# Patient Record
Sex: FEMALE | Race: WHITE | NOT HISPANIC OR LATINO | Employment: FULL TIME | URBAN - METROPOLITAN AREA
[De-identification: names, ages, dates, MRNs, and addresses within clinical notes are randomized per-mention and may not be internally consistent; named-entity substitution may affect disease eponyms.]

---

## 2017-01-27 ENCOUNTER — OFFICE VISIT (OUTPATIENT)
Dept: FAMILY MEDICINE CLINIC | Facility: CLINIC | Age: 57
End: 2017-01-27

## 2017-01-27 VITALS
HEART RATE: 87 BPM | OXYGEN SATURATION: 97 % | BODY MASS INDEX: 27.49 KG/M2 | TEMPERATURE: 98.7 F | HEIGHT: 64 IN | SYSTOLIC BLOOD PRESSURE: 122 MMHG | WEIGHT: 161 LBS | DIASTOLIC BLOOD PRESSURE: 84 MMHG

## 2017-01-27 DIAGNOSIS — R05.3 CHRONIC COUGH: Primary | ICD-10-CM

## 2017-01-27 PROCEDURE — 71020 XR CHEST PA AND LATERAL: CPT | Performed by: NURSE PRACTITIONER

## 2017-01-27 PROCEDURE — 99213 OFFICE O/P EST LOW 20 MIN: CPT | Performed by: NURSE PRACTITIONER

## 2017-01-27 RX ORDER — AZITHROMYCIN 250 MG/1
TABLET, FILM COATED ORAL
Qty: 5 TABLET | Refills: 0 | Status: SHIPPED | OUTPATIENT
Start: 2017-01-27 | End: 2017-05-04 | Stop reason: SDUPTHER

## 2017-01-27 RX ORDER — BENZONATATE 100 MG/1
100 CAPSULE ORAL 3 TIMES DAILY PRN
Qty: 30 CAPSULE | Refills: 0 | Status: SHIPPED | OUTPATIENT
Start: 2017-01-27 | End: 2017-05-09

## 2017-01-27 NOTE — PROGRESS NOTES
Subjective   Wilma Longo is a 56 y.o. female.     History of Present Illness   Wilma Longo 56 y.o. female who presents for evaluation of cough. Symptoms include dry cough and chest congestion and shortness of breath.  Onset of symptoms was 1 month ago, unchanged since that time. Patient denies fever.   Evaluation to date: none Treatment to date:  OTC cough suppressant. Reports she had upper respiratory infection before baljeet. Most symptoms have improved but the cough has lingered. Patient states it is not constant but that she will get into coughing fits.  She does reports dyspnea on exertion and feeling pressure in her chest.  She is very concerned about cardiovascular disease as both her parents had it and her mother  at 55.  Patient has seen cardiologist a few years ago for chest pain but workup was negative. Patient denies chest pain, shoulder pain or nausea currently or with coughing spells. She denies history of GERD and denies hoarseness or cough that is worse at night or in the morning. She states she is very careful with her diet due to colitis.  She is not a smoker .She reports being exposed to a lot of second hand smoke and vehicle emissions throughout her life.     The following portions of the patient's history were reviewed and updated as appropriate: allergies, current medications, past family history, past medical history, past social history, past surgical history and problem list.    Review of Systems   Constitutional: Negative for chills and fever.   HENT: Negative for congestion.    Respiratory: Positive for cough, chest tightness and shortness of breath.        Objective   Physical Exam   Constitutional: She is oriented to person, place, and time. She appears well-developed and well-nourished.   HENT:   Head: Normocephalic and atraumatic.   Cardiovascular: Normal rate and regular rhythm.    Pulmonary/Chest: Effort normal and breath sounds normal.   Neurological: She is alert  and oriented to person, place, and time.   Psychiatric: She has a normal mood and affect. Judgment normal.   Vitals reviewed.      Assessment/Plan   Wilma was seen today for cough and fatigue.    Diagnoses and all orders for this visit:    Chronic cough  -     azithromycin (ZITHROMAX Z-DIMAS) 250 MG tablet; Take 2 tablets the first day, then 1 tablet daily for 4 days.  -     benzonatate (TESSALON PERLES) 100 MG capsule; Take 1 capsule by mouth 3 (Three) Times a Day As Needed for cough.  -     XR Chest PA & Lateral (In Office)      2 view chest xray ordered.  Shortening of diaphragm noted compared to previous xray from 4/2014.  May be the angle of xray vs hypoinflation at time of imaging.    Will treat as bronchitis as patient had URI symptoms initially.  If no improvement, will hold lisinopril x 1 week to see if cough improves.  If still no improvement, will refer for allergy/asthma testing.

## 2017-03-10 DIAGNOSIS — E78.5 HYPERLIPEMIA: ICD-10-CM

## 2017-03-10 RX ORDER — PITAVASTATIN CALCIUM 1.04 MG/1
TABLET, FILM COATED ORAL
Qty: 30 TABLET | Refills: 4 | OUTPATIENT
Start: 2017-03-10

## 2017-03-16 DIAGNOSIS — E78.5 HYPERLIPEMIA: ICD-10-CM

## 2017-03-16 RX ORDER — PITAVASTATIN CALCIUM 1.04 MG/1
TABLET, FILM COATED ORAL
Qty: 30 TABLET | Refills: 0 | Status: SHIPPED | OUTPATIENT
Start: 2017-03-16 | End: 2017-05-09 | Stop reason: SDUPTHER

## 2017-05-04 ENCOUNTER — OFFICE VISIT (OUTPATIENT)
Dept: FAMILY MEDICINE CLINIC | Facility: CLINIC | Age: 57
End: 2017-05-04

## 2017-05-04 VITALS
DIASTOLIC BLOOD PRESSURE: 88 MMHG | WEIGHT: 171 LBS | HEART RATE: 97 BPM | RESPIRATION RATE: 16 BRPM | SYSTOLIC BLOOD PRESSURE: 134 MMHG | HEIGHT: 64 IN | BODY MASS INDEX: 29.19 KG/M2 | OXYGEN SATURATION: 95 % | TEMPERATURE: 99.9 F

## 2017-05-04 DIAGNOSIS — J40 BRONCHITIS: Primary | ICD-10-CM

## 2017-05-04 PROCEDURE — 99213 OFFICE O/P EST LOW 20 MIN: CPT | Performed by: NURSE PRACTITIONER

## 2017-05-04 RX ORDER — PREDNISONE 20 MG/1
20 TABLET ORAL 2 TIMES DAILY
Qty: 10 TABLET | Refills: 0 | Status: SHIPPED | OUTPATIENT
Start: 2017-05-04 | End: 2017-05-09

## 2017-05-04 RX ORDER — AZITHROMYCIN 250 MG/1
TABLET, FILM COATED ORAL
Qty: 5 TABLET | Refills: 0 | Status: SHIPPED | OUTPATIENT
Start: 2017-05-04 | End: 2017-05-09

## 2017-05-09 ENCOUNTER — OFFICE VISIT (OUTPATIENT)
Dept: FAMILY MEDICINE CLINIC | Facility: CLINIC | Age: 57
End: 2017-05-09

## 2017-05-09 VITALS
HEIGHT: 64 IN | HEART RATE: 74 BPM | TEMPERATURE: 98.5 F | BODY MASS INDEX: 29.02 KG/M2 | SYSTOLIC BLOOD PRESSURE: 120 MMHG | WEIGHT: 170 LBS | DIASTOLIC BLOOD PRESSURE: 68 MMHG | RESPIRATION RATE: 16 BRPM

## 2017-05-09 DIAGNOSIS — E03.9 ACQUIRED HYPOTHYROIDISM: ICD-10-CM

## 2017-05-09 DIAGNOSIS — E78.2 MIXED HYPERLIPIDEMIA: ICD-10-CM

## 2017-05-09 DIAGNOSIS — F39 MOOD DISORDER (HCC): ICD-10-CM

## 2017-05-09 DIAGNOSIS — R73.01 IMPAIRED FASTING GLUCOSE: Primary | ICD-10-CM

## 2017-05-09 DIAGNOSIS — E55.9 VITAMIN D DEFICIENCY: ICD-10-CM

## 2017-05-09 DIAGNOSIS — I10 BENIGN ESSENTIAL HYPERTENSION: ICD-10-CM

## 2017-05-09 PROCEDURE — 99214 OFFICE O/P EST MOD 30 MIN: CPT | Performed by: FAMILY MEDICINE

## 2017-05-09 RX ORDER — ALPRAZOLAM 0.5 MG/1
0.5 TABLET ORAL DAILY
Qty: 30 TABLET | Refills: 2 | Status: SHIPPED | OUTPATIENT
Start: 2017-05-09 | End: 2017-12-21 | Stop reason: SDUPTHER

## 2017-05-09 RX ORDER — LEVOTHYROXINE SODIUM 0.07 MG/1
75 TABLET ORAL DAILY
Qty: 30 TABLET | Refills: 5 | Status: SHIPPED | OUTPATIENT
Start: 2017-05-09 | End: 2017-12-21 | Stop reason: SDUPTHER

## 2017-05-09 RX ORDER — LISINOPRIL 10 MG/1
10 TABLET ORAL DAILY
Qty: 30 TABLET | Refills: 5 | Status: SHIPPED | OUTPATIENT
Start: 2017-05-09 | End: 2017-12-21 | Stop reason: SDUPTHER

## 2017-05-09 RX ORDER — ERGOCALCIFEROL 1.25 MG/1
50000 CAPSULE ORAL WEEKLY
Qty: 5 CAPSULE | Refills: 5 | Status: SHIPPED | OUTPATIENT
Start: 2017-05-09 | End: 2017-12-21 | Stop reason: SDUPTHER

## 2017-09-11 ENCOUNTER — APPOINTMENT (OUTPATIENT)
Dept: WOMENS IMAGING | Facility: HOSPITAL | Age: 57
End: 2017-09-11

## 2017-09-11 PROCEDURE — 77067 SCR MAMMO BI INCL CAD: CPT | Performed by: RADIOLOGY

## 2017-09-11 PROCEDURE — 77063 BREAST TOMOSYNTHESIS BI: CPT | Performed by: RADIOLOGY

## 2017-09-29 ENCOUNTER — HOSPITAL ENCOUNTER (EMERGENCY)
Facility: HOSPITAL | Age: 57
Discharge: LEFT BEFORE DISCHARGE | End: 2017-09-29

## 2017-09-29 VITALS
BODY MASS INDEX: 30.36 KG/M2 | RESPIRATION RATE: 18 BRPM | TEMPERATURE: 98.5 F | HEIGHT: 62 IN | WEIGHT: 165 LBS | OXYGEN SATURATION: 96 % | HEART RATE: 105 BPM

## 2017-09-29 PROCEDURE — 99211 OFF/OP EST MAY X REQ PHY/QHP: CPT

## 2017-09-29 PROCEDURE — 93005 ELECTROCARDIOGRAM TRACING: CPT

## 2017-09-29 PROCEDURE — 93010 ELECTROCARDIOGRAM REPORT: CPT | Performed by: INTERNAL MEDICINE

## 2017-10-04 ENCOUNTER — OFFICE VISIT (OUTPATIENT)
Dept: FAMILY MEDICINE CLINIC | Facility: CLINIC | Age: 57
End: 2017-10-04

## 2017-10-04 VITALS
TEMPERATURE: 98.7 F | BODY MASS INDEX: 31.28 KG/M2 | HEART RATE: 97 BPM | SYSTOLIC BLOOD PRESSURE: 137 MMHG | OXYGEN SATURATION: 99 % | DIASTOLIC BLOOD PRESSURE: 86 MMHG | RESPIRATION RATE: 18 BRPM | HEIGHT: 62 IN | WEIGHT: 170 LBS

## 2017-10-04 DIAGNOSIS — R42 DIZZINESS: Primary | ICD-10-CM

## 2017-10-04 DIAGNOSIS — R68.84 JAW PAIN: ICD-10-CM

## 2017-10-04 PROCEDURE — 99214 OFFICE O/P EST MOD 30 MIN: CPT | Performed by: NURSE PRACTITIONER

## 2017-10-04 RX ORDER — MECLIZINE HYDROCHLORIDE 25 MG/1
25 TABLET ORAL 3 TIMES DAILY PRN
Qty: 30 TABLET | Refills: 0 | Status: SHIPPED | OUTPATIENT
Start: 2017-10-04 | End: 2018-07-04

## 2017-10-04 RX ORDER — FLUTICASONE PROPIONATE 50 MCG
2 SPRAY, SUSPENSION (ML) NASAL DAILY
Qty: 1 BOTTLE | Refills: 5 | Status: SHIPPED | OUTPATIENT
Start: 2017-10-04 | End: 2019-01-15

## 2017-10-04 NOTE — PROGRESS NOTES
Subjective   Wilma Longo is a 57 y.o. female.     History of Present Illness   Wilma Longo 57 y.o. female who presents for evaluation of dizziness. The onset of symptoms were several days ago and are intermittent. The attacks occur daily and last a few minutes. Positions that worsen symptoms: none. Previous workup/treatments: EKG. Associated ear symptoms: aural pressure. Associated CNS symptoms: none. She denies synscopy, tinnitus, asymmetrical movements, ataxia, blackouts, blindness, parathesis, hearing loss, memory loss, fainting, involuntary movements, sensory loss, motor loss, eye pain, ear pain and ear discharge.   Recent infections: none. Head trauma: denied.  Patient states she had some pain radiating from her left ear to her jaw on Friday.  She went to ED but left as her EKG was unchanged and it was 6 hour wait. She reports continued intermittent dizziness.  States it is similar to the vertigo she had years ago. She states she felt like she might faint once on Friday. She denies jaw pain or chest pain.  She saw cardiology 3 years ago for workup which she states was negative.   The following portions of the patient's history were reviewed and updated as appropriate: allergies, current medications, past family history, past medical history, past social history, past surgical history and problem list.    Review of Systems   Constitutional: Negative for chills, fever and unexpected weight change.   HENT: Negative for congestion and ear pain.    Respiratory: Negative for shortness of breath.    Cardiovascular: Negative for chest pain and palpitations.   Neurological: Positive for dizziness. Negative for tremors, seizures, syncope, facial asymmetry, speech difficulty, weakness, light-headedness, numbness and headaches.   Psychiatric/Behavioral: Negative for behavioral problems.       Objective   Physical Exam   Constitutional: She is oriented to person, place, and time. She appears well-developed and  well-nourished.   HENT:   Right Ear: External ear and ear canal normal. Tympanic membrane is bulging. Tympanic membrane is not erythematous.   Left Ear: External ear and ear canal normal. Tympanic membrane is bulging. Tympanic membrane is not erythematous.   Cardiovascular: Normal rate and regular rhythm.    Pulmonary/Chest: Effort normal and breath sounds normal.   Neurological: She is alert and oriented to person, place, and time.   Psychiatric: She has a normal mood and affect. Judgment normal.   Nursing note and vitals reviewed.      Assessment/Plan   Wilma was seen today for dizziness.    Diagnoses and all orders for this visit:    Dizziness  -     Ambulatory Referral to Cardiology    Jaw pain  -     Ambulatory Referral to Cardiology    Other orders  -     meclizine (ANTIVERT) 25 MG tablet; Take 1 tablet by mouth 3 (Three) Times a Day As Needed for dizziness.  -     fluticasone (FLONASE) 50 MCG/ACT nasal spray; 2 sprays into each nostril Daily.          Patient will use flonase and meclizine.    She will go to ER if symptoms recur.

## 2017-10-22 LAB
ALBUMIN SERPL-MCNC: 3.9 G/DL (ref 3.5–5.5)
ALBUMIN/GLOB SERPL: 1.2 {RATIO} (ref 1.2–2.2)
ALP SERPL-CCNC: 61 IU/L (ref 39–117)
ALT SERPL-CCNC: 16 IU/L (ref 0–32)
AST SERPL-CCNC: 18 IU/L (ref 0–40)
BASOPHILS # BLD AUTO: 0.1 X10E3/UL (ref 0–0.2)
BASOPHILS NFR BLD AUTO: 1 %
BILIRUB SERPL-MCNC: 0.4 MG/DL (ref 0–1.2)
BUN SERPL-MCNC: 9 MG/DL (ref 6–24)
BUN/CREAT SERPL: 12 (ref 9–23)
CALCIUM SERPL-MCNC: 9.4 MG/DL (ref 8.7–10.2)
CHLORIDE SERPL-SCNC: 103 MMOL/L (ref 96–106)
CHOLEST SERPL-MCNC: 142 MG/DL (ref 100–199)
CO2 SERPL-SCNC: 25 MMOL/L (ref 18–29)
CREAT SERPL-MCNC: 0.76 MG/DL (ref 0.57–1)
EOSINOPHIL # BLD AUTO: 0.2 X10E3/UL (ref 0–0.4)
EOSINOPHIL NFR BLD AUTO: 2 %
ERYTHROCYTE [DISTWIDTH] IN BLOOD BY AUTOMATED COUNT: 14.3 % (ref 12.3–15.4)
GFR SERPLBLD CREATININE-BSD FMLA CKD-EPI: 101 ML/MIN/1.73
GFR SERPLBLD CREATININE-BSD FMLA CKD-EPI: 87 ML/MIN/1.73
GLOBULIN SER CALC-MCNC: 3.3 G/DL (ref 1.5–4.5)
GLUCOSE SERPL-MCNC: 105 MG/DL (ref 65–99)
HBA1C MFR BLD: 5.8 % (ref 4.8–5.6)
HCT VFR BLD AUTO: 40.4 % (ref 34–46.6)
HDLC SERPL-MCNC: 44 MG/DL
HGB BLD-MCNC: 13 G/DL (ref 11.1–15.9)
IMM GRANULOCYTES # BLD: 0 X10E3/UL (ref 0–0.1)
IMM GRANULOCYTES NFR BLD: 0 %
LDLC SERPL CALC-MCNC: 70 MG/DL (ref 0–99)
LYMPHOCYTES # BLD AUTO: 3 X10E3/UL (ref 0.7–3.1)
LYMPHOCYTES NFR BLD AUTO: 35 %
MCH RBC QN AUTO: 28.7 PG (ref 26.6–33)
MCHC RBC AUTO-ENTMCNC: 32.2 G/DL (ref 31.5–35.7)
MCV RBC AUTO: 89 FL (ref 79–97)
MONOCYTES # BLD AUTO: 0.8 X10E3/UL (ref 0.1–0.9)
MONOCYTES NFR BLD AUTO: 9 %
NEUTROPHILS # BLD AUTO: 4.4 X10E3/UL (ref 1.4–7)
NEUTROPHILS NFR BLD AUTO: 53 %
PLATELET # BLD AUTO: 293 X10E3/UL (ref 150–379)
POTASSIUM SERPL-SCNC: 4.4 MMOL/L (ref 3.5–5.2)
PROT SERPL-MCNC: 7.2 G/DL (ref 6–8.5)
RBC # BLD AUTO: 4.53 X10E6/UL (ref 3.77–5.28)
SODIUM SERPL-SCNC: 143 MMOL/L (ref 134–144)
T4 FREE SERPL-MCNC: 1.3 NG/DL (ref 0.82–1.77)
TRIGL SERPL-MCNC: 139 MG/DL (ref 0–149)
TSH SERPL DL<=0.005 MIU/L-ACNC: 1.7 UIU/ML (ref 0.45–4.5)
VLDLC SERPL CALC-MCNC: 28 MG/DL (ref 5–40)
WBC # BLD AUTO: 8.3 X10E3/UL (ref 3.4–10.8)

## 2017-12-05 DIAGNOSIS — E78.2 MIXED HYPERLIPIDEMIA: ICD-10-CM

## 2017-12-05 RX ORDER — PITAVASTATIN CALCIUM 1.04 MG/1
TABLET, FILM COATED ORAL
Qty: 30 TABLET | Refills: 0 | Status: SHIPPED | OUTPATIENT
Start: 2017-12-05 | End: 2017-12-21 | Stop reason: SDUPTHER

## 2017-12-21 ENCOUNTER — OFFICE VISIT (OUTPATIENT)
Dept: FAMILY MEDICINE CLINIC | Facility: CLINIC | Age: 57
End: 2017-12-21

## 2017-12-21 VITALS
SYSTOLIC BLOOD PRESSURE: 124 MMHG | HEIGHT: 64 IN | WEIGHT: 169 LBS | BODY MASS INDEX: 28.85 KG/M2 | DIASTOLIC BLOOD PRESSURE: 75 MMHG | RESPIRATION RATE: 16 BRPM | TEMPERATURE: 98.8 F | HEART RATE: 78 BPM

## 2017-12-21 DIAGNOSIS — I10 BENIGN ESSENTIAL HYPERTENSION: Primary | ICD-10-CM

## 2017-12-21 DIAGNOSIS — M79.7 FIBROMYALGIA: ICD-10-CM

## 2017-12-21 DIAGNOSIS — E55.9 VITAMIN D DEFICIENCY: ICD-10-CM

## 2017-12-21 DIAGNOSIS — F39 MOOD DISORDER (HCC): ICD-10-CM

## 2017-12-21 DIAGNOSIS — E03.9 ACQUIRED HYPOTHYROIDISM: ICD-10-CM

## 2017-12-21 DIAGNOSIS — E78.2 MIXED HYPERLIPIDEMIA: ICD-10-CM

## 2017-12-21 PROCEDURE — 99214 OFFICE O/P EST MOD 30 MIN: CPT | Performed by: FAMILY MEDICINE

## 2017-12-21 RX ORDER — ERGOCALCIFEROL 1.25 MG/1
50000 CAPSULE ORAL WEEKLY
Qty: 5 CAPSULE | Refills: 5 | Status: SHIPPED | OUTPATIENT
Start: 2017-12-21 | End: 2018-07-04 | Stop reason: SDUPTHER

## 2017-12-21 RX ORDER — DULOXETIN HYDROCHLORIDE 30 MG/1
30 CAPSULE, DELAYED RELEASE ORAL DAILY
Qty: 30 CAPSULE | Refills: 5 | Status: SHIPPED | OUTPATIENT
Start: 2017-12-21 | End: 2018-07-04 | Stop reason: SDUPTHER

## 2017-12-21 RX ORDER — LEVOTHYROXINE SODIUM 0.07 MG/1
75 TABLET ORAL DAILY
Qty: 30 TABLET | Refills: 5 | Status: SHIPPED | OUTPATIENT
Start: 2017-12-21 | End: 2018-07-04 | Stop reason: SDUPTHER

## 2017-12-21 RX ORDER — ALPRAZOLAM 0.5 MG/1
0.5 TABLET ORAL DAILY
Qty: 30 TABLET | Refills: 2 | Status: SHIPPED | OUTPATIENT
Start: 2017-12-21 | End: 2018-07-04 | Stop reason: SDUPTHER

## 2017-12-21 RX ORDER — LISINOPRIL 10 MG/1
10 TABLET ORAL DAILY
Qty: 30 TABLET | Refills: 5 | Status: SHIPPED | OUTPATIENT
Start: 2017-12-21 | End: 2018-07-04 | Stop reason: SDUPTHER

## 2017-12-21 NOTE — PROGRESS NOTES
Subjective   Wilma Longo is a 57 y.o. female.     History of Present Illness     Chief Complaint:   Chief Complaint   Patient presents with   • Hypertension     MED REFILL - SURESH    • Hyperlipidemia   • Hypothyroidism   • Anxiety   • LAB RESULTS       Wilma Longo 57 y.o. female who presents today for Medical Management of the below listed issues and medication refills.  she has a problem list of   Patient Active Problem List   Diagnosis   • Colitis   • Depression   • Benign essential hypertension   • Hypothyroidism   • Impaired fasting glucose   • Insomnia   • Irritable bowel syndrome   • Hyperlipidemia   • Mood disorder   • Vitamin D deficiency   • Fibromyalgia   .  Since the last visit, she has overall felt well medically, although has a several year h/o waxing/waning achiness and fatigue issues. Worse in cold weather. Has a FH of several people with Fibromyalgia. Also has the sleep disturbance with this.  she has been compliant with   Current Outpatient Prescriptions:   •  ALPRAZolam (XANAX) 0.5 MG tablet, Take 1 tablet by mouth Daily., Disp: 30 tablet, Rfl: 2  •  levothyroxine (SYNTHROID, LEVOTHROID) 75 MCG tablet, Take 1 tablet by mouth Daily., Disp: 30 tablet, Rfl: 5  •  lisinopril (PRINIVIL,ZESTRIL) 10 MG tablet, Take 1 tablet by mouth Daily., Disp: 30 tablet, Rfl: 5  •  pitavastatin calcium (LIVALO) 1 MG tablet tablet, Take 1 tablet by mouth Daily., Disp: 30 tablet, Rfl: 5  •  vitamin D (ERGOCALCIFEROL) 76738 units capsule capsule, Take 1 capsule by mouth 1 (One) Time Per Week., Disp: 5 capsule, Rfl: 5  •  DULoxetine (CYMBALTA) 30 MG capsule, Take 1 capsule by mouth Daily., Disp: 30 capsule, Rfl: 5  •  fluticasone (FLONASE) 50 MCG/ACT nasal spray, 2 sprays into each nostril Daily., Disp: 1 bottle, Rfl: 5  •  meclizine (ANTIVERT) 25 MG tablet, Take 1 tablet by mouth 3 (Three) Times a Day As Needed for dizziness., Disp: 30 tablet, Rfl: 0  •  mesalamine (LIALDA) 1.2 G EC tablet, Take 2 tablets by  "mouth daily with breakfast., Disp: 60 tablet, Rfl: 11.  she denies medication side effects.    All of the chronic condition(s) listed above are stable w/o issues.    /75  Pulse 78  Temp 98.8 °F (37.1 °C) (Oral)   Resp 16  Ht 162.6 cm (64\")  Wt 76.7 kg (169 lb)  BMI 29.01 kg/m2    Results for orders placed or performed in visit on 05/09/17   Comprehensive metabolic panel   Result Value Ref Range    Glucose 105 (H) 65 - 99 mg/dL    BUN 9 6 - 24 mg/dL    Creatinine 0.76 0.57 - 1.00 mg/dL    eGFR Non African Am 87 >59 mL/min/1.73    eGFR African Am 101 >59 mL/min/1.73    BUN/Creatinine Ratio 12 9 - 23    Sodium 143 134 - 144 mmol/L    Potassium 4.4 3.5 - 5.2 mmol/L    Chloride 103 96 - 106 mmol/L    Total CO2 25 18 - 29 mmol/L    Calcium 9.4 8.7 - 10.2 mg/dL    Total Protein 7.2 6.0 - 8.5 g/dL    Albumin 3.9 3.5 - 5.5 g/dL    Globulin 3.3 1.5 - 4.5 g/dL    A/G Ratio 1.2 1.2 - 2.2    Total Bilirubin 0.4 0.0 - 1.2 mg/dL    Alkaline Phosphatase 61 39 - 117 IU/L    AST (SGOT) 18 0 - 40 IU/L    ALT (SGPT) 16 0 - 32 IU/L   Lipid panel   Result Value Ref Range    Total Cholesterol 142 100 - 199 mg/dL    Triglycerides 139 0 - 149 mg/dL    HDL Cholesterol 44 >39 mg/dL    VLDL Cholesterol 28 5 - 40 mg/dL    LDL Cholesterol  70 0 - 99 mg/dL   TSH   Result Value Ref Range    TSH 1.700 0.450 - 4.500 uIU/mL   Hemoglobin A1c   Result Value Ref Range    Hemoglobin A1C 5.8 (H) 4.8 - 5.6 %   T4, Free   Result Value Ref Range    Free T4 1.30 0.82 - 1.77 ng/dL   CBC and Differential   Result Value Ref Range    WBC 8.3 3.4 - 10.8 x10E3/uL    RBC 4.53 3.77 - 5.28 x10E6/uL    Hemoglobin 13.0 11.1 - 15.9 g/dL    Hematocrit 40.4 34.0 - 46.6 %    MCV 89 79 - 97 fL    MCH 28.7 26.6 - 33.0 pg    MCHC 32.2 31.5 - 35.7 g/dL    RDW 14.3 12.3 - 15.4 %    Platelets 293 150 - 379 x10E3/uL    Neutrophil Rel % 53 Not Estab. %    Lymphocyte Rel % 35 Not Estab. %    Monocyte Rel % 9 Not Estab. %    Eosinophil Rel % 2 Not Estab. %    Basophil Rel " % 1 Not Estab. %    Neutrophils Absolute 4.4 1.4 - 7.0 x10E3/uL    Lymphocytes Absolute 3.0 0.7 - 3.1 x10E3/uL    Monocytes Absolute 0.8 0.1 - 0.9 x10E3/uL    Eosinophils Absolute 0.2 0.0 - 0.4 x10E3/uL    Basophils Absolute 0.1 0.0 - 0.2 x10E3/uL    Immature Granulocyte Rel % 0 Not Estab. %    Immature Grans Absolute 0.0 0.0 - 0.1 x10E3/uL           The following portions of the patient's history were reviewed and updated as appropriate: allergies, current medications, past family history, past medical history, past social history, past surgical history and problem list.    Review of Systems   Constitutional: Negative for activity change, chills, fatigue and fever.   Respiratory: Negative for cough and chest tightness.    Cardiovascular: Negative for chest pain and palpitations.   Gastrointestinal: Negative for abdominal pain and nausea.   Endocrine: Negative for cold intolerance.   Musculoskeletal: Positive for myalgias.   Psychiatric/Behavioral: Negative for behavioral problems and dysphoric mood.       Objective   Physical Exam   Constitutional: She appears well-developed and well-nourished.   Neck: Neck supple. No thyromegaly present.   Cardiovascular: Normal rate and regular rhythm.    No murmur heard.  Pulmonary/Chest: Effort normal and breath sounds normal.   Abdominal: Bowel sounds are normal.   Musculoskeletal: She exhibits tenderness (multiple upper/lower trigger points).   Psychiatric: She has a normal mood and affect. Her behavior is normal.   Nursing note and vitals reviewed.  Labs reviewed with pt today during visit. All questions answered.      Assessment/Plan   Wilma was seen today for hypertension, hyperlipidemia, hypothyroidism, anxiety and lab results.    Diagnoses and all orders for this visit:    Benign essential hypertension  -     lisinopril (PRINIVIL,ZESTRIL) 10 MG tablet; Take 1 tablet by mouth Daily.    Mood disorder  -     ALPRAZolam (XANAX) 0.5 MG tablet; Take 1 tablet by mouth  Daily.    Acquired hypothyroidism  -     levothyroxine (SYNTHROID, LEVOTHROID) 75 MCG tablet; Take 1 tablet by mouth Daily.    Mixed hyperlipidemia  -     pitavastatin calcium (LIVALO) 1 MG tablet tablet; Take 1 tablet by mouth Daily.    Vitamin D deficiency  -     vitamin D (ERGOCALCIFEROL) 20513 units capsule capsule; Take 1 capsule by mouth 1 (One) Time Per Week.    Fibromyalgia  -     DULoxetine (CYMBALTA) 30 MG capsule; Take 1 capsule by mouth Daily.    Pt to contact in 1 month if she wishes to increase the Cymbalta dose to 60mg.

## 2018-01-02 ENCOUNTER — OFFICE VISIT (OUTPATIENT)
Dept: FAMILY MEDICINE CLINIC | Facility: CLINIC | Age: 58
End: 2018-01-02

## 2018-01-02 VITALS
OXYGEN SATURATION: 98 % | WEIGHT: 165 LBS | SYSTOLIC BLOOD PRESSURE: 133 MMHG | RESPIRATION RATE: 18 BRPM | DIASTOLIC BLOOD PRESSURE: 81 MMHG | TEMPERATURE: 98.7 F | HEIGHT: 64 IN | HEART RATE: 90 BPM | BODY MASS INDEX: 28.17 KG/M2

## 2018-01-02 DIAGNOSIS — R68.89 FLU-LIKE SYMPTOMS: Primary | ICD-10-CM

## 2018-01-02 DIAGNOSIS — J40 BRONCHITIS: ICD-10-CM

## 2018-01-02 LAB
EXPIRATION DATE: NORMAL
FLUAV AG NPH QL: NEGATIVE
FLUBV AG NPH QL: NEGATIVE
INTERNAL CONTROL: NORMAL
Lab: NORMAL

## 2018-01-02 PROCEDURE — 99213 OFFICE O/P EST LOW 20 MIN: CPT | Performed by: NURSE PRACTITIONER

## 2018-01-02 PROCEDURE — 87804 INFLUENZA ASSAY W/OPTIC: CPT | Performed by: NURSE PRACTITIONER

## 2018-01-02 RX ORDER — DEXTROMETHORPHAN HYDROBROMIDE AND PROMETHAZINE HYDROCHLORIDE 15; 6.25 MG/5ML; MG/5ML
5 SYRUP ORAL NIGHTLY PRN
Qty: 120 ML | Refills: 0 | Status: SHIPPED | OUTPATIENT
Start: 2018-01-02 | End: 2018-01-12

## 2018-01-02 RX ORDER — PREDNISONE 20 MG/1
20 TABLET ORAL 2 TIMES DAILY
Qty: 14 TABLET | Refills: 0 | Status: SHIPPED | OUTPATIENT
Start: 2018-01-02 | End: 2018-07-04

## 2018-01-02 NOTE — PROGRESS NOTES
Subjective   Wilma Longo is a 57 y.o. female.     History of Present Illness   Wilma Longo 57 y.o. female who presents for evaluation of upper respiratory congestion, fever, cough. Symptoms include congestion, nasal blockage, productive cough, fever, chills and wheezing.  Onset of symptoms was 4 days ago, unchanged since that time. Patient denies pleuritic chest pain, dyspnea on exertion, history of asthma.   Evaluation to date: none Treatment to date:  OTC oral decongestants, OTC cough suppressant, Tylenol and Advil. Reports temp of 101 a couple of days ago.     The following portions of the patient's history were reviewed and updated as appropriate: allergies, current medications, past family history, past medical history, past social history, past surgical history and problem list.    Review of Systems   Constitutional: Positive for chills and fever.   HENT: Positive for congestion. Negative for ear pain, sinus pain and sinus pressure.    Respiratory: Positive for cough, chest tightness and wheezing.        Objective   Physical Exam   Constitutional: She is oriented to person, place, and time. She appears well-developed and well-nourished.   HENT:   Right Ear: Tympanic membrane, external ear and ear canal normal.   Left Ear: Tympanic membrane, external ear and ear canal normal.   Nose: Right sinus exhibits no maxillary sinus tenderness and no frontal sinus tenderness. Left sinus exhibits no maxillary sinus tenderness and no frontal sinus tenderness.   Mouth/Throat: Uvula is midline and oropharynx is clear and moist.   Cardiovascular: Normal rate and regular rhythm.    Pulmonary/Chest: Effort normal. She has wheezes in the right lower field and the left lower field.   Neurological: She is alert and oriented to person, place, and time.   Skin: Skin is warm.   Psychiatric: She has a normal mood and affect. Judgment normal.   Nursing note and vitals reviewed.      Assessment/Plan   Wilma was seen today  for uri, fever and nausea.    Diagnoses and all orders for this visit:    Flu-like symptoms  -     POCT Influenza A/B    Bronchitis  -     predniSONE (DELTASONE) 20 MG tablet; Take 1 tablet by mouth 2 (Two) Times a Day.  -     promethazine-dextromethorphan (PROMETHAZINE-DM) 6.25-15 MG/5ML syrup; Take 5 mL by mouth At Night As Needed for Cough for up to 10 days.

## 2018-07-04 NOTE — PROGRESS NOTES
"Subjective   Wilma Longo is a 58 y.o. female.     History of Present Illness     Chief Complaint:   Chief Complaint   Patient presents with   • Hypertension     med refill  - dai - no labs    • Hyperlipidemia   • Hypothyroidism   • Anxiety       Wilma Longo 58 y.o. female who presents today for Medical Management of the below listed issues and medication refills.  she has a problem list of   Patient Active Problem List   Diagnosis   • Colitis   • Depression   • Benign essential hypertension   • Hypothyroidism   • Impaired fasting glucose   • Insomnia   • Irritable bowel syndrome   • Hyperlipidemia   • Mood disorder (CMS/HCC)   • Vitamin D deficiency   • Fibromyalgia   .  Since the last visit, she has overall felt well.  she has been compliant with   Current Outpatient Prescriptions:   •  ALPRAZolam (XANAX) 0.5 MG tablet, Take 1 tablet by mouth Daily., Disp: 30 tablet, Rfl: 2  •  DULoxetine (CYMBALTA) 30 MG capsule, Take 1 capsule by mouth Daily., Disp: 30 capsule, Rfl: 5  •  fluticasone (FLONASE) 50 MCG/ACT nasal spray, 2 sprays into each nostril Daily., Disp: 1 bottle, Rfl: 5  •  levothyroxine (SYNTHROID, LEVOTHROID) 75 MCG tablet, Take 1 tablet by mouth Daily., Disp: 30 tablet, Rfl: 5  •  lisinopril (PRINIVIL,ZESTRIL) 10 MG tablet, Take 1 tablet by mouth Daily., Disp: 30 tablet, Rfl: 5  •  mesalamine (LIALDA) 1.2 G EC tablet, Take 2 tablets by mouth daily with breakfast., Disp: 60 tablet, Rfl: 11  •  pitavastatin calcium (LIVALO) 1 MG tablet tablet, Take 1 tablet by mouth Daily., Disp: 30 tablet, Rfl: 5  •  vitamin D (ERGOCALCIFEROL) 89224 units capsule capsule, Take 1 capsule by mouth 1 (One) Time Per Week., Disp: 5 capsule, Rfl: 5.  she denies medication side effects.    All of the chronic condition(s) listed above are stable w/o issues.    /76   Pulse 92   Temp 98.2 °F (36.8 °C) (Oral)   Resp 18   Ht 162.6 cm (64\")   Wt 75.3 kg (166 lb)   BMI 28.49 kg/m²     Results for orders placed or " performed in visit on 01/02/18   POCT Influenza A/B   Result Value Ref Range    Rapid Influenza A Ag negative     Rapid Influenza B Ag negative     Internal Control Passed Passed    Lot Number [id number]     Expiration Date 6/2,019            The following portions of the patient's history were reviewed and updated as appropriate: allergies, current medications, past family history, past medical history, past social history, past surgical history and problem list.    Review of Systems   Constitutional: Negative for activity change, chills, fatigue and fever.   Respiratory: Negative for cough and chest tightness.    Cardiovascular: Negative for chest pain and palpitations.   Gastrointestinal: Negative for abdominal pain and nausea.   Endocrine: Negative for cold intolerance.   Psychiatric/Behavioral: Negative for behavioral problems and dysphoric mood.       Objective   Physical Exam   Constitutional: She appears well-developed and well-nourished.   Neck: Neck supple. No thyromegaly present.   Cardiovascular: Normal rate and regular rhythm.    No murmur heard.  Pulmonary/Chest: Effort normal and breath sounds normal.   Abdominal: Bowel sounds are normal. There is no tenderness.   Neurological: She is alert.   Psychiatric: She has a normal mood and affect. Her behavior is normal.   Nursing note and vitals reviewed.      Assessment/Plan   Wilma was seen today for hypertension, hyperlipidemia, hypothyroidism and anxiety.    Diagnoses and all orders for this visit:    Benign essential hypertension  -     lisinopril (PRINIVIL,ZESTRIL) 10 MG tablet; Take 1 tablet by mouth Daily.  -     Basic Metabolic Panel    Mixed hyperlipidemia  -     pitavastatin calcium (LIVALO) 1 MG tablet tablet; Take 1 tablet by mouth Daily.    Vitamin D deficiency  -     vitamin D (ERGOCALCIFEROL) 17620 units capsule capsule; Take 1 capsule by mouth 1 (One) Time Per Week.    Acquired hypothyroidism  -     levothyroxine (SYNTHROID, LEVOTHROID) 75 MCG  tablet; Take 1 tablet by mouth Daily.    Fibromyalgia  -     DULoxetine (CYMBALTA) 30 MG capsule; Take 1 capsule by mouth Daily.    Mood disorder (CMS/HCC)  -     ALPRAZolam (XANAX) 0.5 MG tablet; Take 1 tablet by mouth Daily.    Impaired fasting glucose  -     Basic Metabolic Panel  -     Hemoglobin A1c

## 2018-07-05 ENCOUNTER — OFFICE VISIT (OUTPATIENT)
Dept: FAMILY MEDICINE CLINIC | Facility: CLINIC | Age: 58
End: 2018-07-05

## 2018-07-05 VITALS
SYSTOLIC BLOOD PRESSURE: 123 MMHG | DIASTOLIC BLOOD PRESSURE: 76 MMHG | RESPIRATION RATE: 18 BRPM | HEIGHT: 64 IN | TEMPERATURE: 98.2 F | BODY MASS INDEX: 28.34 KG/M2 | WEIGHT: 166 LBS | HEART RATE: 92 BPM

## 2018-07-05 DIAGNOSIS — E55.9 VITAMIN D DEFICIENCY: ICD-10-CM

## 2018-07-05 DIAGNOSIS — I10 BENIGN ESSENTIAL HYPERTENSION: Primary | ICD-10-CM

## 2018-07-05 DIAGNOSIS — E03.9 ACQUIRED HYPOTHYROIDISM: ICD-10-CM

## 2018-07-05 DIAGNOSIS — R73.01 IMPAIRED FASTING GLUCOSE: ICD-10-CM

## 2018-07-05 DIAGNOSIS — M79.7 FIBROMYALGIA: ICD-10-CM

## 2018-07-05 DIAGNOSIS — E78.2 MIXED HYPERLIPIDEMIA: ICD-10-CM

## 2018-07-05 DIAGNOSIS — F39 MOOD DISORDER (HCC): ICD-10-CM

## 2018-07-05 PROCEDURE — 99214 OFFICE O/P EST MOD 30 MIN: CPT | Performed by: FAMILY MEDICINE

## 2018-07-05 RX ORDER — ALPRAZOLAM 0.5 MG/1
0.5 TABLET ORAL DAILY
Qty: 30 TABLET | Refills: 2 | Status: SHIPPED | OUTPATIENT
Start: 2018-07-05 | End: 2019-01-15 | Stop reason: SDUPTHER

## 2018-07-05 RX ORDER — DULOXETIN HYDROCHLORIDE 30 MG/1
30 CAPSULE, DELAYED RELEASE ORAL DAILY
Qty: 30 CAPSULE | Refills: 5 | Status: SHIPPED | OUTPATIENT
Start: 2018-07-05 | End: 2019-01-15

## 2018-07-05 RX ORDER — LISINOPRIL 10 MG/1
10 TABLET ORAL DAILY
Qty: 30 TABLET | Refills: 5 | Status: SHIPPED | OUTPATIENT
Start: 2018-07-05 | End: 2019-01-15 | Stop reason: SDUPTHER

## 2018-07-05 RX ORDER — LEVOTHYROXINE SODIUM 0.07 MG/1
75 TABLET ORAL DAILY
Qty: 30 TABLET | Refills: 5 | Status: SHIPPED | OUTPATIENT
Start: 2018-07-05 | End: 2019-01-15 | Stop reason: SDUPTHER

## 2018-07-05 RX ORDER — ERGOCALCIFEROL 1.25 MG/1
50000 CAPSULE ORAL WEEKLY
Qty: 5 CAPSULE | Refills: 5 | Status: SHIPPED | OUTPATIENT
Start: 2018-07-05 | End: 2019-01-15 | Stop reason: SDUPTHER

## 2018-09-14 ENCOUNTER — APPOINTMENT (OUTPATIENT)
Dept: WOMENS IMAGING | Facility: HOSPITAL | Age: 58
End: 2018-09-14

## 2018-09-14 PROCEDURE — 77063 BREAST TOMOSYNTHESIS BI: CPT | Performed by: RADIOLOGY

## 2018-09-14 PROCEDURE — 77067 SCR MAMMO BI INCL CAD: CPT | Performed by: RADIOLOGY

## 2018-11-28 ENCOUNTER — OFFICE VISIT (OUTPATIENT)
Dept: FAMILY MEDICINE CLINIC | Facility: CLINIC | Age: 58
End: 2018-11-28

## 2018-11-28 VITALS
HEART RATE: 96 BPM | RESPIRATION RATE: 18 BRPM | BODY MASS INDEX: 28.51 KG/M2 | TEMPERATURE: 99 F | DIASTOLIC BLOOD PRESSURE: 83 MMHG | WEIGHT: 167 LBS | OXYGEN SATURATION: 97 % | SYSTOLIC BLOOD PRESSURE: 146 MMHG | HEIGHT: 64 IN

## 2018-11-28 DIAGNOSIS — J40 BRONCHITIS: Primary | ICD-10-CM

## 2018-11-28 PROCEDURE — 99213 OFFICE O/P EST LOW 20 MIN: CPT | Performed by: FAMILY MEDICINE

## 2018-11-28 PROCEDURE — 71046 X-RAY EXAM CHEST 2 VIEWS: CPT | Performed by: FAMILY MEDICINE

## 2018-11-28 RX ORDER — ALBUTEROL SULFATE 90 UG/1
2 AEROSOL, METERED RESPIRATORY (INHALATION) EVERY 4 HOURS PRN
Qty: 1 INHALER | Refills: 1 | Status: SHIPPED | OUTPATIENT
Start: 2018-11-28 | End: 2019-08-06

## 2018-11-28 RX ORDER — METHYLPREDNISOLONE 4 MG/1
TABLET ORAL
Qty: 21 TABLET | Refills: 0 | Status: SHIPPED | OUTPATIENT
Start: 2018-11-28 | End: 2019-01-15

## 2018-11-28 RX ORDER — AZITHROMYCIN 250 MG/1
TABLET, FILM COATED ORAL
Qty: 6 TABLET | Refills: 0 | Status: SHIPPED | OUTPATIENT
Start: 2018-11-28 | End: 2019-01-15

## 2018-11-28 NOTE — PROGRESS NOTES
"Subjective   Wilma Longo is a 58 y.o. female.     CC: Cough/Congestion    History of Present Illness     Pt comes in today c/o a cough x 3 weeks as well as congestion. Has some f/c, mild dyspnea, mild wheezing, colored d/c, and RN. Cough is keeping up at night,      The following portions of the patient's history were reviewed and updated as appropriate: allergies, current medications, past family history, past medical history, past social history, past surgical history and problem list.    Review of Systems   Constitutional: Negative for activity change, chills, fatigue and fever.   HENT: Positive for congestion.    Respiratory: Positive for cough. Negative for chest tightness.    Cardiovascular: Negative for chest pain and palpitations.   Gastrointestinal: Negative for abdominal pain and nausea.   Endocrine: Negative for cold intolerance.   Psychiatric/Behavioral: Negative for behavioral problems and dysphoric mood.     /83   Pulse 96   Temp 99 °F (37.2 °C) (Oral)   Resp 18   Ht 162.6 cm (64\")   Wt 75.8 kg (167 lb)   SpO2 97%   BMI 28.67 kg/m²     Objective   Physical Exam   Constitutional: She appears well-developed and well-nourished.   Neck: Neck supple. No thyromegaly present.   Cardiovascular: Normal rate and regular rhythm.   No murmur heard.  Pulmonary/Chest: Effort normal. She has wheezes. She has no rales.   Abdominal: Bowel sounds are normal. There is no tenderness.   Neurological: She is alert.   Psychiatric: She has a normal mood and affect. Her behavior is normal.   Nursing note and vitals reviewed.  CXR: ordered due to chronic cough, no comparison, read by me: WNL    Assessment/Plan   Wilma was seen today for cough and nasal congestion.    Diagnoses and all orders for this visit:    Bronchitis  -     XR Chest PA & Lateral (In Office)  -     MethylPREDNISolone (MEDROL) 4 MG tablet; follow package directions  -     HYDROcod Polst-CPM Polst ER (TUSSIONEX PENNKINETIC ER) 10-8 MG/5ML " ER suspension; Take 5 mL by mouth Every 12 (Twelve) Hours As Needed for Cough.  -     azithromycin (ZITHROMAX Z-DIMAS) 250 MG tablet; Take 2 tablets the first day, then 1 tablet daily for 4 days.  -     albuterol (PROVENTIL HFA;VENTOLIN HFA;PROAIR HFA) 108 (90 Base) MCG/ACT inhaler; Inhale 2 puffs Every 4 (Four) Hours As Needed for Wheezing.

## 2019-01-15 ENCOUNTER — OFFICE VISIT (OUTPATIENT)
Dept: FAMILY MEDICINE CLINIC | Facility: CLINIC | Age: 59
End: 2019-01-15

## 2019-01-15 VITALS
HEART RATE: 98 BPM | SYSTOLIC BLOOD PRESSURE: 110 MMHG | BODY MASS INDEX: 29.02 KG/M2 | DIASTOLIC BLOOD PRESSURE: 70 MMHG | HEIGHT: 64 IN | TEMPERATURE: 98.3 F | WEIGHT: 170 LBS | RESPIRATION RATE: 18 BRPM

## 2019-01-15 DIAGNOSIS — E03.9 ACQUIRED HYPOTHYROIDISM: ICD-10-CM

## 2019-01-15 DIAGNOSIS — F39 MOOD DISORDER (HCC): ICD-10-CM

## 2019-01-15 DIAGNOSIS — R73.01 IMPAIRED FASTING GLUCOSE: ICD-10-CM

## 2019-01-15 DIAGNOSIS — M79.7 FIBROMYALGIA: ICD-10-CM

## 2019-01-15 DIAGNOSIS — E55.9 VITAMIN D DEFICIENCY: ICD-10-CM

## 2019-01-15 DIAGNOSIS — E78.2 MIXED HYPERLIPIDEMIA: ICD-10-CM

## 2019-01-15 DIAGNOSIS — I10 BENIGN ESSENTIAL HYPERTENSION: Primary | ICD-10-CM

## 2019-01-15 PROCEDURE — 99214 OFFICE O/P EST MOD 30 MIN: CPT | Performed by: FAMILY MEDICINE

## 2019-01-15 RX ORDER — LEVOTHYROXINE SODIUM 0.07 MG/1
75 TABLET ORAL DAILY
Qty: 30 TABLET | Refills: 5 | Status: SHIPPED | OUTPATIENT
Start: 2019-01-15 | End: 2019-07-24 | Stop reason: SDUPTHER

## 2019-01-15 RX ORDER — ERGOCALCIFEROL 1.25 MG/1
50000 CAPSULE ORAL WEEKLY
Qty: 5 CAPSULE | Refills: 5 | Status: SHIPPED | OUTPATIENT
Start: 2019-01-15 | End: 2019-07-24 | Stop reason: SDUPTHER

## 2019-01-15 RX ORDER — DULOXETIN HYDROCHLORIDE 60 MG/1
60 CAPSULE, DELAYED RELEASE ORAL DAILY
Qty: 30 CAPSULE | Refills: 5 | Status: SHIPPED | OUTPATIENT
Start: 2019-01-15 | End: 2019-07-24 | Stop reason: SDUPTHER

## 2019-01-15 RX ORDER — LISINOPRIL 10 MG/1
10 TABLET ORAL DAILY
Qty: 30 TABLET | Refills: 5 | Status: SHIPPED | OUTPATIENT
Start: 2019-01-15 | End: 2019-07-24 | Stop reason: SDUPTHER

## 2019-01-15 RX ORDER — ALPRAZOLAM 0.5 MG/1
0.5 TABLET ORAL DAILY
Qty: 30 TABLET | Refills: 2 | Status: SHIPPED | OUTPATIENT
Start: 2019-01-15 | End: 2019-07-24 | Stop reason: SDUPTHER

## 2019-01-15 RX ORDER — DULOXETIN HYDROCHLORIDE 30 MG/1
30 CAPSULE, DELAYED RELEASE ORAL DAILY
Qty: 30 CAPSULE | Refills: 5 | Status: CANCELLED | OUTPATIENT
Start: 2019-01-15

## 2019-01-15 NOTE — PROGRESS NOTES
"Subjective   Wilma Longo is a 58 y.o. female.     History of Present Illness     Chief Complaint:   Chief Complaint   Patient presents with   • Hypertension   • Hyperlipidemia   • Hypothyroidism   • Anxiety   • Depression       Wilma Longo 58 y.o. female who presents today for Medical Management of the below listed issues and medication refills.  she has a problem list of   Patient Active Problem List   Diagnosis   • Colitis   • Depression   • Benign essential hypertension   • Hypothyroidism   • Impaired fasting glucose   • Insomnia   • Irritable bowel syndrome   • Hyperlipidemia   • Mood disorder (CMS/HCC)   • Vitamin D deficiency   • Fibromyalgia   .  Since the last visit, she has overall felt well.  she has been compliant with   Current Outpatient Medications:   •  ALPRAZolam (XANAX) 0.5 MG tablet, Take 1 tablet by mouth Daily., Disp: 30 tablet, Rfl: 2  •  DULoxetine (CYMBALTA) 60 MG capsule, Take 1 capsule by mouth Daily., Disp: 30 capsule, Rfl: 5  •  levothyroxine (SYNTHROID, LEVOTHROID) 75 MCG tablet, Take 1 tablet by mouth Daily., Disp: 30 tablet, Rfl: 5  •  lisinopril (PRINIVIL,ZESTRIL) 10 MG tablet, Take 1 tablet by mouth Daily., Disp: 30 tablet, Rfl: 5  •  pitavastatin calcium (LIVALO) 1 MG tablet tablet, Take 1 tablet by mouth Daily., Disp: 30 tablet, Rfl: 5  •  vitamin D (ERGOCALCIFEROL) 01518 units capsule capsule, Take 1 capsule by mouth 1 (One) Time Per Week., Disp: 5 capsule, Rfl: 5  •  albuterol (PROVENTIL HFA;VENTOLIN HFA;PROAIR HFA) 108 (90 Base) MCG/ACT inhaler, Inhale 2 puffs Every 4 (Four) Hours As Needed for Wheezing., Disp: 1 inhaler, Rfl: 1  •  mesalamine (LIALDA) 1.2 G EC tablet, Take 2 tablets by mouth daily with breakfast., Disp: 60 tablet, Rfl: 11.  she denies medication side effects.    All of the chronic condition(s) listed above are stable w/o issues.    /70   Pulse 98   Temp 98.3 °F (36.8 °C) (Oral)   Resp 18   Ht 162.6 cm (64\")   Wt 77.1 kg (170 lb)   BMI 29.18 " kg/m²     Results for orders placed or performed in visit on 01/02/18   POCT Influenza A/B   Result Value Ref Range    Rapid Influenza A Ag negative     Rapid Influenza B Ag negative     Internal Control Passed Passed    Lot Number [id number]     Expiration Date 6/2,019            The following portions of the patient's history were reviewed and updated as appropriate: allergies, current medications, past family history, past medical history, past social history, past surgical history and problem list.    Review of Systems   Constitutional: Negative for activity change, chills, fatigue and fever.   Respiratory: Negative for cough and chest tightness.    Cardiovascular: Negative for chest pain and palpitations.   Gastrointestinal: Negative for abdominal pain and nausea.   Endocrine: Negative for cold intolerance.   Psychiatric/Behavioral: Negative for behavioral problems and dysphoric mood.       Objective   Physical Exam   Constitutional: She appears well-developed and well-nourished.   Neck: Neck supple. No thyromegaly present.   Cardiovascular: Normal rate and regular rhythm.   No murmur heard.  Pulmonary/Chest: Effort normal and breath sounds normal.   Abdominal: Bowel sounds are normal. There is no tenderness.   Neurological: She is alert.   Psychiatric: She has a normal mood and affect. Her behavior is normal.   Nursing note and vitals reviewed.    The patient has read and signed the [facility] Controlled Substance Contract.  I will continue to see patient for regular follow up appointments.  They are well controlled on their medication.  SURESH has been reviewed by me and is updated every 3 months. The patient is aware of the potential for addiction and dependence.    Assessment/Plan   Wilma was seen today for hypertension, hyperlipidemia, hypothyroidism, anxiety and depression.    Diagnoses and all orders for this visit:    Benign essential hypertension  -     lisinopril (PRINIVIL,ZESTRIL) 10 MG tablet;  Take 1 tablet by mouth Daily.  -     Comprehensive metabolic panel  -     Lipid panel  -     CBC and Differential    Mood disorder (CMS/HCC)  -     ALPRAZolam (XANAX) 0.5 MG tablet; Take 1 tablet by mouth Daily.    Mixed hyperlipidemia  -     pitavastatin calcium (LIVALO) 1 MG tablet tablet; Take 1 tablet by mouth Daily.  -     Lipid panel    Vitamin D deficiency  -     vitamin D (ERGOCALCIFEROL) 22497 units capsule capsule; Take 1 capsule by mouth 1 (One) Time Per Week.    Fibromyalgia  -     DULoxetine (CYMBALTA) 60 MG capsule; Take 1 capsule by mouth Daily.    Acquired hypothyroidism  -     levothyroxine (SYNTHROID, LEVOTHROID) 75 MCG tablet; Take 1 tablet by mouth Daily.  -     TSH  -     T4, Free    Impaired fasting glucose  -     Hemoglobin A1c    Other orders  -     Cancel: DULoxetine (CYMBALTA) 30 MG capsule; Take 1 capsule by mouth Daily.  -     Cancel: Mammo Screening Bilateral With CAD; Future

## 2019-01-18 ENCOUNTER — PRIOR AUTHORIZATION (OUTPATIENT)
Dept: FAMILY MEDICINE CLINIC | Facility: CLINIC | Age: 59
End: 2019-01-18

## 2019-02-05 ENCOUNTER — TELEPHONE (OUTPATIENT)
Dept: FAMILY MEDICINE CLINIC | Facility: CLINIC | Age: 59
End: 2019-02-05

## 2019-02-05 DIAGNOSIS — E78.2 MIXED HYPERLIPIDEMIA: Primary | ICD-10-CM

## 2019-02-25 DIAGNOSIS — E78.2 MIXED HYPERLIPIDEMIA: ICD-10-CM

## 2019-03-05 RX ORDER — ROSUVASTATIN CALCIUM 5 MG/1
5 TABLET, COATED ORAL DAILY
Qty: 90 TABLET | Refills: 1 | Status: SHIPPED | OUTPATIENT
Start: 2019-03-05 | End: 2019-07-24 | Stop reason: SDUPTHER

## 2019-03-22 LAB
ALBUMIN SERPL-MCNC: 4.5 G/DL (ref 3.5–5.2)
ALBUMIN/GLOB SERPL: 1.5 G/DL
ALP SERPL-CCNC: 67 U/L (ref 39–117)
ALT SERPL-CCNC: 18 U/L (ref 1–33)
AST SERPL-CCNC: 20 U/L (ref 1–32)
BASOPHILS # BLD AUTO: 0.05 10*3/MM3 (ref 0–0.2)
BASOPHILS NFR BLD AUTO: 0.8 % (ref 0–1.5)
BILIRUB SERPL-MCNC: 0.4 MG/DL (ref 0.2–1.2)
BUN SERPL-MCNC: 13 MG/DL (ref 6–20)
BUN/CREAT SERPL: 15.9 (ref 7–25)
CALCIUM SERPL-MCNC: 9.6 MG/DL (ref 8.6–10.5)
CHLORIDE SERPL-SCNC: 106 MMOL/L (ref 98–107)
CHOLEST SERPL-MCNC: 160 MG/DL (ref 0–200)
CO2 SERPL-SCNC: 23.8 MMOL/L (ref 22–29)
CREAT SERPL-MCNC: 0.82 MG/DL (ref 0.57–1)
EOSINOPHIL # BLD AUTO: 0.08 10*3/MM3 (ref 0–0.4)
EOSINOPHIL NFR BLD AUTO: 1.2 % (ref 0.3–6.2)
ERYTHROCYTE [DISTWIDTH] IN BLOOD BY AUTOMATED COUNT: 12.8 % (ref 12.3–15.4)
GLOBULIN SER CALC-MCNC: 3 GM/DL
GLUCOSE SERPL-MCNC: 107 MG/DL (ref 65–99)
HBA1C MFR BLD: 5.96 % (ref 4.8–5.6)
HCT VFR BLD AUTO: 45.3 % (ref 34–46.6)
HDLC SERPL-MCNC: 53 MG/DL (ref 40–60)
HGB BLD-MCNC: 13.6 G/DL (ref 12–15.9)
IMM GRANULOCYTES # BLD AUTO: 0.03 10*3/MM3 (ref 0–0.05)
IMM GRANULOCYTES NFR BLD AUTO: 0.5 % (ref 0–0.5)
LDLC SERPL CALC-MCNC: 81 MG/DL (ref 0–100)
LYMPHOCYTES # BLD AUTO: 2.31 10*3/MM3 (ref 0.7–3.1)
LYMPHOCYTES NFR BLD AUTO: 35.2 % (ref 19.6–45.3)
MCH RBC QN AUTO: 28.9 PG (ref 26.6–33)
MCHC RBC AUTO-ENTMCNC: 30 G/DL (ref 31.5–35.7)
MCV RBC AUTO: 96.2 FL (ref 79–97)
MONOCYTES # BLD AUTO: 0.37 10*3/MM3 (ref 0.1–0.9)
MONOCYTES NFR BLD AUTO: 5.6 % (ref 5–12)
NEUTROPHILS # BLD AUTO: 3.72 10*3/MM3 (ref 1.4–7)
NEUTROPHILS NFR BLD AUTO: 56.7 % (ref 42.7–76)
NRBC BLD AUTO-RTO: 0.2 /100 WBC (ref 0–0)
PLATELET # BLD AUTO: 336 10*3/MM3 (ref 140–450)
POTASSIUM SERPL-SCNC: 5.1 MMOL/L (ref 3.5–5.2)
PROT SERPL-MCNC: 7.5 G/DL (ref 6–8.5)
RBC # BLD AUTO: 4.71 10*6/MM3 (ref 3.77–5.28)
SODIUM SERPL-SCNC: 146 MMOL/L (ref 136–145)
T4 FREE SERPL-MCNC: 1.15 NG/DL (ref 0.93–1.7)
TRIGL SERPL-MCNC: 129 MG/DL (ref 0–150)
TSH SERPL DL<=0.005 MIU/L-ACNC: 1.11 MIU/ML (ref 0.27–4.2)
VLDLC SERPL CALC-MCNC: 25.8 MG/DL (ref 5–40)
WBC # BLD AUTO: 6.56 10*3/MM3 (ref 3.4–10.8)

## 2019-07-24 ENCOUNTER — OFFICE VISIT (OUTPATIENT)
Dept: FAMILY MEDICINE CLINIC | Facility: CLINIC | Age: 59
End: 2019-07-24

## 2019-07-24 VITALS
BODY MASS INDEX: 29.02 KG/M2 | RESPIRATION RATE: 18 BRPM | TEMPERATURE: 98.7 F | HEIGHT: 64 IN | HEART RATE: 98 BPM | SYSTOLIC BLOOD PRESSURE: 119 MMHG | WEIGHT: 170 LBS | DIASTOLIC BLOOD PRESSURE: 74 MMHG

## 2019-07-24 DIAGNOSIS — E55.9 VITAMIN D DEFICIENCY: ICD-10-CM

## 2019-07-24 DIAGNOSIS — F39 MOOD DISORDER (HCC): ICD-10-CM

## 2019-07-24 DIAGNOSIS — E78.2 MIXED HYPERLIPIDEMIA: ICD-10-CM

## 2019-07-24 DIAGNOSIS — E03.9 ACQUIRED HYPOTHYROIDISM: ICD-10-CM

## 2019-07-24 DIAGNOSIS — I10 BENIGN ESSENTIAL HYPERTENSION: Primary | ICD-10-CM

## 2019-07-24 DIAGNOSIS — R13.11 ORAL PHASE DYSPHAGIA: ICD-10-CM

## 2019-07-24 DIAGNOSIS — M25.552 LEFT HIP PAIN: ICD-10-CM

## 2019-07-24 DIAGNOSIS — M79.7 FIBROMYALGIA: ICD-10-CM

## 2019-07-24 PROCEDURE — 99214 OFFICE O/P EST MOD 30 MIN: CPT | Performed by: FAMILY MEDICINE

## 2019-07-24 RX ORDER — ERGOCALCIFEROL 1.25 MG/1
50000 CAPSULE ORAL WEEKLY
Qty: 5 CAPSULE | Refills: 5 | Status: SHIPPED | OUTPATIENT
Start: 2019-07-24 | End: 2020-02-03 | Stop reason: SDUPTHER

## 2019-07-24 RX ORDER — ALPRAZOLAM 0.5 MG/1
0.5 TABLET ORAL DAILY
Qty: 30 TABLET | Refills: 2 | Status: SHIPPED | OUTPATIENT
Start: 2019-07-24 | End: 2020-02-03 | Stop reason: SDUPTHER

## 2019-07-24 RX ORDER — DULOXETIN HYDROCHLORIDE 60 MG/1
60 CAPSULE, DELAYED RELEASE ORAL DAILY
Qty: 30 CAPSULE | Refills: 5 | Status: SHIPPED | OUTPATIENT
Start: 2019-07-24 | End: 2020-02-03 | Stop reason: SDUPTHER

## 2019-07-24 RX ORDER — LISINOPRIL 10 MG/1
10 TABLET ORAL DAILY
Qty: 30 TABLET | Refills: 5 | Status: SHIPPED | OUTPATIENT
Start: 2019-07-24 | End: 2020-02-03 | Stop reason: SDUPTHER

## 2019-07-24 RX ORDER — LEVOTHYROXINE SODIUM 0.07 MG/1
75 TABLET ORAL DAILY
Qty: 30 TABLET | Refills: 5 | Status: SHIPPED | OUTPATIENT
Start: 2019-07-24 | End: 2020-02-03 | Stop reason: SDUPTHER

## 2019-07-24 RX ORDER — ROSUVASTATIN CALCIUM 5 MG/1
5 TABLET, COATED ORAL DAILY
Qty: 30 TABLET | Refills: 5 | Status: SHIPPED | OUTPATIENT
Start: 2019-07-24 | End: 2020-02-03 | Stop reason: SDUPTHER

## 2019-07-24 NOTE — PROGRESS NOTES
Subjective   Wilma Longo is a 59 y.o. female.     History of Present Illness     Chief Complaint:   Chief Complaint   Patient presents with   • Hypertension     med refill  - dai    • Hyperlipidemia   • Hypothyroidism   • Anxiety       Wilma Longo 59 y.o. female who presents today for Medical Management of the below listed issues and medication refills.  she has a problem list of   Patient Active Problem List   Diagnosis   • Colitis   • Depression   • Benign essential hypertension   • Hypothyroidism   • Impaired fasting glucose   • Insomnia   • Irritable bowel syndrome   • Hyperlipidemia   • Mood disorder (CMS/HCC)   • Vitamin D deficiency   • Fibromyalgia   .  Since the last visit, she has overall felt well regarding her regular medical issues. But, the left hip has been hurting x 1+ year and, no matter what she does, it continues to hurt and disturb sleep.  she has been compliant with   Current Outpatient Medications:   •  ALPRAZolam (XANAX) 0.5 MG tablet, Take 1 tablet by mouth Daily., Disp: 30 tablet, Rfl: 2  •  DULoxetine (CYMBALTA) 60 MG capsule, Take 1 capsule by mouth Daily., Disp: 30 capsule, Rfl: 5  •  levothyroxine (SYNTHROID, LEVOTHROID) 75 MCG tablet, Take 1 tablet by mouth Daily., Disp: 30 tablet, Rfl: 5  •  lisinopril (PRINIVIL,ZESTRIL) 10 MG tablet, Take 1 tablet by mouth Daily., Disp: 30 tablet, Rfl: 5  •  rosuvastatin (CRESTOR) 5 MG tablet, Take 1 tablet by mouth Daily., Disp: 30 tablet, Rfl: 5  •  vitamin D (ERGOCALCIFEROL) 02051 units capsule capsule, Take 1 capsule by mouth 1 (One) Time Per Week., Disp: 5 capsule, Rfl: 5  •  albuterol (PROVENTIL HFA;VENTOLIN HFA;PROAIR HFA) 108 (90 Base) MCG/ACT inhaler, Inhale 2 puffs Every 4 (Four) Hours As Needed for Wheezing., Disp: 1 inhaler, Rfl: 1  •  mesalamine (LIALDA) 1.2 G EC tablet, Take 2 tablets by mouth daily with breakfast., Disp: 60 tablet, Rfl: 11.  she denies medication side effects.    She also has had a several month h/o solid  "foods \"sticking\" with swallowing, at times, but more progressive. Does have some issues with GERD-sx in the middle of the PM, too.    All of the chronic condition(s) listed above are stable w/o issues.    /74   Pulse 98   Temp 98.7 °F (37.1 °C) (Oral)   Resp 18   Ht 162.6 cm (64\")   Wt 77.1 kg (170 lb)   BMI 29.18 kg/m²     Results for orders placed or performed in visit on 01/15/19   Comprehensive metabolic panel   Result Value Ref Range    Glucose 107 (H) 65 - 99 mg/dL    BUN 13 6 - 20 mg/dL    Creatinine 0.82 0.57 - 1.00 mg/dL    eGFR Non African Am 72 >60 mL/min/1.73    eGFR African Am 87 >60 mL/min/1.73    BUN/Creatinine Ratio 15.9 7.0 - 25.0    Sodium 146 (H) 136 - 145 mmol/L    Potassium 5.1 3.5 - 5.2 mmol/L    Chloride 106 98 - 107 mmol/L    Total CO2 23.8 22.0 - 29.0 mmol/L    Calcium 9.6 8.6 - 10.5 mg/dL    Total Protein 7.5 6.0 - 8.5 g/dL    Albumin 4.50 3.50 - 5.20 g/dL    Globulin 3.0 gm/dL    A/G Ratio 1.5 g/dL    Total Bilirubin 0.4 0.2 - 1.2 mg/dL    Alkaline Phosphatase 67 39 - 117 U/L    AST (SGOT) 20 1 - 32 U/L    ALT (SGPT) 18 1 - 33 U/L   Lipid panel   Result Value Ref Range    Total Cholesterol 160 0 - 200 mg/dL    Triglycerides 129 0 - 150 mg/dL    HDL Cholesterol 53 40 - 60 mg/dL    VLDL Cholesterol 25.8 5 - 40 mg/dL    LDL Cholesterol  81 0 - 100 mg/dL   TSH   Result Value Ref Range    TSH 1.110 0.270 - 4.200 mIU/mL   Hemoglobin A1c   Result Value Ref Range    Hemoglobin A1C 5.96 (H) 4.80 - 5.60 %   T4, Free   Result Value Ref Range    Free T4 1.15 0.93 - 1.70 ng/dL   CBC and Differential   Result Value Ref Range    WBC 6.56 3.40 - 10.80 10*3/mm3    RBC 4.71 3.77 - 5.28 10*6/mm3    Hemoglobin 13.6 12.0 - 15.9 g/dL    Hematocrit 45.3 34.0 - 46.6 %    MCV 96.2 79.0 - 97.0 fL    MCH 28.9 26.6 - 33.0 pg    MCHC 30.0 (L) 31.5 - 35.7 g/dL    RDW 12.8 12.3 - 15.4 %    Platelets 336 140 - 450 10*3/mm3    Neutrophil Rel % 56.7 42.7 - 76.0 %    Lymphocyte Rel % 35.2 19.6 - 45.3 %    " Monocyte Rel % 5.6 5.0 - 12.0 %    Eosinophil Rel % 1.2 0.3 - 6.2 %    Basophil Rel % 0.8 0.0 - 1.5 %    Neutrophils Absolute 3.72 1.40 - 7.00 10*3/mm3    Lymphocytes Absolute 2.31 0.70 - 3.10 10*3/mm3    Monocytes Absolute 0.37 0.10 - 0.90 10*3/mm3    Eosinophils Absolute 0.08 0.00 - 0.40 10*3/mm3    Basophils Absolute 0.05 0.00 - 0.20 10*3/mm3    Immature Granulocyte Rel % 0.5 0.0 - 0.5 %    Immature Grans Absolute 0.03 0.00 - 0.05 10*3/mm3    nRBC 0.2 (H) 0.0 - 0.0 /100 WBC           The following portions of the patient's history were reviewed and updated as appropriate: allergies, current medications, past family history, past medical history, past social history, past surgical history and problem list.    Review of Systems   Constitutional: Negative for activity change, chills, fatigue and fever.   Respiratory: Negative for cough and chest tightness.    Cardiovascular: Negative for chest pain and palpitations.   Gastrointestinal: Negative for abdominal pain and nausea.   Endocrine: Negative for cold intolerance.   Psychiatric/Behavioral: Negative for behavioral problems and dysphoric mood.       Objective   Physical Exam   Constitutional: She appears well-developed and well-nourished.   Neck: Neck supple. No thyromegaly present.   Cardiovascular: Normal rate and regular rhythm.   No murmur heard.  Pulmonary/Chest: Effort normal and breath sounds normal.   Abdominal: Bowel sounds are normal. There is no tenderness.   Neurological: She is alert.   Psychiatric: She has a normal mood and affect. Her behavior is normal.   Nursing note and vitals reviewed.    The patient has read and signed the [facility] Controlled Substance Contract.  I will continue to see patient for regular follow up appointments.  They are well controlled on their medication.  SURESH has been reviewed by me and is updated every 3 months. The patient is aware of the potential for addiction and dependence.    Assessment/Plan   Wilma was  seen today for hypertension, hyperlipidemia, hypothyroidism and anxiety.    Diagnoses and all orders for this visit:    Benign essential hypertension  -     lisinopril (PRINIVIL,ZESTRIL) 10 MG tablet; Take 1 tablet by mouth Daily.    Mood disorder (CMS/HCC)  -     ALPRAZolam (XANAX) 0.5 MG tablet; Take 1 tablet by mouth Daily.    Mixed hyperlipidemia  -     rosuvastatin (CRESTOR) 5 MG tablet; Take 1 tablet by mouth Daily.    Vitamin D deficiency  -     vitamin D (ERGOCALCIFEROL) 31061 units capsule capsule; Take 1 capsule by mouth 1 (One) Time Per Week.    Fibromyalgia  -     DULoxetine (CYMBALTA) 60 MG capsule; Take 1 capsule by mouth Daily.    Acquired hypothyroidism  -     levothyroxine (SYNTHROID, LEVOTHROID) 75 MCG tablet; Take 1 tablet by mouth Daily.    Left hip pain  -     Ambulatory Referral to Orthopedic Surgery    Oral phase dysphagia  -     Ambulatory Referral to Gastroenterology    Pt to elevate HOB and start PPI.

## 2019-08-06 ENCOUNTER — OFFICE VISIT (OUTPATIENT)
Dept: GASTROENTEROLOGY | Facility: CLINIC | Age: 59
End: 2019-08-06

## 2019-08-06 VITALS
WEIGHT: 165.5 LBS | HEART RATE: 97 BPM | SYSTOLIC BLOOD PRESSURE: 126 MMHG | BODY MASS INDEX: 28.25 KG/M2 | HEIGHT: 64 IN | DIASTOLIC BLOOD PRESSURE: 90 MMHG

## 2019-08-06 DIAGNOSIS — K52.9 COLITIS: ICD-10-CM

## 2019-08-06 DIAGNOSIS — R13.10 DYSPHAGIA, UNSPECIFIED TYPE: Primary | ICD-10-CM

## 2019-08-06 PROCEDURE — 99214 OFFICE O/P EST MOD 30 MIN: CPT | Performed by: INTERNAL MEDICINE

## 2019-08-06 RX ORDER — SODIUM CHLORIDE, SODIUM LACTATE, POTASSIUM CHLORIDE, CALCIUM CHLORIDE 600; 310; 30; 20 MG/100ML; MG/100ML; MG/100ML; MG/100ML
30 INJECTION, SOLUTION INTRAVENOUS CONTINUOUS
Status: CANCELLED | OUTPATIENT
Start: 2019-09-06

## 2019-08-06 NOTE — PROGRESS NOTES
Subjective   Wilma Longo is a 59 y.o.. female is here today for follow-up.    Chief Complaint   Patient presents with   • Difficulty Swallowing   • Diarrhea     Hx Colitis     History of Present Illness  Patient presents with a couple of issues.  First, she is having problems with dysphasia.  Oftentimes she feels like things are getting stuck at the level of the sternal notch.  Sometimes it sounds like simple transfer dysphasia, other times it sounds like she is able to initiate a swallow but cannot get it down.  In any case this is a new process and only been going on fairly recently.  She is not aware of anything that may provoke it.  It is associated with some heartburn.  Is not severe.  Her second issue is chronic colitis.  Her last colonoscopy was 2016.  It looked normal but background biopsies demonstrated pancolitis.  She is really not having any trouble with it now as long as she adheres to her dietary regimen.  She is not on any colitis specific medications.    The following portions of the patient's history were reviewed and updated as appropriate: allergies, current medications, past family history, past medical history, past social history, past surgical history and problem list.      Current Outpatient Medications:   •  ALPRAZolam (XANAX) 0.5 MG tablet, Take 1 tablet by mouth Daily., Disp: 30 tablet, Rfl: 2  •  DULoxetine (CYMBALTA) 60 MG capsule, Take 1 capsule by mouth Daily., Disp: 30 capsule, Rfl: 5  •  levothyroxine (SYNTHROID, LEVOTHROID) 75 MCG tablet, Take 1 tablet by mouth Daily., Disp: 30 tablet, Rfl: 5  •  lisinopril (PRINIVIL,ZESTRIL) 10 MG tablet, Take 1 tablet by mouth Daily., Disp: 30 tablet, Rfl: 5  •  rosuvastatin (CRESTOR) 5 MG tablet, Take 1 tablet by mouth Daily., Disp: 30 tablet, Rfl: 5  •  vitamin D (ERGOCALCIFEROL) 01878 units capsule capsule, Take 1 capsule by mouth 1 (One) Time Per Week., Disp: 5 capsule, Rfl: 5    Family History   Problem Relation Age of Onset   •  Depression Mother    • Heart disease Mother    • Stroke Mother    • Arthritis Father    • Heart disease Father    • Skin cancer Father    • Heart disease Maternal Grandmother    • Stroke Maternal Grandfather    • Stroke Paternal Grandfather    • Colon cancer Neg Hx    • Colon polyps Neg Hx    • Liver disease Neg Hx    • Rectal cancer Neg Hx    • Stomach cancer Neg Hx    • Liver cancer Neg Hx        Review of Systems   Respiratory: Negative for shortness of breath.    Cardiovascular: Negative for chest pain.   All other systems reviewed and are negative.      Objective   Physical Exam   Constitutional: She is oriented to person, place, and time. She appears well-developed and well-nourished.   HENT:   Head: Normocephalic and atraumatic.   Right Ear: External ear normal.   Left Ear: External ear normal.   Eyes: Conjunctivae and EOM are normal. Pupils are equal, round, and reactive to light.   Pulmonary/Chest: Effort normal.   Neurological: She is alert and oriented to person, place, and time.   Psychiatric: She has a normal mood and affect. Her behavior is normal. Judgment and thought content normal.   Nursing note and vitals reviewed.      Pertinent laboratory results were reviewed. , Pertinent old records were reviewed.  and Pertinent medical tests were reviewed.     Assessment/Plan   Problems Addressed this Visit        Digestive    Colitis    Relevant Orders    Case Request (Completed)    Dysphagia - Primary    Relevant Orders    Case Request (Completed)        We scheduled EGD and colonoscopy.

## 2019-08-30 ENCOUNTER — TELEPHONE (OUTPATIENT)
Dept: GASTROENTEROLOGY | Facility: CLINIC | Age: 59
End: 2019-08-30

## 2019-09-05 RX ORDER — VALACYCLOVIR HYDROCHLORIDE 500 MG/1
500 TABLET, FILM COATED ORAL AS NEEDED
COMMUNITY

## 2019-09-06 ENCOUNTER — HOSPITAL ENCOUNTER (OUTPATIENT)
Facility: HOSPITAL | Age: 59
Setting detail: HOSPITAL OUTPATIENT SURGERY
Discharge: HOME | End: 2019-09-06
Attending: INTERNAL MEDICINE | Admitting: INTERNAL MEDICINE

## 2019-09-06 ENCOUNTER — ANESTHESIA EVENT (OUTPATIENT)
Dept: GASTROENTEROLOGY | Facility: HOSPITAL | Age: 59
End: 2019-09-06

## 2019-09-06 ENCOUNTER — ANESTHESIA (OUTPATIENT)
Dept: GASTROENTEROLOGY | Facility: HOSPITAL | Age: 59
End: 2019-09-06

## 2019-09-06 VITALS
OXYGEN SATURATION: 99 % | DIASTOLIC BLOOD PRESSURE: 69 MMHG | HEART RATE: 85 BPM | HEIGHT: 64 IN | RESPIRATION RATE: 16 BRPM | SYSTOLIC BLOOD PRESSURE: 119 MMHG | BODY MASS INDEX: 29.02 KG/M2 | WEIGHT: 170 LBS

## 2019-09-06 DIAGNOSIS — R13.10 DYSPHAGIA, UNSPECIFIED TYPE: ICD-10-CM

## 2019-09-06 DIAGNOSIS — K52.9 COLITIS: ICD-10-CM

## 2019-09-06 PROCEDURE — 43239 EGD BIOPSY SINGLE/MULTIPLE: CPT | Performed by: INTERNAL MEDICINE

## 2019-09-06 PROCEDURE — 45378 DIAGNOSTIC COLONOSCOPY: CPT | Performed by: INTERNAL MEDICINE

## 2019-09-06 PROCEDURE — 43450 DILATE ESOPHAGUS 1/MULT PASS: CPT | Performed by: INTERNAL MEDICINE

## 2019-09-06 PROCEDURE — 25010000002 PROPOFOL 10 MG/ML EMULSION: Performed by: ANESTHESIOLOGY

## 2019-09-06 PROCEDURE — 88313 SPECIAL STAINS GROUP 2: CPT | Performed by: INTERNAL MEDICINE

## 2019-09-06 PROCEDURE — 88305 TISSUE EXAM BY PATHOLOGIST: CPT | Performed by: INTERNAL MEDICINE

## 2019-09-06 PROCEDURE — 25010000002 PHENYLEPHRINE PER 1 ML: Performed by: ANESTHESIOLOGY

## 2019-09-06 RX ORDER — LIDOCAINE HYDROCHLORIDE 20 MG/ML
INJECTION, SOLUTION INFILTRATION; PERINEURAL AS NEEDED
Status: DISCONTINUED | OUTPATIENT
Start: 2019-09-06 | End: 2019-09-06 | Stop reason: SURG

## 2019-09-06 RX ORDER — PROPOFOL 10 MG/ML
VIAL (ML) INTRAVENOUS CONTINUOUS PRN
Status: DISCONTINUED | OUTPATIENT
Start: 2019-09-06 | End: 2019-09-06 | Stop reason: SURG

## 2019-09-06 RX ORDER — SODIUM CHLORIDE, SODIUM LACTATE, POTASSIUM CHLORIDE, CALCIUM CHLORIDE 600; 310; 30; 20 MG/100ML; MG/100ML; MG/100ML; MG/100ML
30 INJECTION, SOLUTION INTRAVENOUS CONTINUOUS
Status: DISCONTINUED | OUTPATIENT
Start: 2019-09-06 | End: 2019-09-06 | Stop reason: HOSPADM

## 2019-09-06 RX ORDER — PROPOFOL 10 MG/ML
VIAL (ML) INTRAVENOUS AS NEEDED
Status: DISCONTINUED | OUTPATIENT
Start: 2019-09-06 | End: 2019-09-06 | Stop reason: SURG

## 2019-09-06 RX ADMIN — SODIUM CHLORIDE, POTASSIUM CHLORIDE, SODIUM LACTATE AND CALCIUM CHLORIDE 30 ML/HR: 600; 310; 30; 20 INJECTION, SOLUTION INTRAVENOUS at 08:35

## 2019-09-06 RX ADMIN — PHENYLEPHRINE HYDROCHLORIDE 100 MCG: 10 INJECTION INTRAVENOUS at 10:09

## 2019-09-06 RX ADMIN — PROPOFOL 140 MCG/KG/MIN: 10 INJECTION, EMULSION INTRAVENOUS at 09:43

## 2019-09-06 RX ADMIN — EPHEDRINE SULFATE 10 MG: 50 INJECTION INTRAMUSCULAR; INTRAVENOUS; SUBCUTANEOUS at 10:05

## 2019-09-06 RX ADMIN — EPHEDRINE SULFATE 10 MG: 50 INJECTION INTRAMUSCULAR; INTRAVENOUS; SUBCUTANEOUS at 10:01

## 2019-09-06 RX ADMIN — PROPOFOL 125 MG: 10 INJECTION, EMULSION INTRAVENOUS at 09:43

## 2019-09-06 RX ADMIN — LIDOCAINE HYDROCHLORIDE 50 MG: 20 INJECTION, SOLUTION INFILTRATION; PERINEURAL at 09:43

## 2019-09-06 NOTE — H&P
CC: Here for endoscopic evaluation.     HPI: Patient presents with a couple of issues.  First, she is having problems with dysphasia.  Oftentimes she feels like things are getting stuck at the level of the sternal notch.  Sometimes it sounds like simple transfer dysphasia, other times it sounds like she is able to initiate a swallow but cannot get it down.  In any case this is a new process and only been going on fairly recently.  She is not aware of anything that may provoke it.  It is associated with some heartburn.  Is not severe.  Her second issue is chronic colitis.  Her last colonoscopy was 2016.  It looked normal but background biopsies demonstrated pancolitis.  She is really not having any trouble with it now as long as she adheres to her dietary regimen.  She is not on any colitis specific medications.    Past Medical History:   Diagnosis Date   • Anxiety    • Benign essential hypertension 02/01/2015   • Colitis 06/12/2009   • Depression    • Fibromyalgia    • Hyperlipidemia 04/13/2011 2/1/2015   • Hypothyroidism 02/01/2015   • Insomnia 02/03/2015   • Irritable bowel syndrome 11/30/2009   • Panic attacks    • Skin cancer of arm    • Vitamin D deficiency 07/19/2012       Past Surgical History:   Procedure Laterality Date   • BLADDER REPAIR  09/2015    X2   • COLONOSCOPY  2010    [facility]   • COLONOSCOPY N/A 9/9/2016    Procedure: COLONOSCOPY into cecum and terminal ileum with biopsies;  Surgeon: Orlin Mann MD;  Location: [facility] ENDOSCOPY;  Service:    • HYSTERECTOMY  12/2013   • SKIN CANCER EXCISION         Prior to Admission medications    Medication Sig Start Date End Date Taking? Authorizing Provider   DULoxetine (CYMBALTA) 60 MG capsule Take 1 capsule by mouth Daily. 7/24/19  Yes Jerman Virgen MD   levothyroxine (SYNTHROID, LEVOTHROID) 75 MCG tablet Take 1 tablet by mouth Daily. 7/24/19  Yes Jerman Virgen MD   lisinopril (PRINIVIL,ZESTRIL) 10 MG tablet Take 1 tablet by mouth Daily. 7/24/19  Yes  Jerman Virgen MD   rosuvastatin (CRESTOR) 5 MG tablet Take 1 tablet by mouth Daily. 7/24/19  Yes Jerman Virgen MD   valACYclovir (VALTREX) 500 MG tablet Take 500 mg by mouth As Needed.   Yes Provider, MD Leoncio   vitamin D (ERGOCALCIFEROL) 43411 units capsule capsule Take 1 capsule by mouth 1 (One) Time Per Week.  Patient taking differently: Take 1.2 Units by mouth 1 (One) Time Per Week. 7/24/19  Yes Jerman Virgen MD   ALPRAZolam (XANAX) 0.5 MG tablet Take 1 tablet by mouth Daily.  Patient taking differently: Take 0.5 mg by mouth As Needed. 7/24/19   Jerman Virgen MD       No Known Allergies    Family History   Problem Relation Age of Onset   • Depression Mother    • Heart disease Mother    • Stroke Mother    • Arthritis Father    • Heart disease Father    • Skin cancer Father    • Heart disease Maternal Grandmother    • Stroke Maternal Grandfather    • Stroke Paternal Grandfather    • Colon cancer Neg Hx    • Colon polyps Neg Hx    • Liver disease Neg Hx    • Rectal cancer Neg Hx    • Stomach cancer Neg Hx    • Liver cancer Neg Hx    • Malig Hyperthermia Neg Hx        OBJECTIVE:    Patient's vital signs reviewed. No acute distress.     Chest is clear, no wheezing or rales. Normal symmetric air entry throughout both lung fields. No chest wall deformities or tenderness.    S1 and S2 normal, no murmurs, clicks, gallops or rubs. Regular rate and rhythm. Chest is clear; no wheezes or rales. No edema or JVD.    The abdomen is soft without tenderness, guarding, mass, rebound or organomegaly. Bowel sounds are normal. No CVA tenderness or inguinal adenopathy noted.    Patient is alert, oriented and with an intact memory.    Assessment: Dysphagia, chronic colitis.     Plan: EGD. Colonoscopy.

## 2019-09-06 NOTE — ANESTHESIA PREPROCEDURE EVALUATION
Anesthesia Evaluation     Patient summary reviewed   NPO Solid Status: > 8 hours  NPO Liquid Status: > 8 hours           Airway   Mallampati: III  TM distance: >3 FB  Neck ROM: full  No difficulty expected  Dental    (+) upper dentures and lower dentures    Pulmonary     breath sounds clear to auscultation  Cardiovascular   Exercise tolerance: good (4-7 METS)    Rate: normal        Neuro/Psych  GI/Hepatic/Renal/Endo      Musculoskeletal     Abdominal    Substance History      OB/GYN          Other                        Anesthesia Plan    ASA 2     MAC     intravenous induction   Anesthetic plan, all risks, benefits, and alternatives have been provided, discussed and informed consent has been obtained with: patient.

## 2019-09-10 LAB
CYTO UR: NORMAL
LAB AP CASE REPORT: NORMAL
LAB AP DIAGNOSIS COMMENT: NORMAL
PATH REPORT.ADDENDUM SPEC: NORMAL
PATH REPORT.FINAL DX SPEC: NORMAL
PATH REPORT.GROSS SPEC: NORMAL

## 2019-09-17 ENCOUNTER — APPOINTMENT (OUTPATIENT)
Dept: WOMENS IMAGING | Facility: HOSPITAL | Age: 59
End: 2019-09-17

## 2019-09-17 PROCEDURE — 77063 BREAST TOMOSYNTHESIS BI: CPT | Performed by: RADIOLOGY

## 2019-09-17 PROCEDURE — 77067 SCR MAMMO BI INCL CAD: CPT | Performed by: RADIOLOGY

## 2020-02-03 ENCOUNTER — OFFICE VISIT (OUTPATIENT)
Dept: FAMILY MEDICINE CLINIC | Facility: CLINIC | Age: 60
End: 2020-02-03

## 2020-02-03 VITALS
OXYGEN SATURATION: 98 % | WEIGHT: 150 LBS | SYSTOLIC BLOOD PRESSURE: 110 MMHG | TEMPERATURE: 98 F | DIASTOLIC BLOOD PRESSURE: 60 MMHG | HEIGHT: 64 IN | HEART RATE: 71 BPM | BODY MASS INDEX: 25.61 KG/M2 | RESPIRATION RATE: 16 BRPM

## 2020-02-03 DIAGNOSIS — E55.9 VITAMIN D DEFICIENCY: ICD-10-CM

## 2020-02-03 DIAGNOSIS — R73.01 IMPAIRED FASTING GLUCOSE: ICD-10-CM

## 2020-02-03 DIAGNOSIS — E03.9 ACQUIRED HYPOTHYROIDISM: ICD-10-CM

## 2020-02-03 DIAGNOSIS — M79.7 FIBROMYALGIA: ICD-10-CM

## 2020-02-03 DIAGNOSIS — F39 MOOD DISORDER (HCC): ICD-10-CM

## 2020-02-03 DIAGNOSIS — E78.2 MIXED HYPERLIPIDEMIA: ICD-10-CM

## 2020-02-03 DIAGNOSIS — I10 BENIGN ESSENTIAL HYPERTENSION: Primary | ICD-10-CM

## 2020-02-03 PROCEDURE — 99214 OFFICE O/P EST MOD 30 MIN: CPT | Performed by: FAMILY MEDICINE

## 2020-02-03 RX ORDER — ROSUVASTATIN CALCIUM 5 MG/1
5 TABLET, COATED ORAL DAILY
Qty: 30 TABLET | Refills: 5 | Status: SHIPPED | OUTPATIENT
Start: 2020-02-03 | End: 2020-08-24 | Stop reason: SDUPTHER

## 2020-02-03 RX ORDER — ALPRAZOLAM 0.5 MG/1
0.5 TABLET ORAL AS NEEDED
Qty: 30 TABLET | Refills: 2 | Status: SHIPPED | OUTPATIENT
Start: 2020-02-03 | End: 2020-02-03 | Stop reason: SDUPTHER

## 2020-02-03 RX ORDER — ALPRAZOLAM 0.5 MG/1
0.5 TABLET ORAL DAILY PRN
Qty: 30 TABLET | Refills: 2 | Status: SHIPPED | OUTPATIENT
Start: 2020-02-03 | End: 2021-04-19 | Stop reason: SDUPTHER

## 2020-02-03 RX ORDER — ERGOCALCIFEROL 1.25 MG/1
50000 CAPSULE ORAL WEEKLY
Qty: 5 CAPSULE | Refills: 5 | Status: SHIPPED | OUTPATIENT
Start: 2020-02-03 | End: 2020-08-24 | Stop reason: SDUPTHER

## 2020-02-03 RX ORDER — LEVOTHYROXINE SODIUM 0.07 MG/1
75 TABLET ORAL DAILY
Qty: 30 TABLET | Refills: 5 | Status: SHIPPED | OUTPATIENT
Start: 2020-02-03 | End: 2020-08-24 | Stop reason: SDUPTHER

## 2020-02-03 RX ORDER — DULOXETIN HYDROCHLORIDE 60 MG/1
60 CAPSULE, DELAYED RELEASE ORAL DAILY
Qty: 30 CAPSULE | Refills: 5 | Status: SHIPPED | OUTPATIENT
Start: 2020-02-03 | End: 2020-08-24 | Stop reason: SDUPTHER

## 2020-02-03 RX ORDER — LISINOPRIL 10 MG/1
10 TABLET ORAL DAILY
Qty: 30 TABLET | Refills: 5 | Status: SHIPPED | OUTPATIENT
Start: 2020-02-03 | End: 2020-08-24 | Stop reason: SDUPTHER

## 2020-02-03 NOTE — PROGRESS NOTES
"Subjective   Wilma Longo is a 59 y.o. female.     History of Present Illness     Chief Complaint:   Chief Complaint   Patient presents with   • Hypertension   • Anxiety   • Depression       Wilma Longo 59 y.o. female who presents today for Medical Management of the below listed issues and medication refills.  she has a problem list of   Patient Active Problem List   Diagnosis   • Colitis   • Depression   • Benign essential hypertension   • Hypothyroidism   • Impaired fasting glucose   • Insomnia   • Irritable bowel syndrome   • Hyperlipidemia   • Mood disorder (CMS/HCC)   • Vitamin D deficiency   • Fibromyalgia   • Dysphagia   .  Since the last visit, she has overall felt well. Has lose weight with Weight Wat[employer] and is feeling good.  she has been compliant with   Current Outpatient Medications:   •  ALPRAZolam (XANAX) 0.5 MG tablet, Take 1 tablet by mouth As Needed for Anxiety., Disp: 30 tablet, Rfl: 2  •  DULoxetine (CYMBALTA) 60 MG capsule, Take 1 capsule by mouth Daily., Disp: 30 capsule, Rfl: 5  •  levothyroxine (SYNTHROID, LEVOTHROID) 75 MCG tablet, Take 1 tablet by mouth Daily., Disp: 30 tablet, Rfl: 5  •  lisinopril (PRINIVIL,ZESTRIL) 10 MG tablet, Take 1 tablet by mouth Daily., Disp: 30 tablet, Rfl: 5  •  rosuvastatin (CRESTOR) 5 MG tablet, Take 1 tablet by mouth Daily., Disp: 30 tablet, Rfl: 5  •  valACYclovir (VALTREX) 500 MG tablet, Take 500 mg by mouth As Needed., Disp: , Rfl:   •  vitamin D (ERGOCALCIFEROL) 1.25 MG (68897 UT) capsule capsule, Take 1 capsule by mouth 1 (One) Time Per Week., Disp: 5 capsule, Rfl: 5.  she denies medication side effects.    All of the other chronic condition(s) listed above are stable w/o issues.    /60   Pulse 71   Temp 98 °F (36.7 °C)   Resp 16   Ht 162.6 cm (64\")   Wt 68 kg (150 lb)   SpO2 98%   BMI 25.75 kg/m²     Results for orders placed or performed during the hospital encounter of 09/06/19   Tissue Pathology Exam   Result Value Ref Range    " Addendum       An Alcian blue stain was performed on part 1 utilizing appropriate controls.  Focal strong acid mucin positivity is seen with rare goblet cell metaplasia.  This finding is consistent with focal intestinal metaplasia and a diagnosis of developing  Osman's esophagus. No dysplasia nor malignancy is identified.    Ana Paula./heidi      Case Report       Surgical Pathology Report                         Case: [id number]                                  Authorizing Provider:  Orlin Mann MD       Collected:           09/06/2019 09:53 AM          Ordering Location:     [facility]  Received:            09/06/2019 11:53 AM                                 ENDO SUITES                                                                  Pathologist:           Ananth Bolaños MD                                                         Specimen:    Esophagus, Distal, DISTAL ESOPAHGUS BIOPSIES                                               Final Diagnosis       1. Distal Esophagus Biopsy: Benign squamous and glandular mucosa with    A. Mild mixed acute and chronic inflammation with rare eosinophils and changes suggesting chronic reflux.   B. Focally suspicious for rare intestinal metaplasia by routine staining (see comment).   C. No dysplasia nor malignancy identified.    francist/ozzie       Comment       An Alcian blue stain will be performed on part 1 and reported separately.    ana paula/ozzie       Gross Description       1.  The specimen is received in formalin labeled with the patient's name and further designated 'distal esophagus biopsy' are multiple small fragments of gray-tan tissue. The specimen is submitted for embedding as received.        Microscopic Description       Performed, incorporated in diagnosis.              The following portions of the patient's history were reviewed and updated as appropriate: allergies, current medications, past family history, past medical history, past social history, past  surgical history and problem list.    Review of Systems   Constitutional: Negative for activity change, chills, fatigue and fever.   Respiratory: Negative for cough and chest tightness.    Cardiovascular: Negative for chest pain and palpitations.   Gastrointestinal: Negative for abdominal pain and nausea.   Endocrine: Negative for cold intolerance.   Psychiatric/Behavioral: Negative for behavioral problems and dysphoric mood.       Objective   Physical Exam   Constitutional: She appears well-developed and well-nourished.   Neck: Neck supple. No thyromegaly present.   Cardiovascular: Normal rate and regular rhythm.   No murmur heard.  Pulmonary/Chest: Effort normal and breath sounds normal.   Abdominal: Bowel sounds are normal. There is no tenderness.   Neurological: She is alert.   Psychiatric: She has a normal mood and affect. Her behavior is normal.   Nursing note and vitals reviewed.      Assessment/Plan   Wilma was seen today for hypertension, anxiety and depression.    Diagnoses and all orders for this visit:    Benign essential hypertension  -     lisinopril (PRINIVIL,ZESTRIL) 10 MG tablet; Take 1 tablet by mouth Daily.  -     Comprehensive metabolic panel  -     Lipid panel  -     CBC and Differential  -     TSH    Acquired hypothyroidism  -     levothyroxine (SYNTHROID, LEVOTHROID) 75 MCG tablet; Take 1 tablet by mouth Daily.  -     T4, Free    Mixed hyperlipidemia  -     rosuvastatin (CRESTOR) 5 MG tablet; Take 1 tablet by mouth Daily.  -     Lipid panel    Vitamin D deficiency  -     vitamin D (ERGOCALCIFEROL) 1.25 MG (38504 UT) capsule capsule; Take 1 capsule by mouth 1 (One) Time Per Week.  -     Vitamin D 25 Hydroxy    Impaired fasting glucose  -     Hemoglobin A1c    Fibromyalgia  -     DULoxetine (CYMBALTA) 60 MG capsule; Take 1 capsule by mouth Daily.    Mood disorder (CMS/HCC)  -     ALPRAZolam (XANAX) 0.5 MG tablet; Take 1 tablet by mouth As Needed for Anxiety.

## 2020-02-24 LAB
25(OH)D3+25(OH)D2 SERPL-MCNC: 73.2 NG/ML (ref 30–100)
ALBUMIN SERPL-MCNC: 4.2 G/DL (ref 3.8–4.9)
ALBUMIN/GLOB SERPL: 1.4 {RATIO} (ref 1.2–2.2)
ALP SERPL-CCNC: 59 IU/L (ref 39–117)
ALT SERPL-CCNC: 16 IU/L (ref 0–32)
AST SERPL-CCNC: 17 IU/L (ref 0–40)
BASOPHILS # BLD AUTO: 0.1 X10E3/UL (ref 0–0.2)
BASOPHILS NFR BLD AUTO: 1 %
BILIRUB SERPL-MCNC: 0.4 MG/DL (ref 0–1.2)
BUN SERPL-MCNC: 11 MG/DL (ref 6–24)
BUN/CREAT SERPL: 13 (ref 9–23)
CALCIUM SERPL-MCNC: 9.4 MG/DL (ref 8.7–10.2)
CHLORIDE SERPL-SCNC: 102 MMOL/L (ref 96–106)
CHOLEST SERPL-MCNC: 155 MG/DL (ref 100–199)
CO2 SERPL-SCNC: 23 MMOL/L (ref 20–29)
CREAT SERPL-MCNC: 0.86 MG/DL (ref 0.57–1)
EOSINOPHIL # BLD AUTO: 0.1 X10E3/UL (ref 0–0.4)
EOSINOPHIL NFR BLD AUTO: 2 %
ERYTHROCYTE [DISTWIDTH] IN BLOOD BY AUTOMATED COUNT: 13.1 % (ref 11.7–15.4)
GLOBULIN SER CALC-MCNC: 3 G/DL (ref 1.5–4.5)
GLUCOSE SERPL-MCNC: 96 MG/DL (ref 65–99)
HBA1C MFR BLD: 5.5 % (ref 4.8–5.6)
HCT VFR BLD AUTO: 41 % (ref 34–46.6)
HDLC SERPL-MCNC: 50 MG/DL
HGB BLD-MCNC: 12.9 G/DL (ref 11.1–15.9)
IMM GRANULOCYTES # BLD AUTO: 0 X10E3/UL (ref 0–0.1)
IMM GRANULOCYTES NFR BLD AUTO: 0 %
LDLC SERPL CALC-MCNC: 80 MG/DL (ref 0–99)
LYMPHOCYTES # BLD AUTO: 2.4 X10E3/UL (ref 0.7–3.1)
LYMPHOCYTES NFR BLD AUTO: 40 %
MCH RBC QN AUTO: 28.1 PG (ref 26.6–33)
MCHC RBC AUTO-ENTMCNC: 31.5 G/DL (ref 31.5–35.7)
MCV RBC AUTO: 89 FL (ref 79–97)
MONOCYTES # BLD AUTO: 0.5 X10E3/UL (ref 0.1–0.9)
MONOCYTES NFR BLD AUTO: 8 %
NEUTROPHILS # BLD AUTO: 3 X10E3/UL (ref 1.4–7)
NEUTROPHILS NFR BLD AUTO: 49 %
PLATELET # BLD AUTO: 354 X10E3/UL (ref 150–450)
POTASSIUM SERPL-SCNC: 4.3 MMOL/L (ref 3.5–5.2)
PROT SERPL-MCNC: 7.2 G/DL (ref 6–8.5)
RBC # BLD AUTO: 4.59 X10E6/UL (ref 3.77–5.28)
SODIUM SERPL-SCNC: 142 MMOL/L (ref 134–144)
T4 FREE SERPL-MCNC: 1.33 NG/DL (ref 0.82–1.77)
TRIGL SERPL-MCNC: 125 MG/DL (ref 0–149)
TSH SERPL DL<=0.005 MIU/L-ACNC: 0.56 UIU/ML (ref 0.45–4.5)
VLDLC SERPL CALC-MCNC: 25 MG/DL (ref 5–40)
WBC # BLD AUTO: 6.2 X10E3/UL (ref 3.4–10.8)

## 2020-08-24 ENCOUNTER — OFFICE VISIT (OUTPATIENT)
Dept: FAMILY MEDICINE CLINIC | Facility: CLINIC | Age: 60
End: 2020-08-24

## 2020-08-24 VITALS
DIASTOLIC BLOOD PRESSURE: 82 MMHG | BODY MASS INDEX: 25.78 KG/M2 | TEMPERATURE: 97.1 F | HEIGHT: 64 IN | HEART RATE: 89 BPM | RESPIRATION RATE: 16 BRPM | SYSTOLIC BLOOD PRESSURE: 133 MMHG | WEIGHT: 151 LBS

## 2020-08-24 DIAGNOSIS — M75.02 ADHESIVE CAPSULITIS OF LEFT SHOULDER: ICD-10-CM

## 2020-08-24 DIAGNOSIS — F39 MOOD DISORDER (HCC): ICD-10-CM

## 2020-08-24 DIAGNOSIS — E03.9 ACQUIRED HYPOTHYROIDISM: ICD-10-CM

## 2020-08-24 DIAGNOSIS — E78.2 MIXED HYPERLIPIDEMIA: ICD-10-CM

## 2020-08-24 DIAGNOSIS — E55.9 VITAMIN D DEFICIENCY: ICD-10-CM

## 2020-08-24 DIAGNOSIS — I10 BENIGN ESSENTIAL HYPERTENSION: Primary | ICD-10-CM

## 2020-08-24 DIAGNOSIS — M79.7 FIBROMYALGIA: ICD-10-CM

## 2020-08-24 PROCEDURE — 99214 OFFICE O/P EST MOD 30 MIN: CPT | Performed by: FAMILY MEDICINE

## 2020-08-24 RX ORDER — ALPRAZOLAM 0.5 MG/1
0.5 TABLET ORAL DAILY PRN
Qty: 30 TABLET | Refills: 2 | Status: CANCELLED | OUTPATIENT
Start: 2020-08-24

## 2020-08-24 RX ORDER — DULOXETIN HYDROCHLORIDE 60 MG/1
60 CAPSULE, DELAYED RELEASE ORAL DAILY
Qty: 30 CAPSULE | Refills: 5 | Status: SHIPPED | OUTPATIENT
Start: 2020-08-24 | End: 2021-04-17 | Stop reason: SDUPTHER

## 2020-08-24 RX ORDER — LEVOTHYROXINE SODIUM 0.07 MG/1
75 TABLET ORAL DAILY
Qty: 30 TABLET | Refills: 5 | Status: SHIPPED | OUTPATIENT
Start: 2020-08-24 | End: 2021-04-17 | Stop reason: SDUPTHER

## 2020-08-24 RX ORDER — LISINOPRIL 10 MG/1
10 TABLET ORAL DAILY
Qty: 30 TABLET | Refills: 5 | Status: SHIPPED | OUTPATIENT
Start: 2020-08-24 | End: 2021-04-17 | Stop reason: SDUPTHER

## 2020-08-24 RX ORDER — NAPROXEN 500 MG/1
500 TABLET ORAL DAILY
Qty: 30 TABLET | Refills: 5 | Status: SHIPPED | OUTPATIENT
Start: 2020-08-24 | End: 2021-04-17 | Stop reason: SDUPTHER

## 2020-08-24 RX ORDER — ROSUVASTATIN CALCIUM 5 MG/1
5 TABLET, COATED ORAL DAILY
Qty: 30 TABLET | Refills: 5 | Status: SHIPPED | OUTPATIENT
Start: 2020-08-24 | End: 2021-04-17 | Stop reason: SDUPTHER

## 2020-08-24 RX ORDER — ERGOCALCIFEROL 1.25 MG/1
50000 CAPSULE ORAL WEEKLY
Qty: 5 CAPSULE | Refills: 5 | Status: SHIPPED | OUTPATIENT
Start: 2020-08-24 | End: 2021-04-17 | Stop reason: SDUPTHER

## 2020-08-24 NOTE — PROGRESS NOTES
"Subjective   Wilma Longo is a 60 y.o. female.     History of Present Illness     Chief Complaint:   Chief Complaint   Patient presents with   • Hypertension   • Hypothyroidism   • Hyperlipidemia   • Mood Disorder       Wilma Longo 60 y.o. female who presents today for Medical Management of the below listed issues and medication refills.  she has a problem list of   Patient Active Problem List   Diagnosis   • Colitis   • Depression   • Benign essential hypertension   • Hypothyroidism   • Impaired fasting glucose   • Insomnia   • Irritable bowel syndrome   • Hyperlipidemia   • Mood disorder (CMS/HCC)   • Vitamin D deficiency   • Fibromyalgia   • Dysphagia   • Adhesive capsulitis of left shoulder   .  Since the last visit, she has overall felt well.  she has been compliant with   Current Outpatient Medications:   •  ALPRAZolam (XANAX) 0.5 MG tablet, Take 1 tablet by mouth Daily As Needed for Anxiety. D/c previous order, Disp: 30 tablet, Rfl: 2  •  DULoxetine (CYMBALTA) 60 MG capsule, Take 1 capsule by mouth Daily., Disp: 30 capsule, Rfl: 5  •  levothyroxine (SYNTHROID, LEVOTHROID) 75 MCG tablet, Take 1 tablet by mouth Daily., Disp: 30 tablet, Rfl: 5  •  lisinopril (PRINIVIL,ZESTRIL) 10 MG tablet, Take 1 tablet by mouth Daily., Disp: 30 tablet, Rfl: 5  •  naproxen (NAPROSYN) 500 MG tablet, Take 1 tablet by mouth Daily., Disp: 30 tablet, Rfl: 5  •  rosuvastatin (Crestor) 5 MG tablet, Take 1 tablet by mouth Daily., Disp: 30 tablet, Rfl: 5  •  valACYclovir (VALTREX) 500 MG tablet, Take 500 mg by mouth As Needed., Disp: , Rfl:   •  vitamin D (ERGOCALCIFEROL) 1.25 MG (27362 UT) capsule capsule, Take 1 capsule by mouth 1 (One) Time Per Week., Disp: 5 capsule, Rfl: 5.  she denies medication side effects.    All of the other chronic condition(s) listed above are stable w/o issues.    /82 (BP Location: Left arm, Patient Position: Standing)   Pulse 89   Temp 97.1 °F (36.2 °C)   Resp 16   Ht 162.6 cm (64\")   " Wt 68.5 kg (151 lb)   BMI 25.92 kg/m²     Results for orders placed or performed in visit on 02/03/20   Comprehensive metabolic panel   Result Value Ref Range    Glucose 96 65 - 99 mg/dL    BUN 11 6 - 24 mg/dL    Creatinine 0.86 0.57 - 1.00 mg/dL    eGFR Non African Am 74 >59 mL/min/1.73    eGFR African Am 86 >59 mL/min/1.73    BUN/Creatinine Ratio 13 9 - 23    Sodium 142 134 - 144 mmol/L    Potassium 4.3 3.5 - 5.2 mmol/L    Chloride 102 96 - 106 mmol/L    Total CO2 23 20 - 29 mmol/L    Calcium 9.4 8.7 - 10.2 mg/dL    Total Protein 7.2 6.0 - 8.5 g/dL    Albumin 4.2 3.8 - 4.9 g/dL    Globulin 3.0 1.5 - 4.5 g/dL    A/G Ratio 1.4 1.2 - 2.2    Total Bilirubin 0.4 0.0 - 1.2 mg/dL    Alkaline Phosphatase 59 39 - 117 IU/L    AST (SGOT) 17 0 - 40 IU/L    ALT (SGPT) 16 0 - 32 IU/L   Lipid panel   Result Value Ref Range    Total Cholesterol 155 100 - 199 mg/dL    Triglycerides 125 0 - 149 mg/dL    HDL Cholesterol 50 >39 mg/dL    VLDL Cholesterol 25 5 - 40 mg/dL    LDL Cholesterol  80 0 - 99 mg/dL   TSH   Result Value Ref Range    TSH 0.560 0.450 - 4.500 uIU/mL   Hemoglobin A1c   Result Value Ref Range    Hemoglobin A1C 5.5 4.8 - 5.6 %   T4, Free   Result Value Ref Range    Free T4 1.33 0.82 - 1.77 ng/dL   Vitamin D 25 Hydroxy   Result Value Ref Range    25 Hydroxy, Vitamin D 73.2 30.0 - 100.0 ng/mL   CBC and Differential   Result Value Ref Range    WBC 6.2 3.4 - 10.8 x10E3/uL    RBC 4.59 3.77 - 5.28 x10E6/uL    Hemoglobin 12.9 11.1 - 15.9 g/dL    Hematocrit 41.0 34.0 - 46.6 %    MCV 89 79 - 97 fL    MCH 28.1 26.6 - 33.0 pg    MCHC 31.5 31.5 - 35.7 g/dL    RDW 13.1 11.7 - 15.4 %    Platelets 354 150 - 450 x10E3/uL    Neutrophil Rel % 49 Not Estab. %    Lymphocyte Rel % 40 Not Estab. %    Monocyte Rel % 8 Not Estab. %    Eosinophil Rel % 2 Not Estab. %    Basophil Rel % 1 Not Estab. %    Neutrophils Absolute 3.0 1.4 - 7.0 x10E3/uL    Lymphocytes Absolute 2.4 0.7 - 3.1 x10E3/uL    Monocytes Absolute 0.5 0.1 - 0.9 x10E3/uL     Eosinophils Absolute 0.1 0.0 - 0.4 x10E3/uL    Basophils Absolute 0.1 0.0 - 0.2 x10E3/uL    Immature Granulocyte Rel % 0 Not Estab. %    Immature Grans Absolute 0.0 0.0 - 0.1 x10E3/uL     Pt get's her mammograms at [facility] and is UTD per pt.      The following portions of the patient's history were reviewed and updated as appropriate: allergies, current medications, past family history, past medical history, past social history, past surgical history and problem list.    Review of Systems   Constitutional: Negative for activity change, chills, fatigue and fever.   Respiratory: Negative for cough and chest tightness.    Cardiovascular: Negative for chest pain and palpitations.   Gastrointestinal: Negative for abdominal pain and nausea.   Endocrine: Negative for cold intolerance.   Psychiatric/Behavioral: Negative for behavioral problems and dysphoric mood.       Objective   Physical Exam   Constitutional: She appears well-developed and well-nourished.   Neck: Neck supple. No thyromegaly present.   Cardiovascular: Normal rate and regular rhythm.   No murmur heard.  Pulmonary/Chest: Effort normal and breath sounds normal.   Abdominal: Bowel sounds are normal. There is no tenderness.   Neurological: She is alert.   Psychiatric: She has a normal mood and affect. Her behavior is normal.   Nursing note and vitals reviewed.      Assessment/Plan   Wilma was seen today for hypertension, hypothyroidism, hyperlipidemia and mood disorder.    Diagnoses and all orders for this visit:    Benign essential hypertension  -     lisinopril (PRINIVIL,ZESTRIL) 10 MG tablet; Take 1 tablet by mouth Daily.    Acquired hypothyroidism  -     levothyroxine (SYNTHROID, LEVOTHROID) 75 MCG tablet; Take 1 tablet by mouth Daily.    Fibromyalgia  -     DULoxetine (CYMBALTA) 60 MG capsule; Take 1 capsule by mouth Daily.    Mood disorder (CMS/HCC)    Mixed hyperlipidemia  -     rosuvastatin (Crestor) 5 MG tablet; Take 1 tablet by mouth  Daily.    Vitamin D deficiency  -     vitamin D (ERGOCALCIFEROL) 1.25 MG (61753 UT) capsule capsule; Take 1 capsule by mouth 1 (One) Time Per Week.    Adhesive capsulitis of left shoulder  -     naproxen (NAPROSYN) 500 MG tablet; Take 1 tablet by mouth Daily.

## 2020-11-22 NOTE — PROGRESS NOTES
Subjective   Wilma Longo is a 60 y.o. female.     CC: Video Visit for URI-Sx    History of Present Illness     Pt seen today on a VV for cough/dyspnea, congestion, Ha, and fatigue for 14 days. Lots of aches and malaise. Was tested on 11/11 for the COVID/Flu/Strep and all negative. The [facility] asked her to rest/hyrate, etc. Pt got some better until the 18th, when she started back down a similar sx pathway.       Last EKG Qt interval 396.    The following portions of the patient's history were reviewed and updated as appropriate: allergies, current medications, past family history, past medical history, past social history, past surgical history and problem list.    Review of Systems   Constitutional: Negative for activity change, chills and fever.   HENT: Positive for congestion and sore throat.    Respiratory: Positive for cough and shortness of breath (mild, but stable).    Cardiovascular: Negative for chest pain.   Neurological: Positive for headaches.   Psychiatric/Behavioral: Negative for dysphoric mood.       Objective   Physical Exam  Constitutional:       General: She is not in acute distress.     Appearance: She is well-developed.   Pulmonary:      Effort: Pulmonary effort is normal.   Neurological:      Mental Status: She is alert and oriented to person, place, and time.   Psychiatric:         Behavior: Behavior normal.         Thought Content: Thought content normal.     Labs from [facility] reviewed by me today.    Assessment/Plan   Diagnoses and all orders for this visit:    1. Acute URI (Primary)  -     COVID-19,[facility] ROUTINE, NP/OP SWAB IN TRANSPORT MEDIA OR ESWAB 72 HR TAT - Swab, Nasopharynx; Future  -     azithromycin (Zithromax) 500 MG tablet; Take 1 tablet by mouth Daily for 5 days.  Dispense: 5 tablet; Refill: 0  -     hydroxychloroquine (PLAQUENIL) 200 MG tablet; Take 1 tablet by mouth 2 (Two) Times a Day for 7 days. Indications: Symptoms of COVID, suspect infection  Dispense: 14 tablet;  Refill: 0    Pt to start Zinc 50mg QD.  Spent  15   minutes with chart and interview and consent for this encounter given by the patient.  You have chosen to receive care through a telehealth visit.  Do you consent to use a video/audio connection for your medical care today? Yes

## 2020-11-23 ENCOUNTER — TELEMEDICINE (OUTPATIENT)
Dept: FAMILY MEDICINE CLINIC | Facility: CLINIC | Age: 60
End: 2020-11-23

## 2020-11-23 DIAGNOSIS — J06.9 ACUTE URI: Primary | ICD-10-CM

## 2020-11-23 PROCEDURE — 99213 OFFICE O/P EST LOW 20 MIN: CPT | Performed by: FAMILY MEDICINE

## 2020-11-23 RX ORDER — HYDROXYCHLOROQUINE SULFATE 200 MG/1
200 TABLET, FILM COATED ORAL 2 TIMES DAILY
Qty: 14 TABLET | Refills: 0 | Status: SHIPPED | OUTPATIENT
Start: 2020-11-23 | End: 2020-11-30

## 2020-11-23 RX ORDER — AZITHROMYCIN 500 MG/1
500 TABLET, FILM COATED ORAL DAILY
Qty: 5 TABLET | Refills: 0 | Status: SHIPPED | OUTPATIENT
Start: 2020-11-23 | End: 2020-11-28

## 2020-11-30 DIAGNOSIS — E03.9 ACQUIRED HYPOTHYROIDISM: Primary | ICD-10-CM

## 2020-11-30 DIAGNOSIS — I10 BENIGN ESSENTIAL HYPERTENSION: ICD-10-CM

## 2020-12-03 LAB
ALBUMIN SERPL-MCNC: 3.3 G/DL (ref 3.8–4.9)
ALBUMIN/GLOB SERPL: 0.8 {RATIO} (ref 1.2–2.2)
ALP SERPL-CCNC: 297 IU/L (ref 39–117)
ALT SERPL-CCNC: 113 IU/L (ref 0–32)
AST SERPL-CCNC: 133 IU/L (ref 0–40)
BASOPHILS # BLD AUTO: 0.1 X10E3/UL (ref 0–0.2)
BASOPHILS NFR BLD AUTO: 0 %
BILIRUB SERPL-MCNC: 0.4 MG/DL (ref 0–1.2)
BUN SERPL-MCNC: 16 MG/DL (ref 8–27)
BUN/CREAT SERPL: 17 (ref 12–28)
CALCIUM SERPL-MCNC: 9.2 MG/DL (ref 8.7–10.3)
CHLORIDE SERPL-SCNC: 97 MMOL/L (ref 96–106)
CO2 SERPL-SCNC: 21 MMOL/L (ref 20–29)
CREAT SERPL-MCNC: 0.94 MG/DL (ref 0.57–1)
EOSINOPHIL # BLD AUTO: 0 X10E3/UL (ref 0–0.4)
EOSINOPHIL NFR BLD AUTO: 0 %
ERYTHROCYTE [DISTWIDTH] IN BLOOD BY AUTOMATED COUNT: 12.8 % (ref 11.7–15.4)
GLOBULIN SER CALC-MCNC: 4.2 G/DL (ref 1.5–4.5)
GLUCOSE SERPL-MCNC: 103 MG/DL (ref 65–99)
HCT VFR BLD AUTO: 30.6 % (ref 34–46.6)
HGB BLD-MCNC: 10 G/DL (ref 11.1–15.9)
IMM GRANULOCYTES # BLD AUTO: 0.1 X10E3/UL (ref 0–0.1)
IMM GRANULOCYTES NFR BLD AUTO: 1 %
LYMPHOCYTES # BLD AUTO: 2.2 X10E3/UL (ref 0.7–3.1)
LYMPHOCYTES NFR BLD AUTO: 13 %
MCH RBC QN AUTO: 27.5 PG (ref 26.6–33)
MCHC RBC AUTO-ENTMCNC: 32.7 G/DL (ref 31.5–35.7)
MCV RBC AUTO: 84 FL (ref 79–97)
MONOCYTES # BLD AUTO: 1.7 X10E3/UL (ref 0.1–0.9)
MONOCYTES NFR BLD AUTO: 10 %
NEUTROPHILS # BLD AUTO: 12.7 X10E3/UL (ref 1.4–7)
NEUTROPHILS NFR BLD AUTO: 76 %
PLATELET # BLD AUTO: 591 X10E3/UL (ref 150–450)
POTASSIUM SERPL-SCNC: 5 MMOL/L (ref 3.5–5.2)
PROT SERPL-MCNC: 7.5 G/DL (ref 6–8.5)
RBC # BLD AUTO: 3.64 X10E6/UL (ref 3.77–5.28)
SODIUM SERPL-SCNC: 135 MMOL/L (ref 134–144)
T4 FREE SERPL-MCNC: 1.38 NG/DL (ref 0.82–1.77)
TSH SERPL DL<=0.005 MIU/L-ACNC: 2.41 UIU/ML (ref 0.45–4.5)
WBC # BLD AUTO: 16.8 X10E3/UL (ref 3.4–10.8)

## 2020-12-05 DIAGNOSIS — R79.89 ELEVATED LFTS: Primary | ICD-10-CM

## 2020-12-08 ENCOUNTER — TELEMEDICINE (OUTPATIENT)
Dept: FAMILY MEDICINE CLINIC | Facility: CLINIC | Age: 60
End: 2020-12-08

## 2020-12-08 DIAGNOSIS — D72.829 LEUKOCYTOSIS, UNSPECIFIED TYPE: Primary | ICD-10-CM

## 2020-12-08 DIAGNOSIS — N39.0 URINARY TRACT INFECTION WITHOUT HEMATURIA, SITE UNSPECIFIED: ICD-10-CM

## 2020-12-08 DIAGNOSIS — R79.89 ELEVATED LFTS: ICD-10-CM

## 2020-12-08 DIAGNOSIS — D64.9 ANEMIA, UNSPECIFIED TYPE: ICD-10-CM

## 2020-12-08 PROCEDURE — 99214 OFFICE O/P EST MOD 30 MIN: CPT | Performed by: FAMILY MEDICINE

## 2020-12-08 RX ORDER — CIPROFLOXACIN 250 MG/1
250 TABLET, FILM COATED ORAL
Qty: 14 TABLET | Refills: 0 | Status: SHIPPED | OUTPATIENT
Start: 2020-12-08 | End: 2020-12-15

## 2020-12-08 NOTE — PROGRESS NOTES
Subjective   Wilma Longo is a 60 y.o. female.     CC: Video Visit for Abnormal Labs    History of Present Illness     Pt seen today on a VV after recent labs (12/2/20) showed rather elevated LFTs, elevated WBCs, along with anemia. My message back to her was as follows:     Well, I think we are on the path to figuring out why you feel so bad! Your liver enzymes are quite elevated (go ahead and HOLD the Crestor at the current time), you are rather anemic, and your WBC count is elevated. I just ordered a bunch more labs for you to run by and do (not fasting), an ultrasound of your liver, and a consult with the gastro doctors. I'll let you know when I get them all back. That said, if I had to guess, I would think this is all caused by a viral infection (? COVID?). Either way, we need to work it up.    The orders and u/s are in yet not completed yet. Pt also recent saw her GYN, where she was found to have a UTI and was placed on Macrobid.    Her 11/23/20 COVID testing was NEGATIVE.       The following portions of the patient's history were reviewed and updated as appropriate: allergies, current medications, past family history, past medical history, past social history, past surgical history and problem list.    Review of Systems   Constitutional: Positive for fever. Negative for activity change and chills.   Respiratory: Negative for cough.    Cardiovascular: Negative for chest pain.   Neurological: Positive for headaches.   Psychiatric/Behavioral: Negative for dysphoric mood.       Objective   Physical Exam  Constitutional:       General: She is not in acute distress.     Appearance: She is well-developed.   Pulmonary:      Effort: Pulmonary effort is normal.   Neurological:      Mental Status: She is alert and oriented to person, place, and time.   Psychiatric:         Behavior: Behavior normal.         Thought Content: Thought content normal.     Labs reviewed with pt today during visit. All questions  answered.  Review of urine culture shows Macrobid to be nonaffective with the current infection.      Assessment/Plan   Diagnoses and all orders for this visit:    1. Leukocytosis, unspecified type (Primary)    2. Anemia, unspecified type    3. Elevated LFTs    4. Urinary tract infection without hematuria, site unspecified  -     ciprofloxacin (Cipro) 250 MG tablet; Take 1 tablet by mouth 2 (Two) Times a Day for 7 days.  Dispense: 14 tablet; Refill: 0    Pt to complete the labs, get the u/s, and keep the GI appt. No Crestor or Tylenol at this time.  Spent 23    minutes with chart and interview and consent for this encounter given by the patient.  You have chosen to receive care through a telehealth visit.  Do you consent to use a video/audio connection for your medical care today? Yes

## 2020-12-10 LAB
ALBUMIN SERPL-MCNC: 3.4 G/DL (ref 3.5–5.2)
ALBUMIN/GLOB SERPL: 0.9 G/DL
ALP BONE CFR SERPL: 22 % (ref 14–68)
ALP INTEST CFR SERPL: 4 % (ref 0–18)
ALP LIVER CFR SERPL: 74 % (ref 18–85)
ALP SERPL-CCNC: 168 U/L (ref 39–117)
ALT SERPL-CCNC: 38 U/L (ref 1–33)
AST SERPL-CCNC: 17 U/L (ref 1–32)
BASOPHILS # BLD AUTO: 0.09 10*3/MM3 (ref 0–0.2)
BASOPHILS NFR BLD AUTO: 0.7 % (ref 0–1.5)
BILIRUB SERPL-MCNC: 0.2 MG/DL (ref 0–1.2)
BUN SERPL-MCNC: 19 MG/DL (ref 8–23)
BUN/CREAT SERPL: 22.6 (ref 7–25)
CALCIUM SERPL-MCNC: 9.2 MG/DL (ref 8.6–10.5)
CHLORIDE SERPL-SCNC: 97 MMOL/L (ref 98–107)
CMV IGG SERPL IA-ACNC: >10 U/ML (ref 0–0.59)
CMV IGM SERPL IA-ACNC: <30 AU/ML (ref 0–29.9)
CO2 SERPL-SCNC: 29.5 MMOL/L (ref 22–29)
CREAT SERPL-MCNC: 0.84 MG/DL (ref 0.57–1)
EBV EA IGG SER-ACNC: >150 U/ML (ref 0–8.9)
EBV VCA IGG SER IA-ACNC: 354 U/ML (ref 0–17.9)
EOSINOPHIL # BLD AUTO: 0.24 10*3/MM3 (ref 0–0.4)
EOSINOPHIL NFR BLD AUTO: 1.8 % (ref 0.3–6.2)
ERYTHROCYTE [DISTWIDTH] IN BLOOD BY AUTOMATED COUNT: 12.8 % (ref 12.3–15.4)
GGT SERPL-CCNC: 113 U/L (ref 5–36)
GLOBULIN SER CALC-MCNC: 4 GM/DL
GLUCOSE SERPL-MCNC: 98 MG/DL (ref 65–99)
HAV IGM SERPL QL IA: NEGATIVE
HBV CORE IGM SERPL QL IA: NEGATIVE
HBV SURFACE AG SERPL QL IA: NEGATIVE
HCT VFR BLD AUTO: 32.1 % (ref 34–46.6)
HCV AB S/CO SERPL IA: <0.1 S/CO RATIO (ref 0–0.9)
HGB BLD-MCNC: 10.5 G/DL (ref 12–15.9)
IMM GRANULOCYTES # BLD AUTO: 0.12 10*3/MM3 (ref 0–0.05)
IMM GRANULOCYTES NFR BLD AUTO: 0.9 % (ref 0–0.5)
LYMPHOCYTES # BLD AUTO: 3.67 10*3/MM3 (ref 0.7–3.1)
LYMPHOCYTES NFR BLD AUTO: 27.7 % (ref 19.6–45.3)
MCH RBC QN AUTO: 27.4 PG (ref 26.6–33)
MCHC RBC AUTO-ENTMCNC: 32.7 G/DL (ref 31.5–35.7)
MCV RBC AUTO: 83.8 FL (ref 79–97)
MONOCYTES # BLD AUTO: 1.46 10*3/MM3 (ref 0.1–0.9)
MONOCYTES NFR BLD AUTO: 11 % (ref 5–12)
NEUTROPHILS # BLD AUTO: 7.65 10*3/MM3 (ref 1.7–7)
NEUTROPHILS NFR BLD AUTO: 57.9 % (ref 42.7–76)
NRBC BLD AUTO-RTO: 0 /100 WBC (ref 0–0.2)
PLATELET # BLD AUTO: 603 10*3/MM3 (ref 140–450)
POTASSIUM SERPL-SCNC: 4.7 MMOL/L (ref 3.5–5.2)
PROT SERPL-MCNC: 7.4 G/DL (ref 6–8.5)
RBC # BLD AUTO: 3.83 10*6/MM3 (ref 3.77–5.28)
SODIUM SERPL-SCNC: 136 MMOL/L (ref 136–145)
WBC # BLD AUTO: 13.23 10*3/MM3 (ref 3.4–10.8)

## 2020-12-11 DIAGNOSIS — D72.829 LEUKOCYTOSIS, UNSPECIFIED TYPE: Primary | ICD-10-CM

## 2020-12-14 ENCOUNTER — TELEPHONE (OUTPATIENT)
Dept: GASTROENTEROLOGY | Facility: CLINIC | Age: 60
End: 2020-12-14

## 2020-12-14 NOTE — TELEPHONE ENCOUNTER
Received referral for pt, tried calling pt but had to lm for office visit. Please disregard last note.

## 2020-12-19 ENCOUNTER — HOSPITAL ENCOUNTER (OUTPATIENT)
Dept: ULTRASOUND IMAGING | Facility: HOSPITAL | Age: 60
Discharge: HOME | End: 2020-12-19
Admitting: FAMILY MEDICINE

## 2020-12-19 DIAGNOSIS — R79.89 ELEVATED LFTS: ICD-10-CM

## 2020-12-19 DIAGNOSIS — N13.30 HYDRONEPHROSIS, UNSPECIFIED HYDRONEPHROSIS TYPE: Primary | ICD-10-CM

## 2020-12-19 PROCEDURE — 76705 ECHO EXAM OF ABDOMEN: CPT

## 2021-01-05 DIAGNOSIS — D72.829 LEUKOCYTOSIS, UNSPECIFIED TYPE: Primary | ICD-10-CM

## 2021-01-14 ENCOUNTER — LAB (OUTPATIENT)
Dept: OTHER | Facility: HOSPITAL | Age: 61
End: 2021-01-14

## 2021-01-14 ENCOUNTER — CONSULT (OUTPATIENT)
Dept: ONCOLOGY | Facility: CLINIC | Age: 61
End: 2021-01-14

## 2021-01-14 VITALS
BODY MASS INDEX: 26.4 KG/M2 | RESPIRATION RATE: 16 BRPM | HEART RATE: 100 BPM | SYSTOLIC BLOOD PRESSURE: 131 MMHG | WEIGHT: 154.6 LBS | DIASTOLIC BLOOD PRESSURE: 84 MMHG | HEIGHT: 64 IN | TEMPERATURE: 98 F | OXYGEN SATURATION: 100 %

## 2021-01-14 DIAGNOSIS — D72.829 LEUKOCYTOSIS, UNSPECIFIED TYPE: Primary | ICD-10-CM

## 2021-01-14 DIAGNOSIS — D64.9 ANEMIA, UNSPECIFIED TYPE: Primary | ICD-10-CM

## 2021-01-14 LAB
BASOPHILS # BLD AUTO: 0.06 10*3/MM3 (ref 0–0.2)
BASOPHILS NFR BLD AUTO: 0.5 % (ref 0–1.5)
DEPRECATED RDW RBC AUTO: 47.2 FL (ref 37–54)
EOSINOPHIL # BLD AUTO: 0.09 10*3/MM3 (ref 0–0.4)
EOSINOPHIL NFR BLD AUTO: 0.8 % (ref 0.3–6.2)
ERYTHROCYTE [DISTWIDTH] IN BLOOD BY AUTOMATED COUNT: 14.8 % (ref 12.3–15.4)
FERRITIN SERPL-MCNC: 111.8 NG/ML (ref 13–150)
HCT VFR BLD AUTO: 40.8 % (ref 34–46.6)
HGB BLD-MCNC: 12.4 G/DL (ref 12–15.9)
HGB RETIC QN AUTO: 30.4 PG (ref 29.8–36.1)
HOLD SPECIMEN: NORMAL
IMM GRANULOCYTES # BLD AUTO: 0.05 10*3/MM3 (ref 0–0.05)
IMM GRANULOCYTES NFR BLD AUTO: 0.4 % (ref 0–0.5)
IMM RETICS NFR: 12.9 % (ref 3–15.8)
IRON 24H UR-MRATE: 13 MCG/DL (ref 37–145)
IRON SATN MFR SERPL: 3 % (ref 20–50)
LYMPHOCYTES # BLD AUTO: 1.68 10*3/MM3 (ref 0.7–3.1)
LYMPHOCYTES NFR BLD AUTO: 14.5 % (ref 19.6–45.3)
MCH RBC QN AUTO: 26.8 PG (ref 26.6–33)
MCHC RBC AUTO-ENTMCNC: 30.4 G/DL (ref 31.5–35.7)
MCV RBC AUTO: 88.3 FL (ref 79–97)
MONOCYTES # BLD AUTO: 0.91 10*3/MM3 (ref 0.1–0.9)
MONOCYTES NFR BLD AUTO: 7.8 % (ref 5–12)
NEUTROPHILS NFR BLD AUTO: 76 % (ref 42.7–76)
NEUTROPHILS NFR BLD AUTO: 8.83 10*3/MM3 (ref 1.7–7)
NRBC BLD AUTO-RTO: 0 /100 WBC (ref 0–0.2)
PLATELET # BLD AUTO: 404 10*3/MM3 (ref 140–450)
PMV BLD AUTO: 9.2 FL (ref 6–12)
RBC # BLD AUTO: 4.62 10*6/MM3 (ref 3.77–5.28)
RETICS # AUTO: 0.1 10*6/MM3 (ref 0.02–0.13)
RETICS/RBC NFR AUTO: 2.16 % (ref 0.7–1.9)
TIBC SERPL-MCNC: 377 MCG/DL (ref 298–536)
TRANSFERRIN SERPL-MCNC: 253 MG/DL (ref 200–360)
WBC # BLD AUTO: 11.62 10*3/MM3 (ref 3.4–10.8)
WHOLE BLOOD HOLD SPECIMEN: NORMAL

## 2021-01-14 PROCEDURE — 36415 COLL VENOUS BLD VENIPUNCTURE: CPT

## 2021-01-14 PROCEDURE — 85025 COMPLETE CBC W/AUTO DIFF WBC: CPT | Performed by: INTERNAL MEDICINE

## 2021-01-14 PROCEDURE — 82728 ASSAY OF FERRITIN: CPT | Performed by: INTERNAL MEDICINE

## 2021-01-14 PROCEDURE — 83540 ASSAY OF IRON: CPT | Performed by: INTERNAL MEDICINE

## 2021-01-14 PROCEDURE — 99205 OFFICE O/P NEW HI 60 MIN: CPT | Performed by: INTERNAL MEDICINE

## 2021-01-14 PROCEDURE — 85046 RETICYTE/HGB CONCENTRATE: CPT | Performed by: INTERNAL MEDICINE

## 2021-01-14 PROCEDURE — 84466 ASSAY OF TRANSFERRIN: CPT | Performed by: INTERNAL MEDICINE

## 2021-01-18 ENCOUNTER — OFFICE VISIT (OUTPATIENT)
Dept: GASTROENTEROLOGY | Facility: CLINIC | Age: 61
End: 2021-01-18

## 2021-01-18 VITALS — WEIGHT: 156 LBS | BODY MASS INDEX: 26.63 KG/M2 | HEIGHT: 64 IN

## 2021-01-18 DIAGNOSIS — K52.9 COLITIS: ICD-10-CM

## 2021-01-18 DIAGNOSIS — R74.8 ELEVATED LIVER ENZYMES: Primary | ICD-10-CM

## 2021-01-18 DIAGNOSIS — D64.9 ANEMIA, UNSPECIFIED TYPE: ICD-10-CM

## 2021-01-18 DIAGNOSIS — R74.8 ELEVATED ALKALINE PHOSPHATASE LEVEL: ICD-10-CM

## 2021-01-18 PROCEDURE — 99214 OFFICE O/P EST MOD 30 MIN: CPT | Performed by: INTERNAL MEDICINE

## 2021-01-18 RX ORDER — HYDROCORTISONE ACETATE 25 MG/1
25 SUPPOSITORY RECTAL NIGHTLY PRN
Qty: 30 SUPPOSITORY | Refills: 1 | Status: ON HOLD | OUTPATIENT
Start: 2021-01-18 | End: 2021-04-06

## 2021-01-18 NOTE — PROGRESS NOTES
"Chief Complaint   Patient presents with   • Abnormal Lab-Anemia   • ABN LFT's     Subjective   HPI  Wilma Longo is a 60 y.o. female who presents today for transistion of care.  She is former patient of Dr Mann.     She has been referred for evaluation of elevated ALP.  She had labs in early December showing ALP of 297.  Follow up CMP 1 week alter showed improvement to 168.  She had mild elevation of ALT as well.  She reports she had a week or two episode of viral like syndrome with body aches and fatigue in late November preceding this blood work.  She also had a mild anemia, which has since normalized.  U/S liver from 12/20 reviewed, she had normal appearing liver and biliary tree. She did have R sided hydronephrosis which is being worked up.      Hx of \"colitis\" per Dr Mann, last colonoscopy in 2016 and 2019 normal macroscopically, she did have mild active colitis on biopsies in 2016.  She has internal hemorrhoids.    EGD in 2019 was normal.  She is not on any medical therapy for her colitis. She reports occasional red blood on tissue with wiping.  Bowel function is a little erratic.  She is currently being evaluated by Urogynecology for pelvic floor prolapse.        Objective   There were no vitals filed for this visit.  Physical Exam  Vitals signs reviewed.   Constitutional:       Appearance: She is well-developed.   HENT:      Head: Normocephalic and atraumatic.   Abdominal:      General: Bowel sounds are normal. There is no distension.      Palpations: Abdomen is soft. There is no mass.      Tenderness: There is no abdominal tenderness.      Hernia: No hernia is present.   Skin:     General: Skin is warm and dry.   Neurological:      Mental Status: She is alert and oriented to person, place, and time.   Psychiatric:         Behavior: Behavior normal.         Thought Content: Thought content normal.         Judgment: Judgment normal.       The following data was reviewed by: Basilio Martin MD on " 01/18/2021:  CMP    CMP 12/2/20 12/9/20 1/18/21   Glucose 103 (A) 98 98   BUN 16 19 11   Creatinine 0.94 0.84 1.01 (A)   eGFR Non  Am 66 69 61   eGFR  Am 76 84 70   Sodium 135 136 143   Potassium 5.0 4.7 4.7   Chloride 97 97 (A) 106   Calcium 9.2 9.2 9.3   Total Protein 7.5 7.4 7.5   Albumin 3.3 (A) 3.40 (A) 3.9   Globulin 4.2 4.0 3.6   Total Bilirubin 0.4 0.2 0.2   Alkaline Phosphatase 297 (A) 168 (A) 67   AST (SGOT) 133 (A) 17 17   ALT (SGPT) 113 (A) 38 (A) 12   (A) Abnormal value            CBC    CBC 12/2/20 12/9/20 1/14/21   WBC 16.8 (A) 13.23 (A) 11.62 (A)   RBC 3.64 (A) 3.83 4.62   Hemoglobin 10.0 (A) 10.5 (A) 12.4   Hematocrit 30.6 (A) 32.1 (A) 40.8   MCV 84 83.8 88.3   MCH 27.5 27.4 26.8   MCHC 32.7 32.7 30.4 (A)   RDW 12.8 12.8 14.8   Platelets 591 (A) 603 (A) 404   (A) Abnormal value            Data reviewed: Consultant notes (Dr Mann's past office notes) and GI studies Prior endoscopy studies from 2016 and 2019 as well as accompanying pathology.                             Reviewed us liver from 12/2020    Assessment/Plan   Assessment:     1. Elevated liver enzymes    2. Colitis    3. Anemia, unspecified type    4. Elevated alkaline phosphatase level    5.      Pelvic floor prolapse    All problems new to me today    Plan:   Etiology of elevation of ALP/ALT unclear, given some preceding viral symptoms this could have been viral prodrome with transient elevation.  U/S was unremarkable.  Will update LFTs today to see where they are, and if still high, will require further serologic workup.      Transient anemia also resolved.  She has some intermittent scant rectal bleeding which is likely anorectal in origin.  We discussed updating her colonoscopy but she would like to wait until she completes evaluation for her pelvic floor dysfunction.  I think this is reasonable given the essentially normal colonoscopy in 2019.      She will f/u in 6 weeks for reassessment.          Basilio Martin,  M.D.  [facility]  [address] [place] [address]  Office: [phone]

## 2021-01-19 LAB
ALBUMIN SERPL-MCNC: 3.9 G/DL (ref 3.8–4.9)
ALBUMIN/GLOB SERPL: 1.1 {RATIO} (ref 1.2–2.2)
ALP SERPL-CCNC: 67 IU/L (ref 39–117)
ALT SERPL-CCNC: 12 IU/L (ref 0–32)
AST SERPL-CCNC: 17 IU/L (ref 0–40)
BILIRUB SERPL-MCNC: 0.2 MG/DL (ref 0–1.2)
BUN SERPL-MCNC: 11 MG/DL (ref 8–27)
BUN/CREAT SERPL: 11 (ref 12–28)
CALCIUM SERPL-MCNC: 9.3 MG/DL (ref 8.7–10.3)
CHLORIDE SERPL-SCNC: 106 MMOL/L (ref 96–106)
CO2 SERPL-SCNC: 23 MMOL/L (ref 20–29)
CREAT SERPL-MCNC: 1.01 MG/DL (ref 0.57–1)
GLOBULIN SER CALC-MCNC: 3.6 G/DL (ref 1.5–4.5)
GLUCOSE SERPL-MCNC: 98 MG/DL (ref 65–99)
MITOCHONDRIA M2 IGG SER-ACNC: <20 UNITS (ref 0–20)
POTASSIUM SERPL-SCNC: 4.7 MMOL/L (ref 3.5–5.2)
PROT SERPL-MCNC: 7.5 G/DL (ref 6–8.5)
SODIUM SERPL-SCNC: 143 MMOL/L (ref 134–144)

## 2021-02-01 ENCOUNTER — TELEPHONE (OUTPATIENT)
Dept: GASTROENTEROLOGY | Facility: CLINIC | Age: 61
End: 2021-02-01

## 2021-02-01 NOTE — TELEPHONE ENCOUNTER
Called pt and advised of Dr Martin's note.  Pt verbalized understanding.     Pt will wait to schedule f/u visit. She is in the process of scheduling a GYN surgery.

## 2021-02-01 NOTE — TELEPHONE ENCOUNTER
----- Message from Basilio Martin MD sent at 2/1/2021 11:19 AM EST -----  Liver enzymes are normal now.  Would just recommend we monitor for the time being.  Office f/u in 6-8 weeks

## 2021-02-08 ENCOUNTER — HOSPITAL ENCOUNTER (OUTPATIENT)
Dept: CT IMAGING | Facility: HOSPITAL | Age: 61
Discharge: HOME | End: 2021-02-08
Admitting: FAMILY MEDICINE

## 2021-02-08 DIAGNOSIS — N13.30 HYDRONEPHROSIS, UNSPECIFIED HYDRONEPHROSIS TYPE: ICD-10-CM

## 2021-02-08 PROCEDURE — 74176 CT ABD & PELVIS W/O CONTRAST: CPT

## 2021-02-11 DIAGNOSIS — N20.0 RENAL STONE: Primary | ICD-10-CM

## 2021-02-11 DIAGNOSIS — R93.7 ABNORMAL CT OF SPINE: ICD-10-CM

## 2021-02-14 NOTE — PROGRESS NOTES
Subjective   Wilma Longo is a 60 y.o. female.     CC: Video Visit for Management of Abnormal CT Scan    History of Present Illness     Pt seen today on a VV after recent CT Scan showed some abnormalities. Originally, pt received an U/S of the liver due to elevated LFTs (supsequently saw GI and was cleared). This U/S showed some moderate-to-severe hydronephrosis on the right side, thus the CT was ordered. This showed:    IMPRESSION:   1. 6 mm right proximal ureteric calculus with hydroureteronephrosis. Dr. Virgen has been paged to discuss these findings.   2. Pelvic floor weakness.   3. Small ill-defined sclerotic abnormality within the L2 vertebral body.   This is indeterminate. Correlation with any history of malignancy with   propensity for osseous metastases is recommended. In the absence of the   same correlation with prior imaging or followup with bone scan is   recommended.      The following portions of the patient's history were reviewed and updated as appropriate: allergies, current medications, past family history, past medical history, past social history, past surgical history and problem list.    Review of Systems   Constitutional: Negative for activity change, chills and fever.   Respiratory: Negative for cough.    Cardiovascular: Negative for chest pain.   Psychiatric/Behavioral: Negative for dysphoric mood.       Objective   Physical Exam  Constitutional:       General: She is not in acute distress.     Appearance: She is well-developed.   Pulmonary:      Effort: Pulmonary effort is normal.   Neurological:      Mental Status: She is alert and oriented to person, place, and time.   Psychiatric:         Behavior: Behavior normal.         Thought Content: Thought content normal.     CT scan report reviewed with pt and her questions addressed.    Assessment/Plan   Diagnoses and all orders for this visit:    1. Abnormal CT of spine (Primary)    2. Renal stone    Upon receiving this CT report, I  placed a Hematology and Urology referral and encourage pt to keep those appts for further evaluation.  Spent  15   minutes with chart and interview and consent for this encounter given by the patient.  You have chosen to receive care through a telehealth visit.  Do you consent to use a video/audio connection for your medical care today? Yes

## 2021-02-15 ENCOUNTER — DOCUMENTATION (OUTPATIENT)
Dept: ONCOLOGY | Facility: CLINIC | Age: 61
End: 2021-02-15

## 2021-02-15 ENCOUNTER — TELEPHONE (OUTPATIENT)
Dept: ONCOLOGY | Facility: CLINIC | Age: 61
End: 2021-02-15

## 2021-02-15 ENCOUNTER — TELEMEDICINE (OUTPATIENT)
Dept: FAMILY MEDICINE CLINIC | Facility: CLINIC | Age: 61
End: 2021-02-15

## 2021-02-15 DIAGNOSIS — R93.7 ABNORMAL CT OF SPINE: Primary | ICD-10-CM

## 2021-02-15 DIAGNOSIS — N20.0 RENAL STONE: ICD-10-CM

## 2021-02-15 PROCEDURE — 99213 OFFICE O/P EST LOW 20 MIN: CPT | Performed by: FAMILY MEDICINE

## 2021-02-15 NOTE — TELEPHONE ENCOUNTER
Called the patient per Dr. Dee to let her know that her PCP contacted Dr. Dee about her most recent CT scan that showed a questionable spot on her spine and the radiologist recommended a bone scan. I told the patient I would put the order in and we would try to get her in soon and she would be scheduled to see Dr. Dee a week later. Patient agreeable and v/u. Scheduling was notified.

## 2021-02-16 ENCOUNTER — TELEPHONE (OUTPATIENT)
Dept: ONCOLOGY | Facility: CLINIC | Age: 61
End: 2021-02-16

## 2021-02-16 NOTE — TELEPHONE ENCOUNTER
Caller: PATIENT    Relationship to patient:     Best call back number: [phone]    Chief complaint: PATIENT HAS BEEN WAITING ON A CALL TO SCHEDULE HER BONE SCAN AND HAS NOT HEARD ANYTHING YET, PLEASE ADVISE?    Type of visit:BONE SCAN    Requested date: ASAP    If rescheduling, when is the original appointment: NA    Additional notes:

## 2021-02-16 NOTE — TELEPHONE ENCOUNTER
----- Message from Rupali Landers RN sent at 2/15/2021 11:22 AM EST -----  Regarding: Code  Can we try to schedule the Bone scan for this patient later this week or next week. She will get her labs drawn at the scan. She will see Dr. Dee a week after the bone scan.     Thank you   ----- Message -----  From: Jerman Dee II, MD  Sent: 2/15/2021  11:12 AM EST  To: Wilma Leon, [facility] Rep, #    Hi,    Isabel, please call her and tell her that her PCP contacted me about her recent CT scan.  It showed a questionable place on her spine, which I suspect is nothing of concern.  However, the radiologist did recommend a bone scan to further evaluate.Please see if she wants to move the appointment with me up some, I think it is currently scheduled for around March 11 or so.  Do the same labs that I was previously planning about a week before the appointment with me but also do a bone scan.  Please explain to the patient why we are doing the bone scan.      Thanks!

## 2021-02-22 ENCOUNTER — HOSPITAL ENCOUNTER (OUTPATIENT)
Dept: NUCLEAR MEDICINE | Facility: HOSPITAL | Age: 61
Discharge: HOME | End: 2021-02-22

## 2021-02-22 DIAGNOSIS — D64.9 ANEMIA, UNSPECIFIED TYPE: ICD-10-CM

## 2021-02-22 DIAGNOSIS — R93.7 ABNORMAL CT OF SPINE: ICD-10-CM

## 2021-02-22 LAB
BASOPHILS # BLD AUTO: 0.07 10*3/MM3 (ref 0–0.2)
BASOPHILS NFR BLD AUTO: 0.7 % (ref 0–1.5)
DEPRECATED RDW RBC AUTO: 47 FL (ref 37–54)
EOSINOPHIL # BLD AUTO: 0.17 10*3/MM3 (ref 0–0.4)
EOSINOPHIL NFR BLD AUTO: 1.7 % (ref 0.3–6.2)
ERYTHROCYTE [DISTWIDTH] IN BLOOD BY AUTOMATED COUNT: 15.6 % (ref 12.3–15.4)
FERRITIN SERPL-MCNC: 132 NG/ML (ref 13–150)
HCT VFR BLD AUTO: 36.7 % (ref 34–46.6)
HGB BLD-MCNC: 11.6 G/DL (ref 12–15.9)
HGB RETIC QN AUTO: 25.8 PG (ref 29.8–36.1)
IMM GRANULOCYTES # BLD AUTO: 0.05 10*3/MM3 (ref 0–0.05)
IMM GRANULOCYTES NFR BLD AUTO: 0.5 % (ref 0–0.5)
IMM RETICS NFR: 25.3 % (ref 3–15.8)
IRON 24H UR-MRATE: 23 MCG/DL (ref 37–145)
IRON SATN MFR SERPL: 8 % (ref 20–50)
LYMPHOCYTES # BLD AUTO: 1.82 10*3/MM3 (ref 0.7–3.1)
LYMPHOCYTES NFR BLD AUTO: 18.6 % (ref 19.6–45.3)
MCH RBC QN AUTO: 26.3 PG (ref 26.6–33)
MCHC RBC AUTO-ENTMCNC: 31.6 G/DL (ref 31.5–35.7)
MCV RBC AUTO: 83.2 FL (ref 79–97)
MONOCYTES # BLD AUTO: 0.9 10*3/MM3 (ref 0.1–0.9)
MONOCYTES NFR BLD AUTO: 9.2 % (ref 5–12)
NEUTROPHILS NFR BLD AUTO: 6.79 10*3/MM3 (ref 1.7–7)
NEUTROPHILS NFR BLD AUTO: 69.3 % (ref 42.7–76)
NRBC BLD AUTO-RTO: 0 /100 WBC (ref 0–0.2)
PLATELET # BLD AUTO: 445 10*3/MM3 (ref 140–450)
PMV BLD AUTO: 9.9 FL (ref 6–12)
RBC # BLD AUTO: 4.41 10*6/MM3 (ref 3.77–5.28)
RETICS # AUTO: 0.06 10*6/MM3 (ref 0.02–0.13)
RETICS/RBC NFR AUTO: 1.26 % (ref 0.7–1.9)
TIBC SERPL-MCNC: 302 MCG/DL (ref 298–536)
TRANSFERRIN SERPL-MCNC: 203 MG/DL (ref 200–360)
WBC # BLD AUTO: 9.8 10*3/MM3 (ref 3.4–10.8)

## 2021-02-22 PROCEDURE — 0 TECHNETIUM MEDRONATE KIT: Performed by: INTERNAL MEDICINE

## 2021-02-22 PROCEDURE — 83540 ASSAY OF IRON: CPT | Performed by: INTERNAL MEDICINE

## 2021-02-22 PROCEDURE — 85046 RETICYTE/HGB CONCENTRATE: CPT | Performed by: INTERNAL MEDICINE

## 2021-02-22 PROCEDURE — 78306 BONE IMAGING WHOLE BODY: CPT

## 2021-02-22 PROCEDURE — A9503 TC99M MEDRONATE: HCPCS | Performed by: INTERNAL MEDICINE

## 2021-02-22 PROCEDURE — 85025 COMPLETE CBC W/AUTO DIFF WBC: CPT | Performed by: INTERNAL MEDICINE

## 2021-02-22 PROCEDURE — 36415 COLL VENOUS BLD VENIPUNCTURE: CPT

## 2021-02-22 PROCEDURE — 84466 ASSAY OF TRANSFERRIN: CPT | Performed by: INTERNAL MEDICINE

## 2021-02-22 PROCEDURE — 82728 ASSAY OF FERRITIN: CPT | Performed by: INTERNAL MEDICINE

## 2021-02-22 RX ORDER — TC 99M MEDRONATE 20 MG/10ML
19.4 INJECTION, POWDER, LYOPHILIZED, FOR SOLUTION INTRAVENOUS
Status: COMPLETED | OUTPATIENT
Start: 2021-02-22 | End: 2021-02-22

## 2021-02-22 RX ADMIN — Medication 19.4 MILLICURIE: at 09:30

## 2021-02-25 ENCOUNTER — TELEPHONE (OUTPATIENT)
Dept: ONCOLOGY | Facility: CLINIC | Age: 61
End: 2021-02-25

## 2021-02-25 NOTE — TELEPHONE ENCOUNTER
Caller: FAUZIA DOW    Relationship to patient: SELF    Best call back number: [phone]    Chief complaint: PT CALLED ABOUT CANCELED 3-2-2021 APPT. SHE WAS CONCERNED IT WAS CANCELED BECAUSE SHE WAS TO F/U ABOUT BIOPSY AND SPOT ON HER SPINE.    PT WOULD BE OPEN TO VIDEO PHONE CALL, IF NEEDED.    Type of visit: F/U APPT 3-2-2021

## 2021-03-02 ENCOUNTER — DOCUMENTATION (OUTPATIENT)
Dept: ONCOLOGY | Facility: CLINIC | Age: 61
End: 2021-03-02

## 2021-03-02 ENCOUNTER — APPOINTMENT (OUTPATIENT)
Dept: OTHER | Facility: HOSPITAL | Age: 61
End: 2021-03-02

## 2021-03-02 ENCOUNTER — OFFICE VISIT (OUTPATIENT)
Dept: ONCOLOGY | Facility: CLINIC | Age: 61
End: 2021-03-02

## 2021-03-02 ENCOUNTER — TELEPHONE (OUTPATIENT)
Dept: ONCOLOGY | Facility: CLINIC | Age: 61
End: 2021-03-02

## 2021-03-02 VITALS
TEMPERATURE: 97.3 F | HEIGHT: 64 IN | RESPIRATION RATE: 20 BRPM | SYSTOLIC BLOOD PRESSURE: 138 MMHG | BODY MASS INDEX: 27.25 KG/M2 | OXYGEN SATURATION: 98 % | DIASTOLIC BLOOD PRESSURE: 87 MMHG | HEART RATE: 99 BPM | WEIGHT: 159.6 LBS

## 2021-03-02 DIAGNOSIS — D64.9 ANEMIA, UNSPECIFIED TYPE: Primary | ICD-10-CM

## 2021-03-02 PROBLEM — K90.9 MALABSORPTION OF IRON: Status: ACTIVE | Noted: 2021-03-02

## 2021-03-02 PROBLEM — D50.9 IRON DEFICIENCY ANEMIA: Status: ACTIVE | Noted: 2021-03-02

## 2021-03-02 PROCEDURE — 99214 OFFICE O/P EST MOD 30 MIN: CPT | Performed by: INTERNAL MEDICINE

## 2021-03-02 RX ORDER — ESTRADIOL 10 UG/1
1 INSERT VAGINAL 2 TIMES WEEKLY
COMMUNITY
Start: 2021-01-29 | End: 2022-11-30

## 2021-03-02 NOTE — TELEPHONE ENCOUNTER
----- Message from Samira Rick sent at 3/2/2021  1:17 PM EST -----  Regarding: FW: antonio Nunn, please see message below and change. You just scheduled this today and it has been changed to venofer and let pt know. Thanks  ----- Message -----  From: Rupali Landers, DEANNE  Sent: 3/2/2021   1:15 PM EST  To: [facility]  Pool  Subject: code                                             Patient is now getting 3 doses of Venofer instead of injectafer because of insurance.     Thank you   ----- Message -----  From: Jerman Dee II, MD  Sent: 3/2/2021  12:58 PM EST  To: Wilma Wallace RN, Rupali Landers RN    Please change the plan to 3 doses of Venofer, 200 mg each.  ----- Message -----  From: Wilma Wallace, RN  Sent: 3/2/2021  11:19 AM EST  To: Jerman Dee II, MD, Rupali Landers RN    She is scheduled for Injectafer, however her insurance will not approve it until she has tried and failed Venofer, Infed or Ferrlecit.     Please let me know which you would want to change her to.    [facility] commercial members are not able to get Injectafer, however [facility] Medicare members can.    Thank you.

## 2021-03-02 NOTE — PROGRESS NOTES
.     REASON FOR FOLLOWUP :   Leukocytosis and anemia and thrombocytosis    HISTORY OF PRESENT ILLNESS:  The patient is a 60 y.o. year old female  who is here for follow-up with the above-mentioned history.    States she has some mild rectal bleeding but states this is known to her other doctors as it is due to her colitis and rectal prolapse.  She has plans for rectal prolapse repair early April 2021.    Difficulty tolerating the iron.  Cannot take more than twice per day.  This makes her colitis symptoms worse.  Also, oral iron does not seem to have helped.    Past Medical History:   Diagnosis Date   • Anemia    • Anxiety    • Benign essential hypertension 02/01/2015   • Cancer (CMS/HCC)    • Cardiomyopathy (CMS/HCC)    • Colitis 06/12/2009   • Depression    • Fibromyalgia    • History of gestational diabetes    • Hyperlipidemia 04/13/2011 2/1/2015   • Hypothyroidism 02/01/2015   • Insomnia 02/03/2015   • Irritable bowel syndrome 11/30/2009   • Panic attacks    • Skin cancer of arm    • Vitamin D deficiency 07/19/2012     Past Surgical History:   Procedure Laterality Date   • BLADDER REPAIR  09/2015    X2   • COLONOSCOPY  2010    wnl   • COLONOSCOPY N/A 9/9/2016    Procedure: COLONOSCOPY into cecum and terminal ileum with biopsies;  Surgeon: Orlin Mann MD;  Location: [facility] ENDOSCOPY;  Service:    • COLONOSCOPY N/A 9/6/2019    Procedure: COLONOSCOPY TO CECUM AND INTO TERMINAL ILEUM;  Surgeon: Orlin Mann MD;  Location: [facility] ENDOSCOPY;  Service: Gastroenterology   • ENDOSCOPY N/A 9/6/2019    Procedure: ESOPHAGOGASTRODUODENOSCOPY WITH COLD BIOPSIES AND 48F HERNÁNDEZ DILATION;  Surgeon: Orlin Mann MD;  Location: [facility] ENDOSCOPY;  Service: Gastroenterology   • HYSTERECTOMY  12/2013   • SKIN CANCER EXCISION         MEDICATIONS    Current Outpatient Medications:   •  ALPRAZolam (XANAX) 0.5 MG tablet, Take 1 tablet by mouth Daily As Needed for Anxiety. D/c previous order, Disp: 30 tablet, Rfl:  2  •  Aspirin Buf,CaCarb-MgCarb-MgO, 81 MG tablet, Take 81 mg by mouth Daily., Disp: , Rfl:   •  DULoxetine (CYMBALTA) 60 MG capsule, Take 1 capsule by mouth Daily., Disp: 30 capsule, Rfl: 5  •  estradiol (VAGIFEM) 10 MCG tablet vaginal tablet, , Disp: , Rfl:   •  hydrocortisone (ANUSOL-HC) 25 MG suppository, Insert 1 suppository into the rectum At Night As Needed for Hemorrhoids (rectal discomfort/bleeding)., Disp: 30 suppository, Rfl: 1  •  levothyroxine (SYNTHROID, LEVOTHROID) 75 MCG tablet, Take 1 tablet by mouth Daily., Disp: 30 tablet, Rfl: 5  •  lisinopril (PRINIVIL,ZESTRIL) 10 MG tablet, Take 1 tablet by mouth Daily., Disp: 30 tablet, Rfl: 5  •  naproxen (NAPROSYN) 500 MG tablet, Take 1 tablet by mouth Daily., Disp: 30 tablet, Rfl: 5  •  rosuvastatin (Crestor) 5 MG tablet, Take 1 tablet by mouth Daily., Disp: 30 tablet, Rfl: 5  •  valACYclovir (VALTREX) 500 MG tablet, Take 500 mg by mouth As Needed., Disp: , Rfl:   •  vitamin D (ERGOCALCIFEROL) 1.25 MG (18760 UT) capsule capsule, Take 1 capsule by mouth 1 (One) Time Per Week., Disp: 5 capsule, Rfl: 5    ALLERGIES:   No Known Allergies    SOCIAL HISTORY:       Social History     Socioeconomic History   • Marital status:      Spouse name: Ruben   • Number of children: Not on file   • Years of education: Not on file   • Highest education level: Not on file   Occupational History     Employer: [employer]   Tobacco Use   • Smoking status: Never Smoker   • Smokeless tobacco: Never Used   Substance and Sexual Activity   • Alcohol use: Yes     Comment: Rarely wine once per month   • Drug use: No   • Sexual activity: Defer         FAMILY HISTORY:  Family History   Problem Relation Age of Onset   • Depression Mother    • Heart disease Mother    • Stroke Mother    • Heart attack Mother    • Heart failure Mother    • Hyperlipidemia Mother    • Hypertension Mother    • Arthritis Father    • Heart disease Father    • Skin cancer Father    • Heart attack Father   "  • Heart failure Father    • Heart disease Maternal Grandmother    • Stroke Maternal Grandfather    • Stroke Paternal Grandfather    • Hypertension Sister    • Hypothyroidism Sister    • Hypertension Brother    • Hypothyroidism Daughter    • Colon cancer Neg Hx    • Colon polyps Neg Hx    • Liver disease Neg Hx    • Rectal cancer Neg Hx    • Stomach cancer Neg Hx    • Liver cancer Neg Hx    • Malig Hyperthermia Neg Hx        REVIEW OF SYSTEMS:  Review of Systems   Constitutional: Negative for activity change.   HENT: Negative for nosebleeds and trouble swallowing.    Respiratory: Negative for shortness of breath and wheezing.    Cardiovascular: Negative for chest pain and palpitations.   Gastrointestinal: Negative for constipation, diarrhea and nausea.   Genitourinary: Negative for dysuria and hematuria.   Musculoskeletal: Negative for arthralgias and myalgias.   Skin: Negative for rash and wound.   Neurological: Negative for seizures and syncope.   Hematological: Negative for adenopathy. Does not bruise/bleed easily.   Psychiatric/Behavioral: Negative for confusion.              Vitals:    03/02/21 1001   BP: 138/87   Pulse: 99   Resp: 20   Temp: 97.3 °F (36.3 °C)   TempSrc: Temporal   SpO2: 98%   Weight: 72.4 kg (159 lb 9.6 oz)   Height: 162 cm (63.78\")   PainSc: 0-No pain     Current Status 3/2/2021   ECOG score 0      PHYSICAL EXAM:          CONSTITUTIONAL:  Vital signs reviewed.  No distress, looks comfortable.  EYES:  Conjunctiva and lids unremarkable.  PERRLA  EARS,NOSE,MOUTH,THROAT:  Ears and nose appear unremarkable.  Lips, teeth, gums appear unremarkable.  RESPIRATORY:  Normal respiratory effort.  Lungs clear to auscultation bilaterally.  CARDIOVASCULAR:  Normal S1, S2.  No murmurs rubs or gallops.  No significant lower extremity edema.  GASTROINTESTINAL: Abdomen appears unremarkable.  Nontender.  No hepatomegaly.  No splenomegaly.  LYMPHATIC:  No cervical, supraclavicular, axillary " lymphadenopathy.  SKIN:  Warm.  No rashes.  PSYCHIATRIC:  Normal judgment and insight.  Normal mood and affect.         RECENT LABS:        WBC   Date Value Ref Range Status   02/22/2021 9.80 3.40 - 10.80 10*3/mm3 Final   01/14/2021 11.62 (H) 3.40 - 10.80 10*3/mm3 Final   12/09/2020 13.23 (H) 3.40 - 10.80 10*3/mm3 Final   12/02/2020 16.8 (H) 3.4 - 10.8 x10E3/uL Final   02/22/2020 6.2 3.4 - 10.8 x10E3/uL Final   03/21/2019 6.56 3.40 - 10.80 10*3/mm3 Final   10/21/2017 8.3 3.4 - 10.8 x10E3/uL Final   06/28/2016 7.94 4.50 - 10.70 10*3/mm3 Final   09/03/2015 7.56 4.50 - 10.70 K/Cumm Final   04/07/2014 6.03 4.50 - 10.70 K/Cumm Final     Hemoglobin   Date Value Ref Range Status   02/22/2021 11.6 (L) 12.0 - 15.9 g/dL Final   01/14/2021 12.4 12.0 - 15.9 g/dL Final   12/09/2020 10.5 (L) 12.0 - 15.9 g/dL Final   12/02/2020 10.0 (L) 11.1 - 15.9 g/dL Final   02/22/2020 12.9 11.1 - 15.9 g/dL Final   03/21/2019 13.6 12.0 - 15.9 g/dL Final   10/21/2017 13.0 11.1 - 15.9 g/dL Final   06/28/2016 13.9 11.9 - 15.5 g/dL Final   09/03/2015 12.4 11.9 - 15.5 g/dL Final   04/07/2014 12.5 11.9 - 15.5 g/dL Final     Platelets   Date Value Ref Range Status   02/22/2021 445 140 - 450 10*3/mm3 Final   01/14/2021 404 140 - 450 10*3/mm3 Final   12/09/2020 603 (H) 140 - 450 10*3/mm3 Final   12/02/2020 591 (H) 150 - 450 x10E3/uL Final   02/22/2020 354 150 - 450 x10E3/uL Final   03/21/2019 336 140 - 450 10*3/mm3 Final   10/21/2017 293 150 - 379 x10E3/uL Final   06/28/2016 289 140 - 500 10*3/mm3 Final   09/03/2015 300 140 - 500 K/Cumm Final   04/07/2014 277 140 - 500 K/Cumm Final       Assessment/Plan   Anemia, unspecified type  - Ferritin  - Iron Profile  - Retic With IRF & RET-He  - CBC & Differential        Wilma Longo   *Leukocytosis  · WBC normal through February 2020  · WBC 16.8 on 12/2/2020.  WBC 13.2 on 12/9/2020.  Differentials unremarkable.  · On 1/14/2021, WBC 11.6.  Differential unremarkable.  · WBC 9.8    *Iron deficiency  anemia  · Hb normal through February 2020.  MCV around 90.  · Hb 10 on 12/2/2020, 10.5 on 12/9/2020.  MCV 83.8.  · On 1/14/2021, ferritin 112, 3% saturation, reticulated hemoglobin low end of normal at 30.4.  Hb up to normal, 12.4 (without iron replacement).  · Hb normalized without iron replacement.  Perhaps this is because rectal bleeding which started early December 2020, decreased, although persists.  · 2/22/2021, ferritin 132, 8% saturation, Hb 11.6.    · Difficulty tolerating oral iron due to colitis/diarrhea.  No significant improvement from oral iron, thought to be due to poor absorption.  Arrange 1 dose Injectafer.    *Colitis.  · Because of this, patient doubts she will be able to tolerate oral iron.    *Etiology of iron deficiency  EGD and colonoscopy by Dr. Chadd Mann (who has since retired), on 9/6/2019.  · Follows with Dr. Gibbons.  · Dr. Martin is considering GI evaluation but waiting on pelvic floor dysfunction treatment first.  He reported essentially normal colonoscopy 2019    *Thrombocytosis  · PLT normal through February 2020  ·  on 12/2/2020.   on 12/9/2020.  · On 1/14/2021,   ·     *Patient states she was diagnosed with bladder prol[facility]  · Early April 2021 rectal/bladder/vaginal prolapse repair planned    *Moderate to severe right hydronephrosis, incidentally found on liver ultrasound, from 12/19/2020 (U/S was to evaluate high LFTs), ordered by PCP, Dr. Virgen  · Patient states PCP ordered a subsequent CT abdomen.  Patient chose to delay having this done as she thought fixing the bladder prolapse might take care of the unilateral hydronephrosis.  · Dr. Peter Vivar plans to treat the kidney stone.    *Small ill-defined sclerotic abnormality L2, indeterminate  ·  incidentally found on CT AP, 2/8/2021 to work-up hydroureteronephrosis which was felt to be due to a 6 mm right proximal ureteric calculus.  · Bone scan 2/22/2021  unremarkable.  Therefore, plan no further work-up or follow-up of this    *Hematochezia  · Specifically, pain with wiping (no blood mixed in stools), since early December 2020.  This has since improved.  · Following with GI, Dr. Martin, on Monday, 1/18/2021    Plan  · Stop oral iron as it is not working well and difficult to tolerate  · Arrange 1 dose Injectafer  · MD 3 months.  1 week prior: Iron labs    Dr. Martin's note reviewed and summarized.    32 minutes.  Total time.  Same day.

## 2021-03-02 NOTE — PROGRESS NOTES
Please change the plan to 3 doses of Venofer, 200 mg each.  ===View-only below this line===  ----- Message -----  From: Wilma Wallace RN  Sent: 3/2/2021  11:19 AM EST  To: Jerman Dee II, MD, Rupali Landers RN    She is scheduled for Injectafer, however her insurance will not approve it until she has tried and failed Venofer, Infed or Ferrlecit.     Please let me know which you would want to change her to.    [facility] [employer] commercial members are not able to get Injectafer, however [facility] Medicare members can.    Thank you.      
(0) independent

## 2021-03-02 NOTE — TELEPHONE ENCOUNTER
Called pt and advised of the change of treatment, milka lopez, we set up new josé miguel'ts, milka lopez, df

## 2021-03-10 ENCOUNTER — APPOINTMENT (OUTPATIENT)
Dept: ONCOLOGY | Facility: HOSPITAL | Age: 61
End: 2021-03-10

## 2021-03-10 ENCOUNTER — INFUSION (OUTPATIENT)
Dept: ONCOLOGY | Facility: HOSPITAL | Age: 61
End: 2021-03-10

## 2021-03-10 VITALS
OXYGEN SATURATION: 100 % | HEIGHT: 65 IN | WEIGHT: 159.4 LBS | BODY MASS INDEX: 26.56 KG/M2 | DIASTOLIC BLOOD PRESSURE: 84 MMHG | RESPIRATION RATE: 18 BRPM | TEMPERATURE: 97.5 F | SYSTOLIC BLOOD PRESSURE: 143 MMHG | HEART RATE: 90 BPM

## 2021-03-10 DIAGNOSIS — D50.9 IRON DEFICIENCY ANEMIA, UNSPECIFIED IRON DEFICIENCY ANEMIA TYPE: Primary | ICD-10-CM

## 2021-03-10 DIAGNOSIS — K90.9 MALABSORPTION OF IRON: ICD-10-CM

## 2021-03-10 PROCEDURE — 25010000002 IRON SUCROSE PER 1 MG: Performed by: INTERNAL MEDICINE

## 2021-03-10 PROCEDURE — 96365 THER/PROPH/DIAG IV INF INIT: CPT

## 2021-03-10 PROCEDURE — 96375 TX/PRO/DX INJ NEW DRUG ADDON: CPT

## 2021-03-10 PROCEDURE — 25010000002 DIPHENHYDRAMINE PER 50 MG: Performed by: INTERNAL MEDICINE

## 2021-03-10 RX ORDER — SODIUM CHLORIDE 9 MG/ML
250 INJECTION, SOLUTION INTRAVENOUS ONCE
Status: COMPLETED | OUTPATIENT
Start: 2021-03-10 | End: 2021-03-10

## 2021-03-10 RX ORDER — ACETAMINOPHEN 325 MG/1
650 TABLET ORAL ONCE
Status: COMPLETED | OUTPATIENT
Start: 2021-03-10 | End: 2021-03-10

## 2021-03-10 RX ADMIN — SODIUM CHLORIDE 250 ML: 9 INJECTION, SOLUTION INTRAVENOUS at 14:09

## 2021-03-10 RX ADMIN — IRON SUCROSE 200 MG: 20 INJECTION, SOLUTION INTRAVENOUS at 14:56

## 2021-03-10 RX ADMIN — DIPHENHYDRAMINE HYDROCHLORIDE 25 MG: 50 INJECTION, SOLUTION INTRAMUSCULAR; INTRAVENOUS at 14:10

## 2021-03-10 RX ADMIN — ACETAMINOPHEN 650 MG: 325 TABLET, FILM COATED ORAL at 14:09

## 2021-03-17 ENCOUNTER — INFUSION (OUTPATIENT)
Dept: ONCOLOGY | Facility: HOSPITAL | Age: 61
End: 2021-03-17

## 2021-03-17 VITALS
BODY MASS INDEX: 27.49 KG/M2 | TEMPERATURE: 97.3 F | OXYGEN SATURATION: 99 % | SYSTOLIC BLOOD PRESSURE: 124 MMHG | HEIGHT: 64 IN | WEIGHT: 161 LBS | DIASTOLIC BLOOD PRESSURE: 79 MMHG | RESPIRATION RATE: 18 BRPM | HEART RATE: 93 BPM

## 2021-03-17 DIAGNOSIS — K90.9 MALABSORPTION OF IRON: ICD-10-CM

## 2021-03-17 DIAGNOSIS — D50.9 IRON DEFICIENCY ANEMIA, UNSPECIFIED IRON DEFICIENCY ANEMIA TYPE: Primary | ICD-10-CM

## 2021-03-17 PROCEDURE — 96375 TX/PRO/DX INJ NEW DRUG ADDON: CPT

## 2021-03-17 PROCEDURE — 25010000002 IRON SUCROSE PER 1 MG: Performed by: INTERNAL MEDICINE

## 2021-03-17 PROCEDURE — 25010000002 DIPHENHYDRAMINE PER 50 MG: Performed by: INTERNAL MEDICINE

## 2021-03-17 PROCEDURE — 96365 THER/PROPH/DIAG IV INF INIT: CPT

## 2021-03-17 PROCEDURE — 96366 THER/PROPH/DIAG IV INF ADDON: CPT

## 2021-03-17 RX ORDER — SODIUM CHLORIDE 9 MG/ML
250 INJECTION, SOLUTION INTRAVENOUS ONCE
Status: COMPLETED | OUTPATIENT
Start: 2021-03-17 | End: 2021-03-17

## 2021-03-17 RX ORDER — ACETAMINOPHEN 325 MG/1
650 TABLET ORAL ONCE
Status: COMPLETED | OUTPATIENT
Start: 2021-03-17 | End: 2021-03-17

## 2021-03-17 RX ADMIN — DIPHENHYDRAMINE HYDROCHLORIDE 25 MG: 50 INJECTION INTRAMUSCULAR; INTRAVENOUS at 14:12

## 2021-03-17 RX ADMIN — SODIUM CHLORIDE 250 ML: 9 INJECTION, SOLUTION INTRAVENOUS at 14:11

## 2021-03-17 RX ADMIN — IRON SUCROSE 200 MG: 20 INJECTION, SOLUTION INTRAVENOUS at 14:59

## 2021-03-17 RX ADMIN — ACETAMINOPHEN 650 MG: 325 TABLET, FILM COATED ORAL at 14:12

## 2021-03-19 ENCOUNTER — BULK ORDERING (OUTPATIENT)
Dept: CASE MANAGEMENT | Facility: OTHER | Age: 61
End: 2021-03-19

## 2021-03-19 DIAGNOSIS — Z23 IMMUNIZATION DUE: ICD-10-CM

## 2021-03-24 ENCOUNTER — INFUSION (OUTPATIENT)
Dept: ONCOLOGY | Facility: HOSPITAL | Age: 61
End: 2021-03-24

## 2021-03-24 VITALS
HEIGHT: 64 IN | BODY MASS INDEX: 27.28 KG/M2 | WEIGHT: 159.8 LBS | HEART RATE: 93 BPM | SYSTOLIC BLOOD PRESSURE: 136 MMHG | RESPIRATION RATE: 18 BRPM | DIASTOLIC BLOOD PRESSURE: 84 MMHG | TEMPERATURE: 97.6 F | OXYGEN SATURATION: 98 %

## 2021-03-24 DIAGNOSIS — K90.9 MALABSORPTION OF IRON: ICD-10-CM

## 2021-03-24 DIAGNOSIS — D50.9 IRON DEFICIENCY ANEMIA, UNSPECIFIED IRON DEFICIENCY ANEMIA TYPE: Primary | ICD-10-CM

## 2021-03-24 PROCEDURE — 96376 TX/PRO/DX INJ SAME DRUG ADON: CPT

## 2021-03-24 PROCEDURE — 96372 THER/PROPH/DIAG INJ SC/IM: CPT | Performed by: INTERNAL MEDICINE

## 2021-03-24 PROCEDURE — 25010000002 IRON SUCROSE PER 1 MG: Performed by: INTERNAL MEDICINE

## 2021-03-24 PROCEDURE — 25010000002 DIPHENHYDRAMINE PER 50 MG: Performed by: INTERNAL MEDICINE

## 2021-03-24 PROCEDURE — 96365 THER/PROPH/DIAG IV INF INIT: CPT

## 2021-03-24 RX ORDER — SODIUM CHLORIDE 9 MG/ML
250 INJECTION, SOLUTION INTRAVENOUS ONCE
Status: COMPLETED | OUTPATIENT
Start: 2021-03-24 | End: 2021-03-24

## 2021-03-24 RX ORDER — ACETAMINOPHEN 325 MG/1
650 TABLET ORAL ONCE
Status: COMPLETED | OUTPATIENT
Start: 2021-03-24 | End: 2021-03-24

## 2021-03-24 RX ADMIN — IRON SUCROSE 200 MG: 20 INJECTION, SOLUTION INTRAVENOUS at 14:51

## 2021-03-24 RX ADMIN — SODIUM CHLORIDE 250 ML: 9 INJECTION, SOLUTION INTRAVENOUS at 14:11

## 2021-03-24 RX ADMIN — DIPHENHYDRAMINE HYDROCHLORIDE 25 MG: 50 INJECTION INTRAMUSCULAR; INTRAVENOUS at 14:12

## 2021-03-24 RX ADMIN — ACETAMINOPHEN 650 MG: 325 TABLET, FILM COATED ORAL at 14:11

## 2021-03-26 ENCOUNTER — TRANSCRIBE ORDERS (OUTPATIENT)
Dept: PREADMISSION TESTING | Facility: HOSPITAL | Age: 61
End: 2021-03-26

## 2021-03-31 ENCOUNTER — APPOINTMENT (OUTPATIENT)
Dept: PREADMISSION TESTING | Facility: HOSPITAL | Age: 61
End: 2021-03-31

## 2021-03-31 VITALS
RESPIRATION RATE: 16 BRPM | WEIGHT: 161 LBS | HEIGHT: 64 IN | OXYGEN SATURATION: 98 % | DIASTOLIC BLOOD PRESSURE: 87 MMHG | TEMPERATURE: 98 F | BODY MASS INDEX: 27.49 KG/M2 | HEART RATE: 98 BPM | SYSTOLIC BLOOD PRESSURE: 141 MMHG

## 2021-03-31 LAB
ANION GAP SERPL CALCULATED.3IONS-SCNC: 8.9 MMOL/L (ref 5–15)
BASOPHILS # BLD AUTO: 0.07 10*3/MM3 (ref 0–0.2)
BASOPHILS NFR BLD AUTO: 1.3 % (ref 0–1.5)
BUN SERPL-MCNC: 15 MG/DL (ref 8–23)
BUN/CREAT SERPL: 14.2 (ref 7–25)
CALCIUM SPEC-SCNC: 8.8 MG/DL (ref 8.6–10.5)
CHLORIDE SERPL-SCNC: 107 MMOL/L (ref 98–107)
CO2 SERPL-SCNC: 25.1 MMOL/L (ref 22–29)
CREAT SERPL-MCNC: 1.06 MG/DL (ref 0.57–1)
DEPRECATED RDW RBC AUTO: 53 FL (ref 37–54)
EOSINOPHIL # BLD AUTO: 0.22 10*3/MM3 (ref 0–0.4)
EOSINOPHIL NFR BLD AUTO: 4 % (ref 0.3–6.2)
ERYTHROCYTE [DISTWIDTH] IN BLOOD BY AUTOMATED COUNT: 16.3 % (ref 12.3–15.4)
GFR SERPL CREATININE-BSD FRML MDRD: 53 ML/MIN/1.73
GLUCOSE SERPL-MCNC: 99 MG/DL (ref 65–99)
HCT VFR BLD AUTO: 41.1 % (ref 34–46.6)
HGB BLD-MCNC: 12.5 G/DL (ref 12–15.9)
IMM GRANULOCYTES # BLD AUTO: 0.02 10*3/MM3 (ref 0–0.05)
IMM GRANULOCYTES NFR BLD AUTO: 0.4 % (ref 0–0.5)
LYMPHOCYTES # BLD AUTO: 1.94 10*3/MM3 (ref 0.7–3.1)
LYMPHOCYTES NFR BLD AUTO: 35.7 % (ref 19.6–45.3)
MCH RBC QN AUTO: 26.7 PG (ref 26.6–33)
MCHC RBC AUTO-ENTMCNC: 30.4 G/DL (ref 31.5–35.7)
MCV RBC AUTO: 87.8 FL (ref 79–97)
MONOCYTES # BLD AUTO: 0.51 10*3/MM3 (ref 0.1–0.9)
MONOCYTES NFR BLD AUTO: 9.4 % (ref 5–12)
NEUTROPHILS NFR BLD AUTO: 2.68 10*3/MM3 (ref 1.7–7)
NEUTROPHILS NFR BLD AUTO: 49.2 % (ref 42.7–76)
NRBC BLD AUTO-RTO: 0 /100 WBC (ref 0–0.2)
PLATELET # BLD AUTO: 284 10*3/MM3 (ref 140–450)
PMV BLD AUTO: 9.1 FL (ref 6–12)
POTASSIUM SERPL-SCNC: 4.5 MMOL/L (ref 3.5–5.2)
RBC # BLD AUTO: 4.68 10*6/MM3 (ref 3.77–5.28)
SODIUM SERPL-SCNC: 141 MMOL/L (ref 136–145)
WBC # BLD AUTO: 5.44 10*3/MM3 (ref 3.4–10.8)

## 2021-03-31 PROCEDURE — 80048 BASIC METABOLIC PNL TOTAL CA: CPT

## 2021-03-31 PROCEDURE — 36415 COLL VENOUS BLD VENIPUNCTURE: CPT

## 2021-03-31 PROCEDURE — 85025 COMPLETE CBC W/AUTO DIFF WBC: CPT

## 2021-03-31 RX ORDER — MULTIVIT WITH MINERALS/LUTEIN
500 TABLET ORAL NIGHTLY
COMMUNITY

## 2021-04-03 ENCOUNTER — LAB (OUTPATIENT)
Dept: LAB | Facility: HOSPITAL | Age: 61
End: 2021-04-03

## 2021-04-03 PROCEDURE — U0004 COV-19 TEST NON-CDC HGH THRU: HCPCS

## 2021-04-03 PROCEDURE — C9803 HOPD COVID-19 SPEC COLLECT: HCPCS

## 2021-04-05 PROBLEM — N81.9 PROLAPSE OF FEMALE PELVIC ORGANS: Status: ACTIVE | Noted: 2021-04-05

## 2021-04-05 LAB — SARS-COV-2 RNA RESP QL NAA+PROBE: NOT DETECTED

## 2021-04-05 NOTE — H&P
Interval H&P    I have reviewed the H&P from 3/8/21 and there are no interval changes noted.  Pt. scheduled for laparoscopic colpopexy, possible anterior and posterior repair, bilateral salpingectomy.  Place SCDs, IV, and administer Ancef 30-60 min prior to scheduled procedure.  Consent form reviewed, signed, and placed in chart.  Proceed to OR.     Alee MITTAL Fellow  [phone]

## 2021-04-06 ENCOUNTER — ANESTHESIA EVENT (OUTPATIENT)
Dept: PERIOP | Facility: HOSPITAL | Age: 61
End: 2021-04-06

## 2021-04-06 ENCOUNTER — HOSPITAL ENCOUNTER (OUTPATIENT)
Facility: HOSPITAL | Age: 61
Discharge: HOME | End: 2021-04-07
Attending: OBSTETRICS & GYNECOLOGY | Admitting: OBSTETRICS & GYNECOLOGY

## 2021-04-06 ENCOUNTER — ANESTHESIA (OUTPATIENT)
Dept: PERIOP | Facility: HOSPITAL | Age: 61
End: 2021-04-06

## 2021-04-06 DIAGNOSIS — N81.6 RECTOCELE: ICD-10-CM

## 2021-04-06 DIAGNOSIS — N81.9 PELVIC PROLAPSE: ICD-10-CM

## 2021-04-06 DIAGNOSIS — Z98.890 S/P LAPAROSCOPY: Primary | ICD-10-CM

## 2021-04-06 PROCEDURE — C1889 IMPLANT/INSERT DEVICE, NOC: HCPCS | Performed by: OBSTETRICS & GYNECOLOGY

## 2021-04-06 PROCEDURE — 25010000002 EPINEPHRINE PER 0.1 MG: Performed by: OBSTETRICS & GYNECOLOGY

## 2021-04-06 PROCEDURE — 25010000002 FENTANYL CITRATE (PF) 100 MCG/2ML SOLUTION: Performed by: NURSE ANESTHETIST, CERTIFIED REGISTERED

## 2021-04-06 PROCEDURE — 0: Performed by: OBSTETRICS & GYNECOLOGY

## 2021-04-06 PROCEDURE — 25010000002 NEOSTIGMINE 5 MG/10ML SOLUTION: Performed by: NURSE ANESTHETIST, CERTIFIED REGISTERED

## 2021-04-06 PROCEDURE — 25010000002 ONDANSETRON PER 1 MG: Performed by: NURSE ANESTHETIST, CERTIFIED REGISTERED

## 2021-04-06 PROCEDURE — 25010000002 PHENYLEPHRINE PER 1 ML: Performed by: NURSE ANESTHETIST, CERTIFIED REGISTERED

## 2021-04-06 PROCEDURE — C1763 CONN TISS, NON-HUMAN: HCPCS | Performed by: OBSTETRICS & GYNECOLOGY

## 2021-04-06 PROCEDURE — 25010000002 PROPOFOL 10 MG/ML EMULSION: Performed by: NURSE ANESTHETIST, CERTIFIED REGISTERED

## 2021-04-06 PROCEDURE — 25010000003 CEFAZOLIN IN DEXTROSE 2-4 GM/100ML-% SOLUTION: Performed by: OBSTETRICS & GYNECOLOGY

## 2021-04-06 PROCEDURE — 25010000003 CEFAZOLIN IN DEXTROSE 2-4 GM/100ML-% SOLUTION: Performed by: NURSE ANESTHETIST, CERTIFIED REGISTERED

## 2021-04-06 PROCEDURE — 25010000002 MIDAZOLAM PER 1 MG: Performed by: ANESTHESIOLOGY

## 2021-04-06 PROCEDURE — 88302 TISSUE EXAM BY PATHOLOGIST: CPT | Performed by: OBSTETRICS & GYNECOLOGY

## 2021-04-06 PROCEDURE — 25010000002 DEXAMETHASONE PER 1 MG: Performed by: NURSE ANESTHETIST, CERTIFIED REGISTERED

## 2021-04-06 PROCEDURE — G0378 HOSPITAL OBSERVATION PER HR: HCPCS

## 2021-04-06 PROCEDURE — 25010000002 METOCLOPRAMIDE PER 10 MG: Performed by: OBSTETRICS & GYNECOLOGY

## 2021-04-06 DEVICE — POLYPROPYLENE MESH FOR SACROCOLPOSUSPENSION/SACROCOLPOPEXY - Y
Type: IMPLANTABLE DEVICE | Site: ABDOMEN | Status: FUNCTIONAL
Brand: RESTORELLE

## 2021-04-06 DEVICE — DEV CONTRL TISS STRATAFIX SPIRAL PDS PLS CT1 2/0 15CM: Type: IMPLANTABLE DEVICE | Site: ABDOMEN | Status: FUNCTIONAL

## 2021-04-06 DEVICE — DEV CONTRL TISS STRATAFIX SPIRAL MNCRYL CT1 2/0 30CM: Type: IMPLANTABLE DEVICE | Site: ABDOMEN | Status: FUNCTIONAL

## 2021-04-06 DEVICE — FIXATION DEVICE;15 VIOLET ABSORBABLE TACKS
Type: IMPLANTABLE DEVICE | Site: ABDOMEN | Status: FUNCTIONAL
Brand: ABSORBATACK

## 2021-04-06 DEVICE — HEMOST ABS SURGICEL SNOW 1X2IN: Type: IMPLANTABLE DEVICE | Site: ABDOMEN | Status: FUNCTIONAL

## 2021-04-06 RX ORDER — HYDROMORPHONE HYDROCHLORIDE 1 MG/ML
0.5 INJECTION, SOLUTION INTRAMUSCULAR; INTRAVENOUS; SUBCUTANEOUS
Status: DISCONTINUED | OUTPATIENT
Start: 2021-04-06 | End: 2021-04-06 | Stop reason: HOSPADM

## 2021-04-06 RX ORDER — SODIUM CHLORIDE, SODIUM LACTATE, POTASSIUM CHLORIDE, CALCIUM CHLORIDE 600; 310; 30; 20 MG/100ML; MG/100ML; MG/100ML; MG/100ML
100 INJECTION, SOLUTION INTRAVENOUS CONTINUOUS
Status: DISCONTINUED | OUTPATIENT
Start: 2021-04-06 | End: 2021-04-07 | Stop reason: HOSPADM

## 2021-04-06 RX ORDER — SODIUM CHLORIDE, SODIUM LACTATE, POTASSIUM CHLORIDE, CALCIUM CHLORIDE 600; 310; 30; 20 MG/100ML; MG/100ML; MG/100ML; MG/100ML
50 INJECTION, SOLUTION INTRAVENOUS CONTINUOUS
Status: DISCONTINUED | OUTPATIENT
Start: 2021-04-06 | End: 2021-04-07 | Stop reason: HOSPADM

## 2021-04-06 RX ORDER — HYDROCODONE BITARTRATE AND ACETAMINOPHEN 7.5; 325 MG/1; MG/1
1 TABLET ORAL ONCE AS NEEDED
Status: DISCONTINUED | OUTPATIENT
Start: 2021-04-06 | End: 2021-04-06 | Stop reason: HOSPADM

## 2021-04-06 RX ORDER — PHENAZOPYRIDINE HYDROCHLORIDE 200 MG/1
200 TABLET, FILM COATED ORAL ONCE
Status: COMPLETED | OUTPATIENT
Start: 2021-04-06 | End: 2021-04-06

## 2021-04-06 RX ORDER — FLUMAZENIL 0.1 MG/ML
0.2 INJECTION INTRAVENOUS AS NEEDED
Status: DISCONTINUED | OUTPATIENT
Start: 2021-04-06 | End: 2021-04-06 | Stop reason: HOSPADM

## 2021-04-06 RX ORDER — SODIUM CHLORIDE 0.9 % (FLUSH) 0.9 %
3 SYRINGE (ML) INJECTION EVERY 12 HOURS SCHEDULED
Status: DISCONTINUED | OUTPATIENT
Start: 2021-04-06 | End: 2021-04-06 | Stop reason: HOSPADM

## 2021-04-06 RX ORDER — CEFAZOLIN SODIUM 2 G/100ML
2 INJECTION, SOLUTION INTRAVENOUS EVERY 8 HOURS
Status: DISCONTINUED | OUTPATIENT
Start: 2021-04-06 | End: 2021-04-06

## 2021-04-06 RX ORDER — DIPHENHYDRAMINE HYDROCHLORIDE 50 MG/ML
25 INJECTION INTRAMUSCULAR; INTRAVENOUS EVERY 6 HOURS PRN
Status: DISCONTINUED | OUTPATIENT
Start: 2021-04-06 | End: 2021-04-07 | Stop reason: HOSPADM

## 2021-04-06 RX ORDER — LABETALOL HYDROCHLORIDE 5 MG/ML
5 INJECTION, SOLUTION INTRAVENOUS
Status: DISCONTINUED | OUTPATIENT
Start: 2021-04-06 | End: 2021-04-06 | Stop reason: HOSPADM

## 2021-04-06 RX ORDER — LIDOCAINE HYDROCHLORIDE 20 MG/ML
INJECTION, SOLUTION INFILTRATION; PERINEURAL AS NEEDED
Status: DISCONTINUED | OUTPATIENT
Start: 2021-04-06 | End: 2021-04-06 | Stop reason: SURG

## 2021-04-06 RX ORDER — CEFAZOLIN SODIUM 2 G/100ML
INJECTION, SOLUTION INTRAVENOUS AS NEEDED
Status: DISCONTINUED | OUTPATIENT
Start: 2021-04-06 | End: 2021-04-06 | Stop reason: SURG

## 2021-04-06 RX ORDER — MAGNESIUM HYDROXIDE 1200 MG/15ML
LIQUID ORAL AS NEEDED
Status: DISCONTINUED | OUTPATIENT
Start: 2021-04-06 | End: 2021-04-06 | Stop reason: HOSPADM

## 2021-04-06 RX ORDER — ROCURONIUM BROMIDE 10 MG/ML
INJECTION, SOLUTION INTRAVENOUS AS NEEDED
Status: DISCONTINUED | OUTPATIENT
Start: 2021-04-06 | End: 2021-04-06 | Stop reason: SURG

## 2021-04-06 RX ORDER — ALPRAZOLAM 0.5 MG/1
0.5 TABLET ORAL NIGHTLY PRN
Status: DISCONTINUED | OUTPATIENT
Start: 2021-04-06 | End: 2021-04-07 | Stop reason: HOSPADM

## 2021-04-06 RX ORDER — HYDROCODONE BITARTRATE AND ACETAMINOPHEN 5; 325 MG/1; MG/1
1 TABLET ORAL EVERY 4 HOURS PRN
Status: DISCONTINUED | OUTPATIENT
Start: 2021-04-06 | End: 2021-04-07 | Stop reason: HOSPADM

## 2021-04-06 RX ORDER — FENTANYL CITRATE 50 UG/ML
INJECTION, SOLUTION INTRAMUSCULAR; INTRAVENOUS AS NEEDED
Status: DISCONTINUED | OUTPATIENT
Start: 2021-04-06 | End: 2021-04-06 | Stop reason: SURG

## 2021-04-06 RX ORDER — PROMETHAZINE HYDROCHLORIDE 25 MG/1
25 SUPPOSITORY RECTAL ONCE AS NEEDED
Status: DISCONTINUED | OUTPATIENT
Start: 2021-04-06 | End: 2021-04-06 | Stop reason: HOSPADM

## 2021-04-06 RX ORDER — LEVOTHYROXINE SODIUM 0.07 MG/1
75 TABLET ORAL
Status: DISCONTINUED | OUTPATIENT
Start: 2021-04-07 | End: 2021-04-07 | Stop reason: HOSPADM

## 2021-04-06 RX ORDER — CEFAZOLIN SODIUM 2 G/100ML
2 INJECTION, SOLUTION INTRAVENOUS ONCE
Status: COMPLETED | OUTPATIENT
Start: 2021-04-06 | End: 2021-04-06

## 2021-04-06 RX ORDER — SODIUM CHLORIDE 0.9 % (FLUSH) 0.9 %
10 SYRINGE (ML) INJECTION EVERY 12 HOURS SCHEDULED
Status: DISCONTINUED | OUTPATIENT
Start: 2021-04-06 | End: 2021-04-06 | Stop reason: HOSPADM

## 2021-04-06 RX ORDER — POLYETHYLENE GLYCOL 3350 17 G/17G
17 POWDER, FOR SOLUTION ORAL DAILY PRN
Status: DISCONTINUED | OUTPATIENT
Start: 2021-04-06 | End: 2021-04-07 | Stop reason: HOSPADM

## 2021-04-06 RX ORDER — CHOLECALCIFEROL (VITAMIN D3) 125 MCG
5 CAPSULE ORAL NIGHTLY PRN
Status: DISCONTINUED | OUTPATIENT
Start: 2021-04-06 | End: 2021-04-07 | Stop reason: HOSPADM

## 2021-04-06 RX ORDER — DIPHENHYDRAMINE HCL 25 MG
25 CAPSULE ORAL NIGHTLY PRN
Status: DISCONTINUED | OUTPATIENT
Start: 2021-04-06 | End: 2021-04-07 | Stop reason: HOSPADM

## 2021-04-06 RX ORDER — BUPIVACAINE HYDROCHLORIDE AND EPINEPHRINE 5; 5 MG/ML; UG/ML
INJECTION, SOLUTION PERINEURAL AS NEEDED
Status: DISCONTINUED | OUTPATIENT
Start: 2021-04-06 | End: 2021-04-06 | Stop reason: HOSPADM

## 2021-04-06 RX ORDER — MIDAZOLAM HYDROCHLORIDE 1 MG/ML
2 INJECTION INTRAMUSCULAR; INTRAVENOUS
Status: DISCONTINUED | OUTPATIENT
Start: 2021-04-06 | End: 2021-04-06 | Stop reason: HOSPADM

## 2021-04-06 RX ORDER — DEXAMETHASONE SODIUM PHOSPHATE 10 MG/ML
INJECTION INTRAMUSCULAR; INTRAVENOUS AS NEEDED
Status: DISCONTINUED | OUTPATIENT
Start: 2021-04-06 | End: 2021-04-06 | Stop reason: SURG

## 2021-04-06 RX ORDER — METOCLOPRAMIDE HYDROCHLORIDE 5 MG/ML
10 INJECTION INTRAMUSCULAR; INTRAVENOUS EVERY 6 HOURS PRN
Status: DISCONTINUED | OUTPATIENT
Start: 2021-04-06 | End: 2021-04-07 | Stop reason: HOSPADM

## 2021-04-06 RX ORDER — METHENAMINE, SODIUM PHOSPHATE, MONOBASIC, ANHYDROUS, PHENYL SALICYLATE, METHYLENE BLUE AND HYOSCYAMINE SULFATE 118; 40.8; 36; 10; .12 MG/1; MG/1; MG/1; MG/1; MG/1
CAPSULE ORAL
COMMUNITY
Start: 2021-03-03 | End: 2022-11-30

## 2021-04-06 RX ORDER — CALCIUM CARBONATE 200(500)MG
2 TABLET,CHEWABLE ORAL 3 TIMES DAILY PRN
Status: DISCONTINUED | OUTPATIENT
Start: 2021-04-06 | End: 2021-04-07 | Stop reason: HOSPADM

## 2021-04-06 RX ORDER — PROMETHAZINE HYDROCHLORIDE 25 MG/1
25 TABLET ORAL ONCE AS NEEDED
Status: DISCONTINUED | OUTPATIENT
Start: 2021-04-06 | End: 2021-04-06 | Stop reason: HOSPADM

## 2021-04-06 RX ORDER — CEFAZOLIN SODIUM 2 G/100ML
2 INJECTION, SOLUTION INTRAVENOUS EVERY 8 HOURS
Status: DISCONTINUED | OUTPATIENT
Start: 2021-04-06 | End: 2021-04-07 | Stop reason: HOSPADM

## 2021-04-06 RX ORDER — FAMOTIDINE 10 MG/ML
20 INJECTION, SOLUTION INTRAVENOUS ONCE
Status: COMPLETED | OUTPATIENT
Start: 2021-04-06 | End: 2021-04-06

## 2021-04-06 RX ORDER — NEOSTIGMINE METHYLSULFATE 0.5 MG/ML
INJECTION, SOLUTION INTRAVENOUS AS NEEDED
Status: DISCONTINUED | OUTPATIENT
Start: 2021-04-06 | End: 2021-04-06 | Stop reason: SURG

## 2021-04-06 RX ORDER — NALOXONE HCL 0.4 MG/ML
0.2 VIAL (ML) INJECTION AS NEEDED
Status: DISCONTINUED | OUTPATIENT
Start: 2021-04-06 | End: 2021-04-06 | Stop reason: HOSPADM

## 2021-04-06 RX ORDER — ONDANSETRON 2 MG/ML
4 INJECTION INTRAMUSCULAR; INTRAVENOUS EVERY 6 HOURS PRN
Status: DISCONTINUED | OUTPATIENT
Start: 2021-04-06 | End: 2021-04-07 | Stop reason: HOSPADM

## 2021-04-06 RX ORDER — SODIUM CHLORIDE 0.9 % (FLUSH) 0.9 %
10 SYRINGE (ML) INJECTION AS NEEDED
Status: DISCONTINUED | OUTPATIENT
Start: 2021-04-06 | End: 2021-04-06 | Stop reason: HOSPADM

## 2021-04-06 RX ORDER — PROPOFOL 10 MG/ML
VIAL (ML) INTRAVENOUS AS NEEDED
Status: DISCONTINUED | OUTPATIENT
Start: 2021-04-06 | End: 2021-04-06 | Stop reason: SURG

## 2021-04-06 RX ORDER — ALBUTEROL SULFATE 2.5 MG/3ML
2.5 SOLUTION RESPIRATORY (INHALATION) ONCE AS NEEDED
Status: DISCONTINUED | OUTPATIENT
Start: 2021-04-06 | End: 2021-04-06 | Stop reason: HOSPADM

## 2021-04-06 RX ORDER — SODIUM CHLORIDE, SODIUM LACTATE, POTASSIUM CHLORIDE, CALCIUM CHLORIDE 600; 310; 30; 20 MG/100ML; MG/100ML; MG/100ML; MG/100ML
9 INJECTION, SOLUTION INTRAVENOUS CONTINUOUS
Status: DISCONTINUED | OUTPATIENT
Start: 2021-04-06 | End: 2021-04-06

## 2021-04-06 RX ORDER — FENTANYL CITRATE 50 UG/ML
50 INJECTION, SOLUTION INTRAMUSCULAR; INTRAVENOUS
Status: DISCONTINUED | OUTPATIENT
Start: 2021-04-06 | End: 2021-04-06 | Stop reason: HOSPADM

## 2021-04-06 RX ORDER — ONDANSETRON 2 MG/ML
4 INJECTION INTRAMUSCULAR; INTRAVENOUS ONCE AS NEEDED
Status: DISCONTINUED | OUTPATIENT
Start: 2021-04-06 | End: 2021-04-06 | Stop reason: HOSPADM

## 2021-04-06 RX ORDER — GLYCOPYRROLATE 0.2 MG/ML
INJECTION INTRAMUSCULAR; INTRAVENOUS AS NEEDED
Status: DISCONTINUED | OUTPATIENT
Start: 2021-04-06 | End: 2021-04-06 | Stop reason: SURG

## 2021-04-06 RX ORDER — BISACODYL 5 MG/1
10 TABLET, DELAYED RELEASE ORAL DAILY
Status: DISCONTINUED | OUTPATIENT
Start: 2021-04-06 | End: 2021-04-07 | Stop reason: HOSPADM

## 2021-04-06 RX ORDER — EPHEDRINE SULFATE 50 MG/ML
5 INJECTION, SOLUTION INTRAVENOUS ONCE AS NEEDED
Status: DISCONTINUED | OUTPATIENT
Start: 2021-04-06 | End: 2021-04-06 | Stop reason: HOSPADM

## 2021-04-06 RX ORDER — DIPHENHYDRAMINE HCL 25 MG
25 CAPSULE ORAL
Status: DISCONTINUED | OUTPATIENT
Start: 2021-04-06 | End: 2021-04-06 | Stop reason: HOSPADM

## 2021-04-06 RX ORDER — EPHEDRINE SULFATE 50 MG/ML
INJECTION, SOLUTION INTRAVENOUS AS NEEDED
Status: DISCONTINUED | OUTPATIENT
Start: 2021-04-06 | End: 2021-04-06 | Stop reason: SURG

## 2021-04-06 RX ORDER — FAMOTIDINE 20 MG/1
20 TABLET, FILM COATED ORAL
Status: DISCONTINUED | OUTPATIENT
Start: 2021-04-06 | End: 2021-04-07 | Stop reason: HOSPADM

## 2021-04-06 RX ORDER — DIPHENHYDRAMINE HYDROCHLORIDE 50 MG/ML
12.5 INJECTION INTRAMUSCULAR; INTRAVENOUS
Status: DISCONTINUED | OUTPATIENT
Start: 2021-04-06 | End: 2021-04-06 | Stop reason: HOSPADM

## 2021-04-06 RX ORDER — OXYCODONE AND ACETAMINOPHEN 7.5; 325 MG/1; MG/1
1 TABLET ORAL ONCE AS NEEDED
Status: DISCONTINUED | OUTPATIENT
Start: 2021-04-06 | End: 2021-04-06 | Stop reason: HOSPADM

## 2021-04-06 RX ORDER — ONDANSETRON 2 MG/ML
INJECTION INTRAMUSCULAR; INTRAVENOUS AS NEEDED
Status: DISCONTINUED | OUTPATIENT
Start: 2021-04-06 | End: 2021-04-06 | Stop reason: SURG

## 2021-04-06 RX ORDER — METHENAMINE, SODIUM PHOSPHATE, MONOBASIC, MONOHYDRATE, PHENYL SALICYLATE, METHYLENE BLUE, AND HYOSCYAMINE SULFATE 120; 40.8; 36; 10; .12 MG/1; MG/1; MG/1; MG/1; MG/1
1 CAPSULE ORAL
COMMUNITY
Start: 2021-03-03

## 2021-04-06 RX ORDER — ALUMINA, MAGNESIA, AND SIMETHICONE 2400; 2400; 240 MG/30ML; MG/30ML; MG/30ML
15 SUSPENSION ORAL EVERY 6 HOURS PRN
Status: DISCONTINUED | OUTPATIENT
Start: 2021-04-06 | End: 2021-04-07 | Stop reason: HOSPADM

## 2021-04-06 RX ORDER — MIDAZOLAM HYDROCHLORIDE 1 MG/ML
1 INJECTION INTRAMUSCULAR; INTRAVENOUS
Status: DISCONTINUED | OUTPATIENT
Start: 2021-04-06 | End: 2021-04-06 | Stop reason: HOSPADM

## 2021-04-06 RX ORDER — SODIUM CHLORIDE 9 MG/ML
INJECTION, SOLUTION INTRAVENOUS AS NEEDED
Status: DISCONTINUED | OUTPATIENT
Start: 2021-04-06 | End: 2021-04-06 | Stop reason: HOSPADM

## 2021-04-06 RX ORDER — HYDROCODONE BITARTRATE AND ACETAMINOPHEN 10; 325 MG/1; MG/1
1 TABLET ORAL EVERY 4 HOURS PRN
Status: DISCONTINUED | OUTPATIENT
Start: 2021-04-06 | End: 2021-04-07 | Stop reason: HOSPADM

## 2021-04-06 RX ORDER — OXYCODONE HYDROCHLORIDE AND ACETAMINOPHEN 5; 325 MG/1; MG/1
TABLET ORAL
COMMUNITY
Start: 2021-03-03 | End: 2021-04-07 | Stop reason: HOSPADM

## 2021-04-06 RX ADMIN — CEFAZOLIN SODIUM 2 G: 2 INJECTION, SOLUTION INTRAVENOUS at 21:44

## 2021-04-06 RX ADMIN — PHENYLEPHRINE HYDROCHLORIDE 100 MCG: 10 INJECTION INTRAVENOUS at 10:49

## 2021-04-06 RX ADMIN — ONDANSETRON 4 MG: 2 INJECTION INTRAMUSCULAR; INTRAVENOUS at 11:26

## 2021-04-06 RX ADMIN — PHENYLEPHRINE HYDROCHLORIDE 100 MCG: 10 INJECTION INTRAVENOUS at 07:52

## 2021-04-06 RX ADMIN — FENTANYL CITRATE 100 MCG: 50 INJECTION INTRAMUSCULAR; INTRAVENOUS at 07:40

## 2021-04-06 RX ADMIN — SODIUM CHLORIDE, POTASSIUM CHLORIDE, SODIUM LACTATE AND CALCIUM CHLORIDE 9 ML/HR: 600; 310; 30; 20 INJECTION, SOLUTION INTRAVENOUS at 06:10

## 2021-04-06 RX ADMIN — MIDAZOLAM 1 MG: 1 INJECTION INTRAMUSCULAR; INTRAVENOUS at 06:31

## 2021-04-06 RX ADMIN — GLYCOPYRROLATE 0.4 MG: 0.2 INJECTION INTRAMUSCULAR; INTRAVENOUS at 11:31

## 2021-04-06 RX ADMIN — ROCURONIUM BROMIDE 50 MG: 50 INJECTION INTRAVENOUS at 07:40

## 2021-04-06 RX ADMIN — EPHEDRINE SULFATE 5 MG: 50 INJECTION INTRAVENOUS at 07:51

## 2021-04-06 RX ADMIN — FENTANYL CITRATE 50 MCG: 50 INJECTION INTRAMUSCULAR; INTRAVENOUS at 10:36

## 2021-04-06 RX ADMIN — FENTANYL CITRATE 50 MCG: 50 INJECTION, SOLUTION INTRAMUSCULAR; INTRAVENOUS at 12:35

## 2021-04-06 RX ADMIN — NEOSTIGMINE METHYLSULFATE 3 MG: 0.5 INJECTION INTRAVENOUS at 11:31

## 2021-04-06 RX ADMIN — LIDOCAINE HYDROCHLORIDE 80 MG: 20 INJECTION, SOLUTION INFILTRATION; PERINEURAL at 07:40

## 2021-04-06 RX ADMIN — PHENYLEPHRINE HYDROCHLORIDE 100 MCG: 10 INJECTION INTRAVENOUS at 10:53

## 2021-04-06 RX ADMIN — FENTANYL CITRATE 50 MCG: 50 INJECTION, SOLUTION INTRAMUSCULAR; INTRAVENOUS at 12:50

## 2021-04-06 RX ADMIN — PHENYLEPHRINE HYDROCHLORIDE 100 MCG: 10 INJECTION INTRAVENOUS at 07:50

## 2021-04-06 RX ADMIN — DEXAMETHASONE SODIUM PHOSPHATE 8 MG: 10 INJECTION INTRAMUSCULAR; INTRAVENOUS at 09:30

## 2021-04-06 RX ADMIN — FENTANYL CITRATE 50 MCG: 50 INJECTION INTRAMUSCULAR; INTRAVENOUS at 08:56

## 2021-04-06 RX ADMIN — FAMOTIDINE 20 MG: 20 TABLET, FILM COATED ORAL at 16:21

## 2021-04-06 RX ADMIN — PROPOFOL 25 MCG/KG/MIN: 10 INJECTION, EMULSION INTRAVENOUS at 07:45

## 2021-04-06 RX ADMIN — CEFAZOLIN SODIUM 2 G: 2 INJECTION, SOLUTION INTRAVENOUS at 11:14

## 2021-04-06 RX ADMIN — SODIUM CHLORIDE, POTASSIUM CHLORIDE, SODIUM LACTATE AND CALCIUM CHLORIDE 50 ML/HR: 600; 310; 30; 20 INJECTION, SOLUTION INTRAVENOUS at 15:52

## 2021-04-06 RX ADMIN — PHENAZOPYRIDINE 200 MG: 200 TABLET ORAL at 06:31

## 2021-04-06 RX ADMIN — FAMOTIDINE 20 MG: 10 INJECTION INTRAVENOUS at 06:31

## 2021-04-06 RX ADMIN — ROCURONIUM BROMIDE 10 MG: 50 INJECTION INTRAVENOUS at 09:05

## 2021-04-06 RX ADMIN — METOCLOPRAMIDE HYDROCHLORIDE 10 MG: 5 INJECTION INTRAMUSCULAR; INTRAVENOUS at 18:57

## 2021-04-06 RX ADMIN — PROPOFOL 150 MG: 10 INJECTION, EMULSION INTRAVENOUS at 07:40

## 2021-04-06 RX ADMIN — PHENYLEPHRINE HYDROCHLORIDE 100 MCG: 10 INJECTION INTRAVENOUS at 07:46

## 2021-04-06 RX ADMIN — SODIUM CHLORIDE, POTASSIUM CHLORIDE, SODIUM LACTATE AND CALCIUM CHLORIDE: 600; 310; 30; 20 INJECTION, SOLUTION INTRAVENOUS at 11:26

## 2021-04-06 RX ADMIN — HYDROCODONE BITARTRATE AND ACETAMINOPHEN 1 TABLET: 10; 325 TABLET ORAL at 14:53

## 2021-04-06 RX ADMIN — HYDROCODONE BITARTRATE AND ACETAMINOPHEN 1 TABLET: 10; 325 TABLET ORAL at 19:42

## 2021-04-06 RX ADMIN — CEFAZOLIN SODIUM 2 G: 2 INJECTION, SOLUTION INTRAVENOUS at 07:25

## 2021-04-06 NOTE — ANESTHESIA PROCEDURE NOTES
Airway  Urgency: elective    Date/Time: 4/6/2021 7:41 AM  Airway not difficult    General Information and Staff    Patient location during procedure: OR  Anesthesiologist: Tomasa Chavira MD  CRNA: Tenisha Byrd CRNA    Indications and Patient Condition  Indications for airway management: airway protection    Preoxygenated: yes  MILS not maintained throughout  Mask difficulty assessment: 1 - vent by mask    Final Airway Details  Final airway type: endotracheal airway      Successful airway: ETT  Cuffed: yes   Successful intubation technique: direct laryngoscopy  Facilitating devices/methods: intubating stylet  Endotracheal tube insertion site: oral  Blade: Ye  Blade size: 3  ETT size (mm): 7.0  Cormack-Lehane Classification: grade I - full view of glottis  Placement verified by: chest auscultation   Cuff volume (mL): 8  Measured from: lips  Number of attempts at approach: 1  Assessment: lips, teeth, and gum same as pre-op and atraumatic intubation    Additional Comments  PreO2 100% face mask, IV induction, easy mask, DVL x1, cords noted, tube through, cuff up, EBBSH, +etCO2, = chest movement, tube secured in place, atraumatic, teeth and lips intact as preop.

## 2021-04-06 NOTE — OP NOTE
GYN OPERATIVE NOTE    Pre-op Dx:    Pelvic organ prolapse    Post-op Dx: Same    Procedure:   1) Laparoscopic sacro-culpopexy  2) Bilateral salpingectomy  3) Posterior repair  4) Perineorrhaphy  5)Rectal plication  6) Cystoscopy    Surgeon: Dr. Gustafson  Asst: Dr. Alee MITTAL Fellow  Anes: CHRISTOPHER  EBL: 15 cc  Findings:  Surgically absent uterus, adherent bladder to vaginal cuff, normal bilateral adnexa.  Small perineal body. Normal appendix. Normal bilateral UOs and bladder with strong efflux of urine bilaterally.  Specimen: Bilateral fallopian tubes  Complications: none  Status: Stable to PACU  Antibiotics: Ancef 2 g IV    PROCEDURE:    Patient was taken to the operating room where anesthesia was induced without difficulty.  Patient was placed in dorsal lithotomy positioning using mary stirrups. Prepped and draped in the normal fashion. The blankenship was placed at the beginning of the case without any difficulty. Sponge stick was placed in the vagina.     Attention was then turned to the abdomen. An incision was made at the base of the umbilicus and the Veress needle was used for entry. The abdomen was insufflated to a pressure of 15 mmHg. A 5mm trocar was placed and the abdomen and pelvis were surveyed with the findings noted above. Under direct visualization a 5mm right lower quadrant and suprapubic port was placed. In similar fashion a 10mm left lower quadrant port was placed.    The bowel was retracted to the patien't left by using a PDS suture through the epiploica, the suture was extracted through the anterior abdominal wall in the left upper quadrant and the suture was subsequently held in place with a hemostat on the exterior abdominal wall.    The vaginal cuff was then identified and dissection of the bladder was performed using a combination of blunt, Harmonic scalpel, and sharp dissection until visualization of the pubocervical fascia was clearly noted.       Attention was then turned to the presacral  space. The peritoneum overlying it was tented upward and incised sagittally using the harmonic scalpel all the way down to the posterior vaginal wall keeping away from right ureter. Snow placed over small amount of oozing with hemostasis. The posterior vaginal wall was dissected free from the peritoneum using the harmonic.  The dissection was carried out to 5 cm from the vaginal apex which was being manipulated by an EEA sizer. A similar dissection was carried out anteriorly with the same technique. Excellent hemostasis was noted. Restorelle mesh was then placed into the peritoneal cavity. The first was anchored to the anteriorly with absorbatack screws, 0-Stratafix, 0-vicryl, and gortex suture laterally to hold the mesh in place.  This was repeated posteriorly.      The sacral portion of the mesh was brought to the appropriate level and tension was performed to lift the vagina into the pelvic cavity to approximate the full length of the vagina in a tension-free fashion.  The mesh was secured on the sacral promontory using ethibond suture x 2. The peritoneum was then closed with running 2-0 monocryl with Lapra-Tys. The pelvis was irrigated copiously and freed of all clots and debris. Snow placed in vaginal cuff. Excellent hemostasis was noted.    The abdomen was vented and all trocars removed. Fascia at the 10mm port was closed with 0-vicryl using a Aubrey Chandu devise. The remaining skin incisions were closed with 4-0 monocryl and hemostasis was appreciated.     Cystoscopy was performed with 30 degree scope with normal findings.     Attention was then turned Vagina where Allis clamps were placed on the rectocele.  An incision was made at the vaginal introitus medial to the clamp and extending down the perineum to create an inverted triangle. An inverted triangular incision was excised from the posterior perineum and extend cephalad on the posterior vagina over the entire length of the rectocele defect.  The  vaginal mucosa was dissected off the underlying muscularis layer in a lateral direction from the midline. Allis clamps were placed on the vaginal mucosa.  Sharp and blunt dissection was used between the underlying fascia and the vaginal mucosa up to the vaginal cuff.  Rectal plication was performed with 3- vicryl running.   The fascia over the levator muscles was reapproximated in the midline using #0 Vicryl sutures x 3 to. The vaginal rectovaginal muscularis was then plicated toward the midline to reduce the prolapse with interrupted 2-0 Vicryl sutures.  Excess vaginal mucosa was trimmed.  The vaginal mucosa was closed using #0 Vicryl suture in a running locking fashion. A crown stitch reapproximated the distal levator muscles using #0 Vicryl suture. The perineal body was then recreated by reapproximating the superficial transverse perineal, bulbocavernosus, and raphe of the external anal sphincter using  #1 Vicryl suture.  Excellent hemostasis and reduction of the posterior prolapse was noted at the end of the procedure.    Vaginal packing placed.    All instruments were removed from the vagina and vaginal sweep was negative. All counts were correct x2. Bladder was drained and Travis was reinserted. The patient was returned to supine position and cleaned, undraped, dried, and awakened from anesthesia without complication. She returned to PACU in a stable condition.     All sponge and needle counts were correct x 2    Dr. Gustafson was scrubbed and present throughout the entire procedure    Alee Turner MD-[facility]

## 2021-04-06 NOTE — PLAN OF CARE
Goal Outcome Evaluation:  Plan of Care Reviewed With: patient, spouse     Outcome Summary: VSS. Travis to KASEY. Patient refuses any type of stool softener. States she has IBS and doesn't need it.

## 2021-04-06 NOTE — ANESTHESIA POSTPROCEDURE EVALUATION
"Patient: Wilma Longo    Procedure Summary     Date: 04/06/21 Room / Location: [facility] OR 02 / [facility] MAIN OR    Anesthesia Start: 0729 Anesthesia Stop: 1201    Procedures:       LARPAROSCOPY COLPOPEXY, BILATERAL SALPINGECTOMY (N/A Abdomen)      POSTERIOR VAGINAL REPAIR, CYSTOSCOPY (N/A Vagina) Diagnosis:     Surgeons: Cora Gustafson MD Provider: Tomasa Chavira MD    Anesthesia Type: general ASA Status: 2          Anesthesia Type: general    Vitals  Vitals Value Taken Time   /59 04/06/21 1255   Temp 37 °C (98.6 °F) 04/06/21 1158   Pulse 80 04/06/21 1303   Resp 16 04/06/21 1240   SpO2 97 % 04/06/21 1303   Vitals shown include unvalidated device data.        Post Anesthesia Care and Evaluation    Patient location during evaluation: bedside  Patient participation: complete - patient participated  Level of consciousness: awake and alert  Pain management: adequate  Airway patency: patent  Anesthetic complications: No anesthetic complications    Cardiovascular status: acceptable  Respiratory status: acceptable  Hydration status: acceptable    Comments: /61   Pulse 91   Temp 37 °C (98.6 °F) (Oral)   Resp 16   Ht 162.6 cm (64\")   Wt 73.3 kg (161 lb 11.2 oz)   SpO2 96%   Breastfeeding No   BMI 27.76 kg/m²       "

## 2021-04-06 NOTE — ANESTHESIA PREPROCEDURE EVALUATION
Anesthesia Evaluation     no history of anesthetic complications:               Airway   Mallampati: I  TM distance: >3 FB  Neck ROM: full  Dental - normal exam   (+) upper dentures and partials    Pulmonary    (-) shortness of breath, recent URI, sleep apnea, not a smoker  Cardiovascular     (+) hypertension, hyperlipidemia,   (-) dysrhythmias, angina    ROS comment: Prolonged QT , poss atrial abnormality    Neuro/Psych  (+) psychiatric history Anxiety,     (-) dizziness/light headedness, syncope  GI/Hepatic/Renal/Endo    (+)   renal disease (stent in place) stones,     Musculoskeletal     Abdominal    Substance History      OB/GYN          Other   blood dyscrasia anemia,                     Anesthesia Plan    ASA 2     general     intravenous induction     Anesthetic plan, all risks, benefits, and alternatives have been provided, discussed and informed consent has been obtained with: patient.

## 2021-04-07 VITALS
WEIGHT: 161.7 LBS | TEMPERATURE: 97.1 F | DIASTOLIC BLOOD PRESSURE: 62 MMHG | RESPIRATION RATE: 16 BRPM | SYSTOLIC BLOOD PRESSURE: 112 MMHG | OXYGEN SATURATION: 96 % | BODY MASS INDEX: 27.61 KG/M2 | HEART RATE: 74 BPM | HEIGHT: 64 IN

## 2021-04-07 LAB
ANION GAP SERPL CALCULATED.3IONS-SCNC: 6.5 MMOL/L (ref 5–15)
BUN SERPL-MCNC: 14 MG/DL (ref 8–23)
BUN/CREAT SERPL: 15.6 (ref 7–25)
CALCIUM SPEC-SCNC: 8.2 MG/DL (ref 8.6–10.5)
CHLORIDE SERPL-SCNC: 105 MMOL/L (ref 98–107)
CO2 SERPL-SCNC: 27.5 MMOL/L (ref 22–29)
CREAT SERPL-MCNC: 0.9 MG/DL (ref 0.57–1)
DEPRECATED RDW RBC AUTO: 50.1 FL (ref 37–54)
ERYTHROCYTE [DISTWIDTH] IN BLOOD BY AUTOMATED COUNT: 15.9 % (ref 12.3–15.4)
GFR SERPL CREATININE-BSD FRML MDRD: 64 ML/MIN/1.73
GLUCOSE SERPL-MCNC: 117 MG/DL (ref 65–99)
HCT VFR BLD AUTO: 35.4 % (ref 34–46.6)
HGB BLD-MCNC: 11.4 G/DL (ref 12–15.9)
LAB AP CASE REPORT: NORMAL
MCH RBC QN AUTO: 27.5 PG (ref 26.6–33)
MCHC RBC AUTO-ENTMCNC: 32.2 G/DL (ref 31.5–35.7)
MCV RBC AUTO: 85.5 FL (ref 79–97)
PATH REPORT.FINAL DX SPEC: NORMAL
PATH REPORT.GROSS SPEC: NORMAL
PLATELET # BLD AUTO: 344 10*3/MM3 (ref 140–450)
PMV BLD AUTO: 9.8 FL (ref 6–12)
POTASSIUM SERPL-SCNC: 4 MMOL/L (ref 3.5–5.2)
RBC # BLD AUTO: 4.14 10*6/MM3 (ref 3.77–5.28)
SODIUM SERPL-SCNC: 139 MMOL/L (ref 136–145)
WBC # BLD AUTO: 12.93 10*3/MM3 (ref 3.4–10.8)

## 2021-04-07 PROCEDURE — 85027 COMPLETE CBC AUTOMATED: CPT | Performed by: OBSTETRICS & GYNECOLOGY

## 2021-04-07 PROCEDURE — 25010000003 CEFAZOLIN IN DEXTROSE 2-4 GM/100ML-% SOLUTION: Performed by: OBSTETRICS & GYNECOLOGY

## 2021-04-07 PROCEDURE — 80048 BASIC METABOLIC PNL TOTAL CA: CPT | Performed by: OBSTETRICS & GYNECOLOGY

## 2021-04-07 RX ORDER — NITROFURANTOIN 25; 75 MG/1; MG/1
100 CAPSULE ORAL 2 TIMES DAILY
Qty: 6 CAPSULE | Refills: 0 | Status: SHIPPED | OUTPATIENT
Start: 2021-04-07 | End: 2021-04-10

## 2021-04-07 RX ORDER — HYDROCODONE BITARTRATE AND ACETAMINOPHEN 10; 325 MG/1; MG/1
1 TABLET ORAL EVERY 4 HOURS PRN
Qty: 20 TABLET | Refills: 0 | Status: SHIPPED | OUTPATIENT
Start: 2021-04-07 | End: 2022-11-30

## 2021-04-07 RX ORDER — ONDANSETRON 4 MG/1
4 TABLET, FILM COATED ORAL DAILY PRN
Qty: 12 TABLET | Refills: 1 | Status: SHIPPED | OUTPATIENT
Start: 2021-04-07 | End: 2022-04-07

## 2021-04-07 RX ADMIN — HYDROCODONE BITARTRATE AND ACETAMINOPHEN 1 TABLET: 10; 325 TABLET ORAL at 04:30

## 2021-04-07 RX ADMIN — BISACODYL 10 MG: 5 TABLET ORAL at 08:49

## 2021-04-07 RX ADMIN — FAMOTIDINE 20 MG: 20 TABLET, FILM COATED ORAL at 06:41

## 2021-04-07 RX ADMIN — HYDROCODONE BITARTRATE AND ACETAMINOPHEN 1 TABLET: 10; 325 TABLET ORAL at 13:57

## 2021-04-07 RX ADMIN — LEVOTHYROXINE SODIUM 75 MCG: 0.07 TABLET ORAL at 06:41

## 2021-04-07 RX ADMIN — ALPRAZOLAM 0.5 MG: 0.5 TABLET ORAL at 06:44

## 2021-04-07 RX ADMIN — HYDROCODONE BITARTRATE AND ACETAMINOPHEN 1 TABLET: 10; 325 TABLET ORAL at 08:47

## 2021-04-07 RX ADMIN — CEFAZOLIN SODIUM 2 G: 2 INJECTION, SOLUTION INTRAVENOUS at 04:24

## 2021-04-07 NOTE — DISCHARGE INSTRUCTIONS
IF FEVER > 100.4, SEVERE PAIN, VAGINAL BLEEDING, NAUSEA/VOMITING, SHORTNESS OF BREATH, CHEST PAIN, SEVERE LEG SWELLING, PURULENT DISCHARGE OR INCREASING REDNESS OF INCISIONS, OR ANY OTHER CONCERNS CALL [phone] OR GO TO THE ER.   ABDOMINAL STERI-STRIPS CAN BE REMOVED AFTER 1 WEEK.   Restart vaginal estrogen in 1 week 4/13/21  NO HEAVY LIFTING GREATER THAN 15 LBS.   NO DRIVING UNTIL NO PAIN AND NOT TAKING NARCOTICS OR CLEARED BY MD.   NO BATHS OK TO SHOWER.  AMBULATE OFTEN AS TOLERATED AND OK TO TAKE STAIRS.  IF you have to go go home with a blankenship catheter call Dr. Gustafson's office to schedule repeat bladder challenge Friday 4/9/21  REMOVE VAGINAL PACKING Friday morning 4/9/21.

## 2021-04-07 NOTE — DISCHARGE SUMMARY
[facility]   DISCHARGE SUMMARY    Patient Name: Wilma Longo  : 1960  MRN: [id number]    Date of Admission: 2021  Date of Discharge:  21  Primary Care Physician: Jerman Virgen MD    Consults     No orders found for last 30 day(s).          Hospital Course     Presenting Problem:   Prolapse of female pelvic organs [N81.9]  Pelvic prolapse [N81.9]    Active Hospital Problems:  No notes have been filed under this hospital service.  Service: Hospitalist      Resolved Hospital Problems:  No notes have been filed under this hospital service.  Service: Hospitalist        Hospital Course:  Wilma Longo is a 61 y.o. female admitted for scheduled surgery.    Day of Discharge     HPI:   Patient admitted for scheduled LSC sacrocolpopexy, posterior repair and bilateral salpingectomy and underwent an uncomplicated procedure. She met all milestones ambulating, tolerating regular diet, pain well controlled, afebrile vitals stable.  She was discharged with vaginal packing in place and patient given instructions to discontinue vaginal packing 21.        Vital Signs:  Temp:  [96.6 °F (35.9 °C)-98.6 °F (37 °C)] 97.6 °F (36.4 °C)  Heart Rate:  [69-97] 69  Resp:  [12-16] 16  BP: (105-151)/(59-94) 117/64  Flow (L/min):  [2-4] 2    Physical Exam:  Gen: NAD  AOx3  Unlabored breathing  RRR  Abdomen: sof/nt/nd +LSC c/d/i incisions healing well  Ext: no c/c/e +SCDs in place    Pertinent  and/or Most Recent Results             Lab 21  0649   WBC 12.93*   HEMOGLOBIN 11.4*   HEMATOCRIT 35.4   PLATELETS 344   MCV 85.5         Lab 21  0649   SODIUM 139   POTASSIUM 4.0   CHLORIDE 105   CO2 27.5   ANION GAP 6.5   BUN 14   CREATININE 0.90   GLUCOSE 117*   CALCIUM 8.2*                         Brief Urine Lab Results     None        Microbiology Results (last 10 days)     Procedure Component Value - Date/Time    COVID PRE-OP / PRE-PROCEDURE SCREENING ORDER (NO ISOLATION) - Swab, Nasopharynx  [[id number]] Collected: 04/03/21 1112    Lab Status: Final result Specimen: Swab from Nasopharynx Updated: 04/05/21 1130    Narrative:      The following orders were created for panel order COVID PRE-OP / PRE-PROCEDURE SCREENING ORDER (NO ISOLATION) - Swab, Nasopharynx.  Procedure                               Abnormality         Status                     ---------                               -----------         ------                     COVID-19,BIOTAP, NP/OP S...[[id number]]                      Final result                 Please view results for these tests on the individual orders.    COVID-19,BIOTAP, NP/OP SWAB IN TRANSPORT MEDIA OR SALINE 24-36 HR TAT - Swab, Nasopharynx [[id number]] Collected: 04/03/21 1112    Lab Status: Final result Specimen: Swab from Nasopharynx Updated: 04/05/21 1130     SARS-CoV-2 PCR Not Detected     Comment: Nucleic acid specific to SARS-CoV-2 (COVID-19) virus was not detected in  this sample by the TaqPath (TM) COVID-19 Combo Kit.          SARS-CoV-2 (COVID-19) nucleic acid testing performed using [employer] Aptima (R) SARS-CoV-2 Assay or [employer] TaqPath (TM)  COVID-19 Combo Kit as indicated above under Emergency Use Authorization (EUA) from the FDA. Aptima (R) and TaqPath (TM) test performance  characteristics verified by [facility] in accordance with the FDAs Guidance Document (Policy for Diagnostic Tests for Coronavirus Disease-2019  during the Public Health Emergency) issued on March 16, 2020. The laboratory is regulated under CLIA as qualified to perform high-complexity testing  Unless otherwise noted, all testing was performed at [facility], CLIA No. [id number], [place] State Licensee No. [id number]                              Pending Labs     Order Current Status    Tissue Pathology Exam In process        Discharge Details        Discharge Medications      New Medications      Instructions Start Date   HYDROcodone-acetaminophen  MG per tablet  Commonly  known as: Norco   1 tablet, Oral, Every 4 Hours PRN      methylcellulose (Laxative) 500 MG tablet tablet  Commonly known as: Citrucel   1,000 mg, Oral, Every 8 Hours Scheduled, Hold for loose stools      ondansetron 4 MG tablet  Commonly known as: Zofran   4 mg, Oral, Daily PRN         Continue These Medications      Instructions Start Date   ALPRAZolam 0.5 MG tablet  Commonly known as: Xanax   0.5 mg, Oral, Daily PRN, D/c previous order      DULoxetine 60 MG capsule  Commonly known as: CYMBALTA   60 mg, Oral, Daily      estradiol 10 MCG tablet vaginal tablet  Commonly known as: VAGIFEM   1 tablet, Vaginal, 2 Times Weekly      levothyroxine 75 MCG tablet  Commonly known as: SYNTHROID, LEVOTHROID   75 mcg, Oral, Daily      lisinopril 10 MG tablet  Commonly known as: PRINIVIL,ZESTRIL   10 mg, Oral, Daily      naproxen 500 MG tablet  Commonly known as: NAPROSYN   500 mg, Oral, Daily      rosuvastatin 5 MG tablet  Commonly known as: Crestor   5 mg, Oral, Daily      uribel 118 MG capsule capsule   1 capsule, Oral      Uro- MG capsule   No dose, route, or frequency recorded.      valACYclovir 500 MG tablet  Commonly known as: VALTREX   500 mg, Oral, As Needed      vitamin C 250 MG tablet  Commonly known as: ASCORBIC ACID   500 mg, Oral, Nightly      vitamin D 1.25 MG (17880 UT) capsule capsule  Commonly known as: ERGOCALCIFEROL   50,000 Units, Oral, Weekly         Stop These Medications    estrogens (conjugated) 0.3 MG tablet  Commonly known as: PREMARIN     oxyCODONE-acetaminophen 5-325 MG per tablet  Commonly known as: PERCOCET            No Known Allergies      Discharge Disposition:  Home or Self Care    Diet:  Hospital:  Diet Order   Procedures   • Diet Regular         Discharge Activity: as tolerated ad stephan        CODE STATUS:    Code Status and Medical Interventions:   Ordered at: 04/06/21 8873     Code Status:    CPR     Medical Interventions (Level of Support Prior to Arrest):    Full         Future  Appointments   Date Time Provider Department Center   5/25/2021  9:00 AM LAB CHAIR [facility] [place]   6/1/2021  9:20 AM VITALS ONLY [facility] [place]   6/1/2021  9:40 AM Jerman Dee II, MD [facility]       Additional Instructions for the Follow-ups that You Need to Schedule     Chlorhexidine Skin Prep   Apr 10, 2021      Chlorhexidine Skin Prep and Instructions For All Patients Having A Procedure Requiring an Outward Incision if Not Allergic. If Allergic, Give Antibacterial Skin Wipes and Instructions. Do Not Use For Facial Cases or on Any Mucus Membranes.    Order Comments: Chlorhexidine Skin Prep and Instructions For All Patients Having A Procedure Requiring an Outward Incision if Not Allergic. If Allergic, Give Antibacterial Skin Wipes and Instructions. Do Not Use For Facial Cases or on Any Mucus Membranes.                  Electronically signed by Alee Turner MD, 04/07/21, 8:14 AM EDT.

## 2021-04-07 NOTE — PLAN OF CARE
Goal Outcome Evaluation:  Plan of Care Reviewed With: patient     Outcome Summary: VSS, Travis to be removed at 0600, pain managed with x2 norco, no c/o nausea. slept between care, lap sites (5) CDI, IVF at 50mL, IV abx given, diet reg, slept between care.

## 2021-04-07 NOTE — PLAN OF CARE
Goal Outcome Evaluation:        Outcome Summary: VSS. Norco given for pain. Lap sites with steri-strips. Voiding. Stable for discharge.

## 2021-04-07 NOTE — PROGRESS NOTES
Continued Stay Note  [facility]     Patient Name: Wilma Longo  MRN: [id number]  Today's Date: 4/7/2021    Admit Date: 4/6/2021    Discharge Plan     Row Name 04/07/21 0954       Plan    Plan  Home no needs    Plan Comments  Discharge order noted. Met with patient who confirmed DC plan is home. Stated spouse will assist as needed and will provide transportation at DC. Denies any needs/equipment.        Discharge Codes    No documentation.       Expected Discharge Date and Time     Expected Discharge Date Expected Discharge Time    Apr 7, 2021             Jacquelin Moralez RN

## 2021-04-07 NOTE — PROGRESS NOTES
"GYN Postop Progress Note   ID: Wilma Longo is a 61 y.o.  now POD#1 s/p   1) Laparoscopic sacro-culpopexy  2) Bilateral salpingectomy  3) Posterior repair  4) Perineorrhaphy  5)Rectal plication  6) Cystoscopy     S: Doing well this AM. Pain well controlled on PO meds. Tolerating regular diet without N/V. Denies CP, SOB, fevers and chills. Has ambulated and passed flatus. Travis catheter removed this AM and she has not yet voided.  Vaginal packing will stay in until Friday morning discussed with patient.      O:  Vitals range:  Temp:  [96.6 °F (35.9 °C)-98.6 °F (37 °C)] 97.6 °F (36.4 °C)  Heart Rate:  [69-97] 69  Resp:  [12-16] 16  BP: (105-151)/(59-94) 117/64     Current Vitals:  /64 (BP Location: Right arm, Patient Position: Lying)   Pulse 69   Temp 97.6 °F (36.4 °C) (Oral)   Resp 16   Ht 162.6 cm (64\")   Wt 73.3 kg (161 lb 11.2 oz)   SpO2 97%   Breastfeeding No   BMI 27.76 kg/m²      Scheduled Meds:bisacodyl, 10 mg, Oral, Daily  ceFAZolin, 2 g, Intravenous, Q8H  famotidine, 20 mg, Oral, BID AC  levothyroxine, 75 mcg, Oral, Q AM      Continuous Infusions:lactated ringers, 100 mL/hr, Last Rate: Stopped (04/07/21 0204)  lactated ringers, 50 mL/hr, Last Rate: 50 mL/hr (04/07/21 5704)      PRN Meds:.•  ALPRAZolam  •  aluminum-magnesium hydroxide-simethicone  •  calcium carbonate  •  diphenhydrAMINE **OR** diphenhydrAMINE  •  HYDROcodone-acetaminophen  •  HYDROcodone-acetaminophen  •  melatonin  •  metoclopramide  •  ondansetron  •  polyethylene glycol    Exam:  Gen: Well appearing. NAD  Lungs: even, unlabored respirations  Abd: Soft, non-tender, non-distended. Appropriately tender to palpation. Small incisions are c/d/i  Pelvic: No vaginal bleeding. Vaginal packing in place  Extremities: no c/c/e with SCDs in place     Labs:  Reviewed      A/P:  Recovering well postoperatively  Meeting all postoperative milestones, however awaiting voiding trial.   Discussed w/ patient removal of vaginal packing Friday " morning  Dc on macrobid x3 days  Plan for discharge home to self care today.

## 2021-04-07 NOTE — PROGRESS NOTES
Case Management Discharge Note      Final Note: Discharged home. Jacquelin Moralez, DEANNE         Transportation Services  Private: Car    Final Discharge Disposition Code: 01 - home or self-care

## 2021-04-09 ENCOUNTER — NURSE TRIAGE (OUTPATIENT)
Dept: CALL CENTER | Facility: HOSPITAL | Age: 61
End: 2021-04-09

## 2021-04-09 NOTE — TELEPHONE ENCOUNTER
"Caller states that she was to remove her vaginal packing today.  Caller states that she has felt and her  has attempted to remove the packing and there is not packing.  Caller asking if it could have came out while she was using the toilet.  Questions answered.     Reason for Disposition  • Health Information question, no triage required and triager able to answer question    Additional Information  • Negative: [1] Caller is not with the adult (patient) AND [2] reporting urgent symptoms  • Negative: Lab result questions  • Negative: Medication questions  • Negative: Caller can't be reached by phone  • Negative: Caller has already spoken to PCP or another triager  • Negative: RN needs further essential information from caller in order to complete triage  • Negative: Requesting regular office appointment  • Negative: [1] Caller requesting NON-URGENT health information AND [2] PCP's office is the best resource    Answer Assessment - Initial Assessment Questions  1. REASON FOR CALL or QUESTION: \"What is your reason for calling today?\" or \"How can I best help you?\" or \"What question do you have that I can help answer?\"      I have a question about my discharge instructions.    Protocols used: INFORMATION ONLY CALL - NO TRIAGE-ADULT-      "

## 2021-04-19 ENCOUNTER — OFFICE VISIT (OUTPATIENT)
Dept: FAMILY MEDICINE CLINIC | Facility: CLINIC | Age: 61
End: 2021-04-19

## 2021-04-19 VITALS
DIASTOLIC BLOOD PRESSURE: 82 MMHG | TEMPERATURE: 96.9 F | HEART RATE: 88 BPM | WEIGHT: 161 LBS | BODY MASS INDEX: 27.49 KG/M2 | SYSTOLIC BLOOD PRESSURE: 128 MMHG | HEIGHT: 64 IN | RESPIRATION RATE: 16 BRPM

## 2021-04-19 DIAGNOSIS — E55.9 VITAMIN D DEFICIENCY: Chronic | ICD-10-CM

## 2021-04-19 DIAGNOSIS — I10 BENIGN ESSENTIAL HYPERTENSION: Primary | ICD-10-CM

## 2021-04-19 DIAGNOSIS — M75.02 ADHESIVE CAPSULITIS OF LEFT SHOULDER: Chronic | ICD-10-CM

## 2021-04-19 DIAGNOSIS — M79.7 FIBROMYALGIA: Chronic | ICD-10-CM

## 2021-04-19 DIAGNOSIS — E78.2 MIXED HYPERLIPIDEMIA: Chronic | ICD-10-CM

## 2021-04-19 DIAGNOSIS — E03.9 ACQUIRED HYPOTHYROIDISM: Chronic | ICD-10-CM

## 2021-04-19 DIAGNOSIS — F39 MOOD DISORDER (HCC): Chronic | ICD-10-CM

## 2021-04-19 PROCEDURE — 99214 OFFICE O/P EST MOD 30 MIN: CPT | Performed by: FAMILY MEDICINE

## 2021-04-19 RX ORDER — LEVOTHYROXINE SODIUM 0.07 MG/1
75 TABLET ORAL DAILY
Qty: 30 TABLET | Refills: 5 | Status: SHIPPED | OUTPATIENT
Start: 2021-04-19 | End: 2021-10-26 | Stop reason: SDUPTHER

## 2021-04-19 RX ORDER — DULOXETIN HYDROCHLORIDE 60 MG/1
60 CAPSULE, DELAYED RELEASE ORAL DAILY
Qty: 30 CAPSULE | Refills: 5 | Status: SHIPPED | OUTPATIENT
Start: 2021-04-19 | End: 2021-10-26 | Stop reason: SDUPTHER

## 2021-04-19 RX ORDER — NAPROXEN 500 MG/1
500 TABLET ORAL DAILY
Qty: 30 TABLET | Refills: 5 | Status: SHIPPED | OUTPATIENT
Start: 2021-04-19 | End: 2021-10-26 | Stop reason: SDUPTHER

## 2021-04-19 RX ORDER — OXYCODONE HYDROCHLORIDE AND ACETAMINOPHEN 5; 325 MG/1; MG/1
TABLET ORAL
COMMUNITY
Start: 2021-04-15 | End: 2022-11-30

## 2021-04-19 RX ORDER — ROSUVASTATIN CALCIUM 5 MG/1
5 TABLET, COATED ORAL DAILY
Qty: 30 TABLET | Refills: 5 | Status: SHIPPED | OUTPATIENT
Start: 2021-04-19 | End: 2021-10-26 | Stop reason: SDUPTHER

## 2021-04-19 RX ORDER — ALPRAZOLAM 0.5 MG/1
0.5 TABLET ORAL DAILY PRN
Qty: 30 TABLET | Refills: 2 | Status: SHIPPED | OUTPATIENT
Start: 2021-04-19 | End: 2021-10-26 | Stop reason: SDUPTHER

## 2021-04-19 RX ORDER — LISINOPRIL 10 MG/1
10 TABLET ORAL DAILY
Qty: 30 TABLET | Refills: 5 | Status: SHIPPED | OUTPATIENT
Start: 2021-04-19 | End: 2021-10-26 | Stop reason: SDUPTHER

## 2021-04-19 RX ORDER — ERGOCALCIFEROL 1.25 MG/1
50000 CAPSULE ORAL WEEKLY
Qty: 5 CAPSULE | Refills: 5 | Status: SHIPPED | OUTPATIENT
Start: 2021-04-19 | End: 2021-10-26 | Stop reason: SDUPTHER

## 2021-05-25 ENCOUNTER — LAB (OUTPATIENT)
Dept: OTHER | Facility: HOSPITAL | Age: 61
End: 2021-05-25

## 2021-05-25 DIAGNOSIS — D64.9 ANEMIA, UNSPECIFIED TYPE: ICD-10-CM

## 2021-05-25 LAB
ALBUMIN SERPL-MCNC: 4.3 G/DL (ref 3.5–5.2)
ALBUMIN/GLOB SERPL: 1.2 G/DL
ALP SERPL-CCNC: 52 U/L (ref 39–117)
ALT SERPL W P-5'-P-CCNC: 12 U/L (ref 1–33)
ANION GAP SERPL CALCULATED.3IONS-SCNC: 9.4 MMOL/L (ref 5–15)
AST SERPL-CCNC: 21 U/L (ref 1–32)
BASOPHILS # BLD AUTO: 0.04 10*3/MM3 (ref 0–0.2)
BASOPHILS # BLD MANUAL: 0.05 10*3/MM3 (ref 0–0.2)
BASOPHILS NFR BLD AUTO: 0.7 % (ref 0–1.5)
BASOPHILS NFR BLD AUTO: 1 % (ref 0–1.5)
BILIRUB SERPL-MCNC: 0.3 MG/DL (ref 0–1.2)
BUN SERPL-MCNC: 19 MG/DL (ref 8–23)
BUN/CREAT SERPL: 18.6 (ref 7–25)
CALCIUM SPEC-SCNC: 9.8 MG/DL (ref 8.6–10.5)
CHLORIDE SERPL-SCNC: 103 MMOL/L (ref 98–107)
CO2 SERPL-SCNC: 29.6 MMOL/L (ref 22–29)
CREAT SERPL-MCNC: 1.02 MG/DL (ref 0.57–1)
DEPRECATED RDW RBC AUTO: 48.3 FL (ref 37–54)
DEPRECATED RDW RBC AUTO: 48.3 FL (ref 37–54)
EOSINOPHIL # BLD AUTO: 0.11 10*3/MM3 (ref 0–0.4)
EOSINOPHIL # BLD MANUAL: 0.05 10*3/MM3 (ref 0–0.4)
EOSINOPHIL NFR BLD AUTO: 2 % (ref 0.3–6.2)
EOSINOPHIL NFR BLD MANUAL: 1 % (ref 0.3–6.2)
ERYTHROCYTE [DISTWIDTH] IN BLOOD BY AUTOMATED COUNT: 15.1 % (ref 12.3–15.4)
ERYTHROCYTE [DISTWIDTH] IN BLOOD BY AUTOMATED COUNT: 15.2 % (ref 12.3–15.4)
FERRITIN SERPL-MCNC: 140.8 NG/ML (ref 13–150)
GFR SERPL CREATININE-BSD FRML MDRD: 55 ML/MIN/1.73
GLOBULIN UR ELPH-MCNC: 3.6 GM/DL
GLUCOSE SERPL-MCNC: 103 MG/DL (ref 65–99)
HCT VFR BLD AUTO: 42.4 % (ref 34–46.6)
HCT VFR BLD AUTO: 42.6 % (ref 34–46.6)
HGB BLD-MCNC: 13 G/DL (ref 12–15.9)
HGB BLD-MCNC: 13.2 G/DL (ref 12–15.9)
HGB RETIC QN AUTO: 31.8 PG (ref 29.8–36.1)
IMM GRANULOCYTES # BLD AUTO: 0.02 10*3/MM3 (ref 0–0.05)
IMM GRANULOCYTES NFR BLD AUTO: 0.4 % (ref 0–0.5)
IMM RETICS NFR: 13.2 % (ref 3–15.8)
IRON 24H UR-MRATE: 82 MCG/DL (ref 37–145)
IRON SATN MFR SERPL: 25 % (ref 20–50)
LYMPHOCYTES # BLD AUTO: 2.19 10*3/MM3 (ref 0.7–3.1)
LYMPHOCYTES # BLD MANUAL: 1.84 10*3/MM3 (ref 0.7–3.1)
LYMPHOCYTES NFR BLD AUTO: 38.9 % (ref 19.6–45.3)
LYMPHOCYTES NFR BLD MANUAL: 34 % (ref 19.6–45.3)
LYMPHOCYTES NFR BLD MANUAL: 5 % (ref 5–12)
MCH RBC QN AUTO: 26.7 PG (ref 26.6–33)
MCH RBC QN AUTO: 27.1 PG (ref 26.6–33)
MCHC RBC AUTO-ENTMCNC: 30.7 G/DL (ref 31.5–35.7)
MCHC RBC AUTO-ENTMCNC: 31 G/DL (ref 31.5–35.7)
MCV RBC AUTO: 87.2 FL (ref 79–97)
MCV RBC AUTO: 87.5 FL (ref 79–97)
MONOCYTES # BLD AUTO: 0.27 10*3/MM3 (ref 0.1–0.9)
MONOCYTES # BLD AUTO: 0.49 10*3/MM3 (ref 0.1–0.9)
MONOCYTES NFR BLD AUTO: 8.7 % (ref 5–12)
NEUTROPHILS # BLD AUTO: 3.2 10*3/MM3 (ref 1.7–7)
NEUTROPHILS NFR BLD AUTO: 2.78 10*3/MM3 (ref 1.7–7)
NEUTROPHILS NFR BLD AUTO: 49.3 % (ref 42.7–76)
NEUTROPHILS NFR BLD MANUAL: 59 % (ref 42.7–76)
NRBC BLD AUTO-RTO: 0 /100 WBC (ref 0–0.2)
PLAT MORPH BLD: NORMAL
PLATELET # BLD AUTO: 320 10*3/MM3 (ref 140–450)
PLATELET # BLD AUTO: 321 10*3/MM3 (ref 140–450)
PMV BLD AUTO: 9.7 FL (ref 6–12)
PMV BLD AUTO: 9.7 FL (ref 6–12)
POTASSIUM SERPL-SCNC: 4.3 MMOL/L (ref 3.5–5.2)
PROT SERPL-MCNC: 7.9 G/DL (ref 6–8.5)
RBC # BLD AUTO: 4.86 10*6/MM3 (ref 3.77–5.28)
RBC # BLD AUTO: 4.87 10*6/MM3 (ref 3.77–5.28)
RBC MORPH BLD: NORMAL
RETICS # AUTO: 0.06 10*6/MM3 (ref 0.02–0.13)
RETICS/RBC NFR AUTO: 1.28 % (ref 0.7–1.9)
SODIUM SERPL-SCNC: 142 MMOL/L (ref 136–145)
TIBC SERPL-MCNC: 332 MCG/DL (ref 298–536)
TRANSFERRIN SERPL-MCNC: 223 MG/DL (ref 200–360)
TSH SERPL DL<=0.05 MIU/L-ACNC: 1.21 UIU/ML (ref 0.27–4.2)
WBC # BLD AUTO: 5.42 10*3/MM3 (ref 3.4–10.8)
WBC # BLD AUTO: 5.63 10*3/MM3 (ref 3.4–10.8)
WBC MORPH BLD: NORMAL

## 2021-05-25 PROCEDURE — 85007 BL SMEAR W/DIFF WBC COUNT: CPT | Performed by: FAMILY MEDICINE

## 2021-05-25 PROCEDURE — 36415 COLL VENOUS BLD VENIPUNCTURE: CPT

## 2021-05-25 PROCEDURE — 80061 LIPID PANEL: CPT | Performed by: FAMILY MEDICINE

## 2021-05-25 PROCEDURE — 85027 COMPLETE CBC AUTOMATED: CPT | Performed by: FAMILY MEDICINE

## 2021-05-25 PROCEDURE — 82306 VITAMIN D 25 HYDROXY: CPT | Performed by: FAMILY MEDICINE

## 2021-05-25 PROCEDURE — 84439 ASSAY OF FREE THYROXINE: CPT | Performed by: FAMILY MEDICINE

## 2021-05-25 PROCEDURE — 82728 ASSAY OF FERRITIN: CPT | Performed by: INTERNAL MEDICINE

## 2021-05-25 PROCEDURE — 85025 COMPLETE CBC W/AUTO DIFF WBC: CPT | Performed by: INTERNAL MEDICINE

## 2021-05-25 PROCEDURE — 84443 ASSAY THYROID STIM HORMONE: CPT | Performed by: FAMILY MEDICINE

## 2021-05-25 PROCEDURE — 85046 RETICYTE/HGB CONCENTRATE: CPT | Performed by: INTERNAL MEDICINE

## 2021-05-25 PROCEDURE — 84466 ASSAY OF TRANSFERRIN: CPT | Performed by: INTERNAL MEDICINE

## 2021-05-25 PROCEDURE — 83540 ASSAY OF IRON: CPT | Performed by: INTERNAL MEDICINE

## 2021-05-25 PROCEDURE — 80053 COMPREHEN METABOLIC PANEL: CPT | Performed by: FAMILY MEDICINE

## 2021-05-26 LAB
25(OH)D3+25(OH)D2 SERPL-MCNC: 75.1 NG/ML (ref 30–100)
CHOLEST SERPL-MCNC: 152 MG/DL (ref 100–199)
HDLC SERPL-MCNC: 55 MG/DL
LDLC SERPL CALC-MCNC: 77 MG/DL (ref 0–99)
T4 FREE SERPL-MCNC: 1.58 NG/DL (ref 0.82–1.77)
TRIGL SERPL-MCNC: 113 MG/DL (ref 0–149)
VLDLC SERPL CALC-MCNC: 20 MG/DL (ref 5–40)

## 2021-06-01 ENCOUNTER — OFFICE VISIT (OUTPATIENT)
Dept: ONCOLOGY | Facility: CLINIC | Age: 61
End: 2021-06-01

## 2021-06-01 ENCOUNTER — APPOINTMENT (OUTPATIENT)
Dept: OTHER | Facility: HOSPITAL | Age: 61
End: 2021-06-01

## 2021-06-01 VITALS
DIASTOLIC BLOOD PRESSURE: 81 MMHG | TEMPERATURE: 96.8 F | HEIGHT: 64 IN | HEART RATE: 85 BPM | OXYGEN SATURATION: 97 % | SYSTOLIC BLOOD PRESSURE: 133 MMHG | WEIGHT: 154.5 LBS | BODY MASS INDEX: 26.38 KG/M2 | RESPIRATION RATE: 18 BRPM

## 2021-06-01 DIAGNOSIS — D64.9 ANEMIA, UNSPECIFIED TYPE: Primary | ICD-10-CM

## 2021-06-01 PROCEDURE — 99214 OFFICE O/P EST MOD 30 MIN: CPT | Performed by: INTERNAL MEDICINE

## 2021-06-01 NOTE — PROGRESS NOTES
.     REASON FOR FOLLOWUP :   Leukocytosis and anemia and thrombocytosis    HISTORY OF PRESENT ILLNESS:  The patient is a 61 y.o. year old female  who is here for follow-up with the above-mentioned history.    States since her prolapse surgery early April 2021, no more rectal bleeding.  States she also had cystoscopy for removal of a kidney stone.  Has been a little tired since these procedures but recently feeling better.        Past Medical History:   Diagnosis Date   • Anemia    • Anxiety    • Benign essential hypertension 02/01/2015   • Cancer (CMS/HCC)    • Cardiomyopathy (CMS/HCC)    • Colitis 06/12/2009   • Depression    • Fibromyalgia    • History of gestational diabetes    • Hyperlipidemia 04/13/2011 2/1/2015   • Hypothyroidism 02/01/2015   • Insomnia 02/03/2015   • Irritable bowel syndrome 11/30/2009   • Panic attacks    • Rectocele    • Skin cancer of arm    • Vaginal prolapse    • Vitamin D deficiency 07/19/2012     Past Surgical History:   Procedure Laterality Date   • ANTERIOR AND POSTERIOR VAGINAL REPAIR N/A 4/6/2021    Procedure: POSTERIOR VAGINAL REPAIR, CYSTOSCOPY;  Surgeon: Cora Gustafson MD;  Location[facility] OR;  Service: Gynecology;  Laterality: N/A;   • BLADDER REPAIR  09/2015    X2   • COLONOSCOPY  2010    wnl   • COLONOSCOPY N/A 9/9/2016    Procedure: COLONOSCOPY into cecum and terminal ileum with biopsies;  Surgeon: Orlin Mann MD;  Location: [facility] ENDOSCOPY;  Service:    • COLONOSCOPY N/A 9/6/2019    Procedure: COLONOSCOPY TO CECUM AND INTO TERMINAL ILEUM;  Surgeon: Orlin Mann MD;  Location: [facility] ENDOSCOPY;  Service: Gastroenterology   • CYSTOSCOPY W/ URETERAL STENT PLACEMENT     • ENDOSCOPY N/A 9/6/2019    Procedure: ESOPHAGOGASTRODUODENOSCOPY WITH COLD BIOPSIES AND 48F HERNÁNDEZ DILATION;  Surgeon: Orlin Mann MD;  Location: [facility] ENDOSCOPY;  Service: Gastroenterology   • HYSTERECTOMY  12/2013   • MOUTH SURGERY     • SACROCOLPOPEXY N/A 4/6/2021     Procedure: LARPAROSCOPY COLPOPEXY, BILATERAL SALPINGECTOMY;  Surgeon: Cora Gustafson MD;  Location: [facility] MAIN OR;  Service: Gynecology;  Laterality: N/A;   • SKIN CANCER EXCISION         MEDICATIONS    Current Outpatient Medications:   •  ALPRAZolam (Xanax) 0.5 MG tablet, Take 1 tablet by mouth Daily As Needed for Anxiety. D/c previous order, Disp: 30 tablet, Rfl: 2  •  DULoxetine (CYMBALTA) 60 MG capsule, Take 1 capsule by mouth Daily., Disp: 30 capsule, Rfl: 5  •  estradiol (VAGIFEM) 10 MCG tablet vaginal tablet, Insert 1 tablet into the vagina 2 (Two) Times a Week., Disp: , Rfl:   •  HYDROcodone-acetaminophen (Norco)  MG per tablet, Take 1 tablet by mouth Every 4 (Four) Hours As Needed for Moderate Pain  or Severe Pain ., Disp: 20 tablet, Rfl: 0  •  levothyroxine (SYNTHROID, LEVOTHROID) 75 MCG tablet, Take 1 tablet by mouth Daily., Disp: 30 tablet, Rfl: 5  •  lisinopril (PRINIVIL,ZESTRIL) 10 MG tablet, Take 1 tablet by mouth Daily., Disp: 30 tablet, Rfl: 5  •  Meth-Hyo-M Bl-Na Phos-Ph Sal (Uro-MP) 118 MG capsule, , Disp: , Rfl:   •  methylcellulose, Laxative, (Citrucel) 500 MG tablet tablet, Take 2 tablets by mouth Every 8 (Eight) Hours. Hold for loose stools, Disp: 120 tablet, Rfl: 3  •  naproxen (NAPROSYN) 500 MG tablet, Take 1 tablet by mouth Daily., Disp: 30 tablet, Rfl: 5  •  ondansetron (Zofran) 4 MG tablet, Take 1 tablet by mouth Daily As Needed for Nausea or Vomiting., Disp: 12 tablet, Rfl: 1  •  oxyCODONE-acetaminophen (PERCOCET) 5-325 MG per tablet, , Disp: , Rfl:   •  rosuvastatin (Crestor) 5 MG tablet, Take 1 tablet by mouth Daily., Disp: 30 tablet, Rfl: 5  •  uribel (URO-MP) 118 MG capsule capsule, Take 1 capsule by mouth., Disp: , Rfl:   •  valACYclovir (VALTREX) 500 MG tablet, Take 500 mg by mouth As Needed., Disp: , Rfl:   •  vitamin C (ASCORBIC ACID) 250 MG tablet, Take 500 mg by mouth Every Night., Disp: , Rfl:   •  vitamin D (ERGOCALCIFEROL) 1.25 MG (61748 UT) capsule capsule, Take 1  capsule by mouth 1 (One) Time Per Week., Disp: 5 capsule, Rfl: 5    ALLERGIES:   No Known Allergies    SOCIAL HISTORY:       Social History     Socioeconomic History   • Marital status:      Spouse name: Ruben   • Number of children: Not on file   • Years of education: Not on file   • Highest education level: Not on file   Tobacco Use   • Smoking status: Never Smoker   • Smokeless tobacco: Never Used   Vaping Use   • Vaping Use: Never used   Substance and Sexual Activity   • Alcohol use: Yes     Comment: less than once per  month   • Drug use: No   • Sexual activity: Defer         FAMILY HISTORY:  Family History   Problem Relation Age of Onset   • Depression Mother    • Heart disease Mother    • Stroke Mother    • Heart attack Mother    • Heart failure Mother    • Hyperlipidemia Mother    • Hypertension Mother    • Arthritis Father    • Heart disease Father    • Skin cancer Father    • Heart attack Father    • Heart failure Father    • Heart disease Maternal Grandmother    • Stroke Maternal Grandfather    • Stroke Paternal Grandfather    • Hypertension Sister    • Hypothyroidism Sister    • Hypertension Brother    • Hypothyroidism Daughter    • Colon cancer Neg Hx    • Colon polyps Neg Hx    • Liver disease Neg Hx    • Rectal cancer Neg Hx    • Stomach cancer Neg Hx    • Liver cancer Neg Hx    • Malig Hyperthermia Neg Hx        REVIEW OF SYSTEMS:  Review of Systems   Constitutional: Negative for activity change.   HENT: Negative for nosebleeds and trouble swallowing.    Respiratory: Negative for shortness of breath and wheezing.    Cardiovascular: Negative for chest pain and palpitations.   Gastrointestinal: Negative for constipation, diarrhea and nausea.   Genitourinary: Negative for dysuria and hematuria.   Musculoskeletal: Negative for arthralgias and myalgias.   Skin: Negative for rash and wound.   Neurological: Negative for seizures and syncope.   Hematological: Negative for adenopathy. Does not  "bruise/bleed easily.   Psychiatric/Behavioral: Negative for confusion.              Vitals:    06/01/21 0930   BP: 133/81   Pulse: 85   Resp: 18   Temp: 96.8 °F (36 °C)   TempSrc: Temporal   SpO2: 97%   Weight: 70.1 kg (154 lb 8 oz)   Height: 162.6 cm (64.02\")   PainSc:   5   PainLoc: Hip     Current Status 6/1/2021   ECOG score 0      PHYSICAL EXAM:        CONSTITUTIONAL:  Vital signs reviewed.  No distress, looks comfortable.  EYES:  Conjunctiva and lids unremarkable.  PERRLA  EARS,NOSE,MOUTH,THROAT:  Ears and nose appear unremarkable.  Lips, teeth, gums appear unremarkable.  RESPIRATORY:  Normal respiratory effort.  Lungs clear to auscultation bilaterally.  CARDIOVASCULAR:  Normal S1, S2.  No murmurs rubs or gallops.  No significant lower extremity edema.  GASTROINTESTINAL: Abdomen appears unremarkable.  Nontender.  No hepatomegaly.  No splenomegaly.  LYMPHATIC:  No cervical, supraclavicular, axillary lymphadenopathy.  SKIN:  Warm.  No rashes.  PSYCHIATRIC:  Normal judgment and insight.  Normal mood and affect.       RECENT LABS:        WBC   Date Value Ref Range Status   05/25/2021 5.42 3.40 - 10.80 10*3/mm3 Final   05/25/2021 5.63 3.40 - 10.80 10*3/mm3 Final   04/07/2021 12.93 (H) 3.40 - 10.80 10*3/mm3 Final   03/31/2021 5.44 3.40 - 10.80 10*3/mm3 Final   02/22/2021 9.80 3.40 - 10.80 10*3/mm3 Final   01/14/2021 11.62 (H) 3.40 - 10.80 10*3/mm3 Final   12/09/2020 13.23 (H) 3.40 - 10.80 10*3/mm3 Final   12/02/2020 16.8 (H) 3.4 - 10.8 x10E3/uL Final   02/22/2020 6.2 3.4 - 10.8 x10E3/uL Final   03/21/2019 6.56 3.40 - 10.80 10*3/mm3 Final   10/21/2017 8.3 3.4 - 10.8 x10E3/uL Final   06/28/2016 7.94 4.50 - 10.70 10*3/mm3 Final   09/03/2015 7.56 4.50 - 10.70 K/Cumm Final   04/07/2014 6.03 4.50 - 10.70 K/Cumm Final     Hemoglobin   Date Value Ref Range Status   05/25/2021 13.2 12.0 - 15.9 g/dL Final   05/25/2021 13.0 12.0 - 15.9 g/dL Final   04/07/2021 11.4 (L) 12.0 - 15.9 g/dL Final   03/31/2021 12.5 12.0 - 15.9 g/dL " Final   02/22/2021 11.6 (L) 12.0 - 15.9 g/dL Final   01/14/2021 12.4 12.0 - 15.9 g/dL Final   12/09/2020 10.5 (L) 12.0 - 15.9 g/dL Final   12/02/2020 10.0 (L) 11.1 - 15.9 g/dL Final   02/22/2020 12.9 11.1 - 15.9 g/dL Final   03/21/2019 13.6 12.0 - 15.9 g/dL Final   10/21/2017 13.0 11.1 - 15.9 g/dL Final   06/28/2016 13.9 11.9 - 15.5 g/dL Final   09/03/2015 12.4 11.9 - 15.5 g/dL Final   04/07/2014 12.5 11.9 - 15.5 g/dL Final     Platelets   Date Value Ref Range Status   05/25/2021 321 140 - 450 10*3/mm3 Final   05/25/2021 320 140 - 450 10*3/mm3 Final   04/07/2021 344 140 - 450 10*3/mm3 Final   03/31/2021 284 140 - 450 10*3/mm3 Final   02/22/2021 445 140 - 450 10*3/mm3 Final   01/14/2021 404 140 - 450 10*3/mm3 Final   12/09/2020 603 (H) 140 - 450 10*3/mm3 Final   12/02/2020 591 (H) 150 - 450 x10E3/uL Final   02/22/2020 354 150 - 450 x10E3/uL Final   03/21/2019 336 140 - 450 10*3/mm3 Final   10/21/2017 293 150 - 379 x10E3/uL Final   06/28/2016 289 140 - 500 10*3/mm3 Final   09/03/2015 300 140 - 500 K/Cumm Final   04/07/2014 277 140 - 500 K/Cumm Final       Assessment/Plan   Anemia, unspecified type  - Ferritin  - Iron Profile  - Retic With IRF & RET-He  - CBC & Differential        Wilmajane Longo   *Leukocytosis  · WBC normal through February 2020  · WBC 16.8 on 12/2/2020.  WBC 13.2 on 12/9/2020.  Differentials unremarkable.  · On 1/14/2021, WBC 11.6.  Differential unremarkable.  · WBC 5.4    *Iron deficiency anemia  · Hb normal through February 2020.  MCV around 90.  · Hb 10 on 12/2/2020, 10.5 on 12/9/2020.  MCV 83.8.  · On 1/14/2021, ferritin 112, 3% saturation, reticulated hemoglobin low end of normal at 30.4.  Hb up to normal, 12.4 (without iron replacement).  · Hb normalized without iron replacement.  Perhaps this is because rectal bleeding which started early December 2020, decreased, although persists.  · 2/22/2021, ferritin 132, 8% saturation, Hb 11.6.    · Difficulty tolerating oral iron due to  colitis/diarrhea.  No significant improvement from oral iron, thought to be due to poor absorption.  · Received 600 mg Venofer  · Her insurance will not cover Injectafer.  It does cover Venofer.  · 5/25/2021: Ferritin 141, 25% saturation, Hb 13  · No need for IV iron at this time.  She states she might want to try oral iron 1 tablet every other day.    *Colitis.  · Because of this, patient doubts she will be able to tolerate oral iron.    *Etiology of iron deficiency  EGD and colonoscopy by Dr. Chadd Mann (who has since retired), on 9/6/2019.  · Follows with Dr. Gibbons.  · Dr. Martin is considering GI evaluation but waiting on pelvic floor dysfunction treatment first.  He reported essentially normal colonoscopy 2019    *Thrombocytosis  · PLT normal through February 2020  ·  on 12/2/2020.   on 12/9/2020.  · On 1/14/2021,   ·     *Patient states she was diagnosed with bladder prolapse upon evaluation [facility].  · Early April 2021 rectal/bladder/vaginal prolapse repair planned    *Moderate to severe right hydronephrosis, incidentally found on liver ultrasound, from 12/19/2020 (U/S was to evaluate high LFTs), ordered by PCP, Dr. Virgen  · Patient states PCP ordered a subsequent CT abdomen.  Patient chose to delay having this done as she thought fixing the bladder prolapse might take care of the unilateral hydronephrosis.  · Dr. Peter Vivar plans to treat the kidney stone.  · Patient states the stone was removed through cystoscopy.    *Small ill-defined sclerotic abnormality L2, indeterminate  ·  incidentally found on CT AP, 2/8/2021 to work-up hydroureteronephrosis which was felt to be due to a 6 mm right proximal ureteric calculus.  · Bone scan 2/22/2021 unremarkable.  Therefore, plan no further work-up or follow-up of this    *Hematochezia  · Specifically, pain with wiping (no blood mixed in stools), since early December 2020.  This has since improved.  · Following with GI,  Dr. Martin, on Monday, 1/18/2021  · No hematochezia since prolapse surgery early April 2021    *Overweight.  Being overweight can lead to cytopenias through hepatic steatosis.  Ideally, lose weight.  Body mass index is 26.51 kg/m².  BMI 25 to <30 is overweight  BMI 30 to <35 is class 1 obesity  BMI 35 to <40 is class 2 obesity  BMI 40 or higher is class 3 obesity   She states she has lost some weight with effort.  I congratulated her and encouraged her to keep going.    Plan  · MD 6 months.  1 week prior: Iron labs  · Her insurance does not cover Injectafer, but does cover Venofer  · I told her it is fine if she wants to try oral iron every other day but I suspect she will have difficulty tolerating this as she did with daily dosing

## 2021-10-26 NOTE — PROGRESS NOTES
Subjective   Wilma Longo is a 61 y.o. female.     History of Present Illness     Chief Complaint:   Chief Complaint   Patient presents with   • Hypertension     MED REFILL DUE /  NO LABS    • Hyperlipidemia     [facility] / meds reviewed with pt today    • Hypothyroidism   • Anxiety   • Depression   • Vitamin D Deficiency       Wilma Longo 61 y.o. female who presents today for Medical Management of the below listed issues and medication refills. She  has a problem list of   Patient Active Problem List   Diagnosis   • Colitis   • Depression   • Benign essential hypertension   • Hypothyroidism   • Impaired fasting glucose   • Insomnia   • Irritable bowel syndrome   • Hyperlipidemia   • Mood disorder (HCC)   • Vitamin D deficiency   • Fibromyalgia   • Dysphagia   • Adhesive capsulitis of left shoulder   • Anemia   • Iron deficiency anemia   • Malabsorption of iron   • Prolapse of female pelvic organs   • Pelvic prolapse   • S/P laparoscopy   .  Since the last visit, She has overall felt well and has been going to [employer] with some good success.   she has been compliant with   Current Outpatient Medications:   •  ALPRAZolam (Xanax) 0.5 MG tablet, Take 1 tablet by mouth Daily As Needed for Anxiety. D/c previous order, Disp: 30 tablet, Rfl: 2  •  DULoxetine (CYMBALTA) 60 MG capsule, Take 1 capsule by mouth Daily., Disp: 30 capsule, Rfl: 5  •  levothyroxine (SYNTHROID, LEVOTHROID) 75 MCG tablet, Take 1 tablet by mouth Daily., Disp: 30 tablet, Rfl: 5  •  lisinopril (PRINIVIL,ZESTRIL) 10 MG tablet, Take 1 tablet by mouth Daily., Disp: 30 tablet, Rfl: 5  •  naproxen (NAPROSYN) 500 MG tablet, Take 1 tablet by mouth Daily., Disp: 30 tablet, Rfl: 5  •  rosuvastatin (Crestor) 5 MG tablet, Take 1 tablet by mouth Daily., Disp: 30 tablet, Rfl: 5  •  vitamin D (ERGOCALCIFEROL) 1.25 MG (82920 UT) capsule capsule, Take 1 capsule by mouth 1 (One) Time Per Week., Disp: 5 capsule, Rfl: 5  •  ARIPiprazole (Abilify) 2 MG  "tablet, Take 1 tablet by mouth Daily., Disp: 30 tablet, Rfl: 5  •  estradiol (VAGIFEM) 10 MCG tablet vaginal tablet, Insert 1 tablet into the vagina 2 (Two) Times a Week., Disp: , Rfl:   •  HYDROcodone-acetaminophen (Norco)  MG per tablet, Take 1 tablet by mouth Every 4 (Four) Hours As Needed for Moderate Pain  or Severe Pain ., Disp: 20 tablet, Rfl: 0  •  Meth-Hyo-M Bl-Na Phos-Ph Sal (Uro-MP) 118 MG capsule, , Disp: , Rfl:   •  methylcellulose, Laxative, (Citrucel) 500 MG tablet tablet, Take 2 tablets by mouth Every 8 (Eight) Hours. Hold for loose stools, Disp: 120 tablet, Rfl: 3  •  ondansetron (Zofran) 4 MG tablet, Take 1 tablet by mouth Daily As Needed for Nausea or Vomiting., Disp: 12 tablet, Rfl: 1  •  oxyCODONE-acetaminophen (PERCOCET) 5-325 MG per tablet, , Disp: , Rfl:   •  uribel (URO-MP) 118 MG capsule capsule, Take 1 capsule by mouth., Disp: , Rfl:   •  valACYclovir (VALTREX) 500 MG tablet, Take 500 mg by mouth As Needed., Disp: , Rfl:   •  vitamin C (ASCORBIC ACID) 250 MG tablet, Take 500 mg by mouth Every Night., Disp: , Rfl: .  She denies medication side effects.    All of the other chronic condition(s) listed above are stable w/o issues.    /83   Pulse 84   Temp 96.7 °F (35.9 °C) (Oral)   Resp 16   Ht 162.6 cm (64\")   Wt 66.7 kg (147 lb)   LMP  (LMP Unknown)   BMI 25.23 kg/m²     Results for orders placed or performed in visit on 05/25/21   Ferritin    Specimen: Blood   Result Value Ref Range    Ferritin 140.80 13.00 - 150.00 ng/mL   Iron Profile    Specimen: Blood   Result Value Ref Range    Iron 82 37 - 145 mcg/dL    Iron Saturation 25 20 - 50 %    Transferrin 223 200 - 360 mg/dL    TIBC 332 298 - 536 mcg/dL   Retic With IRF & RET-He    Specimen: Blood   Result Value Ref Range    Immature Reticulocyte Fraction 13.2 3.0 - 15.8 %    Reticulocyte % 1.28 0.70 - 1.90 %    Reticulocyte Absolute 0.0622 0.0200 - 0.1300 10*6/mm3    Reticulocyte Hgb 31.8 29.8 - 36.1 pg   CBC Auto " Differential    Specimen: Blood   Result Value Ref Range    WBC 5.63 3.40 - 10.80 10*3/mm3    RBC 4.86 3.77 - 5.28 10*6/mm3    Hemoglobin 13.0 12.0 - 15.9 g/dL    Hematocrit 42.4 34.0 - 46.6 %    MCV 87.2 79.0 - 97.0 fL    MCH 26.7 26.6 - 33.0 pg    MCHC 30.7 (L) 31.5 - 35.7 g/dL    RDW 15.1 12.3 - 15.4 %    RDW-SD 48.3 37.0 - 54.0 fl    MPV 9.7 6.0 - 12.0 fL    Platelets 320 140 - 450 10*3/mm3    Neutrophil % 49.3 42.7 - 76.0 %    Lymphocyte % 38.9 19.6 - 45.3 %    Monocyte % 8.7 5.0 - 12.0 %    Eosinophil % 2.0 0.3 - 6.2 %    Basophil % 0.7 0.0 - 1.5 %    Immature Grans % 0.4 0.0 - 0.5 %    Neutrophils, Absolute 2.78 1.70 - 7.00 10*3/mm3    Lymphocytes, Absolute 2.19 0.70 - 3.10 10*3/mm3    Monocytes, Absolute 0.49 0.10 - 0.90 10*3/mm3    Eosinophils, Absolute 0.11 0.00 - 0.40 10*3/mm3    Basophils, Absolute 0.04 0.00 - 0.20 10*3/mm3    Immature Grans, Absolute 0.02 0.00 - 0.05 10*3/mm3    nRBC 0.0 0.0 - 0.2 /100 WBC             The following portions of the patient's history were reviewed and updated as appropriate: allergies, current medications, past family history, past medical history, past social history, past surgical history, and problem list.    Review of Systems   Constitutional: Negative for activity change, chills and fever.   Respiratory: Negative for cough.    Cardiovascular: Negative for chest pain.   Psychiatric/Behavioral: Negative for dysphoric mood.       Objective   Physical Exam  Constitutional:       General: She is not in acute distress.     Appearance: She is well-developed.   Cardiovascular:      Rate and Rhythm: Normal rate and regular rhythm.   Pulmonary:      Effort: Pulmonary effort is normal.      Breath sounds: Normal breath sounds.   Neurological:      Mental Status: She is alert and oriented to person, place, and time.   Psychiatric:         Behavior: Behavior normal.         Thought Content: Thought content normal.           Assessment/Plan   Diagnoses and all orders for this  visit:    1. Benign essential hypertension (Primary)  -     lisinopril (PRINIVIL,ZESTRIL) 10 MG tablet; Take 1 tablet by mouth Daily.  Dispense: 30 tablet; Refill: 5  -     Comprehensive metabolic panel  -     Lipid panel    2. Acquired hypothyroidism  -     levothyroxine (SYNTHROID, LEVOTHROID) 75 MCG tablet; Take 1 tablet by mouth Daily.  Dispense: 30 tablet; Refill: 5  -     TSH  -     T4, Free    3. Fibromyalgia  -     DULoxetine (CYMBALTA) 60 MG capsule; Take 1 capsule by mouth Daily.  Dispense: 30 capsule; Refill: 5    4. Vitamin D deficiency  -     vitamin D (ERGOCALCIFEROL) 1.25 MG (13820 UT) capsule capsule; Take 1 capsule by mouth 1 (One) Time Per Week.  Dispense: 5 capsule; Refill: 5    5. Adhesive capsulitis of left shoulder  -     naproxen (NAPROSYN) 500 MG tablet; Take 1 tablet by mouth Daily.  Dispense: 30 tablet; Refill: 5    6. Mixed hyperlipidemia  -     rosuvastatin (Crestor) 5 MG tablet; Take 1 tablet by mouth Daily.  Dispense: 30 tablet; Refill: 5    7. Mood disorder (HCC)  Comments:  worse during winter  Orders:  -     ALPRAZolam (Xanax) 0.5 MG tablet; Take 1 tablet by mouth Daily As Needed for Anxiety. D/c previous order  Dispense: 30 tablet; Refill: 2  -     ARIPiprazole (Abilify) 2 MG tablet; Take 1 tablet by mouth Daily.  Dispense: 30 tablet; Refill: 5

## 2021-10-27 ENCOUNTER — OFFICE VISIT (OUTPATIENT)
Dept: FAMILY MEDICINE CLINIC | Facility: CLINIC | Age: 61
End: 2021-10-27

## 2021-10-27 VITALS
DIASTOLIC BLOOD PRESSURE: 83 MMHG | BODY MASS INDEX: 25.1 KG/M2 | TEMPERATURE: 96.7 F | WEIGHT: 147 LBS | SYSTOLIC BLOOD PRESSURE: 144 MMHG | HEART RATE: 84 BPM | HEIGHT: 64 IN | RESPIRATION RATE: 16 BRPM

## 2021-10-27 DIAGNOSIS — M75.02 ADHESIVE CAPSULITIS OF LEFT SHOULDER: Chronic | ICD-10-CM

## 2021-10-27 DIAGNOSIS — E78.2 MIXED HYPERLIPIDEMIA: Chronic | ICD-10-CM

## 2021-10-27 DIAGNOSIS — E03.9 ACQUIRED HYPOTHYROIDISM: Chronic | ICD-10-CM

## 2021-10-27 DIAGNOSIS — E55.9 VITAMIN D DEFICIENCY: Chronic | ICD-10-CM

## 2021-10-27 DIAGNOSIS — F39 MOOD DISORDER (HCC): Chronic | ICD-10-CM

## 2021-10-27 DIAGNOSIS — M79.7 FIBROMYALGIA: Chronic | ICD-10-CM

## 2021-10-27 DIAGNOSIS — I10 BENIGN ESSENTIAL HYPERTENSION: Primary | Chronic | ICD-10-CM

## 2021-10-27 PROCEDURE — 99214 OFFICE O/P EST MOD 30 MIN: CPT | Performed by: FAMILY MEDICINE

## 2021-10-27 RX ORDER — LISINOPRIL 10 MG/1
10 TABLET ORAL DAILY
Qty: 30 TABLET | Refills: 5 | Status: SHIPPED | OUTPATIENT
Start: 2021-10-27 | End: 2022-07-08 | Stop reason: SDUPTHER

## 2021-10-27 RX ORDER — DULOXETIN HYDROCHLORIDE 60 MG/1
60 CAPSULE, DELAYED RELEASE ORAL DAILY
Qty: 30 CAPSULE | Refills: 5 | Status: SHIPPED | OUTPATIENT
Start: 2021-10-27 | End: 2022-07-08 | Stop reason: SDUPTHER

## 2021-10-27 RX ORDER — ALPRAZOLAM 0.5 MG/1
0.5 TABLET ORAL DAILY PRN
Qty: 30 TABLET | Refills: 2 | Status: SHIPPED | OUTPATIENT
Start: 2021-10-27 | End: 2022-07-08 | Stop reason: SDUPTHER

## 2021-10-27 RX ORDER — ARIPIPRAZOLE 2 MG/1
2 TABLET ORAL DAILY
Qty: 30 TABLET | Refills: 5 | Status: SHIPPED | OUTPATIENT
Start: 2021-10-27 | End: 2022-07-11

## 2021-10-27 RX ORDER — LEVOTHYROXINE SODIUM 0.07 MG/1
75 TABLET ORAL DAILY
Qty: 30 TABLET | Refills: 5 | Status: SHIPPED | OUTPATIENT
Start: 2021-10-27 | End: 2022-07-08 | Stop reason: SDUPTHER

## 2021-10-27 RX ORDER — ROSUVASTATIN CALCIUM 5 MG/1
5 TABLET, COATED ORAL DAILY
Qty: 30 TABLET | Refills: 5 | Status: SHIPPED | OUTPATIENT
Start: 2021-10-27 | End: 2022-07-08 | Stop reason: SDUPTHER

## 2021-10-27 RX ORDER — NAPROXEN 500 MG/1
500 TABLET ORAL DAILY
Qty: 30 TABLET | Refills: 5 | Status: SHIPPED | OUTPATIENT
Start: 2021-10-27 | End: 2022-07-08 | Stop reason: SDUPTHER

## 2021-10-27 RX ORDER — ERGOCALCIFEROL 1.25 MG/1
50000 CAPSULE ORAL WEEKLY
Qty: 5 CAPSULE | Refills: 5 | Status: SHIPPED | OUTPATIENT
Start: 2021-10-27 | End: 2022-07-08 | Stop reason: SDUPTHER

## 2021-11-05 ENCOUNTER — LAB (OUTPATIENT)
Dept: OTHER | Facility: HOSPITAL | Age: 61
End: 2021-11-05

## 2021-11-05 DIAGNOSIS — D64.9 ANEMIA, UNSPECIFIED TYPE: ICD-10-CM

## 2021-11-05 LAB
ALBUMIN SERPL-MCNC: 4.5 G/DL (ref 3.5–5.2)
ALBUMIN/GLOB SERPL: 1.3 G/DL
ALP SERPL-CCNC: 66 U/L (ref 39–117)
ALT SERPL W P-5'-P-CCNC: 17 U/L (ref 1–33)
ANION GAP SERPL CALCULATED.3IONS-SCNC: 9.5 MMOL/L (ref 5–15)
AST SERPL-CCNC: 21 U/L (ref 1–32)
BASOPHILS # BLD AUTO: 0.04 10*3/MM3 (ref 0–0.2)
BASOPHILS NFR BLD AUTO: 0.6 % (ref 0–1.5)
BILIRUB SERPL-MCNC: 0.3 MG/DL (ref 0–1.2)
BUN SERPL-MCNC: 12 MG/DL (ref 8–23)
BUN/CREAT SERPL: 12.2 (ref 7–25)
CALCIUM SPEC-SCNC: 9.6 MG/DL (ref 8.6–10.5)
CHLORIDE SERPL-SCNC: 105 MMOL/L (ref 98–107)
CO2 SERPL-SCNC: 27.5 MMOL/L (ref 22–29)
CREAT SERPL-MCNC: 0.98 MG/DL (ref 0.57–1)
DEPRECATED RDW RBC AUTO: 43.4 FL (ref 37–54)
EOSINOPHIL # BLD AUTO: 0.16 10*3/MM3 (ref 0–0.4)
EOSINOPHIL NFR BLD AUTO: 2.6 % (ref 0.3–6.2)
ERYTHROCYTE [DISTWIDTH] IN BLOOD BY AUTOMATED COUNT: 13 % (ref 12.3–15.4)
FERRITIN SERPL-MCNC: 94.4 NG/ML (ref 13–150)
GFR SERPL CREATININE-BSD FRML MDRD: 58 ML/MIN/1.73
GLOBULIN UR ELPH-MCNC: 3.5 GM/DL
GLUCOSE SERPL-MCNC: 109 MG/DL (ref 65–99)
HCT VFR BLD AUTO: 42.4 % (ref 34–46.6)
HGB BLD-MCNC: 13.3 G/DL (ref 12–15.9)
HGB RETIC QN AUTO: 32.5 PG (ref 29.8–36.1)
IMM GRANULOCYTES # BLD AUTO: 0.01 10*3/MM3 (ref 0–0.05)
IMM GRANULOCYTES NFR BLD AUTO: 0.2 % (ref 0–0.5)
IMM RETICS NFR: 12.1 % (ref 3–15.8)
IRON 24H UR-MRATE: 63 MCG/DL (ref 37–145)
IRON SATN MFR SERPL: 18 % (ref 20–50)
LYMPHOCYTES # BLD AUTO: 1.84 10*3/MM3 (ref 0.7–3.1)
LYMPHOCYTES NFR BLD AUTO: 29.4 % (ref 19.6–45.3)
MCH RBC QN AUTO: 28.6 PG (ref 26.6–33)
MCHC RBC AUTO-ENTMCNC: 31.4 G/DL (ref 31.5–35.7)
MCV RBC AUTO: 91.2 FL (ref 79–97)
MONOCYTES # BLD AUTO: 0.52 10*3/MM3 (ref 0.1–0.9)
MONOCYTES NFR BLD AUTO: 8.3 % (ref 5–12)
NEUTROPHILS NFR BLD AUTO: 3.69 10*3/MM3 (ref 1.7–7)
NEUTROPHILS NFR BLD AUTO: 58.9 % (ref 42.7–76)
NRBC BLD AUTO-RTO: 0 /100 WBC (ref 0–0.2)
PLATELET # BLD AUTO: 303 10*3/MM3 (ref 140–450)
PMV BLD AUTO: 9.4 FL (ref 6–12)
POTASSIUM SERPL-SCNC: 4.1 MMOL/L (ref 3.5–5.2)
PROT SERPL-MCNC: 8 G/DL (ref 6–8.5)
RBC # BLD AUTO: 4.65 10*6/MM3 (ref 3.77–5.28)
RETICS # AUTO: 0.06 10*6/MM3 (ref 0.02–0.13)
RETICS/RBC NFR AUTO: 1.27 % (ref 0.7–1.9)
SODIUM SERPL-SCNC: 142 MMOL/L (ref 136–145)
TIBC SERPL-MCNC: 349 MCG/DL (ref 298–536)
TRANSFERRIN SERPL-MCNC: 234 MG/DL (ref 200–360)
TSH SERPL DL<=0.05 MIU/L-ACNC: 1.87 UIU/ML (ref 0.27–4.2)
WBC # BLD AUTO: 6.26 10*3/MM3 (ref 3.4–10.8)

## 2021-11-05 PROCEDURE — 80053 COMPREHEN METABOLIC PANEL: CPT | Performed by: FAMILY MEDICINE

## 2021-11-05 PROCEDURE — 84466 ASSAY OF TRANSFERRIN: CPT | Performed by: INTERNAL MEDICINE

## 2021-11-05 PROCEDURE — 82728 ASSAY OF FERRITIN: CPT | Performed by: INTERNAL MEDICINE

## 2021-11-05 PROCEDURE — 85025 COMPLETE CBC W/AUTO DIFF WBC: CPT | Performed by: INTERNAL MEDICINE

## 2021-11-05 PROCEDURE — 85046 RETICYTE/HGB CONCENTRATE: CPT | Performed by: INTERNAL MEDICINE

## 2021-11-05 PROCEDURE — 84443 ASSAY THYROID STIM HORMONE: CPT | Performed by: FAMILY MEDICINE

## 2021-11-05 PROCEDURE — 36415 COLL VENOUS BLD VENIPUNCTURE: CPT

## 2021-11-05 PROCEDURE — 83540 ASSAY OF IRON: CPT | Performed by: INTERNAL MEDICINE

## 2021-11-05 PROCEDURE — 84439 ASSAY OF FREE THYROXINE: CPT | Performed by: FAMILY MEDICINE

## 2021-11-05 PROCEDURE — 80061 LIPID PANEL: CPT | Performed by: FAMILY MEDICINE

## 2021-11-06 LAB
CHOLEST SERPL-MCNC: 151 MG/DL (ref 100–199)
HDLC SERPL-MCNC: 60 MG/DL
LDLC SERPL CALC-MCNC: 75 MG/DL (ref 0–99)
T4 FREE SERPL-MCNC: 1.29 NG/DL (ref 0.82–1.77)
TRIGL SERPL-MCNC: 83 MG/DL (ref 0–149)
VLDLC SERPL CALC-MCNC: 16 MG/DL (ref 5–40)

## 2021-11-12 ENCOUNTER — OFFICE VISIT (OUTPATIENT)
Dept: ONCOLOGY | Facility: CLINIC | Age: 61
End: 2021-11-12

## 2021-11-12 ENCOUNTER — APPOINTMENT (OUTPATIENT)
Dept: OTHER | Facility: HOSPITAL | Age: 61
End: 2021-11-12

## 2021-11-12 VITALS
RESPIRATION RATE: 16 BRPM | BODY MASS INDEX: 25.13 KG/M2 | OXYGEN SATURATION: 98 % | HEIGHT: 64 IN | WEIGHT: 147.2 LBS | DIASTOLIC BLOOD PRESSURE: 90 MMHG | HEART RATE: 91 BPM | SYSTOLIC BLOOD PRESSURE: 163 MMHG | TEMPERATURE: 97.1 F

## 2021-11-12 DIAGNOSIS — D50.9 IRON DEFICIENCY ANEMIA, UNSPECIFIED IRON DEFICIENCY ANEMIA TYPE: Primary | ICD-10-CM

## 2021-11-12 PROCEDURE — 99214 OFFICE O/P EST MOD 30 MIN: CPT | Performed by: INTERNAL MEDICINE

## 2021-11-12 NOTE — PROGRESS NOTES
.     REASON FOR FOLLOWUP :   Leukocytosis and anemia and thrombocytosis    HISTORY OF PRESENT ILLNESS:  The patient is a 61 y.o. year old female  who is here for follow-up with the above-mentioned history.    Doing fine.  She states she has a rash under her left breast which she thinks might be yeast.  She has been using hydrocortisone cream.  I suggested Desitin.  She states she will try this and if it does not help she plans to see her PCP.    No bleeding, chest pain, SOA    Past Medical History:   Diagnosis Date   • Anemia    • Anxiety    • Benign essential hypertension 02/01/2015   • Cancer (HCC)    • Cardiomyopathy (HCC)    • Colitis 06/12/2009   • Depression    • Fibromyalgia    • History of gestational diabetes    • Hyperlipidemia 04/13/2011 2/1/2015   • Hypothyroidism 02/01/2015   • Insomnia 02/03/2015   • Irritable bowel syndrome 11/30/2009   • Panic attacks    • Rectocele    • Skin cancer of arm    • Vaginal prolapse    • Vitamin D deficiency 07/19/2012     Past Surgical History:   Procedure Laterality Date   • ANTERIOR AND POSTERIOR VAGINAL REPAIR N/A 4/6/2021    Procedure: POSTERIOR VAGINAL REPAIR, CYSTOSCOPY;  Surgeon: Cora Gustafson MD;  Location: [facility] MAIN OR;  Service: Gynecology;  Laterality: N/A;   • BLADDER REPAIR  09/2015    X2   • COLONOSCOPY  2010    wnl   • COLONOSCOPY N/A 9/9/2016    Procedure: COLONOSCOPY into cecum and terminal ileum with biopsies;  Surgeon: Orlin Mann MD;  Location: [facility] ENDOSCOPY;  Service:    • COLONOSCOPY N/A 9/6/2019    Procedure: COLONOSCOPY TO CECUM AND INTO TERMINAL ILEUM;  Surgeon: Orlin Mann MD;  Location: [facility] ENDOSCOPY;  Service: Gastroenterology   • CYSTOSCOPY W/ URETERAL STENT PLACEMENT     • ENDOSCOPY N/A 9/6/2019    Procedure: ESOPHAGOGASTRODUODENOSCOPY WITH COLD BIOPSIES AND 48F HERNÁNDEZ DILATION;  Surgeon: Orlin Mann MD;  Location: [facility] ENDOSCOPY;  Service: Gastroenterology   • HYSTERECTOMY  12/2013   • MOUTH SURGERY      • SACROCOLPOPEXY N/A 4/6/2021    Procedure: LARPAROSCOPY COLPOPEXY, BILATERAL SALPINGECTOMY;  Surgeon: Cora Gustafson MD;  Location: [facility];  Service: Gynecology;  Laterality: N/A;   • SKIN CANCER EXCISION         MEDICATIONS    Current Outpatient Medications:   •  ALPRAZolam (Xanax) 0.5 MG tablet, Take 1 tablet by mouth Daily As Needed for Anxiety. D/c previous order, Disp: 30 tablet, Rfl: 2  •  ARIPiprazole (Abilify) 2 MG tablet, Take 1 tablet by mouth Daily., Disp: 30 tablet, Rfl: 5  •  DULoxetine (CYMBALTA) 60 MG capsule, Take 1 capsule by mouth Daily., Disp: 30 capsule, Rfl: 5  •  estradiol (VAGIFEM) 10 MCG tablet vaginal tablet, Insert 1 tablet into the vagina 2 (Two) Times a Week., Disp: , Rfl:   •  HYDROcodone-acetaminophen (Norco)  MG per tablet, Take 1 tablet by mouth Every 4 (Four) Hours As Needed for Moderate Pain  or Severe Pain ., Disp: 20 tablet, Rfl: 0  •  levothyroxine (SYNTHROID, LEVOTHROID) 75 MCG tablet, Take 1 tablet by mouth Daily., Disp: 30 tablet, Rfl: 5  •  lisinopril (PRINIVIL,ZESTRIL) 10 MG tablet, Take 1 tablet by mouth Daily., Disp: 30 tablet, Rfl: 5  •  Meth-Hyo-M Bl-Na Phos-Ph Sal (Uro-MP) 118 MG capsule, , Disp: , Rfl:   •  methylcellulose, Laxative, (Citrucel) 500 MG tablet tablet, Take 2 tablets by mouth Every 8 (Eight) Hours. Hold for loose stools, Disp: 120 tablet, Rfl: 3  •  naproxen (NAPROSYN) 500 MG tablet, Take 1 tablet by mouth Daily., Disp: 30 tablet, Rfl: 5  •  ondansetron (Zofran) 4 MG tablet, Take 1 tablet by mouth Daily As Needed for Nausea or Vomiting., Disp: 12 tablet, Rfl: 1  •  oxyCODONE-acetaminophen (PERCOCET) 5-325 MG per tablet, , Disp: , Rfl:   •  rosuvastatin (Crestor) 5 MG tablet, Take 1 tablet by mouth Daily., Disp: 30 tablet, Rfl: 5  •  uribel (URO-MP) 118 MG capsule capsule, Take 1 capsule by mouth., Disp: , Rfl:   •  valACYclovir (VALTREX) 500 MG tablet, Take 500 mg by mouth As Needed., Disp: , Rfl:   •  vitamin C (ASCORBIC ACID) 250 MG  tablet, Take 500 mg by mouth Every Night., Disp: , Rfl:   •  vitamin D (ERGOCALCIFEROL) 1.25 MG (50500 UT) capsule capsule, Take 1 capsule by mouth 1 (One) Time Per Week., Disp: 5 capsule, Rfl: 5    ALLERGIES:   No Known Allergies    SOCIAL HISTORY:       Social History     Socioeconomic History   • Marital status:      Spouse name: Ruben   Tobacco Use   • Smoking status: Never Smoker   • Smokeless tobacco: Never Used   Vaping Use   • Vaping Use: Never used   Substance and Sexual Activity   • Alcohol use: Yes     Comment: less than once per  month   • Drug use: No   • Sexual activity: Defer         FAMILY HISTORY:  Family History   Problem Relation Age of Onset   • Depression Mother    • Heart disease Mother    • Stroke Mother    • Heart attack Mother    • Heart failure Mother    • Hyperlipidemia Mother    • Hypertension Mother    • Arthritis Father    • Heart disease Father    • Skin cancer Father    • Heart attack Father    • Heart failure Father    • Heart disease Maternal Grandmother    • Stroke Maternal Grandfather    • Stroke Paternal Grandfather    • Hypertension Sister    • Hypothyroidism Sister    • Hypertension Brother    • Hypothyroidism Daughter    • Colon cancer Neg Hx    • Colon polyps Neg Hx    • Liver disease Neg Hx    • Rectal cancer Neg Hx    • Stomach cancer Neg Hx    • Liver cancer Neg Hx    • Malig Hyperthermia Neg Hx        REVIEW OF SYSTEMS:  Review of Systems   Constitutional: Negative for activity change.   HENT: Negative for nosebleeds and trouble swallowing.    Respiratory: Negative for shortness of breath and wheezing.    Cardiovascular: Negative for chest pain and palpitations.   Gastrointestinal: Negative for constipation, diarrhea and nausea.   Genitourinary: Negative for dysuria and hematuria.   Musculoskeletal: Negative for arthralgias and myalgias.   Skin: Negative for rash and wound.   Neurological: Negative for seizures and syncope.   Hematological: Negative for adenopathy.  "Does not bruise/bleed easily.   Psychiatric/Behavioral: Negative for confusion.              Vitals:    11/12/21 1133   BP: 163/90   Pulse: 91   Resp: 16   Temp: 97.1 °F (36.2 °C)   TempSrc: Temporal   SpO2: 98%   Weight: 66.8 kg (147 lb 3.2 oz)   Height: 162.6 cm (64.02\")   PainSc: 0-No pain     Current Status 6/1/2021   ECOG score 0      PHYSICAL EXAM:          CONSTITUTIONAL:  Vital signs reviewed.  No distress, looks comfortable.  EYES:  Conjunctiva and lids unremarkable.  PERRLA  EARS,NOSE,MOUTH,THROAT:  Ears and nose appear unremarkable.  Lips, teeth, gums appear unremarkable.  RESPIRATORY:  Normal respiratory effort.  Lungs clear to auscultation bilaterally.  CARDIOVASCULAR:  Normal S1, S2.  No murmurs rubs or gallops.  No significant lower extremity edema.  GASTROINTESTINAL: Abdomen appears unremarkable.  Nontender.  No hepatomegaly.  No splenomegaly.  LYMPHATIC:  No cervical, supraclavicular, axillary lymphadenopathy.  SKIN:  Warm.  No rashes.  PSYCHIATRIC:  Normal judgment and insight.  Normal mood and affect.          RECENT LABS:        WBC   Date Value Ref Range Status   11/05/2021 6.26 3.40 - 10.80 10*3/mm3 Final   05/25/2021 5.42 3.40 - 10.80 10*3/mm3 Final   05/25/2021 5.63 3.40 - 10.80 10*3/mm3 Final   04/07/2021 12.93 (H) 3.40 - 10.80 10*3/mm3 Final   03/31/2021 5.44 3.40 - 10.80 10*3/mm3 Final   02/22/2021 9.80 3.40 - 10.80 10*3/mm3 Final   01/14/2021 11.62 (H) 3.40 - 10.80 10*3/mm3 Final   12/09/2020 13.23 (H) 3.40 - 10.80 10*3/mm3 Final   12/02/2020 16.8 (H) 3.4 - 10.8 x10E3/uL Final   02/22/2020 6.2 3.4 - 10.8 x10E3/uL Final   03/21/2019 6.56 3.40 - 10.80 10*3/mm3 Final   10/21/2017 8.3 3.4 - 10.8 x10E3/uL Final   06/28/2016 7.94 4.50 - 10.70 10*3/mm3 Final   09/03/2015 7.56 4.50 - 10.70 K/Cumm Final   04/07/2014 6.03 4.50 - 10.70 K/Cumm Final     Hemoglobin   Date Value Ref Range Status   11/05/2021 13.3 12.0 - 15.9 g/dL Final   05/25/2021 13.2 12.0 - 15.9 g/dL Final   05/25/2021 13.0 12.0 - " 15.9 g/dL Final   04/07/2021 11.4 (L) 12.0 - 15.9 g/dL Final   03/31/2021 12.5 12.0 - 15.9 g/dL Final   02/22/2021 11.6 (L) 12.0 - 15.9 g/dL Final   01/14/2021 12.4 12.0 - 15.9 g/dL Final   12/09/2020 10.5 (L) 12.0 - 15.9 g/dL Final   12/02/2020 10.0 (L) 11.1 - 15.9 g/dL Final   02/22/2020 12.9 11.1 - 15.9 g/dL Final   03/21/2019 13.6 12.0 - 15.9 g/dL Final   10/21/2017 13.0 11.1 - 15.9 g/dL Final   06/28/2016 13.9 11.9 - 15.5 g/dL Final   09/03/2015 12.4 11.9 - 15.5 g/dL Final   04/07/2014 12.5 11.9 - 15.5 g/dL Final     Platelets   Date Value Ref Range Status   11/05/2021 303 140 - 450 10*3/mm3 Final   05/25/2021 321 140 - 450 10*3/mm3 Final   05/25/2021 320 140 - 450 10*3/mm3 Final   04/07/2021 344 140 - 450 10*3/mm3 Final   03/31/2021 284 140 - 450 10*3/mm3 Final   02/22/2021 445 140 - 450 10*3/mm3 Final   01/14/2021 404 140 - 450 10*3/mm3 Final   12/09/2020 603 (H) 140 - 450 10*3/mm3 Final   12/02/2020 591 (H) 150 - 450 x10E3/uL Final   02/22/2020 354 150 - 450 x10E3/uL Final   03/21/2019 336 140 - 450 10*3/mm3 Final   10/21/2017 293 150 - 379 x10E3/uL Final   06/28/2016 289 140 - 500 10*3/mm3 Final   09/03/2015 300 140 - 500 K/Cumm Final   04/07/2014 277 140 - 500 K/Cumm Final       Assessment/Plan   There are no diagnoses linked to this encounter.      Wilma Longo   *Leukocytosis  · WBC normal through February 2020  · WBC 16.8 on 12/2/2020.  WBC 13.2 on 12/9/2020.  Differentials unremarkable.  · On 1/14/2021, WBC 11.6.  Differential unremarkable.  · WBC 6.3    *Iron deficiency anemia  · Hb normal through February 2020.  MCV around 90.  · Hb 10 on 12/2/2020, 10.5 on 12/9/2020.  MCV 83.8.  · On 1/14/2021, ferritin 112, 3% saturation, reticulated hemoglobin low end of normal at 30.4.  Hb up to normal, 12.4 (without iron replacement).  · Hb normalized without iron replacement.  Perhaps this is because rectal bleeding which started early December 2020, decreased, although persists.  · 2/22/2021, ferritin 132,  8% saturation, Hb 11.6.    · Difficulty tolerating oral iron due to colitis/diarrhea.  No significant improvement from oral iron, thought to be due to poor absorption.  · Received 600 mg Venofer  · Her insurance will not cover Injectafer.  It does cover Venofer.  · 5/25/2021: Ferritin 141, 25% saturation, Hb 13  · No need for IV iron at this time.  She states she might want to try oral iron 1 tablet every other day.  · 11/5/2021: Ferritin 94, 18% saturation, Hb 13.3    *Colitis.  · Because of this, patient doubts she will be able to tolerate oral iron.    *Etiology of iron deficiency  EGD and colonoscopy by Dr. Chadd Mann (who has since retired), on 9/6/2019.  · Follows with Dr. Gibbons.  · Dr. Martin is considering GI evaluation but waiting on pelvic floor dysfunction treatment first.  He reported essentially normal colonoscopy 2019    *Thrombocytosis  · PLT normal through February 2020  ·  on 12/2/2020.   on 12/9/2020.  · On 1/14/2021,   ·     *Patient states she was diagnosed with bladder prolapse upon evaluation at [facility].  · Early April 2021 rectal/bladder/vaginal prolapse repair planned    *Moderate to severe right hydronephrosis, incidentally found on liver ultrasound, from 12/19/2020 (U/S was to evaluate high LFTs), ordered by PCP, Dr. Virgen  · Patient states PCP ordered a subsequent CT abdomen.  Patient chose to delay having this done as she thought fixing the bladder prolapse might take care of the unilateral hydronephrosis.  · Dr. Peter Vivar plans to treat the kidney stone.  · Patient states the stone was removed through cystoscopy.    *Small ill-defined sclerotic abnormality L2, indeterminate  ·  incidentally found on CT AP, 2/8/2021 to work-up hydroureteronephrosis which was felt to be due to a 6 mm right proximal ureteric calculus.  · Bone scan 2/22/2021 unremarkable.  Therefore, plan no further work-up or follow-up of this    *Hematochezia  · Specifically,  pain with wiping (no blood mixed in stools), since early December 2020.  This has since improved.  · Following with GI, Dr. Martin, on Monday, 1/18/2021  · No hematochezia since prolapse surgery early April 2021    *Overweight.  Being overweight can lead to cytopenias through hepatic steatosis.  Ideally, lose weight.  Body mass index is 25.25 kg/m².  BMI 25 to <30 is overweight  BMI 30 to <35 is class 1 obesity  BMI 35 to <40 is class 2 obesity  BMI 40 or higher is class 3 obesity   She states she has lost some more weight with effort, using weight watchers.  I congratulated her again and encouraged her to keep going.    Plan  · MD 6 months.  1 week prior: Ferritin, iron panel, CBC, reticulate hemoglobin  · Her insurance does not cover Injectafer, but does cover Venofer  · I told her it is fine if she wants to try oral iron every other day but I suspect she will have difficulty tolerating this as she did with daily dosing

## 2022-05-17 ENCOUNTER — TELEPHONE (OUTPATIENT)
Dept: ONCOLOGY | Facility: CLINIC | Age: 62
End: 2022-05-17

## 2022-05-17 NOTE — TELEPHONE ENCOUNTER
Caller: Wilma Longo    Relationship to patient: Self    Best call back number: [phone]    Chief complaint: R/S    Type of visit: VITALS AND FOLLOW UP    Requested date: N/A , EITHER [place] OR [facility] OFFICE    If rescheduling, when is the original appointment: 5-27     Additional notes:PLEASE ADVISE

## 2022-05-18 ENCOUNTER — LAB (OUTPATIENT)
Dept: OTHER | Facility: HOSPITAL | Age: 62
End: 2022-05-18

## 2022-05-18 DIAGNOSIS — D64.9 ANEMIA, UNSPECIFIED TYPE: ICD-10-CM

## 2022-05-18 DIAGNOSIS — D72.829 LEUKOCYTOSIS, UNSPECIFIED TYPE: Primary | ICD-10-CM

## 2022-05-18 LAB
BASOPHILS # BLD AUTO: 0.05 10*3/MM3 (ref 0–0.2)
BASOPHILS NFR BLD AUTO: 0.6 % (ref 0–1.5)
DEPRECATED RDW RBC AUTO: 42.7 FL (ref 37–54)
EOSINOPHIL # BLD AUTO: 0.1 10*3/MM3 (ref 0–0.4)
EOSINOPHIL NFR BLD AUTO: 1.3 % (ref 0.3–6.2)
ERYTHROCYTE [DISTWIDTH] IN BLOOD BY AUTOMATED COUNT: 13 % (ref 12.3–15.4)
FERRITIN SERPL-MCNC: 74.2 NG/ML (ref 13–150)
HCT VFR BLD AUTO: 43.9 % (ref 34–46.6)
HGB BLD-MCNC: 13.7 G/DL (ref 12–15.9)
HGB RETIC QN AUTO: 29.4 PG (ref 29.8–36.1)
IMM GRANULOCYTES # BLD AUTO: 0.02 10*3/MM3 (ref 0–0.05)
IMM GRANULOCYTES NFR BLD AUTO: 0.3 % (ref 0–0.5)
IMM RETICS NFR: 12.3 % (ref 3–15.8)
IRON 24H UR-MRATE: 47 MCG/DL (ref 37–145)
IRON SATN MFR SERPL: 14 % (ref 20–50)
LYMPHOCYTES # BLD AUTO: 2.72 10*3/MM3 (ref 0.7–3.1)
LYMPHOCYTES NFR BLD AUTO: 34.2 % (ref 19.6–45.3)
MCH RBC QN AUTO: 27.9 PG (ref 26.6–33)
MCHC RBC AUTO-ENTMCNC: 31.2 G/DL (ref 31.5–35.7)
MCV RBC AUTO: 89.4 FL (ref 79–97)
MONOCYTES # BLD AUTO: 0.86 10*3/MM3 (ref 0.1–0.9)
MONOCYTES NFR BLD AUTO: 10.8 % (ref 5–12)
NEUTROPHILS NFR BLD AUTO: 4.2 10*3/MM3 (ref 1.7–7)
NEUTROPHILS NFR BLD AUTO: 52.8 % (ref 42.7–76)
NRBC BLD AUTO-RTO: 0 /100 WBC (ref 0–0.2)
PLATELET # BLD AUTO: 277 10*3/MM3 (ref 140–450)
PMV BLD AUTO: 9.9 FL (ref 6–12)
RBC # BLD AUTO: 4.91 10*6/MM3 (ref 3.77–5.28)
RETICS # AUTO: 0.06 10*6/MM3 (ref 0.02–0.13)
RETICS/RBC NFR AUTO: 1.29 % (ref 0.7–1.9)
TIBC SERPL-MCNC: 340 MCG/DL (ref 298–536)
TRANSFERRIN SERPL-MCNC: 228 MG/DL (ref 200–360)
WBC NRBC COR # BLD: 7.95 10*3/MM3 (ref 3.4–10.8)

## 2022-05-18 PROCEDURE — 85025 COMPLETE CBC W/AUTO DIFF WBC: CPT | Performed by: INTERNAL MEDICINE

## 2022-05-18 PROCEDURE — 83540 ASSAY OF IRON: CPT | Performed by: INTERNAL MEDICINE

## 2022-05-18 PROCEDURE — 82728 ASSAY OF FERRITIN: CPT | Performed by: INTERNAL MEDICINE

## 2022-05-18 PROCEDURE — 84466 ASSAY OF TRANSFERRIN: CPT | Performed by: INTERNAL MEDICINE

## 2022-05-18 PROCEDURE — 36415 COLL VENOUS BLD VENIPUNCTURE: CPT

## 2022-05-18 PROCEDURE — 85046 RETICYTE/HGB CONCENTRATE: CPT | Performed by: INTERNAL MEDICINE

## 2022-05-26 ENCOUNTER — APPOINTMENT (OUTPATIENT)
Dept: OTHER | Facility: HOSPITAL | Age: 62
End: 2022-05-26

## 2022-05-26 ENCOUNTER — OFFICE VISIT (OUTPATIENT)
Dept: ONCOLOGY | Facility: CLINIC | Age: 62
End: 2022-05-26

## 2022-05-26 VITALS
BODY MASS INDEX: 27.5 KG/M2 | WEIGHT: 161.1 LBS | HEIGHT: 64 IN | DIASTOLIC BLOOD PRESSURE: 84 MMHG | TEMPERATURE: 96.8 F | OXYGEN SATURATION: 97 % | RESPIRATION RATE: 18 BRPM | HEART RATE: 87 BPM | SYSTOLIC BLOOD PRESSURE: 136 MMHG

## 2022-05-26 DIAGNOSIS — D64.9 ANEMIA, UNSPECIFIED TYPE: Primary | ICD-10-CM

## 2022-05-26 PROCEDURE — 99214 OFFICE O/P EST MOD 30 MIN: CPT | Performed by: INTERNAL MEDICINE

## 2022-05-26 NOTE — PROGRESS NOTES
.     REASON FOR FOLLOWUP :   Leukocytosis and anemia and thrombocytosis    HISTORY OF PRESENT ILLNESS:  The patient is a 62 y.o. year old female  who is here for follow-up with the above-mentioned history.    No new problems.  No bleeding.  No chest pain or SOA.    Past Medical History:   Diagnosis Date   • Anemia    • Anxiety    • Benign essential hypertension 02/01/2015   • Cancer (HCC)    • Cardiomyopathy (HCC)    • Colitis 06/12/2009   • Depression    • Fibromyalgia    • History of gestational diabetes    • Hyperlipidemia 04/13/2011 2/1/2015   • Hypothyroidism 02/01/2015   • Insomnia 02/03/2015   • Irritable bowel syndrome 11/30/2009   • Panic attacks    • Rectocele    • Skin cancer of arm    • Vaginal prolapse    • Vitamin D deficiency 07/19/2012     Past Surgical History:   Procedure Laterality Date   • ANTERIOR AND POSTERIOR VAGINAL REPAIR N/A 4/6/2021    Procedure: POSTERIOR VAGINAL REPAIR, CYSTOSCOPY;  Surgeon: Cora Gustafson MD;  Location: [facility];  Service: Gynecology;  Laterality: N/A;   • BLADDER REPAIR  09/2015    X2   • COLONOSCOPY  2010    wnl   • COLONOSCOPY N/A 9/9/2016    Procedure: COLONOSCOPY into cecum and terminal ileum with biopsies;  Surgeon: Orlin Mann MD;  Location: [facility] ENDOSCOPY;  Service:    • COLONOSCOPY N/A 9/6/2019    Procedure: COLONOSCOPY TO CECUM AND INTO TERMINAL ILEUM;  Surgeon: Orlin Mann MD;  Location: [facility] ENDOSCOPY;  Service: Gastroenterology   • CYSTOSCOPY W/ URETERAL STENT PLACEMENT     • ENDOSCOPY N/A 9/6/2019    Procedure: ESOPHAGOGASTRODUODENOSCOPY WITH COLD BIOPSIES AND 48F HERNÁNDEZ DILATION;  Surgeon: Orlin Mann MD;  Location: [facility] ENDOSCOPY;  Service: Gastroenterology   • HYSTERECTOMY  12/2013   • MOUTH SURGERY     • SACROCOLPOPEXY N/A 4/6/2021    Procedure: LARPAROSCOPY COLPOPEXY, BILATERAL SALPINGECTOMY;  Surgeon: Cora Gustafson MD;  Location: [facility] OR;  Service: Gynecology;  Laterality: N/A;   • SKIN CANCER EXCISION          MEDICATIONS    Current Outpatient Medications:   •  ALPRAZolam (Xanax) 0.5 MG tablet, Take 1 tablet by mouth Daily As Needed for Anxiety. D/c previous order, Disp: 30 tablet, Rfl: 2  •  ARIPiprazole (Abilify) 2 MG tablet, Take 1 tablet by mouth Daily., Disp: 30 tablet, Rfl: 5  •  DULoxetine (CYMBALTA) 60 MG capsule, Take 1 capsule by mouth Daily., Disp: 30 capsule, Rfl: 5  •  estradiol (VAGIFEM) 10 MCG tablet vaginal tablet, Insert 1 tablet into the vagina 2 (Two) Times a Week., Disp: , Rfl:   •  HYDROcodone-acetaminophen (Norco)  MG per tablet, Take 1 tablet by mouth Every 4 (Four) Hours As Needed for Moderate Pain  or Severe Pain ., Disp: 20 tablet, Rfl: 0  •  levothyroxine (SYNTHROID, LEVOTHROID) 75 MCG tablet, Take 1 tablet by mouth Daily., Disp: 30 tablet, Rfl: 5  •  lisinopril (PRINIVIL,ZESTRIL) 10 MG tablet, Take 1 tablet by mouth Daily., Disp: 30 tablet, Rfl: 5  •  Meth-Hyo-M Bl-Na Phos-Ph Sal (Uro-MP) 118 MG capsule, , Disp: , Rfl:   •  methylcellulose, Laxative, (Citrucel) 500 MG tablet tablet, Take 2 tablets by mouth Every 8 (Eight) Hours. Hold for loose stools, Disp: 120 tablet, Rfl: 3  •  naproxen (NAPROSYN) 500 MG tablet, Take 1 tablet by mouth Daily., Disp: 30 tablet, Rfl: 5  •  oxyCODONE-acetaminophen (PERCOCET) 5-325 MG per tablet, , Disp: , Rfl:   •  rosuvastatin (Crestor) 5 MG tablet, Take 1 tablet by mouth Daily., Disp: 30 tablet, Rfl: 5  •  uribel (URO-MP) 118 MG capsule capsule, Take 1 capsule by mouth., Disp: , Rfl:   •  valACYclovir (VALTREX) 500 MG tablet, Take 500 mg by mouth As Needed., Disp: , Rfl:   •  vitamin C (ASCORBIC ACID) 250 MG tablet, Take 500 mg by mouth Every Night., Disp: , Rfl:   •  vitamin D (ERGOCALCIFEROL) 1.25 MG (57590 UT) capsule capsule, Take 1 capsule by mouth 1 (One) Time Per Week., Disp: 5 capsule, Rfl: 5    ALLERGIES:   No Known Allergies    SOCIAL HISTORY:       Social History     Socioeconomic History   • Marital status:      Spouse name: Rubne  "  Tobacco Use   • Smoking status: Never Smoker   • Smokeless tobacco: Never Used   Vaping Use   • Vaping Use: Never used   Substance and Sexual Activity   • Alcohol use: Yes     Comment: less than once per  month   • Drug use: No   • Sexual activity: Defer         FAMILY HISTORY:  Family History   Problem Relation Age of Onset   • Depression Mother    • Heart disease Mother    • Stroke Mother    • Heart attack Mother    • Heart failure Mother    • Hyperlipidemia Mother    • Hypertension Mother    • Arthritis Father    • Heart disease Father    • Skin cancer Father    • Heart attack Father    • Heart failure Father    • Heart disease Maternal Grandmother    • Stroke Maternal Grandfather    • Stroke Paternal Grandfather    • Hypertension Sister    • Hypothyroidism Sister    • Hypertension Brother    • Hypothyroidism Daughter    • Colon cancer Neg Hx    • Colon polyps Neg Hx    • Liver disease Neg Hx    • Rectal cancer Neg Hx    • Stomach cancer Neg Hx    • Liver cancer Neg Hx    • Malig Hyperthermia Neg Hx        REVIEW OF SYSTEMS:  Review of Systems   Constitutional: Negative for activity change.   HENT: Negative for nosebleeds and trouble swallowing.    Respiratory: Negative for shortness of breath and wheezing.    Cardiovascular: Negative for chest pain and palpitations.   Gastrointestinal: Negative for constipation, diarrhea and nausea.   Genitourinary: Negative for dysuria and hematuria.   Musculoskeletal: Negative for arthralgias and myalgias.   Skin: Negative for rash and wound.   Neurological: Negative for seizures and syncope.   Hematological: Negative for adenopathy. Does not bruise/bleed easily.   Psychiatric/Behavioral: Negative for confusion.              Vitals:    05/26/22 0852   BP: 136/84   Pulse: 87   Resp: 18   Temp: 96.8 °F (36 °C)   TempSrc: Temporal   SpO2: 97%   Weight: 73.1 kg (161 lb 1.6 oz)   Height: 162.6 cm (64.02\")   PainSc: 0-No pain     Current Status 5/26/2022   ECOG score 0      " PHYSICAL EXAM:          CONSTITUTIONAL:  Vital signs reviewed.  No distress, looks comfortable.  EYES:  Conjunctiva and lids unremarkable.  PERRLA  EARS,NOSE,MOUTH,THROAT:  Ears and nose appear unremarkable.  Lips, teeth, gums appear unremarkable.  RESPIRATORY:  Normal respiratory effort.  Lungs clear to auscultation bilaterally.  CARDIOVASCULAR:  Normal S1, S2.  No murmurs rubs or gallops.  No significant lower extremity edema.  GASTROINTESTINAL: Abdomen appears unremarkable.  Nontender.  No hepatomegaly.  No splenomegaly.  LYMPHATIC:  No cervical, supraclavicular, axillary lymphadenopathy.  SKIN:  Warm.  No rashes.  PSYCHIATRIC:  Normal judgment and insight.  Normal mood and affect.        RECENT LABS:        WBC   Date Value Ref Range Status   05/18/2022 7.95 3.40 - 10.80 10*3/mm3 Final   11/05/2021 6.26 3.40 - 10.80 10*3/mm3 Final   05/25/2021 5.42 3.40 - 10.80 10*3/mm3 Final   05/25/2021 5.63 3.40 - 10.80 10*3/mm3 Final   04/07/2021 12.93 (H) 3.40 - 10.80 10*3/mm3 Final   03/31/2021 5.44 3.40 - 10.80 10*3/mm3 Final   02/22/2021 9.80 3.40 - 10.80 10*3/mm3 Final   01/14/2021 11.62 (H) 3.40 - 10.80 10*3/mm3 Final   12/09/2020 13.23 (H) 3.40 - 10.80 10*3/mm3 Final   12/02/2020 16.8 (H) 3.4 - 10.8 x10E3/uL Final   02/22/2020 6.2 3.4 - 10.8 x10E3/uL Final   03/21/2019 6.56 3.40 - 10.80 10*3/mm3 Final   10/21/2017 8.3 3.4 - 10.8 x10E3/uL Final   06/28/2016 7.94 4.50 - 10.70 10*3/mm3 Final   09/03/2015 7.56 4.50 - 10.70 K/Cumm Final   04/07/2014 6.03 4.50 - 10.70 K/Cumm Final     Hemoglobin   Date Value Ref Range Status   05/18/2022 13.7 12.0 - 15.9 g/dL Final   11/05/2021 13.3 12.0 - 15.9 g/dL Final   05/25/2021 13.2 12.0 - 15.9 g/dL Final   05/25/2021 13.0 12.0 - 15.9 g/dL Final   04/07/2021 11.4 (L) 12.0 - 15.9 g/dL Final   03/31/2021 12.5 12.0 - 15.9 g/dL Final   02/22/2021 11.6 (L) 12.0 - 15.9 g/dL Final   01/14/2021 12.4 12.0 - 15.9 g/dL Final   12/09/2020 10.5 (L) 12.0 - 15.9 g/dL Final   12/02/2020 10.0 (L)  11.1 - 15.9 g/dL Final   02/22/2020 12.9 11.1 - 15.9 g/dL Final   03/21/2019 13.6 12.0 - 15.9 g/dL Final   10/21/2017 13.0 11.1 - 15.9 g/dL Final   06/28/2016 13.9 11.9 - 15.5 g/dL Final   09/03/2015 12.4 11.9 - 15.5 g/dL Final   04/07/2014 12.5 11.9 - 15.5 g/dL Final     Platelets   Date Value Ref Range Status   05/18/2022 277 140 - 450 10*3/mm3 Final   11/05/2021 303 140 - 450 10*3/mm3 Final   05/25/2021 321 140 - 450 10*3/mm3 Final   05/25/2021 320 140 - 450 10*3/mm3 Final   04/07/2021 344 140 - 450 10*3/mm3 Final   03/31/2021 284 140 - 450 10*3/mm3 Final   02/22/2021 445 140 - 450 10*3/mm3 Final   01/14/2021 404 140 - 450 10*3/mm3 Final   12/09/2020 603 (H) 140 - 450 10*3/mm3 Final   12/02/2020 591 (H) 150 - 450 x10E3/uL Final   02/22/2020 354 150 - 450 x10E3/uL Final   03/21/2019 336 140 - 450 10*3/mm3 Final   10/21/2017 293 150 - 379 x10E3/uL Final   06/28/2016 289 140 - 500 10*3/mm3 Final   09/03/2015 300 140 - 500 K/Cumm Final   04/07/2014 277 140 - 500 K/Cumm Final       Assessment & Plan   Anemia, unspecified type  - Ferritin  - Iron Profile  - Retic With IRF & RET-He  - CBC & Differential        Wilma Longo   *Leukocytosis  · WBC normal through February 2020  · WBC 16.8 on 12/2/2020.  WBC 13.2 on 12/9/2020.  Differentials unremarkable.  · On 1/14/2021, WBC 11.6.  Differential unremarkable.  · WBC 8    *Iron deficiency anemia  · Hb normal through February 2020.  MCV around 90.  · Hb 10 on 12/2/2020, 10.5 on 12/9/2020.  MCV 83.8.  · On 1/14/2021, ferritin 112, 3% saturation, reticulated hemoglobin low end of normal at 30.4.  Hb up to normal, 12.4 (without iron replacement).  · Hb normalized without iron replacement.  Perhaps this is because rectal bleeding which started early December 2020, decreased, although persists.  · 2/22/2021, ferritin 132, 8% saturation, Hb 11.6.    · Difficulty tolerating oral iron due to colitis/diarrhea.  No significant improvement from oral iron, thought to be due to poor  absorption.  · Received 600 mg Venofer  · Her insurance will not cover Injectafer.  It does cover Venofer.  · 5/25/2021: Ferritin 141, 25% saturation, Hb 13.  No need for IV iron. .  · 11/5/2021: Ferritin 94, 18% saturation, Hb 13.3.  No need for IV iron.  · 5/18/2022: Ferritin 74, 14% saturation, Hb 13.7.  No need for IV iron    *Colitis.  · Because of this, patient states that she cannot tolerate oral iron.    *Etiology of iron deficiency  EGD and colonoscopy by Dr. Chadd Mann (who has since retired), on 9/6/2019.  · Follows with Dr. Gibbons.  · Dr. Martin is considering GI evaluation but waiting on pelvic floor dysfunction treatment first.  He reported essentially normal colonoscopy 2019    *Thrombocytosis  · PLT normal through February 2020  ·  on 12/2/2020.   on 12/9/2020.  · On 1/14/2021,   · , from 303    *Patient states she was diagnosed with [facility]  · Early April 2021 rectal/bladder/vaginal prolapse repair planned    *Moderate to severe right hydronephrosis, incidentally found on liver ultrasound, from 12/19/2020 (U/S was to evaluate high LFTs), ordered by PCP, Dr. Virgen  · Patient states PCP ordered a subsequent CT abdomen.  Patient chose to delay having this done as she thought fixing the bladder prolapse might take care of the unilateral hydronephrosis.  · Dr. Peter Vivar plans to treat the kidney stone.  · Patient states the stone was removed through cystoscopy.    *Small ill-defined sclerotic abnormality L2, indeterminate  ·  incidentally found on CT AP, 2/8/2021 to work-up hydroureteronephrosis which was felt to be due to a 6 mm right proximal ureteric calculus.  · Bone scan 2/22/2021 unremarkable.  Therefore, plan no further work-up or follow-up of this    *Hematochezia  · Specifically, pain with wiping (no blood mixed in stools), since early December 2020.  This has since improved.  · Following with GI, Dr. Martin, on  Monday, 1/18/2021  · Continues to have no hematochezia since prolapse surgery early April 2021    *Overweight.  Being overweight can lead to cytopenias through hepatic steatosis.  Ideally, lose weight.  Body mass index is 27.64 kg/m².  BMI 25 to <30 is overweight  BMI 30 to <35 is class 1 obesity  BMI 35 to <40 is class 2 obesity  BMI 40 or higher is class 3 obesity   Remains overweight.  Ideally, lose weight    Plan  · MD 6 months.  1 week prior: Ferritin, iron panel, CBC, reticulate hemoglobin  · Her insurance does not cover Injectafer, but does cover Venofer

## 2022-05-27 ENCOUNTER — APPOINTMENT (OUTPATIENT)
Dept: OTHER | Facility: HOSPITAL | Age: 62
End: 2022-05-27

## 2022-05-30 NOTE — MR AVS SNAPSHOT
Wilma Longo   1/27/2017 8:00 AM   Office Visit    Provider:  HEATH Sosa   Department:  [facility]   Dept Phone:  [phone]                Your Full Care Plan              Today's Medication Changes          These changes are accurate as of: 1/27/17  8:53 AM.  If you have any questions, ask your nurse or doctor.               New Medication(s)Ordered:     azithromycin 250 MG tablet   Commonly known as:  ZITHROMAX Z-DIMAS   Take 2 tablets the first day, then 1 tablet daily for 4 days.   Started by:  HEATH Sosa       benzonatate 100 MG capsule   Commonly known as:  TESSALON PERLES   Take 1 capsule by mouth 3 (Three) Times a Day As Needed for cough.   Started by:  HEATH Sosa            Where to Get Your Medications      These medications were sent to [address] AT [facility] [phone] [facility] [phone] [address], [place] [address]     Phone:  [phone]     azithromycin 250 MG tablet    benzonatate 100 MG capsule                  Your Updated Medication List          This list is accurate as of: 1/27/17  8:53 AM.  Always use your most recent med list.                ALPRAZolam 0.5 MG tablet   Commonly known as:  XANAX   Take 1 tablet by mouth daily.       azithromycin 250 MG tablet   Commonly known as:  ZITHROMAX Z-DIMAS   Take 2 tablets the first day, then 1 tablet daily for 4 days.       benzonatate 100 MG capsule   Commonly known as:  TESSALON PERLES   Take 1 capsule by mouth 3 (Three) Times a Day As Needed for cough.       levothyroxine 75 MCG tablet   Commonly known as:  SYNTHROID, LEVOTHROID   Take 1 tablet by mouth daily.       lisinopril 10 MG tablet   Commonly known as:  PRINIVIL,ZESTRIL   Take 1 tablet by mouth daily.       mesalamine 1.2 G EC tablet   Commonly known as:  LIALDA   Take 2 tablets by mouth  "daily with breakfast.       pitavastatin calcium 1 MG tablet tablet   Commonly known as:  LIVALO   Take 1 tablet by mouth every night.       vitamin D 48099 UNITS capsule capsule   Commonly known as:  ERGOCALCIFEROL   Take 1 capsule by mouth 1 (one) time per week.               We Performed the Following     XR Chest PA & Lateral (In Office)       You Were Diagnosed With        Codes Comments    Chronic cough    -  Primary ICD-10-CM: R05  ICD-9-CM: 786.2       Instructions     None    Patient Instructions History      [facility] Signup     Our records indicate that you have an active [employer] account.    You can view your After Visit Summary by going to [employer] and logging in with your [employer] username and password.  If you don't have a [employer] username and password but a parent or guardian has access to your record, the parent or guardian should login with their own [employer] username and password and access your record to view the After Visit Summary.    If you have questions, you can email [email or web address] or call [phone] to talk to our [employer] staff.  Remember, [employer] is NOT to be used for urgent needs.  For medical emergencies, dial 911.               Other Info from Your Visit           Allergies     No Known Allergies      Reason for Visit     Cough Dec.     Fatigue cant catch breath at times      Vital Signs     Blood Pressure Pulse Temperature Height Weight Oxygen Saturation    122/84 87 98.7 °F (37.1 °C) 63.75\" (161.9 cm) 161 lb (73 kg) 97%    Body Mass Index Smoking Status                27.85 kg/m2 Never Smoker          Problems and Diagnoses Noted     Chronic cough    -  Primary      " No

## 2022-06-03 ENCOUNTER — OFFICE VISIT (OUTPATIENT)
Dept: FAMILY MEDICINE CLINIC | Facility: CLINIC | Age: 62
End: 2022-06-03

## 2022-06-03 VITALS
BODY MASS INDEX: 27.14 KG/M2 | DIASTOLIC BLOOD PRESSURE: 86 MMHG | HEART RATE: 98 BPM | HEIGHT: 64 IN | SYSTOLIC BLOOD PRESSURE: 138 MMHG | TEMPERATURE: 98.9 F | RESPIRATION RATE: 16 BRPM | OXYGEN SATURATION: 98 % | WEIGHT: 159 LBS

## 2022-06-03 DIAGNOSIS — J40 BRONCHITIS: Primary | ICD-10-CM

## 2022-06-03 PROCEDURE — 99213 OFFICE O/P EST LOW 20 MIN: CPT

## 2022-06-03 RX ORDER — PREDNISONE 20 MG/1
20 TABLET ORAL 2 TIMES DAILY
Qty: 10 TABLET | Refills: 0 | Status: SHIPPED | OUTPATIENT
Start: 2022-06-03 | End: 2022-06-08

## 2022-06-03 RX ORDER — AZITHROMYCIN 250 MG/1
TABLET, FILM COATED ORAL
Qty: 6 TABLET | Refills: 0 | Status: SHIPPED | OUTPATIENT
Start: 2022-06-03 | End: 2022-06-08

## 2022-06-03 RX ORDER — DEXTROMETHORPHAN HYDROBROMIDE AND PROMETHAZINE HYDROCHLORIDE 15; 6.25 MG/5ML; MG/5ML
5 SYRUP ORAL 4 TIMES DAILY PRN
Qty: 118 ML | Refills: 0 | Status: SHIPPED | OUTPATIENT
Start: 2022-06-03 | End: 2022-07-11

## 2022-06-03 NOTE — PROGRESS NOTES
"Chief Complaint  Cough    Subjective          Wilma Longo presents to [facility]    History of Present Illness    Wilma Longo 62 y.o. female who presents for evaluation of cough. Symptoms include post nasal drip, slightly productive cough, cough described as productive of white sputum, and nasal congestion.  Onset of symptoms was 1 month ago, but worsened 2 weeks ago, gradually worsening since that time. Patient denies shortness of breath, wheezing, hemoptysis, pleuritic chest pain, fever, dyspnea on exertion, ear drainage, eye discharge, vomiting, vertigo, sneezing, chills, sweats, sinus pain, epistaxis, high fever, and joint pain.   Evaluation to date: none Treatment to date:  OTC antihistamines, OTC cough suppressant and Mucinex.     The patient had a negative in-home Covid test. No known sick contacts. She is a non-smoker.     She  denies medication side effects.    The following data was reviewed by: HEATH Quintana on 06/03/2022:    Objective     Vital Signs:   /86   Pulse 98   Temp 98.9 °F (37.2 °C)   Resp 16   Ht 162.6 cm (64.02\")   Wt 72.1 kg (159 lb)   SpO2 98%   BMI 27.28 kg/m²      Review of Systems   Constitutional: Negative for appetite change, chills, diaphoresis and fever.   HENT: Positive for congestion (nasal) and postnasal drip. Negative for ear pain, hearing loss, mouth sores, nosebleeds, rhinorrhea, sinus pressure, sneezing, sore throat, tinnitus, trouble swallowing and voice change.    Eyes: Negative for blurred vision and visual disturbance.   Respiratory: Positive for cough. Negative for apnea, choking, chest tightness, shortness of breath and wheezing.    Cardiovascular: Negative for chest pain and palpitations.   Gastrointestinal: Negative for abdominal pain, blood in stool, nausea and vomiting.   Genitourinary: Negative.    Musculoskeletal: Negative.    Skin: Negative.    Neurological: Negative for dizziness, syncope, facial " asymmetry, speech difficulty, light-headedness and numbness.   Psychiatric/Behavioral: Negative for dysphoric mood, self-injury and suicidal ideas.         Physical Exam  Vitals and nursing note reviewed.   Constitutional:       General: She is not in acute distress.     Appearance: Normal appearance. She is well-developed. She is not toxic-appearing or diaphoretic.   HENT:      Head: Normocephalic and atraumatic. Hair is normal.      Right Ear: Hearing and external ear normal. No drainage, swelling or tenderness. Tympanic membrane is not retracted.      Left Ear: Hearing and external ear normal. No drainage, swelling or tenderness. Tympanic membrane is not retracted.      Nose: Mucosal edema and congestion present. No rhinorrhea.      Right Sinus: No maxillary sinus tenderness or frontal sinus tenderness.      Left Sinus: No maxillary sinus tenderness or frontal sinus tenderness.      Mouth/Throat:      Mouth: Mucous membranes are moist. No oral lesions.      Pharynx: Uvula midline. Posterior oropharyngeal erythema present. No pharyngeal swelling, oropharyngeal exudate or uvula swelling.      Tonsils: No tonsillar exudate.   Eyes:      General: No scleral icterus.        Right eye: No discharge.         Left eye: No discharge.      Conjunctiva/sclera: Conjunctivae normal.      Pupils: Pupils are equal, round, and reactive to light.   Cardiovascular:      Rate and Rhythm: Normal rate and regular rhythm.      Heart sounds: Normal heart sounds. No murmur heard.    No gallop.   Pulmonary:      Effort: No tachypnea, bradypnea, accessory muscle usage, respiratory distress or retractions.      Breath sounds: Normal breath sounds. No stridor or decreased air movement. No decreased breath sounds, wheezing, rhonchi or rales.      Comments: No respiratory distress.  Pulmonary effort is normal.  Oxygen saturation is 98% on room air.  Chest:      Chest wall: No tenderness.   Abdominal:      Palpations: Abdomen is soft.       Tenderness: There is no abdominal tenderness.   Musculoskeletal:      Cervical back: Normal range of motion and neck supple.   Lymphadenopathy:      Cervical: No cervical adenopathy.   Skin:     General: Skin is warm and dry.      Findings: No rash.   Neurological:      Mental Status: She is alert and oriented to person, place, and time.      Motor: No abnormal muscle tone.   Psychiatric:         Behavior: Behavior normal.         Thought Content: Thought content normal.         Judgment: Judgment normal.                     Assessment and Plan      Diagnoses and all orders for this visit:    1. Bronchitis (Primary)  -     azithromycin (Zithromax Z-Sarkis) 250 MG tablet; Take 2 tablets the first day, then 1 tablet daily for 4 days.  Dispense: 6 tablet; Refill: 0  -     predniSONE (DELTASONE) 20 MG tablet; Take 1 tablet by mouth 2 (Two) Times a Day for 5 days.  Dispense: 10 tablet; Refill: 0  -     promethazine-dextromethorphan (PROMETHAZINE-DM) 6.25-15 MG/5ML syrup; Take 5 mL by mouth 4 (Four) Times a Day As Needed for Cough.  Dispense: 118 mL; Refill: 0            Follow Up     Return if symptoms worsen or fail to improve.    Patient was given instructions and counseling regarding her condition or for health maintenance advice. Please see specific information pulled into the AVS if appropriate.     -Take medications as prescribed.  -Monitor for fever and take Tylenol as needed.  Drink plenty of fluids and get plenty of rest.  -Use cool-mist humidifier as needed.  -Seek immediate medical attention for fever unrelieved by Tylenol, chest pain, shortness of breath, decreased oxygen saturations, sharp back pain, or any other worsening signs or symptoms.  -Return if no improvement.

## 2022-07-08 RX ORDER — ARIPIPRAZOLE 2 MG/1
2 TABLET ORAL DAILY
Qty: 30 TABLET | Refills: 5 | Status: CANCELLED | OUTPATIENT
Start: 2022-07-08

## 2022-07-09 NOTE — PROGRESS NOTES
Subjective   Wilma Longo is a 62 y.o. female.     History of Present Illness     Chief Complaint:   Chief Complaint   Patient presents with   • Hypertension     Med refill / [facility] / no labs     • Hyperlipidemia   • Hypothyroidism   • Anxiety   • Depression   • neuropathy       Wilma Longo 62 y.o. female who presents today for Medical Management of the below listed issues. She  has a problem list of   Patient Active Problem List   Diagnosis   • Colitis   • Depression   • Benign essential hypertension   • Hypothyroidism   • Impaired fasting glucose   • Insomnia   • Irritable bowel syndrome   • Hyperlipidemia   • Mood disorder (HCC)   • Vitamin D deficiency   • Fibromyalgia   • Dysphagia   • Adhesive capsulitis of left shoulder   • Anemia   • Iron deficiency anemia   • Malabsorption of iron   • Prolapse of female pelvic organs   • Pelvic prolapse   • S/P laparoscopy   .  Since the last visit, She has overall felt well, although has had some recurrence of prior vertigo, mainly to the right and laying on that ear, for about a month. No change in hearing or tinnitus.  she has been compliant with   Current Outpatient Medications:   •  ALPRAZolam (Xanax) 0.5 MG tablet, Take 1 tablet by mouth Daily As Needed for Anxiety. D/c previous order, Disp: 30 tablet, Rfl: 2  •  DULoxetine (CYMBALTA) 60 MG capsule, Take 1 capsule by mouth Daily., Disp: 30 capsule, Rfl: 5  •  estradiol (VAGIFEM) 10 MCG tablet vaginal tablet, Insert 1 tablet into the vagina 2 (Two) Times a Week., Disp: , Rfl:   •  HYDROcodone-acetaminophen (Norco)  MG per tablet, Take 1 tablet by mouth Every 4 (Four) Hours As Needed for Moderate Pain  or Severe Pain ., Disp: 20 tablet, Rfl: 0  •  levothyroxine (SYNTHROID, LEVOTHROID) 75 MCG tablet, Take 1 tablet by mouth Daily., Disp: 30 tablet, Rfl: 5  •  lisinopril (PRINIVIL,ZESTRIL) 10 MG tablet, Take 1 tablet by mouth Daily., Disp: 30 tablet, Rfl: 5  •  meclizine (ANTIVERT) 25 MG tablet, Take 1  "tablet by mouth Every Night., Disp: 30 tablet, Rfl: 1  •  Meth-Hyo-M Bl-Na Phos-Ph Sal (Uro-MP) 118 MG capsule, , Disp: , Rfl:   •  methylcellulose, Laxative, (Citrucel) 500 MG tablet tablet, Take 2 tablets by mouth Every 8 (Eight) Hours. Hold for loose stools, Disp: 120 tablet, Rfl: 3  •  naproxen (NAPROSYN) 500 MG tablet, Take 1 tablet by mouth Daily., Disp: 30 tablet, Rfl: 5  •  oxyCODONE-acetaminophen (PERCOCET) 5-325 MG per tablet, , Disp: , Rfl:   •  rosuvastatin (Crestor) 5 MG tablet, Take 1 tablet by mouth Daily., Disp: 30 tablet, Rfl: 5  •  uribel (URO-MP) 118 MG capsule capsule, Take 1 capsule by mouth., Disp: , Rfl:   •  valACYclovir (VALTREX) 500 MG tablet, Take 500 mg by mouth As Needed., Disp: , Rfl:   •  vitamin C (ASCORBIC ACID) 250 MG tablet, Take 500 mg by mouth Every Night., Disp: , Rfl:   •  vitamin D (ERGOCALCIFEROL) 1.25 MG (25539 UT) capsule capsule, Take 1 capsule by mouth 1 (One) Time Per Week., Disp: 5 capsule, Rfl: 5.  She denies medication side effects.    All of the other chronic condition(s) listed above are stable w/o issues.    /83   Pulse 78   Temp 97.9 °F (36.6 °C) (Oral)   Resp 16   Ht 162.6 cm (64.02\")   Wt 73.5 kg (162 lb)   LMP  (LMP Unknown)   BMI 27.79 kg/m²     Results for orders placed or performed in visit on 05/18/22   Ferritin    Specimen: Blood   Result Value Ref Range    Ferritin 74.20 13.00 - 150.00 ng/mL   Retic With IRF & RET-He    Specimen: Blood   Result Value Ref Range    Immature Reticulocyte Fraction 12.3 3.0 - 15.8 %    Reticulocyte % 1.29 0.70 - 1.90 %    Reticulocyte Absolute 0.0633 0.0200 - 0.1300 10*6/mm3    Reticulocyte Hgb 29.4 (L) 29.8 - 36.1 pg   Iron Profile    Specimen: Blood   Result Value Ref Range    Iron 47 37 - 145 mcg/dL    Iron Saturation 14 (L) 20 - 50 %    Transferrin 228 200 - 360 mg/dL    TIBC 340 298 - 536 mcg/dL   CBC Auto Differential    Specimen: Blood   Result Value Ref Range    WBC 7.95 3.40 - 10.80 10*3/mm3    RBC 4.91 " 3.77 - 5.28 10*6/mm3    Hemoglobin 13.7 12.0 - 15.9 g/dL    Hematocrit 43.9 34.0 - 46.6 %    MCV 89.4 79.0 - 97.0 fL    MCH 27.9 26.6 - 33.0 pg    MCHC 31.2 (L) 31.5 - 35.7 g/dL    RDW 13.0 12.3 - 15.4 %    RDW-SD 42.7 37.0 - 54.0 fl    MPV 9.9 6.0 - 12.0 fL    Platelets 277 140 - 450 10*3/mm3    Neutrophil % 52.8 42.7 - 76.0 %    Lymphocyte % 34.2 19.6 - 45.3 %    Monocyte % 10.8 5.0 - 12.0 %    Eosinophil % 1.3 0.3 - 6.2 %    Basophil % 0.6 0.0 - 1.5 %    Immature Grans % 0.3 0.0 - 0.5 %    Neutrophils, Absolute 4.20 1.70 - 7.00 10*3/mm3    Lymphocytes, Absolute 2.72 0.70 - 3.10 10*3/mm3    Monocytes, Absolute 0.86 0.10 - 0.90 10*3/mm3    Eosinophils, Absolute 0.10 0.00 - 0.40 10*3/mm3    Basophils, Absolute 0.05 0.00 - 0.20 10*3/mm3    Immature Grans, Absolute 0.02 0.00 - 0.05 10*3/mm3    nRBC 0.0 0.0 - 0.2 /100 WBC             The following portions of the patient's history were reviewed and updated as appropriate: allergies, current medications, past family history, past medical history, past social history, past surgical history, and problem list.    Review of Systems   Constitutional: Negative for activity change, chills and fever.   Respiratory: Negative for cough.    Cardiovascular: Negative for chest pain.   Psychiatric/Behavioral: Negative for dysphoric mood.       Objective   Physical Exam  Constitutional:       General: She is not in acute distress.     Appearance: She is well-developed.   HENT:      Right Ear: Tympanic membrane and ear canal normal.      Left Ear: Tympanic membrane and ear canal normal.   Cardiovascular:      Rate and Rhythm: Normal rate.   Pulmonary:      Effort: Pulmonary effort is normal.      Breath sounds: Normal breath sounds.   Neurological:      Mental Status: She is alert and oriented to person, place, and time.   Psychiatric:         Behavior: Behavior normal.         Thought Content: Thought content normal.             Diagnoses and all orders for this visit:    1. Benign  essential hypertension (Primary)  -     lisinopril (PRINIVIL,ZESTRIL) 10 MG tablet; Take 1 tablet by mouth Daily.  Dispense: 30 tablet; Refill: 5  -     Basic Metabolic Panel    2. Mood disorder (HCC)  Comments:  worse during winter  Orders:  -     ALPRAZolam (Xanax) 0.5 MG tablet; Take 1 tablet by mouth Daily As Needed for Anxiety. D/c previous order  Dispense: 30 tablet; Refill: 2    3. Fibromyalgia  -     DULoxetine (CYMBALTA) 60 MG capsule; Take 1 capsule by mouth Daily.  Dispense: 30 capsule; Refill: 5    4. Acquired hypothyroidism  -     levothyroxine (SYNTHROID, LEVOTHROID) 75 MCG tablet; Take 1 tablet by mouth Daily.  Dispense: 30 tablet; Refill: 5    5. Adhesive capsulitis of left shoulder  -     naproxen (NAPROSYN) 500 MG tablet; Take 1 tablet by mouth Daily.  Dispense: 30 tablet; Refill: 5    6. Mixed hyperlipidemia  -     rosuvastatin (Crestor) 5 MG tablet; Take 1 tablet by mouth Daily.  Dispense: 30 tablet; Refill: 5    7. Vitamin D deficiency  -     vitamin D (ERGOCALCIFEROL) 1.25 MG (67324 UT) capsule capsule; Take 1 capsule by mouth 1 (One) Time Per Week.  Dispense: 5 capsule; Refill: 5    8. Impaired fasting glucose  -     Basic Metabolic Panel  -     Hemoglobin A1c    9. Benign paroxysmal positional vertigo of right ear  -     meclizine (ANTIVERT) 25 MG tablet; Take 1 tablet by mouth Every Night.  Dispense: 30 tablet; Refill: 1    Diet/exercise/weight management to treat the glucose discussed.

## 2022-07-11 ENCOUNTER — OFFICE VISIT (OUTPATIENT)
Dept: FAMILY MEDICINE CLINIC | Facility: CLINIC | Age: 62
End: 2022-07-11

## 2022-07-11 VITALS
TEMPERATURE: 97.9 F | WEIGHT: 162 LBS | RESPIRATION RATE: 16 BRPM | HEART RATE: 78 BPM | SYSTOLIC BLOOD PRESSURE: 137 MMHG | HEIGHT: 64 IN | DIASTOLIC BLOOD PRESSURE: 83 MMHG | BODY MASS INDEX: 27.66 KG/M2

## 2022-07-11 DIAGNOSIS — E55.9 VITAMIN D DEFICIENCY: Chronic | ICD-10-CM

## 2022-07-11 DIAGNOSIS — I10 BENIGN ESSENTIAL HYPERTENSION: Primary | Chronic | ICD-10-CM

## 2022-07-11 DIAGNOSIS — F39 MOOD DISORDER: Chronic | ICD-10-CM

## 2022-07-11 DIAGNOSIS — R73.01 IMPAIRED FASTING GLUCOSE: ICD-10-CM

## 2022-07-11 DIAGNOSIS — E03.9 ACQUIRED HYPOTHYROIDISM: Chronic | ICD-10-CM

## 2022-07-11 DIAGNOSIS — M75.02 ADHESIVE CAPSULITIS OF LEFT SHOULDER: Chronic | ICD-10-CM

## 2022-07-11 DIAGNOSIS — E78.2 MIXED HYPERLIPIDEMIA: Chronic | ICD-10-CM

## 2022-07-11 DIAGNOSIS — H81.11 BENIGN PAROXYSMAL POSITIONAL VERTIGO OF RIGHT EAR: ICD-10-CM

## 2022-07-11 DIAGNOSIS — M79.7 FIBROMYALGIA: Chronic | ICD-10-CM

## 2022-07-11 PROCEDURE — 99214 OFFICE O/P EST MOD 30 MIN: CPT | Performed by: FAMILY MEDICINE

## 2022-07-11 RX ORDER — LISINOPRIL 10 MG/1
10 TABLET ORAL DAILY
Qty: 30 TABLET | Refills: 5 | Status: SHIPPED | OUTPATIENT
Start: 2022-07-11

## 2022-07-11 RX ORDER — ROSUVASTATIN CALCIUM 5 MG/1
5 TABLET, COATED ORAL DAILY
Qty: 30 TABLET | Refills: 5 | Status: SHIPPED | OUTPATIENT
Start: 2022-07-11

## 2022-07-11 RX ORDER — LEVOTHYROXINE SODIUM 0.07 MG/1
75 TABLET ORAL DAILY
Qty: 30 TABLET | Refills: 5 | Status: SHIPPED | OUTPATIENT
Start: 2022-07-11

## 2022-07-11 RX ORDER — NAPROXEN 500 MG/1
500 TABLET ORAL DAILY
Qty: 30 TABLET | Refills: 5 | Status: SHIPPED | OUTPATIENT
Start: 2022-07-11

## 2022-07-11 RX ORDER — DULOXETIN HYDROCHLORIDE 60 MG/1
60 CAPSULE, DELAYED RELEASE ORAL DAILY
Qty: 30 CAPSULE | Refills: 5 | Status: SHIPPED | OUTPATIENT
Start: 2022-07-11

## 2022-07-11 RX ORDER — ERGOCALCIFEROL 1.25 MG/1
50000 CAPSULE ORAL WEEKLY
Qty: 5 CAPSULE | Refills: 5 | Status: SHIPPED | OUTPATIENT
Start: 2022-07-11

## 2022-07-11 RX ORDER — ALPRAZOLAM 0.5 MG/1
0.5 TABLET ORAL DAILY PRN
Qty: 30 TABLET | Refills: 2 | Status: SHIPPED | OUTPATIENT
Start: 2022-07-11

## 2022-07-11 RX ORDER — MECLIZINE HYDROCHLORIDE 25 MG/1
25 TABLET ORAL NIGHTLY
Qty: 30 TABLET | Refills: 1 | Status: SHIPPED | OUTPATIENT
Start: 2022-07-11

## 2022-07-12 LAB
ALBUMIN SERPL-MCNC: 4.2 G/DL (ref 3.8–4.8)
ALBUMIN/GLOB SERPL: 1.6 {RATIO} (ref 1.2–2.2)
ALP SERPL-CCNC: 69 IU/L (ref 44–121)
ALT SERPL-CCNC: 19 IU/L (ref 0–32)
AST SERPL-CCNC: 20 IU/L (ref 0–40)
BILIRUB SERPL-MCNC: 0.4 MG/DL (ref 0–1.2)
BUN SERPL-MCNC: 14 MG/DL (ref 8–27)
BUN/CREAT SERPL: 14 (ref 12–28)
CALCIUM SERPL-MCNC: 9.5 MG/DL (ref 8.7–10.3)
CHLORIDE SERPL-SCNC: 108 MMOL/L (ref 96–106)
CHOLEST SERPL-MCNC: 156 MG/DL (ref 100–199)
CO2 SERPL-SCNC: 26 MMOL/L (ref 20–29)
CREAT SERPL-MCNC: 0.97 MG/DL (ref 0.57–1)
EGFRCR SERPLBLD CKD-EPI 2021: 66 ML/MIN/1.73
GLOBULIN SER CALC-MCNC: 2.6 G/DL (ref 1.5–4.5)
GLUCOSE SERPL-MCNC: 98 MG/DL (ref 65–99)
HDLC SERPL-MCNC: 57 MG/DL
LDLC SERPL CALC-MCNC: 81 MG/DL (ref 0–99)
POTASSIUM SERPL-SCNC: 5.3 MMOL/L (ref 3.5–5.2)
PROT SERPL-MCNC: 6.8 G/DL (ref 6–8.5)
SODIUM SERPL-SCNC: 146 MMOL/L (ref 134–144)
T4 FREE SERPL-MCNC: 1.24 NG/DL (ref 0.82–1.77)
TRIGL SERPL-MCNC: 99 MG/DL (ref 0–149)
TSH SERPL DL<=0.005 MIU/L-ACNC: 3.65 UIU/ML (ref 0.45–4.5)
VLDLC SERPL CALC-MCNC: 18 MG/DL (ref 5–40)

## 2022-07-25 ENCOUNTER — OFFICE VISIT (OUTPATIENT)
Dept: FAMILY MEDICINE CLINIC | Facility: CLINIC | Age: 62
End: 2022-07-25

## 2022-07-25 VITALS
WEIGHT: 160 LBS | DIASTOLIC BLOOD PRESSURE: 77 MMHG | SYSTOLIC BLOOD PRESSURE: 129 MMHG | TEMPERATURE: 98.1 F | HEART RATE: 78 BPM | HEIGHT: 64 IN | BODY MASS INDEX: 27.31 KG/M2 | OXYGEN SATURATION: 100 % | RESPIRATION RATE: 16 BRPM

## 2022-07-25 DIAGNOSIS — M25.532 PAIN IN LEFT WRIST: ICD-10-CM

## 2022-07-25 DIAGNOSIS — M79.642 PAIN IN LEFT HAND: Primary | ICD-10-CM

## 2022-07-25 PROCEDURE — 73110 X-RAY EXAM OF WRIST: CPT

## 2022-07-25 PROCEDURE — 99213 OFFICE O/P EST LOW 20 MIN: CPT

## 2022-07-25 NOTE — PROGRESS NOTES
"Chief Complaint  Pain in left hand and wrist (Pt states that she fell on 7/23/2022 and hurt her hand)    Subjective          History of Present Illness    The patient complains of left hand and wrist pain. The symptoms began on Saturday.  Pain is a result of falling onto concrete. Pain is located wrist and hand region. Discomfort is described as aching. Symptoms are exacerbated by movement.  Evaluation to date: none. Therapy to date includes: ice with some improvement with swelling.     She  denies medication side effects.    Review of Systems   Constitutional: Negative for chills, fatigue and fever.   HENT: Negative for congestion and sinus pressure.    Eyes: Negative for visual disturbance.   Respiratory: Negative for cough and shortness of breath.    Cardiovascular: Negative for chest pain and palpitations.   Gastrointestinal: Negative for abdominal pain, constipation, diarrhea, nausea and vomiting.   Genitourinary: Negative for dysuria, frequency and urgency.   Musculoskeletal: Positive for arthralgias. Negative for back pain, gait problem, joint swelling, myalgias, neck pain and neck stiffness.   Skin: Negative for rash.   Neurological: Positive for weakness (left hand). Negative for dizziness, syncope, light-headedness and numbness.   Psychiatric/Behavioral: Negative for behavioral problems and sleep disturbance.        Objective   Vital Signs:   /77   Pulse 78   Temp 98.1 °F (36.7 °C)   Resp 16   Ht 162.6 cm (64.02\")   Wt 72.6 kg (160 lb)   SpO2 100%   BMI 27.45 kg/m²      BMI is >= 25 and <30. (Overweight) The following options were offered after discussion;: exercise counseling/recommendations and nutrition counseling/recommendations        Physical Exam  Vitals and nursing note reviewed.   Constitutional:       General: She is not in acute distress.     Appearance: Normal appearance. She is well-developed. She is not diaphoretic.   HENT:      Head: Normocephalic and atraumatic.   Eyes:      " General: No scleral icterus.     Conjunctiva/sclera: Conjunctivae normal.      Pupils: Pupils are equal, round, and reactive to light.   Neck:      Thyroid: No thyromegaly.      Trachea: No tracheal deviation.   Cardiovascular:      Rate and Rhythm: Normal rate and regular rhythm.      Pulses: Normal pulses.           Radial pulses are 2+ on the right side and 2+ on the left side.      Heart sounds: Normal heart sounds. No murmur heard.    No gallop.      Comments: Radial pulses are equal and regular.  Pulmonary:      Effort: Pulmonary effort is normal.   Abdominal:      Palpations: Abdomen is soft.   Musculoskeletal:         General: Tenderness present. No deformity.      Left wrist: Swelling, tenderness and snuff box tenderness present. No crepitus. Decreased range of motion. Normal pulse.      Cervical back: Normal range of motion and neck supple.      Comments: Point tenderness with palpation of left wrist.  Decreased range of motion with extension of left wrist.  Positive snuffbox tenderness of left wrist.  Minimal swelling.  No decreased sensation or abnormal pulse.   strength is unequal with weakness of left hand.   Lymphadenopathy:      Cervical: No cervical adenopathy.   Skin:     General: Skin is warm and dry.      Findings: No rash.   Neurological:      Mental Status: She is alert and oriented to person, place, and time.      Cranial Nerves: No cranial nerve deficit.      Sensory: Sensation is intact. No sensory deficit.      Motor: Weakness (left hand and wrist) present. No tremor, atrophy or abnormal muscle tone.      Coordination: Coordination normal.      Deep Tendon Reflexes: Reflexes normal.      Reflex Scores:       Tricep reflexes are 2+ on the right side and 2+ on the left side.       Bicep reflexes are 2+ on the right side and 2+ on the left side.       Brachioradialis reflexes are 2+ on the right side and 2+ on the left side.  Psychiatric:         Behavior: Behavior normal.         Thought  Content: Thought content normal.         Judgment: Judgment normal.                         Assessment and Plan      Diagnoses and all orders for this visit:    1. Pain in left hand (Primary)  Comments:  X-ray today shows overlapping with possible fracture  RICE therapy, ACE wrapped today, buy wrist brace  Referral to hand surgeon  Return if symptoms worsen  Orders:  -     XR Wrist 3+ View Left (In Office)  -     Ambulatory Referral to Hand Surgery    2. Pain in left wrist  Comments:  X-ray today shows overlapping with possible fracture  RICE therapy, ACE wrapped today, buy wrist brace  Referral to hand surgeon  Return if symptoms worsen  Orders:  -     XR Wrist 3+ View Left (In Office)  -     Ambulatory Referral to Hand Surgery            Follow Up     Return if symptoms worsen or fail to improve.    Patient was given instructions and counseling regarding her condition or for health maintenance advice. Please see specific information pulled into the AVS if appropriate.     -Return if symptoms worsen or do not improve.

## 2022-11-03 ENCOUNTER — LAB (OUTPATIENT)
Dept: OTHER | Facility: HOSPITAL | Age: 62
End: 2022-11-03

## 2022-11-03 DIAGNOSIS — D64.9 ANEMIA, UNSPECIFIED TYPE: ICD-10-CM

## 2022-11-03 LAB
BASOPHILS # BLD AUTO: 0.06 10*3/MM3 (ref 0–0.2)
BASOPHILS NFR BLD AUTO: 0.8 % (ref 0–1.5)
DEPRECATED RDW RBC AUTO: 43.6 FL (ref 37–54)
EOSINOPHIL # BLD AUTO: 0.14 10*3/MM3 (ref 0–0.4)
EOSINOPHIL NFR BLD AUTO: 1.8 % (ref 0.3–6.2)
ERYTHROCYTE [DISTWIDTH] IN BLOOD BY AUTOMATED COUNT: 13.2 % (ref 12.3–15.4)
FERRITIN SERPL-MCNC: 55.2 NG/ML (ref 13–150)
HCT VFR BLD AUTO: 44.5 % (ref 34–46.6)
HGB BLD-MCNC: 14 G/DL (ref 12–15.9)
HGB RETIC QN AUTO: 31.6 PG (ref 29.8–36.1)
IMM GRANULOCYTES # BLD AUTO: 0.02 10*3/MM3 (ref 0–0.05)
IMM GRANULOCYTES NFR BLD AUTO: 0.3 % (ref 0–0.5)
IMM RETICS NFR: 13.2 % (ref 3–15.8)
IRON 24H UR-MRATE: 73 MCG/DL (ref 37–145)
IRON SATN MFR SERPL: 19 % (ref 20–50)
LYMPHOCYTES # BLD AUTO: 2.84 10*3/MM3 (ref 0.7–3.1)
LYMPHOCYTES NFR BLD AUTO: 37.2 % (ref 19.6–45.3)
MCH RBC QN AUTO: 28.3 PG (ref 26.6–33)
MCHC RBC AUTO-ENTMCNC: 31.5 G/DL (ref 31.5–35.7)
MCV RBC AUTO: 90.1 FL (ref 79–97)
MONOCYTES # BLD AUTO: 0.65 10*3/MM3 (ref 0.1–0.9)
MONOCYTES NFR BLD AUTO: 8.5 % (ref 5–12)
NEUTROPHILS NFR BLD AUTO: 3.92 10*3/MM3 (ref 1.7–7)
NEUTROPHILS NFR BLD AUTO: 51.4 % (ref 42.7–76)
NRBC BLD AUTO-RTO: 0 /100 WBC (ref 0–0.2)
PLATELET # BLD AUTO: 323 10*3/MM3 (ref 140–450)
PMV BLD AUTO: 9.6 FL (ref 6–12)
RBC # BLD AUTO: 4.94 10*6/MM3 (ref 3.77–5.28)
RETICS # AUTO: 0.07 10*6/MM3 (ref 0.02–0.13)
RETICS/RBC NFR AUTO: 1.36 % (ref 0.7–1.9)
TIBC SERPL-MCNC: 378 MCG/DL (ref 298–536)
TRANSFERRIN SERPL-MCNC: 254 MG/DL (ref 200–360)
WBC NRBC COR # BLD: 7.63 10*3/MM3 (ref 3.4–10.8)

## 2022-11-03 PROCEDURE — 85046 RETICYTE/HGB CONCENTRATE: CPT | Performed by: INTERNAL MEDICINE

## 2022-11-03 PROCEDURE — 83540 ASSAY OF IRON: CPT | Performed by: INTERNAL MEDICINE

## 2022-11-03 PROCEDURE — 85025 COMPLETE CBC W/AUTO DIFF WBC: CPT | Performed by: INTERNAL MEDICINE

## 2022-11-03 PROCEDURE — 84466 ASSAY OF TRANSFERRIN: CPT | Performed by: INTERNAL MEDICINE

## 2022-11-03 PROCEDURE — 82728 ASSAY OF FERRITIN: CPT | Performed by: INTERNAL MEDICINE

## 2022-11-03 PROCEDURE — 36415 COLL VENOUS BLD VENIPUNCTURE: CPT

## 2022-11-10 ENCOUNTER — OFFICE VISIT (OUTPATIENT)
Dept: ONCOLOGY | Facility: CLINIC | Age: 62
End: 2022-11-10

## 2022-11-10 ENCOUNTER — APPOINTMENT (OUTPATIENT)
Dept: OTHER | Facility: HOSPITAL | Age: 62
End: 2022-11-10

## 2022-11-10 VITALS
HEIGHT: 64 IN | OXYGEN SATURATION: 98 % | WEIGHT: 160.4 LBS | TEMPERATURE: 98 F | SYSTOLIC BLOOD PRESSURE: 137 MMHG | HEART RATE: 81 BPM | BODY MASS INDEX: 27.39 KG/M2 | DIASTOLIC BLOOD PRESSURE: 86 MMHG | RESPIRATION RATE: 16 BRPM

## 2022-11-10 DIAGNOSIS — D64.9 ANEMIA, UNSPECIFIED TYPE: Primary | ICD-10-CM

## 2022-11-10 PROCEDURE — 99214 OFFICE O/P EST MOD 30 MIN: CPT | Performed by: INTERNAL MEDICINE

## 2022-11-10 NOTE — PROGRESS NOTES
.     REASON FOR FOLLOWUP :   Leukocytosis and anemia and thrombocytosis    HISTORY OF PRESENT ILLNESS:  The patient is a 62 y.o. year old female  who is here for follow-up with the above-mentioned history.    No new problems.  No chest pain or SOA.  Occasional mild amounts of blood with wiping.  None mixed in stools.  This has been an ongoing issue.  Continues to follow with GI, has an appointment soon.    Past Medical History:   Diagnosis Date   • Anemia    • Anxiety    • Benign essential hypertension 02/01/2015   • Cancer (HCC)    • Cardiomyopathy (HCC)    • Colitis 06/12/2009   • Depression    • Fibromyalgia    • History of gestational diabetes    • Hyperlipidemia 04/13/2011 2/1/2015   • Hypothyroidism 02/01/2015   • Insomnia 02/03/2015   • Irritable bowel syndrome 11/30/2009   • Panic attacks    • Rectocele    • Skin cancer of arm    • Vaginal prolapse    • Vitamin D deficiency 07/19/2012     Past Surgical History:   Procedure Laterality Date   • ANTERIOR AND POSTERIOR VAGINAL REPAIR N/A 4/6/2021    Procedure: POSTERIOR VAGINAL REPAIR, CYSTOSCOPY;  Surgeon: Cora Gustafson MD;  Location: [facility] OR;  Service: Gynecology;  Laterality: N/A;   • BLADDER REPAIR  09/2015    X2   • COLONOSCOPY  2010    wnl   • COLONOSCOPY N/A 9/9/2016    Procedure: COLONOSCOPY into cecum and terminal ileum with biopsies;  Surgeon: Orlin Mann MD;  Location: [facility] ENDOSCOPY;  Service:    • COLONOSCOPY N/A 9/6/2019    Procedure: COLONOSCOPY TO CECUM AND INTO TERMINAL ILEUM;  Surgeon: Orlin Mann MD;  Location: [facility] ENDOSCOPY;  Service: Gastroenterology   • CYSTOSCOPY W/ URETERAL STENT PLACEMENT     • ENDOSCOPY N/A 9/6/2019    Procedure: ESOPHAGOGASTRODUODENOSCOPY WITH COLD BIOPSIES AND 48F HERNÁNDEZ DILATION;  Surgeon: Orlin Mann MD;  Location: [facility] ENDOSCOPY;  Service: Gastroenterology   • HYSTERECTOMY  12/2013   • MOUTH SURGERY     • SACROCOLPOPEXY N/A 4/6/2021    Procedure: LARPAROSCOPY COLPOPEXY,  BILATERAL SALPINGECTOMY;  Surgeon: Cora Gustafson MD;  Location: [facility] MAIN OR;  Service: Gynecology;  Laterality: N/A;   • SKIN CANCER EXCISION         MEDICATIONS    Current Outpatient Medications:   •  ALPRAZolam (Xanax) 0.5 MG tablet, Take 1 tablet by mouth Daily As Needed for Anxiety. D/c previous order, Disp: 30 tablet, Rfl: 2  •  DULoxetine (CYMBALTA) 60 MG capsule, Take 1 capsule by mouth Daily., Disp: 30 capsule, Rfl: 5  •  levothyroxine (SYNTHROID, LEVOTHROID) 75 MCG tablet, Take 1 tablet by mouth Daily., Disp: 30 tablet, Rfl: 5  •  lisinopril (PRINIVIL,ZESTRIL) 10 MG tablet, Take 1 tablet by mouth Daily., Disp: 30 tablet, Rfl: 5  •  meclizine (ANTIVERT) 25 MG tablet, Take 1 tablet by mouth Every Night., Disp: 30 tablet, Rfl: 1  •  naproxen (NAPROSYN) 500 MG tablet, Take 1 tablet by mouth Daily., Disp: 30 tablet, Rfl: 5  •  rosuvastatin (Crestor) 5 MG tablet, Take 1 tablet by mouth Daily., Disp: 30 tablet, Rfl: 5  •  valACYclovir (VALTREX) 500 MG tablet, Take 500 mg by mouth As Needed., Disp: , Rfl:   •  vitamin C (ASCORBIC ACID) 250 MG tablet, Take 500 mg by mouth Every Night., Disp: , Rfl:   •  vitamin D (ERGOCALCIFEROL) 1.25 MG (64214 UT) capsule capsule, Take 1 capsule by mouth 1 (One) Time Per Week., Disp: 5 capsule, Rfl: 5  •  estradiol (VAGIFEM) 10 MCG tablet vaginal tablet, Insert 1 tablet into the vagina 2 (Two) Times a Week., Disp: , Rfl:   •  HYDROcodone-acetaminophen (Norco)  MG per tablet, Take 1 tablet by mouth Every 4 (Four) Hours As Needed for Moderate Pain  or Severe Pain ., Disp: 20 tablet, Rfl: 0  •  Meth-Hyo-M Bl-Na Phos-Ph Sal (Uro-MP) 118 MG capsule, , Disp: , Rfl:   •  methylcellulose, Laxative, (Citrucel) 500 MG tablet tablet, Take 2 tablets by mouth Every 8 (Eight) Hours. Hold for loose stools, Disp: 120 tablet, Rfl: 3  •  oxyCODONE-acetaminophen (PERCOCET) 5-325 MG per tablet, , Disp: , Rfl:   •  uribel (URO-MP) 118 MG capsule capsule, Take 1 capsule by mouth., Disp: ,  Rfl:     ALLERGIES:   No Known Allergies    SOCIAL HISTORY:       Social History     Socioeconomic History   • Marital status:      Spouse name: Ruben   Tobacco Use   • Smoking status: Never   • Smokeless tobacco: Never   Vaping Use   • Vaping Use: Never used   Substance and Sexual Activity   • Alcohol use: Yes     Comment: less than once per  month   • Drug use: No   • Sexual activity: Defer         FAMILY HISTORY:  Family History   Problem Relation Age of Onset   • Depression Mother    • Heart disease Mother    • Stroke Mother    • Heart attack Mother    • Heart failure Mother    • Hyperlipidemia Mother    • Hypertension Mother    • Arthritis Father    • Heart disease Father    • Skin cancer Father    • Heart attack Father    • Heart failure Father    • Heart disease Maternal Grandmother    • Stroke Maternal Grandfather    • Stroke Paternal Grandfather    • Hypertension Sister    • Hypothyroidism Sister    • Hypertension Brother    • Hypothyroidism Daughter    • Colon cancer Neg Hx    • Colon polyps Neg Hx    • Liver disease Neg Hx    • Rectal cancer Neg Hx    • Stomach cancer Neg Hx    • Liver cancer Neg Hx    • Malig Hyperthermia Neg Hx        REVIEW OF SYSTEMS:  Review of Systems   Constitutional: Negative for activity change.   HENT: Negative for nosebleeds and trouble swallowing.    Respiratory: Negative for shortness of breath and wheezing.    Cardiovascular: Negative for chest pain and palpitations.   Gastrointestinal: Negative for constipation, diarrhea and nausea.   Genitourinary: Negative for dysuria and hematuria.   Musculoskeletal: Negative for arthralgias and myalgias.   Skin: Negative for rash and wound.   Neurological: Negative for seizures and syncope.   Hematological: Negative for adenopathy. Does not bruise/bleed easily.   Psychiatric/Behavioral: Negative for confusion.              Vitals:    11/10/22 0929   BP: 137/86   Pulse: 81   Resp: 16   Temp: 98 °F (36.7 °C)   TempSrc: Temporal  "  SpO2: 98%   Weight: 72.8 kg (160 lb 6.4 oz)   Height: 162 cm (63.78\")   PainSc: 0-No pain     Current Status 11/10/2022   ECOG score 0      PHYSICAL EXAM:        CONSTITUTIONAL:  Vital signs reviewed.  No distress, looks comfortable.  EYES:  Conjunctiva and lids unremarkable.  PERRLA  EARS,NOSE,MOUTH,THROAT:  Ears and nose appear unremarkable.  Lips, teeth, gums appear unremarkable.  RESPIRATORY:  Normal respiratory effort.  Lungs clear to auscultation bilaterally.  CARDIOVASCULAR:  Normal S1, S2.  No murmurs rubs or gallops.  No significant lower extremity edema.  GASTROINTESTINAL: Abdomen appears unremarkable.  Nontender.  No hepatomegaly.  No splenomegaly.  LYMPHATIC:  No cervical, supraclavicular, axillary lymphadenopathy.  SKIN:  Warm.  No rashes.  PSYCHIATRIC:  Normal judgment and insight.  Normal mood and affect.         RECENT LABS:        WBC   Date Value Ref Range Status   11/03/2022 7.63 3.40 - 10.80 10*3/mm3 Final   05/18/2022 7.95 3.40 - 10.80 10*3/mm3 Final   11/05/2021 6.26 3.40 - 10.80 10*3/mm3 Final   05/25/2021 5.42 3.40 - 10.80 10*3/mm3 Final   05/25/2021 5.63 3.40 - 10.80 10*3/mm3 Final   04/07/2021 12.93 (H) 3.40 - 10.80 10*3/mm3 Final   03/31/2021 5.44 3.40 - 10.80 10*3/mm3 Final   02/22/2021 9.80 3.40 - 10.80 10*3/mm3 Final   01/14/2021 11.62 (H) 3.40 - 10.80 10*3/mm3 Final   12/09/2020 13.23 (H) 3.40 - 10.80 10*3/mm3 Final   12/02/2020 16.8 (H) 3.4 - 10.8 x10E3/uL Final   02/22/2020 6.2 3.4 - 10.8 x10E3/uL Final   03/21/2019 6.56 3.40 - 10.80 10*3/mm3 Final   10/21/2017 8.3 3.4 - 10.8 x10E3/uL Final   06/28/2016 7.94 4.50 - 10.70 10*3/mm3 Final   09/03/2015 7.56 4.50 - 10.70 K/Cumm Final   04/07/2014 6.03 4.50 - 10.70 K/Cumm Final     Hemoglobin   Date Value Ref Range Status   11/03/2022 14.0 12.0 - 15.9 g/dL Final   05/18/2022 13.7 12.0 - 15.9 g/dL Final   11/05/2021 13.3 12.0 - 15.9 g/dL Final   05/25/2021 13.2 12.0 - 15.9 g/dL Final   05/25/2021 13.0 12.0 - 15.9 g/dL Final   04/07/2021 " 11.4 (L) 12.0 - 15.9 g/dL Final   03/31/2021 12.5 12.0 - 15.9 g/dL Final   02/22/2021 11.6 (L) 12.0 - 15.9 g/dL Final   01/14/2021 12.4 12.0 - 15.9 g/dL Final   12/09/2020 10.5 (L) 12.0 - 15.9 g/dL Final   12/02/2020 10.0 (L) 11.1 - 15.9 g/dL Final   02/22/2020 12.9 11.1 - 15.9 g/dL Final   03/21/2019 13.6 12.0 - 15.9 g/dL Final   10/21/2017 13.0 11.1 - 15.9 g/dL Final   06/28/2016 13.9 11.9 - 15.5 g/dL Final   09/03/2015 12.4 11.9 - 15.5 g/dL Final   04/07/2014 12.5 11.9 - 15.5 g/dL Final     Platelets   Date Value Ref Range Status   11/03/2022 323 140 - 450 10*3/mm3 Final   05/18/2022 277 140 - 450 10*3/mm3 Final   11/05/2021 303 140 - 450 10*3/mm3 Final   05/25/2021 321 140 - 450 10*3/mm3 Final   05/25/2021 320 140 - 450 10*3/mm3 Final   04/07/2021 344 140 - 450 10*3/mm3 Final   03/31/2021 284 140 - 450 10*3/mm3 Final   02/22/2021 445 140 - 450 10*3/mm3 Final   01/14/2021 404 140 - 450 10*3/mm3 Final   12/09/2020 603 (H) 140 - 450 10*3/mm3 Final   12/02/2020 591 (H) 150 - 450 x10E3/uL Final   02/22/2020 354 150 - 450 x10E3/uL Final   03/21/2019 336 140 - 450 10*3/mm3 Final   10/21/2017 293 150 - 379 x10E3/uL Final   06/28/2016 289 140 - 500 10*3/mm3 Final   09/03/2015 300 140 - 500 K/Cumm Final   04/07/2014 277 140 - 500 K/Cumm Final       Assessment & Plan   Anemia, unspecified type  - Ferritin  - Iron Profile  - Retic With IRF & RET-He  - CBC & Differential        Wilma Longo   *Leukocytosis  · WBC normal through February 2020  · WBC 16.8 on 12/2/2020.  WBC 13.2 on 12/9/2020.  Differentials unremarkable.  · On 1/14/2021, WBC 11.6.  Differential unremarkable.  · WBC 7.6, from 8    *Iron deficiency anemia  · Hb normal through February 2020.  MCV around 90.  · Hb 10 on 12/2/2020, 10.5 on 12/9/2020.  MCV 83.8.  · On 1/14/2021, ferritin 112, 3% saturation, reticulated hemoglobin low end of normal at 30.4.  Hb up to normal, 12.4 (without iron replacement).  · Hb normalized without iron replacement.  Perhaps this  is because rectal bleeding which started early December 2020, decreased, although persists.  · 2/22/2021, ferritin 132, 8% saturation, Hb 11.6.    · Difficulty tolerating oral iron due to colitis/diarrhea.  No significant improvement from oral iron, thought to be due to poor absorption.  · Received 600 mg Venofer  · Her insurance will not cover Injectafer.  It does cover Venofer.  · 5/25/2021: Ferritin 141, 25% saturation, Hb 13.  No need for IV iron. .  · 11/5/2021: Ferritin 94, 18% saturation, Hb 13.3.  No need for IV iron.  · 5/18/2022: Ferritin 74, 14% saturation, Hb 13.7.  No need for IV iron  · 11/3/2022: Ferritin 55, 19% saturation, Hb 14.  No need for IV iron.    *Colitis.  · Because of this, patient states that she cannot tolerate oral iron.    *Etiology of iron deficiency  EGD and colonoscopy by Dr. Chadd Mann (who has since retired), on 9/6/2019.  · Follows with Dr. Gibbons.  · Dr. Martin is considering GI evaluation but waiting on pelvic floor dysfunction treatment first.  He reported essentially normal colonoscopy 2019    *Thrombocytosis  · PLT normal through February 2020  ·  on 12/2/2020.   on 12/9/2020.  · On 1/14/2021,   · , from 277, from 303    *Patient states she was diagnosed with bladder [facility]  · Early April 2021 rectal/bladder/vaginal prolapse repair planned    *Moderate to severe right hydronephrosis, incidentally found on liver ultrasound, from 12/19/2020 (U/S was to evaluate high LFTs), ordered by PCP, Dr. Virgen  · Patient states PCP ordered a subsequent CT abdomen.  Patient chose to delay having this done as she thought fixing the bladder prolapse might take care of the unilateral hydronephrosis.  · Dr. Peter Vivar plans to treat the kidney stone.  · Patient states the stone was removed through cystoscopy.    *Small ill-defined sclerotic abnormality L2, indeterminate  ·  incidentally found on CT AP, 2/8/2021 to  work-up hydroureteronephrosis which was felt to be due to a 6 mm right proximal ureteric calculus.  · Bone scan 2/22/2021 unremarkable.  Therefore, plan no further work-up or follow-up of this    *Hematochezia  · Specifically, pain with wiping (no blood mixed in stools), since early December 2020.  This has since improved.  · Following with GI, Dr. Martin, on Monday, 1/18/2021  · Continues to have no hematochezia since prolapse surgery early April 2021  · 11/10/2022: Still having mild amounts of blood with wiping.  None mixed in stool.  Continues to follow with GI, appointment soon    *Overweight.  Being overweight can lead to cytopenias through hepatic steatosis.    Body mass index is 27.72 kg/m².  BMI 25 to <30 is overweight  BMI 30 to <35 is class 1 obesity  BMI 35 to <40 is class 2 obesity  BMI 40 or higher is class 3 obesity   Continues to be overweight.  Ideally, lose weight    Plan  · MD 6 months.  1 week prior: Ferritin, iron panel, CBC, reticulate hemoglobin  · Her insurance does not cover Injectafer, but does cover Venofer  · No need for Venofer now

## 2022-11-18 ENCOUNTER — APPOINTMENT (OUTPATIENT)
Dept: WOMENS IMAGING | Facility: HOSPITAL | Age: 62
End: 2022-11-18

## 2022-11-18 PROCEDURE — 77063 BREAST TOMOSYNTHESIS BI: CPT | Performed by: RADIOLOGY

## 2022-11-18 PROCEDURE — 77067 SCR MAMMO BI INCL CAD: CPT | Performed by: RADIOLOGY

## 2022-11-30 ENCOUNTER — OFFICE VISIT (OUTPATIENT)
Dept: GASTROENTEROLOGY | Facility: CLINIC | Age: 62
End: 2022-11-30

## 2022-11-30 ENCOUNTER — TELEPHONE (OUTPATIENT)
Dept: GASTROENTEROLOGY | Facility: CLINIC | Age: 62
End: 2022-11-30

## 2022-11-30 VITALS
OXYGEN SATURATION: 96 % | BODY MASS INDEX: 27.9 KG/M2 | TEMPERATURE: 94.1 F | DIASTOLIC BLOOD PRESSURE: 100 MMHG | HEART RATE: 92 BPM | SYSTOLIC BLOOD PRESSURE: 152 MMHG | WEIGHT: 163.4 LBS | HEIGHT: 64 IN

## 2022-11-30 DIAGNOSIS — K22.70 BARRETT'S ESOPHAGUS WITHOUT DYSPLASIA: Primary | ICD-10-CM

## 2022-11-30 DIAGNOSIS — K52.9 COLITIS: ICD-10-CM

## 2022-11-30 PROCEDURE — 99214 OFFICE O/P EST MOD 30 MIN: CPT | Performed by: INTERNAL MEDICINE

## 2022-11-30 RX ORDER — SODIUM CHLORIDE, SODIUM LACTATE, POTASSIUM CHLORIDE, CALCIUM CHLORIDE 600; 310; 30; 20 MG/100ML; MG/100ML; MG/100ML; MG/100ML
30 INJECTION, SOLUTION INTRAVENOUS CONTINUOUS
Status: CANCELLED | OUTPATIENT
Start: 2023-02-20

## 2022-11-30 NOTE — PROGRESS NOTES
Chief Complaint   Patient presents with   • Ulcerative Colitis     Subjective     HPI  Wilma Longo is a 62 y.o. female who presents today for office follow up.  She has a questional history of colitis previously followed by Dr. Mann.  Last colonoscopy in 2019 was essentially unremarkable.  She continues to have intermittent rectal bleeding predominantly scant blood noted with wiping.  She is now status post pelvic floor reconstruction for bladder prolapse.  She still has some intermittent fecal incontinence.  She also has a history of a short segment of Osman's esophagus on EGD from 2019.  She has no active GERD symptoms or dysphagia. Not current on PPI.      Objective   Vitals:    11/30/22 0854   BP: 152/100   Pulse: 92   Temp: 94.1 °F (34.5 °C)   SpO2: 96%       Physical Exam  Vitals reviewed.   Constitutional:       Appearance: She is well-developed.   HENT:      Head: Normocephalic and atraumatic.   Neurological:      Mental Status: She is alert and oriented to person, place, and time.   Psychiatric:         Behavior: Behavior normal.         Thought Content: Thought content normal.         Judgment: Judgment normal.                Assessment & Plan   Assessment:     1. Osman's esophagus without dysplasia    2. Colitis    3.      Fecal incontinence    Plan:   We will schedule colonoscopy to follow-up on her questionable history of colitis as well as her intermittent rectal bleeding.  Recommend initiation of Citrucel daily for fecal incontinence  We will schedule EGD at time of colonoscopy to follow-up on her short segment Osman's esophagus if histology confirms Osman's she will need to start PPI therapy even in the absence of any reflux related symptoms.          Basilio Martin M.D.  [facility]  [address] [place] [address]  Office: [phone]

## 2022-11-30 NOTE — TELEPHONE ENCOUNTER
DEE patient via telephone for. Scheduled 02/20/2023 with arrival time of 0700A. Prep paperwork mailed to verified address on file. Patient advised arrival time may change based on [facility] guidelines. DEE GARCIA

## 2023-02-15 NOTE — PROGRESS NOTES
Subjective   Wilma Longo is a 61 y.o. female.     History of Present Illness     Chief Complaint:   Chief Complaint   Patient presents with   • Hypertension     med refill -meds reviewed with pt today -    • Hyperlipidemia     [facility]    • Hypothyroidism       Wilma Longo 61 y.o. female who presents today for Medical Management of the below listed issues and medication refills.  she has a problem list of   Patient Active Problem List   Diagnosis   • Colitis   • Depression   • Benign essential hypertension   • Hypothyroidism   • Impaired fasting glucose   • Insomnia   • Irritable bowel syndrome   • Hyperlipidemia   • Mood disorder (CMS/HCC)   • Vitamin D deficiency   • Fibromyalgia   • Dysphagia   • Adhesive capsulitis of left shoulder   • Anemia   • Iron deficiency anemia   • Malabsorption of iron   • Prolapse of female pelvic organs   • Pelvic prolapse   • S/P laparoscopy   .  Since the last visit, she has overall felt well on our end, although has been busy seeing specialists for abnormal lab w/u, as well as a recent GYN surgery.  she has been compliant with   Current Outpatient Medications:   •  ALPRAZolam (Xanax) 0.5 MG tablet, Take 1 tablet by mouth Daily As Needed for Anxiety. D/c previous order, Disp: 30 tablet, Rfl: 2  •  DULoxetine (CYMBALTA) 60 MG capsule, Take 1 capsule by mouth Daily., Disp: 30 capsule, Rfl: 5  •  estradiol (VAGIFEM) 10 MCG tablet vaginal tablet, Insert 1 tablet into the vagina 2 (Two) Times a Week., Disp: , Rfl:   •  HYDROcodone-acetaminophen (Norco)  MG per tablet, Take 1 tablet by mouth Every 4 (Four) Hours As Needed for Moderate Pain  or Severe Pain ., Disp: 20 tablet, Rfl: 0  •  levothyroxine (SYNTHROID, LEVOTHROID) 75 MCG tablet, Take 1 tablet by mouth Daily., Disp: 30 tablet, Rfl: 5  •  lisinopril (PRINIVIL,ZESTRIL) 10 MG tablet, Take 1 tablet by mouth Daily., Disp: 30 tablet, Rfl: 5  •  Meth-Hyo-M Bl-Na Phos-Ph Sal (Uro-MP) 118 MG capsule, , Disp: , Rfl:   •   "methylcellulose, Laxative, (Citrucel) 500 MG tablet tablet, Take 2 tablets by mouth Every 8 (Eight) Hours. Hold for loose stools, Disp: 120 tablet, Rfl: 3  •  naproxen (NAPROSYN) 500 MG tablet, Take 1 tablet by mouth Daily., Disp: 30 tablet, Rfl: 5  •  ondansetron (Zofran) 4 MG tablet, Take 1 tablet by mouth Daily As Needed for Nausea or Vomiting., Disp: 12 tablet, Rfl: 1  •  oxyCODONE-acetaminophen (PERCOCET) 5-325 MG per tablet, , Disp: , Rfl:   •  rosuvastatin (Crestor) 5 MG tablet, Take 1 tablet by mouth Daily., Disp: 30 tablet, Rfl: 5  •  uribel (URO-MP) 118 MG capsule capsule, Take 1 capsule by mouth., Disp: , Rfl:   •  valACYclovir (VALTREX) 500 MG tablet, Take 500 mg by mouth As Needed., Disp: , Rfl:   •  vitamin C (ASCORBIC ACID) 250 MG tablet, Take 500 mg by mouth Every Night., Disp: , Rfl:   •  vitamin D (ERGOCALCIFEROL) 1.25 MG (18707 UT) capsule capsule, Take 1 capsule by mouth 1 (One) Time Per Week., Disp: 5 capsule, Rfl: 5.  she denies medication side effects.    All of the other chronic condition(s) listed above are stable w/o issues.    /82   Pulse 88   Temp 96.9 °F (36.1 °C) (Oral)   Resp 16   Ht 162.6 cm (64\")   Wt 73 kg (161 lb)   LMP  (LMP Unknown)   BMI 27.64 kg/m²     Results for orders placed or performed during the hospital encounter of 04/06/21   CBC (No Diff)    Specimen: Blood   Result Value Ref Range    WBC 12.93 (H) 3.40 - 10.80 10*3/mm3    RBC 4.14 3.77 - 5.28 10*6/mm3    Hemoglobin 11.4 (L) 12.0 - 15.9 g/dL    Hematocrit 35.4 34.0 - 46.6 %    MCV 85.5 79.0 - 97.0 fL    MCH 27.5 26.6 - 33.0 pg    MCHC 32.2 31.5 - 35.7 g/dL    RDW 15.9 (H) 12.3 - 15.4 %    RDW-SD 50.1 37.0 - 54.0 fl    MPV 9.8 6.0 - 12.0 fL    Platelets 344 140 - 450 10*3/mm3   Basic Metabolic Panel    Specimen: Blood   Result Value Ref Range    Glucose 117 (H) 65 - 99 mg/dL    BUN 14 8 - 23 mg/dL    Creatinine 0.90 0.57 - 1.00 mg/dL    Sodium 139 136 - 145 mmol/L    Potassium 4.0 3.5 - 5.2 mmol/L    " "Chloride 105 98 - 107 mmol/L    CO2 27.5 22.0 - 29.0 mmol/L    Calcium 8.2 (L) 8.6 - 10.5 mg/dL    eGFR Non African Amer 64 >60 mL/min/1.73    BUN/Creatinine Ratio 15.6 7.0 - 25.0    Anion Gap 6.5 5.0 - 15.0 mmol/L   Tissue Pathology Exam    Specimen: Fallopian Tubes, Bilateral; Tissue   Result Value Ref Range    Case Report       Surgical Pathology Report                         Case: [id number]                                  Authorizing Provider:  Cora Gustafson MD         Collected:           04/06/2021 08:11 AM          Ordering Location:     [facility]  Received:            04/06/2021 12:47 PM                                 MAIN OR                                                                      Pathologist:           Sri Luo MD                                                          Specimen:    Fallopian Tubes, Bilateral, bilateral fallopian tubes                                      Final Diagnosis       1. Fallopian Tubes, Bilateral, Bilateral Salpingectomy:   A. Bilateral fallopian tubes with paratubal cysts, otherwise no significant histopathologic changes.    Jab/kds      Gross Description       1. Received in formalin labeled \"bilateral fallopian tubes\" are two continuous fimbriated fallopian tubes measuring 2.5 x 0.5 cm and 4.0 x 0.4 cm.  Both display smooth tan pink intact serosal surfaces with a marked amount of adherent adipose tissue.  Both also have pinpoint lumens with wall thickness averaging 0.1 cm.  Representative sections are submitted as follows:   1A - longitudinal section of the longer segment to be cut and embedded on end by Histology  1B - fimbria trisected of the longer segment  1C - longitudinal section of the shorter segment to be cut and embedded on end by Histology  1D - shorter segment fimbria bisected    jap/uso/mec/jse              The following portions of the patient's history were reviewed and updated as appropriate: allergies, current " [General Appearance - Alert] : alert [General Appearance - In No Acute Distress] : in no acute distress medications, past family history, past medical history, past social history, past surgical history and problem list.    Review of Systems   Constitutional: Negative for activity change, chills and fever.   Respiratory: Negative for cough.    Cardiovascular: Negative for chest pain.   Psychiatric/Behavioral: Negative for dysphoric mood.       Objective   Physical Exam  Constitutional:       General: She is not in acute distress.     Appearance: She is well-developed.   Cardiovascular:      Rate and Rhythm: Normal rate and regular rhythm.   Pulmonary:      Effort: Pulmonary effort is normal.      Breath sounds: Normal breath sounds.   Neurological:      Mental Status: She is alert and oriented to person, place, and time.   Psychiatric:         Behavior: Behavior normal.         Thought Content: Thought content normal.         Assessment/Plan   Diagnoses and all orders for this visit:    1. Benign essential hypertension (Primary)  -     lisinopril (PRINIVIL,ZESTRIL) 10 MG tablet; Take 1 tablet by mouth Daily.  Dispense: 30 tablet; Refill: 5  -     Comprehensive metabolic panel  -     Lipid panel  -     CBC and Differential    2. Acquired hypothyroidism  -     levothyroxine (SYNTHROID, LEVOTHROID) 75 MCG tablet; Take 1 tablet by mouth Daily.  Dispense: 30 tablet; Refill: 5  -     TSH  -     T4, Free    3. Fibromyalgia  -     DULoxetine (CYMBALTA) 60 MG capsule; Take 1 capsule by mouth Daily.  Dispense: 30 capsule; Refill: 5    4. Adhesive capsulitis of left shoulder  -     naproxen (NAPROSYN) 500 MG tablet; Take 1 tablet by mouth Daily.  Dispense: 30 tablet; Refill: 5    5. Mixed hyperlipidemia  -     rosuvastatin (Crestor) 5 MG tablet; Take 1 tablet by mouth Daily.  Dispense: 30 tablet; Refill: 5    6. Vitamin D deficiency  -     vitamin D (ERGOCALCIFEROL) 1.25 MG (18070 UT) capsule capsule; Take 1 capsule by mouth 1 (One) Time Per Week.  Dispense: 5 capsule; Refill: 5  -     Vitamin D 25 Hydroxy    7. Mood disorder  (CMS/[facility])  -     ALPRAZolam (Xanax) 0.5 MG tablet; Take 1 tablet by mouth Daily As Needed for Anxiety. D/c previous order  Dispense: 30 tablet; Refill: 2                [General Appearance - Well Nourished] : well nourished [General Appearance - Well-Appearing] : healthy appearing [Sclera] : the sclera and conjunctiva were normal [Hearing Threshold Finger Rub Not [place]] : hearing was normal [Neck Appearance] : the appearance of the neck was normal [Respiration, Rhythm And Depth] : normal respiratory rhythm and effort [Exaggerated Use Of Accessory Muscles For Inspiration] : no accessory muscle use [Auscultation Breath Sounds / Voice Sounds] : lungs were clear to auscultation bilaterally [Heart Rate And Rhythm] : heart rate was normal and rhythm regular [Heart Sounds] : normal S1 and S2 [Abdomen Soft] : soft [] : no rash [Sensation] : the sensory exam was normal to light touch and pinprick [Motor Exam] : the motor exam was normal [Oriented To Time, Place, And Person] : oriented to person, place, and time [FreeTextEntry1] : Bilateral lower extremity edema slightly worse on the left.  There is a patch of dry skin in the mid tibia area in the left which is unchanged erythema has improved in the left lower extremity there is no weeping.

## 2023-02-20 ENCOUNTER — ANESTHESIA (OUTPATIENT)
Dept: GASTROENTEROLOGY | Facility: HOSPITAL | Age: 63
End: 2023-02-20
Payer: COMMERCIAL

## 2023-02-20 ENCOUNTER — HOSPITAL ENCOUNTER (OUTPATIENT)
Facility: HOSPITAL | Age: 63
Setting detail: HOSPITAL OUTPATIENT SURGERY
Discharge: HOME | End: 2023-02-20
Attending: INTERNAL MEDICINE | Admitting: INTERNAL MEDICINE
Payer: COMMERCIAL

## 2023-02-20 ENCOUNTER — ANESTHESIA EVENT (OUTPATIENT)
Dept: GASTROENTEROLOGY | Facility: HOSPITAL | Age: 63
End: 2023-02-20
Payer: COMMERCIAL

## 2023-02-20 VITALS
OXYGEN SATURATION: 97 % | SYSTOLIC BLOOD PRESSURE: 150 MMHG | HEART RATE: 80 BPM | DIASTOLIC BLOOD PRESSURE: 93 MMHG | TEMPERATURE: 98.5 F | RESPIRATION RATE: 17 BRPM

## 2023-02-20 DIAGNOSIS — K52.9 COLITIS: ICD-10-CM

## 2023-02-20 DIAGNOSIS — K22.70 BARRETT'S ESOPHAGUS WITHOUT DYSPLASIA: ICD-10-CM

## 2023-02-20 PROCEDURE — 43239 EGD BIOPSY SINGLE/MULTIPLE: CPT | Performed by: INTERNAL MEDICINE

## 2023-02-20 PROCEDURE — 25010000002 ONDANSETRON PER 1 MG: Performed by: NURSE ANESTHETIST, CERTIFIED REGISTERED

## 2023-02-20 PROCEDURE — 25010000002 PROPOFOL 10 MG/ML EMULSION: Performed by: NURSE ANESTHETIST, CERTIFIED REGISTERED

## 2023-02-20 PROCEDURE — 45380 COLONOSCOPY AND BIOPSY: CPT | Performed by: INTERNAL MEDICINE

## 2023-02-20 PROCEDURE — 88305 TISSUE EXAM BY PATHOLOGIST: CPT | Performed by: INTERNAL MEDICINE

## 2023-02-20 RX ORDER — LIDOCAINE HYDROCHLORIDE 20 MG/ML
INJECTION, SOLUTION INFILTRATION; PERINEURAL AS NEEDED
Status: DISCONTINUED | OUTPATIENT
Start: 2023-02-20 | End: 2023-02-20 | Stop reason: SURG

## 2023-02-20 RX ORDER — SODIUM CHLORIDE, SODIUM LACTATE, POTASSIUM CHLORIDE, CALCIUM CHLORIDE 600; 310; 30; 20 MG/100ML; MG/100ML; MG/100ML; MG/100ML
1000 INJECTION, SOLUTION INTRAVENOUS CONTINUOUS
Status: DISCONTINUED | OUTPATIENT
Start: 2023-02-20 | End: 2023-02-20 | Stop reason: HOSPADM

## 2023-02-20 RX ORDER — PROPOFOL 10 MG/ML
VIAL (ML) INTRAVENOUS CONTINUOUS PRN
Status: DISCONTINUED | OUTPATIENT
Start: 2023-02-20 | End: 2023-02-20 | Stop reason: SURG

## 2023-02-20 RX ORDER — ONDANSETRON 2 MG/ML
INJECTION INTRAMUSCULAR; INTRAVENOUS AS NEEDED
Status: DISCONTINUED | OUTPATIENT
Start: 2023-02-20 | End: 2023-02-20 | Stop reason: SURG

## 2023-02-20 RX ORDER — PROPOFOL 10 MG/ML
VIAL (ML) INTRAVENOUS AS NEEDED
Status: DISCONTINUED | OUTPATIENT
Start: 2023-02-20 | End: 2023-02-20 | Stop reason: SURG

## 2023-02-20 RX ORDER — SODIUM CHLORIDE, SODIUM LACTATE, POTASSIUM CHLORIDE, CALCIUM CHLORIDE 600; 310; 30; 20 MG/100ML; MG/100ML; MG/100ML; MG/100ML
30 INJECTION, SOLUTION INTRAVENOUS CONTINUOUS
Status: DISCONTINUED | OUTPATIENT
Start: 2023-02-20 | End: 2023-02-20 | Stop reason: HOSPADM

## 2023-02-20 RX ORDER — SODIUM CHLORIDE 0.9 % (FLUSH) 0.9 %
10 SYRINGE (ML) INJECTION AS NEEDED
Status: DISCONTINUED | OUTPATIENT
Start: 2023-02-20 | End: 2023-02-20 | Stop reason: HOSPADM

## 2023-02-20 RX ADMIN — PROPOFOL 250 MCG/KG/MIN: 10 INJECTION, EMULSION INTRAVENOUS at 08:07

## 2023-02-20 RX ADMIN — SODIUM CHLORIDE, POTASSIUM CHLORIDE, SODIUM LACTATE AND CALCIUM CHLORIDE 30 ML/HR: 600; 310; 30; 20 INJECTION, SOLUTION INTRAVENOUS at 07:51

## 2023-02-20 RX ADMIN — LIDOCAINE HYDROCHLORIDE 100 MG: 20 INJECTION, SOLUTION INFILTRATION; PERINEURAL at 08:07

## 2023-02-20 RX ADMIN — ONDANSETRON 4 MG: 2 INJECTION INTRAMUSCULAR; INTRAVENOUS at 08:10

## 2023-02-20 RX ADMIN — Medication 130 MG: at 08:07

## 2023-02-20 NOTE — ANESTHESIA PREPROCEDURE EVALUATION
Anesthesia Evaluation     Patient summary reviewed   NPO Solid Status: > 8 hours  NPO Liquid Status: > 8 hours           Airway   Mallampati: III  TM distance: >3 FB  Neck ROM: full  No difficulty expected  Dental - normal exam   (+) upper dentures and lower dentures    Pulmonary - normal exam    breath sounds clear to auscultation  Cardiovascular - normal exam  Exercise tolerance: good (4-7 METS)    Rate: normal    (+) hypertension, hyperlipidemia,       Neuro/Psych  (+) psychiatric history Anxiety and Depression,    GI/Hepatic/Renal/Endo    (+)   thyroid problem     Musculoskeletal     Abdominal  - normal exam   Substance History      OB/GYN          Other   arthritis,    history of cancer                      Anesthesia Plan    ASA 2     MAC     intravenous induction     Anesthetic plan, risks, benefits, and alternatives have been provided, discussed and informed consent has been obtained with: patient.

## 2023-02-20 NOTE — ANESTHESIA POSTPROCEDURE EVALUATION
Patient: Wilma Longo    Procedure Summary     Date: 02/20/23 Room / Location:  [facility]    Anesthesia Start: 0754 Anesthesia Stop: 0830    Procedures:       ESOPHAGOGASTRODUODENOSCOPY WITH BIOPSIES (Esophagus)      COLONOSCOPY INTO CECUM AND TI WITH BIOPSIES Diagnosis:       Osman's esophagus without dysplasia      Colitis      (Osman's esophagus without dysplasia [K22.70])      (Colitis [K52.9])    Surgeons: Basilio Martin MD Provider: Toy Osorio MD    Anesthesia Type: MAC ASA Status: 2          Anesthesia Type: MAC    Vitals  Vitals Value Taken Time   /93 02/20/23 0842   Temp     Pulse 80 02/20/23 0842   Resp 17 02/20/23 0842   SpO2 97 % 02/20/23 0842           Post Anesthesia Care and Evaluation    Patient location during evaluation: bedside  Patient participation: complete - patient participated  Level of consciousness: awake and alert  Pain management: adequate    Airway patency: patent  Anesthetic complications: No anesthetic complications    Cardiovascular status: acceptable  Respiratory status: acceptable  Hydration status: acceptable    Comments: /93 (BP Location: Left arm, Patient Position: Lying)   Pulse 80   Temp 36.9 °C (98.5 °F) (Oral)   Resp 17   LMP  (LMP Unknown)   SpO2 97%

## 2023-02-20 NOTE — H&P
[facility]  Pre Procedure History & Physical    Chief Complaint:   Colitis  Barretts    Subjective     HPI:   62 y.o. female here for EGD/colonoscopy for above noted sx    Past Medical History:   Past Medical History:   Diagnosis Date   • Anemia    • Anxiety    • Osman esophagus ?   • Benign essential hypertension 02/01/2015   • Cancer (HCC)    • Cardiomyopathy (HCC)    • Colitis 06/12/2009   • Depression    • Fibromyalgia    • History of gestational diabetes    • Hyperlipidemia 04/13/2011 2/1/2015   • Hypothyroidism 02/01/2015   • Insomnia 02/03/2015   • Irritable bowel syndrome 11/30/2009   • Panic attacks    • Rectocele    • Skin cancer of arm    • Vaginal prolapse    • Vitamin D deficiency 07/19/2012       Past Surgical History:  Past Surgical History:   Procedure Laterality Date   • ANTERIOR AND POSTERIOR VAGINAL REPAIR N/A 04/06/2021    Procedure: POSTERIOR VAGINAL REPAIR, CYSTOSCOPY;  Surgeon: Cora Gustafson MD;  Location: [facility] OR;  Service: Gynecology;  Laterality: N/A;   • BLADDER REPAIR  09/2015    X2   • COLONOSCOPY  2010    wnl   • COLONOSCOPY N/A 09/09/2016    Procedure: COLONOSCOPY into cecum and terminal ileum with biopsies;  Surgeon: Orlin Mann MD;  Location: [facility] ENDOSCOPY;  Service:    • COLONOSCOPY N/A 09/06/2019    Procedure: COLONOSCOPY TO CECUM AND INTO TERMINAL ILEUM;  Surgeon: Orlin Mann MD;  Location: [facility] ENDOSCOPY;  Service: Gastroenterology   • CYSTOSCOPY W/ URETERAL STENT PLACEMENT     • ENDOSCOPY N/A 09/06/2019    Procedure: ESOPHAGOGASTRODUODENOSCOPY WITH COLD BIOPSIES AND 48F HERNÁNDEZ DILATION;  Surgeon: Orlin Mann MD;  Location: [facility] ENDOSCOPY;  Service: Gastroenterology   • HYSTERECTOMY  12/2013   • MOUTH SURGERY     • SACROCOLPOPEXY N/A 04/06/2021    Procedure: LARPAROSCOPY COLPOPEXY, BILATERAL SALPINGECTOMY;  Surgeon: Cora Gustafson MD;  Location: [facility] OR;  Service: Gynecology;  Laterality: N/A;   • SKIN  CANCER EXCISION     • UPPER GASTROINTESTINAL ENDOSCOPY  9/6/3019       Family History:  Family History   Problem Relation Age of Onset   • Depression Mother    • Heart disease Mother    • Stroke Mother    • Heart attack Mother    • Heart failure Mother    • Hyperlipidemia Mother    • Hypertension Mother    • Arthritis Father    • Heart disease Father    • Skin cancer Father    • Heart attack Father    • Heart failure Father    • Heart disease Maternal Grandmother    • Stroke Maternal Grandfather    • Stroke Paternal Grandfather    • Hypertension Sister    • Hypothyroidism Sister    • Hypertension Brother    • Hypothyroidism Daughter    • Colon cancer Neg Hx    • Colon polyps Neg Hx    • Liver disease Neg Hx    • Rectal cancer Neg Hx    • Stomach cancer Neg Hx    • Liver cancer Neg Hx    • Malig Hyperthermia Neg Hx        Social History:   reports that she has never smoked. She has never used smokeless tobacco. She reports that she does not currently use alcohol. She reports that she does not use drugs.    Medications:   Medications Prior to Admission   Medication Sig Dispense Refill Last Dose   • ALPRAZolam (Xanax) 0.5 MG tablet Take 1 tablet by mouth Daily As Needed for Anxiety. D/c previous order 30 tablet 2    • DULoxetine (CYMBALTA) 60 MG capsule Take 1 capsule by mouth Daily. 30 capsule 5    • levothyroxine (SYNTHROID, LEVOTHROID) 75 MCG tablet Take 1 tablet by mouth Daily. 30 tablet 5    • lisinopril (PRINIVIL,ZESTRIL) 10 MG tablet Take 1 tablet by mouth Daily. 30 tablet 5    • meclizine (ANTIVERT) 25 MG tablet Take 1 tablet by mouth Every Night. 30 tablet 1    • naproxen (NAPROSYN) 500 MG tablet Take 1 tablet by mouth Daily. (Patient taking differently: Take 500 mg by mouth Daily As Needed.) 30 tablet 5    • rosuvastatin (Crestor) 5 MG tablet Take 1 tablet by mouth Daily. 30 tablet 5    • valACYclovir (VALTREX) 500 MG tablet Take 500 mg by mouth As Needed.      • vitamin C (ASCORBIC ACID) 250 MG tablet Take  500 mg by mouth Every Night.      • vitamin D (ERGOCALCIFEROL) 1.25 MG (12809 UT) capsule capsule Take 1 capsule by mouth 1 (One) Time Per Week. 5 capsule 5    • uribel (URO-MP) 118 MG capsule capsule Take 1 capsule by mouth.          Allergies:  Patient has no known allergies.    ROS:    Pertinent items are noted in HPI     Objective     not currently breastfeeding.    Physical Exam   Constitutional: Pt is oriented to person, place, and time and well-developed, well-nourished, and in no distress.   Mouth/Throat: Oropharynx is clear and moist.   Neck: Normal range of motion.   Cardiovascular: Normal rate, regular rhythm and normal heart sounds.    Pulmonary/Chest: Effort normal and breath sounds normal.   Abdominal: Soft. Nontender  Skin: Skin is warm and dry.   Psychiatric: Mood, memory, affect and judgment normal.     Assessment & Plan     Diagnosis:  Colitis  Barretts    Anticipated Surgical Procedure:  EGD  Colonoscopy    The risks, benefits, and alternatives of this procedure have been discussed with the patient or the responsible party- the patient understands and agrees to proceed.

## 2023-02-27 LAB
LAB AP CASE REPORT: NORMAL
PATH REPORT.FINAL DX SPEC: NORMAL
PATH REPORT.GROSS SPEC: NORMAL

## 2023-05-16 ENCOUNTER — TELEPHONE (OUTPATIENT)
Dept: GASTROENTEROLOGY | Facility: CLINIC | Age: 63
End: 2023-05-16
Payer: COMMERCIAL

## 2023-05-16 DIAGNOSIS — K52.832 LYMPHOCYTIC COLITIS: Primary | ICD-10-CM

## 2023-05-16 RX ORDER — BUDESONIDE 3 MG/1
CAPSULE, COATED PELLETS ORAL
Qty: 180 CAPSULE | Refills: 0 | Status: SHIPPED | OUTPATIENT
Start: 2023-05-16

## 2023-05-16 NOTE — TELEPHONE ENCOUNTER
----- Message from Basilio Martin MD sent at 3/10/2023 10:55 AM EST -----  Benign EGD bx  Colon biopsies suggestive of lymphocytic colitis  Recommend budesonide taper - 9mg day x 30 days, then 6mg/day x 30 days, then 3mg/day x 30 days  Office f/u in 6 weeks

## 2023-05-16 NOTE — TELEPHONE ENCOUNTER
Patient called. Advised as per Dr. Martin's note. She verb understanding.  Script written for Budesonide. Await MD signature.

## 2023-05-19 DIAGNOSIS — E03.9 ACQUIRED HYPOTHYROIDISM: Chronic | ICD-10-CM

## 2023-05-19 DIAGNOSIS — I10 BENIGN ESSENTIAL HYPERTENSION: Chronic | ICD-10-CM

## 2023-05-19 DIAGNOSIS — M79.7 FIBROMYALGIA: Chronic | ICD-10-CM

## 2023-05-19 DIAGNOSIS — E78.2 MIXED HYPERLIPIDEMIA: Chronic | ICD-10-CM

## 2023-05-19 RX ORDER — LISINOPRIL 10 MG/1
TABLET ORAL
Qty: 30 TABLET | Refills: 0 | Status: SHIPPED | OUTPATIENT
Start: 2023-05-19

## 2023-05-19 RX ORDER — ROSUVASTATIN CALCIUM 5 MG/1
TABLET, COATED ORAL
Qty: 30 TABLET | Refills: 0 | Status: SHIPPED | OUTPATIENT
Start: 2023-05-19

## 2023-05-19 RX ORDER — DULOXETIN HYDROCHLORIDE 60 MG/1
CAPSULE, DELAYED RELEASE ORAL
Qty: 30 CAPSULE | Refills: 0 | Status: SHIPPED | OUTPATIENT
Start: 2023-05-19

## 2023-05-19 RX ORDER — LEVOTHYROXINE SODIUM 0.07 MG/1
TABLET ORAL
Qty: 30 TABLET | Refills: 0 | Status: SHIPPED | OUTPATIENT
Start: 2023-05-19

## 2023-05-19 NOTE — TELEPHONE ENCOUNTER
Rx Refill Note  Requested Prescriptions     Pending Prescriptions Disp Refills   • rosuvastatin (CRESTOR) 5 MG tablet [Pharmacy Med Name: ROSUVASTATIN CALCIUM 5 MG TAB] 30 tablet 5     Sig: TAKE ONE TABLET BY MOUTH DAILY   • DULoxetine (CYMBALTA) 60 MG capsule [Pharmacy Med Name: DULoxetine HCL DR 60 MG CAPSULE] 30 capsule 5     Sig: TAKE ONE CAPSULE BY MOUTH DAILY   • levothyroxine (SYNTHROID, LEVOTHROID) 75 MCG tablet [Pharmacy Med Name: LEVOTHYROXINE 75 MCG TABLET] 30 tablet 5     Sig: TAKE ONE TABLET BY MOUTH DAILY   • lisinopril (PRINIVIL,ZESTRIL) 10 MG tablet [Pharmacy Med Name: LISINOPRIL 10 MG TABLET] 30 tablet 5     Sig: TAKE ONE TABLET BY MOUTH DAILY      Last office visit with prescribing clinician: 7/11/2022   Last telemedicine visit with prescribing clinician: Visit date not found   Next office visit with prescribing clinician: Visit date not found     Sent a 30 day RX. Pt is needing an appointment.     Okay for the HUB to relay message     {

## 2023-05-24 DIAGNOSIS — E55.9 VITAMIN D DEFICIENCY: Chronic | ICD-10-CM

## 2023-05-24 RX ORDER — ERGOCALCIFEROL 1.25 MG/1
CAPSULE ORAL
Qty: 4 CAPSULE | Refills: 0 | Status: SHIPPED | OUTPATIENT
Start: 2023-05-24

## 2023-06-04 RX ORDER — LISINOPRIL 10 MG/1
10 TABLET ORAL DAILY
Qty: 90 TABLET | Refills: 1 | Status: CANCELLED | OUTPATIENT
Start: 2023-06-04

## 2023-06-04 NOTE — PROGRESS NOTES
Subjective   Wilma Longo is a 63 y.o. female.          Chief Complaint:   Chief Complaint   Patient presents with    Hypertension     Med refill due  - no labs  - [facility]     Hyperlipidemia     Mammogram  / pap smear due     Hypothyroidism     PT OUT OF BP MEDICATIONS SINCE SAT     Vitamin D Deficiency    Anxiety    Depression       Wilma Longo 63 y.o. female who presents today for Medical Management of the below listed issues. She  has a problem list of   Patient Active Problem List   Diagnosis    Colitis    Depression    Benign essential hypertension    Hypothyroidism    Impaired fasting glucose    Insomnia    Irritable bowel syndrome    Hyperlipidemia    Mood disorder    Vitamin D deficiency    Fibromyalgia    Dysphagia    Adhesive capsulitis of left shoulder    Anemia    Iron deficiency anemia    Malabsorption of iron    Prolapse of female pelvic organs    Pelvic prolapse    S/P laparoscopy    Osman's esophagus without dysplasia   .  Since the last visit, She has overall felt well.  she has been compliant with   Current Outpatient Medications:     DULoxetine (CYMBALTA) 60 MG capsule, Take 1 capsule by mouth Daily., Disp: 90 capsule, Rfl: 1    levothyroxine (SYNTHROID, LEVOTHROID) 75 MCG tablet, Take 1 tablet by mouth Daily., Disp: 90 tablet, Rfl: 1    lisinopril (PRINIVIL,ZESTRIL) 20 MG tablet, Take 1 tablet by mouth Daily., Disp: 90 tablet, Rfl: 1    rosuvastatin (CRESTOR) 5 MG tablet, Take 1 tablet by mouth Daily., Disp: 90 tablet, Rfl: 1    vitamin D (ERGOCALCIFEROL) 1.25 MG (94014 UT) capsule capsule, Take 1 capsule by mouth 1 (One) Time Per Week., Disp: 15 capsule, Rfl: 3    ALPRAZolam (Xanax) 0.5 MG tablet, Take 1 tablet by mouth Daily As Needed for Anxiety. D/c previous order, Disp: 30 tablet, Rfl: 2    Budesonide (ENTOCORT EC) 3 MG 24 hr capsule, Take 3 tablets every morning x 30 days, then take 2 tablets every morning for 30 days and then one tablet every morning for 30 days and stop.,  "Disp: 180 capsule, Rfl: 0    meclizine (ANTIVERT) 25 MG tablet, Take 1 tablet by mouth Every Night., Disp: 30 tablet, Rfl: 1    naproxen (NAPROSYN) 500 MG tablet, Take 1 tablet by mouth Daily. (Patient taking differently: Take 500 mg by mouth Daily As Needed.), Disp: 30 tablet, Rfl: 5    uribel (URO-MP) 118 MG capsule capsule, Take 1 capsule by mouth., Disp: , Rfl:     valACYclovir (VALTREX) 500 MG tablet, Take 500 mg by mouth As Needed., Disp: , Rfl:     vitamin C (ASCORBIC ACID) 250 MG tablet, Take 500 mg by mouth Every Night., Disp: , Rfl: .  She denies medication side effects.    All of the other chronic condition(s) listed above are stable w/o issues.    /91 Comment: out of medications bp  Pulse 92   Temp 97.5 °F (36.4 °C) (Oral)   Resp 18   Ht 162.1 cm (63.8\")   Wt 72.6 kg (160 lb)   LMP  (LMP Unknown)   SpO2 99%   BMI 27.64 kg/m²     Results for orders placed or performed during the hospital encounter of 02/20/23   Tissue Pathology Exam    Specimen: A: Gastric, Antrum; Tissue    B: GE Junction; Tissue    C: Large Intestine; Tissue   Result Value Ref Range    Case Report       Surgical Pathology Report                         Case: [id number]                                  Authorizing Provider:  Basilio Martin MD  Collected:           02/20/2023 08:05 AM          Ordering Location:     [facility]  Received:            02/20/2023 08:48 AM                                 ENDO SUITES                                                                  Pathologist:           Sri Luo MD                                                          Specimens:   1) - Gastric, Antrum                                                                                2) - GE Junction                                                                                    3) - Large Intestine, RANDOM COLON                                                         Final Diagnosis       1. " Stomach, Antrum, Biopsy:     A. Gastric antral mucosa with chronic inactive gastritis.   B. Negative for intestinal metaplasia.   C. Negative for H. pylori-type organisms on routine stain.     2. Gastroesophageal Junction, Biopsy:     A. Columnar mucosa with chronic inflammation and reactive foveolar hyperplasia.   B. Negative for intestinal metaplasia.    3. Colon, Random, Biopsy:     A. Colonic mucosa with increased lamina propria chronic inflammation, surface epithelial alteration, increased       intraepithelial lymphocytes, features suggestive of lymphocytic colitis.    JAB/clm      Gross Description       1. Gastric, Antrum.  The specimen is received in formalin labeled with the patient's name and further designated 'gastric antrum biopsy' is a small fragment of gray-tan tissue. The specimen is submitted for embedding as received.    2. GE Junction.  The specimen is received in formalin labeled with the patient's name and further designated 'GE junction biopsy' is a small fragment of gray-tan tissue. The specimen is submitted for embedding as received.    3. Large Intestine.  The specimen is received in formalin labeled with the patient's name and further designated 'large intestine random colon biopsy' are multiple small fragments of gray-tan tissue. The specimen is submitted for embedding as received.                 The following portions of the patient's history were reviewed and updated as appropriate: allergies, current medications, past family history, past medical history, past social history, past surgical history, and problem list.    Review of Systems   Constitutional:  Negative for activity change, chills and fever.   Respiratory:  Negative for cough.    Cardiovascular:  Negative for chest pain.   Psychiatric/Behavioral:  Negative for dysphoric mood.      Objective     BMI is >= 25 and <30. (Overweight) The following options were offered after discussion;: exercise counseling/recommendations and  nutrition counseling/recommendations       Physical Exam  Constitutional:       General: She is not in acute distress.     Appearance: She is well-developed.   Cardiovascular:      Rate and Rhythm: Normal rate and regular rhythm.   Pulmonary:      Effort: Pulmonary effort is normal.      Breath sounds: Normal breath sounds.   Neurological:      Mental Status: She is alert and oriented to person, place, and time.   Psychiatric:         Behavior: Behavior normal.         Thought Content: Thought content normal.           Diagnoses and all orders for this visit:    1. Benign essential hypertension (Primary)  Comments:  UNCONTROLLED; MEDICATION ADJSUTED  Orders:  -     Comprehensive metabolic panel  -     Lipid panel  -     CBC and Differential  -     lisinopril (PRINIVIL,ZESTRIL) 20 MG tablet; Take 1 tablet by mouth Daily.  Dispense: 90 tablet; Refill: 1    2. Acquired hypothyroidism  -     levothyroxine (SYNTHROID, LEVOTHROID) 75 MCG tablet; Take 1 tablet by mouth Daily.  Dispense: 90 tablet; Refill: 1  -     TSH  -     T4, Free    3. Fibromyalgia  -     DULoxetine (CYMBALTA) 60 MG capsule; Take 1 capsule by mouth Daily.  Dispense: 90 capsule; Refill: 1    4. Vitamin D deficiency  -     vitamin D (ERGOCALCIFEROL) 1.25 MG (90391 UT) capsule capsule; Take 1 capsule by mouth 1 (One) Time Per Week.  Dispense: 15 capsule; Refill: 3  -     Vitamin D,25-Hydroxy    5. Mixed hyperlipidemia  -     rosuvastatin (CRESTOR) 5 MG tablet; Take 1 tablet by mouth Daily.  Dispense: 90 tablet; Refill: 1    Pt gets her mammograms at [facility] (GYN office).

## 2023-06-05 ENCOUNTER — OFFICE VISIT (OUTPATIENT)
Dept: FAMILY MEDICINE CLINIC | Facility: CLINIC | Age: 63
End: 2023-06-05
Payer: COMMERCIAL

## 2023-06-05 VITALS
TEMPERATURE: 97.5 F | HEART RATE: 92 BPM | OXYGEN SATURATION: 99 % | SYSTOLIC BLOOD PRESSURE: 156 MMHG | HEIGHT: 64 IN | DIASTOLIC BLOOD PRESSURE: 91 MMHG | WEIGHT: 160 LBS | BODY MASS INDEX: 27.31 KG/M2 | RESPIRATION RATE: 18 BRPM

## 2023-06-05 DIAGNOSIS — M79.7 FIBROMYALGIA: Chronic | ICD-10-CM

## 2023-06-05 DIAGNOSIS — E55.9 VITAMIN D DEFICIENCY: Chronic | ICD-10-CM

## 2023-06-05 DIAGNOSIS — I10 BENIGN ESSENTIAL HYPERTENSION: Primary | Chronic | ICD-10-CM

## 2023-06-05 DIAGNOSIS — E78.2 MIXED HYPERLIPIDEMIA: Chronic | ICD-10-CM

## 2023-06-05 DIAGNOSIS — E03.9 ACQUIRED HYPOTHYROIDISM: Chronic | ICD-10-CM

## 2023-06-05 PROCEDURE — 99214 OFFICE O/P EST MOD 30 MIN: CPT | Performed by: FAMILY MEDICINE

## 2023-06-05 RX ORDER — DULOXETIN HYDROCHLORIDE 60 MG/1
60 CAPSULE, DELAYED RELEASE ORAL DAILY
Qty: 90 CAPSULE | Refills: 1 | Status: SHIPPED | OUTPATIENT
Start: 2023-06-05

## 2023-06-05 RX ORDER — LEVOTHYROXINE SODIUM 0.07 MG/1
75 TABLET ORAL DAILY
Qty: 90 TABLET | Refills: 1 | Status: SHIPPED | OUTPATIENT
Start: 2023-06-05

## 2023-06-05 RX ORDER — LISINOPRIL 20 MG/1
20 TABLET ORAL DAILY
Qty: 90 TABLET | Refills: 1 | Status: SHIPPED | OUTPATIENT
Start: 2023-06-05

## 2023-06-05 RX ORDER — ROSUVASTATIN CALCIUM 5 MG/1
5 TABLET, COATED ORAL DAILY
Qty: 90 TABLET | Refills: 1 | Status: SHIPPED | OUTPATIENT
Start: 2023-06-05

## 2023-06-05 RX ORDER — ERGOCALCIFEROL 1.25 MG/1
50000 CAPSULE ORAL WEEKLY
Qty: 15 CAPSULE | Refills: 3 | Status: SHIPPED | OUTPATIENT
Start: 2023-06-05

## 2023-06-13 DIAGNOSIS — F39 MOOD DISORDER: Chronic | ICD-10-CM

## 2023-06-13 RX ORDER — ALPRAZOLAM 0.5 MG/1
0.5 TABLET ORAL DAILY PRN
Qty: 30 TABLET | Refills: 2 | Status: SHIPPED | OUTPATIENT
Start: 2023-06-13

## 2023-06-16 ENCOUNTER — LAB (OUTPATIENT)
Dept: OTHER | Facility: HOSPITAL | Age: 63
End: 2023-06-16
Payer: COMMERCIAL

## 2023-06-16 DIAGNOSIS — D64.9 ANEMIA, UNSPECIFIED TYPE: ICD-10-CM

## 2023-06-16 LAB
25(OH)D3 SERPL-MCNC: 63.2 NG/ML (ref 30–100)
ALBUMIN SERPL-MCNC: 3.8 G/DL (ref 3.5–5.2)
ALBUMIN/GLOB SERPL: 1.2 G/DL
ALP SERPL-CCNC: 65 U/L (ref 39–117)
ALT SERPL W P-5'-P-CCNC: 16 U/L (ref 1–33)
ANION GAP SERPL CALCULATED.3IONS-SCNC: 10.7 MMOL/L (ref 5–15)
AST SERPL-CCNC: 16 U/L (ref 1–32)
BASOPHILS # BLD AUTO: 0.04 10*3/MM3 (ref 0–0.2)
BASOPHILS NFR BLD AUTO: 0.5 % (ref 0–1.5)
BILIRUB SERPL-MCNC: 0.3 MG/DL (ref 0–1.2)
BUN SERPL-MCNC: 18 MG/DL (ref 8–23)
BUN/CREAT SERPL: 20 (ref 7–25)
CALCIUM SPEC-SCNC: 9.5 MG/DL (ref 8.6–10.5)
CHLORIDE SERPL-SCNC: 106 MMOL/L (ref 98–107)
CHOLEST SERPL-MCNC: 142 MG/DL (ref 0–200)
CO2 SERPL-SCNC: 27.3 MMOL/L (ref 22–29)
CREAT SERPL-MCNC: 0.9 MG/DL (ref 0.57–1)
DEPRECATED RDW RBC AUTO: 43.1 FL (ref 37–54)
EGFRCR SERPLBLD CKD-EPI 2021: 72 ML/MIN/1.73
EOSINOPHIL # BLD AUTO: 0.11 10*3/MM3 (ref 0–0.4)
EOSINOPHIL NFR BLD AUTO: 1.5 % (ref 0.3–6.2)
ERYTHROCYTE [DISTWIDTH] IN BLOOD BY AUTOMATED COUNT: 13.2 % (ref 12.3–15.4)
FERRITIN SERPL-MCNC: 72.2 NG/ML (ref 13–150)
GLOBULIN UR ELPH-MCNC: 3.1 GM/DL
GLUCOSE SERPL-MCNC: 97 MG/DL (ref 65–99)
HBA1C MFR BLD: 6 % (ref 4.8–5.6)
HCT VFR BLD AUTO: 40.1 % (ref 34–46.6)
HDLC SERPL-MCNC: 70 MG/DL (ref 40–60)
HGB BLD-MCNC: 12.9 G/DL (ref 12–15.9)
HGB RETIC QN AUTO: 24.7 PG (ref 29.8–36.1)
IMM GRANULOCYTES # BLD AUTO: 0.03 10*3/MM3 (ref 0–0.05)
IMM GRANULOCYTES NFR BLD AUTO: 0.4 % (ref 0–0.5)
IMM RETICS NFR: 0 % (ref 3–15.8)
IRON 24H UR-MRATE: 68 MCG/DL (ref 37–145)
IRON SATN MFR SERPL: 21 % (ref 20–50)
LDLC SERPL CALC-MCNC: 57 MG/DL (ref 0–100)
LDLC/HDLC SERPL: 0.82 {RATIO}
LYMPHOCYTES # BLD AUTO: 3.02 10*3/MM3 (ref 0.7–3.1)
LYMPHOCYTES NFR BLD AUTO: 40.1 % (ref 19.6–45.3)
MCH RBC QN AUTO: 28.5 PG (ref 26.6–33)
MCHC RBC AUTO-ENTMCNC: 32.2 G/DL (ref 31.5–35.7)
MCV RBC AUTO: 88.7 FL (ref 79–97)
MONOCYTES # BLD AUTO: 0.57 10*3/MM3 (ref 0.1–0.9)
MONOCYTES NFR BLD AUTO: 7.6 % (ref 5–12)
NEUTROPHILS NFR BLD AUTO: 3.77 10*3/MM3 (ref 1.7–7)
NEUTROPHILS NFR BLD AUTO: 49.9 % (ref 42.7–76)
NRBC BLD AUTO-RTO: 0 /100 WBC (ref 0–0.2)
PLATELET # BLD AUTO: 268 10*3/MM3 (ref 140–450)
PMV BLD AUTO: 9.7 FL (ref 6–12)
POTASSIUM SERPL-SCNC: 5.2 MMOL/L (ref 3.5–5.2)
PROT SERPL-MCNC: 6.9 G/DL (ref 6–8.5)
RBC # BLD AUTO: 4.52 10*6/MM3 (ref 3.77–5.28)
RETICS # AUTO: 0.02 10*6/MM3 (ref 0.02–0.13)
RETICS/RBC NFR AUTO: 0.34 % (ref 0.7–1.9)
SODIUM SERPL-SCNC: 144 MMOL/L (ref 136–145)
T4 FREE SERPL-MCNC: 1.85 NG/DL (ref 0.93–1.7)
TIBC SERPL-MCNC: 325 MCG/DL (ref 298–536)
TRANSFERRIN SERPL-MCNC: 218 MG/DL (ref 200–360)
TRIGL SERPL-MCNC: 74 MG/DL (ref 0–150)
TSH SERPL DL<=0.05 MIU/L-ACNC: 0.66 UIU/ML (ref 0.27–4.2)
VLDLC SERPL-MCNC: 15 MG/DL (ref 5–40)
WBC NRBC COR # BLD: 7.54 10*3/MM3 (ref 3.4–10.8)

## 2023-06-16 PROCEDURE — 85025 COMPLETE CBC W/AUTO DIFF WBC: CPT | Performed by: INTERNAL MEDICINE

## 2023-06-16 PROCEDURE — 84439 ASSAY OF FREE THYROXINE: CPT | Performed by: FAMILY MEDICINE

## 2023-06-16 PROCEDURE — 36415 COLL VENOUS BLD VENIPUNCTURE: CPT

## 2023-06-16 PROCEDURE — 80061 LIPID PANEL: CPT | Performed by: FAMILY MEDICINE

## 2023-06-16 PROCEDURE — 84466 ASSAY OF TRANSFERRIN: CPT | Performed by: INTERNAL MEDICINE

## 2023-06-16 PROCEDURE — 80053 COMPREHEN METABOLIC PANEL: CPT | Performed by: FAMILY MEDICINE

## 2023-06-16 PROCEDURE — 83036 HEMOGLOBIN GLYCOSYLATED A1C: CPT | Performed by: FAMILY MEDICINE

## 2023-06-16 PROCEDURE — 82728 ASSAY OF FERRITIN: CPT | Performed by: INTERNAL MEDICINE

## 2023-06-16 PROCEDURE — 85046 RETICYTE/HGB CONCENTRATE: CPT | Performed by: INTERNAL MEDICINE

## 2023-06-16 PROCEDURE — 83540 ASSAY OF IRON: CPT | Performed by: INTERNAL MEDICINE

## 2023-06-16 PROCEDURE — 84443 ASSAY THYROID STIM HORMONE: CPT | Performed by: FAMILY MEDICINE

## 2023-06-16 PROCEDURE — 82306 VITAMIN D 25 HYDROXY: CPT | Performed by: FAMILY MEDICINE

## 2023-08-18 ENCOUNTER — OFFICE VISIT (OUTPATIENT)
Dept: GASTROENTEROLOGY | Facility: CLINIC | Age: 63
End: 2023-08-18
Payer: COMMERCIAL

## 2023-08-18 VITALS
HEIGHT: 64 IN | SYSTOLIC BLOOD PRESSURE: 158 MMHG | HEART RATE: 86 BPM | DIASTOLIC BLOOD PRESSURE: 96 MMHG | TEMPERATURE: 98 F | OXYGEN SATURATION: 98 % | BODY MASS INDEX: 27.01 KG/M2 | WEIGHT: 158.2 LBS

## 2023-08-18 DIAGNOSIS — R15.2 INCONTINENCE OF FECES WITH FECAL URGENCY: ICD-10-CM

## 2023-08-18 DIAGNOSIS — K52.832 LYMPHOCYTIC COLITIS: Primary | ICD-10-CM

## 2023-08-18 DIAGNOSIS — R15.9 INCONTINENCE OF FECES WITH FECAL URGENCY: ICD-10-CM

## 2023-08-18 PROCEDURE — 99214 OFFICE O/P EST MOD 30 MIN: CPT | Performed by: INTERNAL MEDICINE

## 2023-08-18 NOTE — PROGRESS NOTES
Chief Complaint   Patient presents with    fecal leakage    Abdominal Pain    Diarrhea     Subjective     HPI  Wilma Longo is a 63 y.o. female who presents today for office follow up.     Prior EGD/colonoscopy in March, noted below, done for dyspepsia and fecal incontinence.       ENDOSCOPY - EGD (02/20/2023) - benign  ENDOSCOPY - COLON (02/20/2023) - normal macroscopically; bx c/w lymphocytic colitis    Started on tapering dose Budesonide, finished yesterday - some improvement  Still has fecal incontinence on occasion.  Diarrhea has improve but has not resolved.      Objective   Vitals:    08/18/23 0915   BP: 158/96   Pulse: 86   Temp: 98 øF (36.7 øC)   SpO2: 98%       Physical Exam  Vitals reviewed.   Constitutional:       Appearance: She is well-developed.   HENT:      Head: Normocephalic and atraumatic.   Neurological:      Mental Status: She is alert and oriented to person, place, and time.   Psychiatric:         Behavior: Behavior normal.         Thought Content: Thought content normal.         Judgment: Judgment normal.              Assessment & Plan   Assessment:     1. Lymphocytic colitis    2. Incontinence of feces with fecal urgency       Plan:   Check celiac panel  Check fecal elastase  Start citrucel and daily probiotic  Referral for pelvic floor physical therapy    If citrucel not effective, consider trial of Questran    Office f/u 3mos          Basilio Martin M.D.  [facility]  [address] [place] [address] - [address] [place] [address]  Office: [phone]

## 2023-08-21 LAB
ENDOMYSIUM IGA SER QL: NEGATIVE
GLIADIN PEPTIDE IGA SER-ACNC: 5 UNITS (ref 0–19)
GLIADIN PEPTIDE IGG SER-ACNC: 5 UNITS (ref 0–19)
IGA SERPL-MCNC: 456 MG/DL (ref 87–352)
TTG IGA SER-ACNC: <2 U/ML (ref 0–3)
TTG IGG SER-ACNC: 4 U/ML (ref 0–5)

## 2023-09-05 ENCOUNTER — TELEPHONE (OUTPATIENT)
Dept: GASTROENTEROLOGY | Facility: CLINIC | Age: 63
End: 2023-09-05
Payer: COMMERCIAL

## 2023-09-05 NOTE — TELEPHONE ENCOUNTER
Per Dr. Richmond:    An elevated IgA level is very non specific.  We can send her to see an immunologist who specializes in immunoglobulin levels.  If she would like, please arrange for referral to [facility] Dr De La Paz     Office f/u in 6-8 weeks with any APC

## 2023-09-05 NOTE — TELEPHONE ENCOUNTER
Per Dr. Martin: lab normal.   Returned patient's phone call. She is concerned about her elevated IGa number. Patient also sent a my chart note. Advised her my chart note has been sent to Dr. Martin for advisement.     Instructed patient she needs to turn in a stool specimen. She verb understanding.

## 2023-09-05 NOTE — TELEPHONE ENCOUNTER
Provider: DR MOBLEY  Caller: FAUZIA DOW  Relationship to Patient: SELF  Pharmacy:   Phone Number: [phone]   Reason for Call: PT CALLED TO DISCUSS THE CELIAC TESTING BECAUSE OF THE NUMBERS SEEMED TO BE HIGH AND IT IS POSSIBLE SHE COULD STILL BE POSITIVE FOR THIS.    PLEASE CALL TO DISCUSS FURTHER.

## 2023-09-17 LAB — ELASTASE PANC STL-MCNT: 436 UG ELAST./G

## 2023-10-13 ENCOUNTER — TELEPHONE (OUTPATIENT)
Dept: GASTROENTEROLOGY | Facility: CLINIC | Age: 63
End: 2023-10-13
Payer: COMMERCIAL

## 2023-10-24 ENCOUNTER — DOCUMENTATION (OUTPATIENT)
Dept: ONCOLOGY | Facility: CLINIC | Age: 63
End: 2023-10-24
Payer: COMMERCIAL

## 2023-10-24 NOTE — PROGRESS NOTES
What specialty is Dr. Anjum De La Paz?  Do have any office notes from Dr. De La Paz?  I wonder why this test was checked?    I have a couple of openings next week.  I could see her next week to discuss this with her.  The day she sees me she should have CBC, stat CMP, SPEP, SIFE, serum free light chains      ===View-only below this line===  ----- Message -----  From: Kim Chow RN  Sent: 10/24/2023   1:13 PM EDT  To: Jerman Dee II, MD    Received a phone call from Dr. Anjum De La Paz he states pt needs to see you sooner due to recent labs showing   IMPRESSION:   MONOCLONAL IGG LAMBDA.         
asp PNA

## 2023-10-24 NOTE — PROGRESS NOTES
Chief Complaint:   Chief Complaint   Patient presents with    Hypertension     Med refill / [facility]      Hyperlipidemia    Anxiety    Depression       Wilma Longo 63 y.o. female who presents today for Medical Management of the below listed issues. She  has a problem list of   Patient Active Problem List   Diagnosis    Colitis    Depression    Benign essential hypertension    Hypothyroidism    Impaired fasting glucose    Insomnia    Irritable bowel syndrome    Hyperlipidemia    Mood disorder    Vitamin D deficiency    Fibromyalgia    Dysphagia    Adhesive capsulitis of left shoulder    Anemia    Iron deficiency anemia    Malabsorption of iron    Prolapse of female pelvic organs    Pelvic prolapse    S/P laparoscopy    Osman's esophagus without dysplasia   .  Since the last visit, She has been seeing gastroenterology for some continued issues with stool leakage, although going gluten-free has made her feel better.  In the work-up, GI sent her to an immunologist who did some further work-up and was noted that she has monoclonal IgG lambda.  She was thus referred back to her hematologist and she is working on making an appointment there.     she has been compliant with   Current Outpatient Medications:     ALPRAZolam (Xanax) 0.5 MG tablet, Take 1 tablet by mouth Daily As Needed for Anxiety. D/c previous order, Disp: 30 tablet, Rfl: 2    DULoxetine (CYMBALTA) 60 MG capsule, Take 1 capsule by mouth Daily., Disp: 90 capsule, Rfl: 1    levothyroxine (SYNTHROID, LEVOTHROID) 75 MCG tablet, Take 1 tablet by mouth Daily., Disp: 90 tablet, Rfl: 1    lisinopril (PRINIVIL,ZESTRIL) 20 MG tablet, Take 1 tablet by mouth Daily., Disp: 90 tablet, Rfl: 1    rosuvastatin (CRESTOR) 5 MG tablet, Take 1 tablet by mouth Daily., Disp: 90 tablet, Rfl: 1    Budesonide (ENTOCORT EC) 3 MG 24 hr capsule, Take 3 tablets every morning x 30 days, then take 2 tablets every morning for 30 days and then one tablet every morning for 30 days and  "stop. (Patient not taking: Reported on 8/18/2023), Disp: 180 capsule, Rfl: 0    meclizine (ANTIVERT) 25 MG tablet, Take 1 tablet by mouth Every Night., Disp: 30 tablet, Rfl: 1    naproxen (NAPROSYN) 500 MG tablet, Take 1 tablet by mouth Daily. (Patient taking differently: Take 1 tablet by mouth Daily As Needed.), Disp: 30 tablet, Rfl: 5    tretinoin (RETIN-A) 0.025 % cream, , Disp: , Rfl:     valACYclovir (VALTREX) 500 MG tablet, Take 1 tablet by mouth As Needed. (Patient not taking: Reported on 8/18/2023), Disp: , Rfl:     vitamin C (ASCORBIC ACID) 250 MG tablet, Take 2 tablets by mouth Every Night., Disp: , Rfl:     vitamin D (ERGOCALCIFEROL) 1.25 MG (12797 UT) capsule capsule, Take 1 capsule by mouth 1 (One) Time Per Week., Disp: 15 capsule, Rfl: 3.  She denies medication side effects.    All of the other chronic condition(s) listed above are stable w/o issues.    /76   Pulse 110   Temp 98.1 °F (36.7 °C) (Oral)   Resp 20   Ht 162.1 cm (63.82\")   Wt 71.2 kg (157 lb)   LMP  (LMP Unknown)   SpO2 99%   BMI 27.10 kg/m²     Results for orders placed or performed in visit on 08/18/23   Celiac Comprehensive Panel    Specimen: Blood   Result Value Ref Range    Gliadin Deamidated Peptide Ab, IgA 5 0 - 19 units    Deaminated Gliadin Ab IgG 5 0 - 19 units    Tissue Transglutaminase IgA <2 0 - 3 U/mL    Tissue Transglutaminase IgG 4 0 - 5 U/mL    Endomysial IgA Negative Negative    IgA 456 (H) 87 - 352 mg/dL   Pancreatic Elastase, Fecal - Stool, Per Rectum    Specimen: Per Rectum; Stool   Result Value Ref Range    Pancreatic Fecal 436 >200 ug Elast./g             The following portions of the patient's history were reviewed and updated as appropriate: allergies, current medications, past family history, past medical history, past social history, past surgical history, and problem list.    Review of Systems   Constitutional:  Negative for activity change, chills and fever.   Respiratory:  Negative for cough.  "   Cardiovascular:  Negative for chest pain.   Psychiatric/Behavioral:  Negative for dysphoric mood.        Objective              Physical Exam  Constitutional:       General: She is not in acute distress.     Appearance: She is well-developed.   Cardiovascular:      Rate and Rhythm: Normal rate and regular rhythm.   Pulmonary:      Effort: Pulmonary effort is normal.      Breath sounds: Normal breath sounds.   Neurological:      Mental Status: She is alert and oriented to person, place, and time.   Psychiatric:         Behavior: Behavior normal.         Thought Content: Thought content normal.             Diagnoses and all orders for this visit:    1. Benign essential hypertension (Primary)  -     lisinopril (PRINIVIL,ZESTRIL) 20 MG tablet; Take 1 tablet by mouth Daily.  Dispense: 90 tablet; Refill: 1  -     Basic Metabolic Panel    2. Mood disorder  -     ALPRAZolam (Xanax) 0.5 MG tablet; Take 1 tablet by mouth Daily As Needed for Anxiety. D/c previous order  Dispense: 30 tablet; Refill: 2    3. Fibromyalgia  -     DULoxetine (CYMBALTA) 60 MG capsule; Take 1 capsule by mouth Daily.  Dispense: 90 capsule; Refill: 1    4. Acquired hypothyroidism  -     levothyroxine (SYNTHROID, LEVOTHROID) 75 MCG tablet; Take 1 tablet by mouth Daily.  Dispense: 90 tablet; Refill: 1  -     TSH  -     T4, Free  -     T3    5. Mixed hyperlipidemia  -     rosuvastatin (CRESTOR) 5 MG tablet; Take 1 tablet by mouth Daily.  Dispense: 90 tablet; Refill: 1    6. Impaired fasting glucose  -     Basic Metabolic Panel  -     Hemoglobin A1c    Diet/exercise/weight management to treat the glucose discussed.

## 2023-10-25 ENCOUNTER — OFFICE VISIT (OUTPATIENT)
Dept: FAMILY MEDICINE CLINIC | Facility: CLINIC | Age: 63
End: 2023-10-25
Payer: COMMERCIAL

## 2023-10-25 VITALS
SYSTOLIC BLOOD PRESSURE: 128 MMHG | HEART RATE: 110 BPM | RESPIRATION RATE: 20 BRPM | WEIGHT: 157 LBS | TEMPERATURE: 98.1 F | HEIGHT: 64 IN | BODY MASS INDEX: 26.8 KG/M2 | DIASTOLIC BLOOD PRESSURE: 76 MMHG | OXYGEN SATURATION: 99 %

## 2023-10-25 DIAGNOSIS — E78.2 MIXED HYPERLIPIDEMIA: Chronic | ICD-10-CM

## 2023-10-25 DIAGNOSIS — I10 BENIGN ESSENTIAL HYPERTENSION: Primary | Chronic | ICD-10-CM

## 2023-10-25 DIAGNOSIS — E03.9 ACQUIRED HYPOTHYROIDISM: Chronic | ICD-10-CM

## 2023-10-25 DIAGNOSIS — D47.2 IGG LAMBDA MONOCLONAL GAMMOPATHY: Primary | ICD-10-CM

## 2023-10-25 DIAGNOSIS — F39 MOOD DISORDER: Chronic | ICD-10-CM

## 2023-10-25 DIAGNOSIS — R73.01 IMPAIRED FASTING GLUCOSE: ICD-10-CM

## 2023-10-25 DIAGNOSIS — M79.7 FIBROMYALGIA: Chronic | ICD-10-CM

## 2023-10-25 PROCEDURE — 99214 OFFICE O/P EST MOD 30 MIN: CPT | Performed by: FAMILY MEDICINE

## 2023-10-25 RX ORDER — ALPRAZOLAM 0.5 MG/1
0.5 TABLET ORAL DAILY PRN
Qty: 30 TABLET | Refills: 2 | Status: SHIPPED | OUTPATIENT
Start: 2023-10-25

## 2023-10-25 RX ORDER — LEVOTHYROXINE SODIUM 0.07 MG/1
75 TABLET ORAL DAILY
Qty: 90 TABLET | Refills: 1 | Status: SHIPPED | OUTPATIENT
Start: 2023-10-25

## 2023-10-25 RX ORDER — DULOXETIN HYDROCHLORIDE 60 MG/1
60 CAPSULE, DELAYED RELEASE ORAL DAILY
Qty: 90 CAPSULE | Refills: 1 | Status: SHIPPED | OUTPATIENT
Start: 2023-10-25

## 2023-10-25 RX ORDER — ROSUVASTATIN CALCIUM 5 MG/1
5 TABLET, COATED ORAL DAILY
Qty: 90 TABLET | Refills: 1 | Status: SHIPPED | OUTPATIENT
Start: 2023-10-25

## 2023-10-25 RX ORDER — LISINOPRIL 20 MG/1
20 TABLET ORAL DAILY
Qty: 90 TABLET | Refills: 1 | Status: SHIPPED | OUTPATIENT
Start: 2023-10-25

## 2023-10-26 NOTE — PROGRESS NOTES
.     REASON FOR FOLLOWUP :   Leukocytosis and anemia and thrombocytosis    HISTORY OF PRESENT ILLNESS:  The patient is a 63 y.o. year old female  who is here for follow-up with the above-mentioned history.    Elevated IgA was found during work-up for celiac disease.  Therefore Dr. Martin referred her to Dr. Ken, allergy and immunology.  Dr. De La Paz saw her on 9/20/2023.  There is office records were reviewed.  He checked SIFE on 9/30/2023 revealing IgA of 481, but an IgG lambda monoclonal gammopathy.    Past Medical History:   Diagnosis Date    Anemia     Anxiety     Osman esophagus ?    Benign essential hypertension 02/01/2015    Cancer     Cardiomyopathy     Colitis 06/12/2009    Depression     Fibromyalgia     History of gestational diabetes     Hyperlipidemia 04/13/2011 2/1/2015    Hypothyroidism 02/01/2015    Insomnia 02/03/2015    Irritable bowel syndrome 11/30/2009    Panic attacks     Rectocele     Skin cancer of arm     Vaginal prolapse     Vitamin D deficiency 07/19/2012     Past Surgical History:   Procedure Laterality Date    ANTERIOR AND POSTERIOR VAGINAL REPAIR N/A 04/06/2021    Procedure: POSTERIOR VAGINAL REPAIR, CYSTOSCOPY;  Surgeon: Cora Gustafson MD;  Location: [facility] OR;  Service: Gynecology;  Laterality: N/A;    BLADDER REPAIR  09/2015    X2    COLONOSCOPY  2010    wnl    COLONOSCOPY N/A 09/09/2016    Procedure: COLONOSCOPY into cecum and terminal ileum with biopsies;  Surgeon: Orlin Mann MD;  Location: [facility] ENDOSCOPY;  Service:     COLONOSCOPY N/A 09/06/2019    Procedure: COLONOSCOPY TO CECUM AND INTO TERMINAL ILEUM;  Surgeon: Orlin Mann MD;  Location: [facility] ENDOSCOPY;  Service: Gastroenterology    COLONOSCOPY W/ POLYPECTOMY N/A 02/20/2023    Procedure: COLONOSCOPY INTO CECUM AND TI WITH BIOPSIES;  Surgeon: Basilio Martin MD;  Location: [facility] ENDOSCOPY;  Service: Gastroenterology;  Laterality: N/A;  PRE: COLITIS  POST: NORMAL COLON    CYSTOSCOPY  W/ URETERAL STENT PLACEMENT      ENDOSCOPY N/A 09/06/2019    Procedure: ESOPHAGOGASTRODUODENOSCOPY WITH COLD BIOPSIES AND 48F HERNÁNDEZ DILATION;  Surgeon: Orlin Mann MD;  Location: [facility] ENDOSCOPY;  Service: Gastroenterology    ENDOSCOPY N/A 02/20/2023    Procedure: ESOPHAGOGASTRODUODENOSCOPY WITH BIOPSIES;  Surgeon: Basilio Martin MD;  Location: [facility] ENDOSCOPY;  Service: Gastroenterology;  Laterality: N/A;  PRE: MENDEZ'S  POST: HIATAL HERNIA, GASTRITIS    HYSTERECTOMY  12/2013    MOUTH SURGERY      SACROCOLPOPEXY N/A 04/06/2021    Procedure: LARPAROSCOPY COLPOPEXY, BILATERAL SALPINGECTOMY;  Surgeon: Cora Gustafson MD;  Location: [facility] MAIN OR;  Service: Gynecology;  Laterality: N/A;    SKIN CANCER EXCISION      UPPER GASTROINTESTINAL ENDOSCOPY  9/6/3019       MEDICATIONS    Current Outpatient Medications:     ALPRAZolam (Xanax) 0.5 MG tablet, Take 1 tablet by mouth Daily As Needed for Anxiety. D/c previous order, Disp: 30 tablet, Rfl: 2    Budesonide (ENTOCORT EC) 3 MG 24 hr capsule, Take 3 tablets every morning x 30 days, then take 2 tablets every morning for 30 days and then one tablet every morning for 30 days and stop., Disp: 180 capsule, Rfl: 0    DULoxetine (CYMBALTA) 60 MG capsule, Take 1 capsule by mouth Daily., Disp: 90 capsule, Rfl: 1    levothyroxine (SYNTHROID, LEVOTHROID) 75 MCG tablet, Take 1 tablet by mouth Daily., Disp: 90 tablet, Rfl: 1    lisinopril (PRINIVIL,ZESTRIL) 20 MG tablet, Take 1 tablet by mouth Daily., Disp: 90 tablet, Rfl: 1    meclizine (ANTIVERT) 25 MG tablet, Take 1 tablet by mouth Every Night., Disp: 30 tablet, Rfl: 1    naproxen (NAPROSYN) 500 MG tablet, Take 1 tablet by mouth Daily. (Patient taking differently: Take 1 tablet by mouth Daily As Needed.), Disp: 30 tablet, Rfl: 5    rosuvastatin (CRESTOR) 5 MG tablet, Take 1 tablet by mouth Daily., Disp: 90 tablet, Rfl: 1    tretinoin (RETIN-A) 0.025 % cream, , Disp: , Rfl:     valACYclovir (VALTREX) 500 MG tablet,  Take 1 tablet by mouth As Needed., Disp: , Rfl:     vitamin C (ASCORBIC ACID) 250 MG tablet, Take 2 tablets by mouth Every Night., Disp: , Rfl:     vitamin D (ERGOCALCIFEROL) 1.25 MG (46070 UT) capsule capsule, Take 1 capsule by mouth 1 (One) Time Per Week., Disp: 15 capsule, Rfl: 3    ALLERGIES:   No Known Allergies    SOCIAL HISTORY:       Social History     Socioeconomic History    Marital status:      Spouse name: Ruben   Tobacco Use    Smoking status: Never    Smokeless tobacco: Never   Vaping Use    Vaping Use: Never used   Substance and Sexual Activity    Alcohol use: Not Currently     Comment: less than once per  month    Drug use: No    Sexual activity: Yes     Partners: Male     Birth control/protection: Post-menopausal         FAMILY HISTORY:  Family History   Problem Relation Age of Onset    Depression Mother     Heart disease Mother     Stroke Mother     Heart attack Mother     Heart failure Mother     Hyperlipidemia Mother     Hypertension Mother     Arthritis Father     Heart disease Father     Skin cancer Father     Heart attack Father     Heart failure Father     Heart disease Maternal Grandmother     Stroke Maternal Grandfather     Stroke Paternal Grandfather     Hypertension Sister     Hypothyroidism Sister     Hypertension Brother     Hypothyroidism Daughter     Colon cancer Neg Hx     Colon polyps Neg Hx     Liver disease Neg Hx     Rectal cancer Neg Hx     Stomach cancer Neg Hx     Liver cancer Neg Hx     Malig Hyperthermia Neg Hx        REVIEW OF SYSTEMS:  Review of Systems   Constitutional:  Negative for activity change.   HENT:  Negative for nosebleeds and trouble swallowing.    Respiratory:  Negative for shortness of breath and wheezing.    Cardiovascular:  Negative for chest pain and palpitations.   Gastrointestinal:  Negative for constipation, diarrhea and nausea.   Genitourinary:  Negative for dysuria and hematuria.   Musculoskeletal:  Negative for arthralgias and myalgias.   Skin:   "Negative for rash and wound.   Neurological:  Negative for seizures and syncope.   Hematological:  Negative for adenopathy. Does not bruise/bleed easily.   Psychiatric/Behavioral:  Negative for confusion.               Vitals:    10/30/23 1324   BP: 149/90   Pulse: 104   Resp: 18   Temp: 98 °F (36.7 °C)   TempSrc: Temporal   SpO2: 99%   Weight: 71.8 kg (158 lb 4.8 oz)   Height: 162.1 cm (63.82\")   PainSc: 0-No pain         10/30/2023     1:20 PM   Current Status   ECOG score 0      PHYSICAL EXAM:        CONSTITUTIONAL:  Vital signs reviewed.  No distress, looks comfortable.  EYES:  Conjunctiva and lids unremarkable.  PERRLA  EARS,NOSE,MOUTH,THROAT:  Ears and nose appear unremarkable.  Lips, teeth, gums appear unremarkable.  RESPIRATORY:  Normal respiratory effort.  Lungs clear to auscultation bilaterally.  CARDIOVASCULAR:  Normal S1, S2.  No murmurs rubs or gallops.  No significant lower extremity edema.  GASTROINTESTINAL: Abdomen appears unremarkable.  Nontender.  No hepatomegaly.  No splenomegaly.  LYMPHATIC:  No cervical, supraclavicular, axillary lymphadenopathy.  SKIN:  Warm.  No rashes.  PSYCHIATRIC:  Normal judgment and insight.  Normal mood and affect.      RECENT LABS:        WBC   Date Value Ref Range Status   10/30/2023 8.23 3.40 - 10.80 10*3/mm3 Final   09/30/2023 5.76 4.5 - 11.0 10*3/uL Final   06/16/2023 7.54 3.40 - 10.80 10*3/mm3 Final   11/03/2022 7.63 3.40 - 10.80 10*3/mm3 Final   05/18/2022 7.95 3.40 - 10.80 10*3/mm3 Final   11/05/2021 6.26 3.40 - 10.80 10*3/mm3 Final   05/25/2021 5.42 3.40 - 10.80 10*3/mm3 Final   05/25/2021 5.63 3.40 - 10.80 10*3/mm3 Final   04/07/2021 12.93 (H) 3.40 - 10.80 10*3/mm3 Final   03/31/2021 5.44 3.40 - 10.80 10*3/mm3 Final   02/22/2021 9.80 3.40 - 10.80 10*3/mm3 Final   01/14/2021 11.62 (H) 3.40 - 10.80 10*3/mm3 Final   12/09/2020 13.23 (H) 3.40 - 10.80 10*3/mm3 Final   12/02/2020 16.8 (H) 3.4 - 10.8 x10E3/uL Final   02/22/2020 6.2 3.4 - 10.8 x10E3/uL Final "   03/21/2019 6.56 3.40 - 10.80 10*3/mm3 Final   10/21/2017 8.3 3.4 - 10.8 x10E3/uL Final   06/28/2016 7.94 4.50 - 10.70 10*3/mm3 Final   09/03/2015 7.56 4.50 - 10.70 K/Cumm Final   04/07/2014 6.03 4.50 - 10.70 K/Cumm Final     Hemoglobin   Date Value Ref Range Status   10/30/2023 13.7 12.0 - 15.9 g/dL Final   09/30/2023 13.4 12.0 - 16.0 g/dL Final   06/16/2023 12.9 12.0 - 15.9 g/dL Final   11/03/2022 14.0 12.0 - 15.9 g/dL Final   05/18/2022 13.7 12.0 - 15.9 g/dL Final   11/05/2021 13.3 12.0 - 15.9 g/dL Final   05/25/2021 13.2 12.0 - 15.9 g/dL Final   05/25/2021 13.0 12.0 - 15.9 g/dL Final   04/07/2021 11.4 (L) 12.0 - 15.9 g/dL Final   03/31/2021 12.5 12.0 - 15.9 g/dL Final   02/22/2021 11.6 (L) 12.0 - 15.9 g/dL Final   01/14/2021 12.4 12.0 - 15.9 g/dL Final   12/09/2020 10.5 (L) 12.0 - 15.9 g/dL Final   12/02/2020 10.0 (L) 11.1 - 15.9 g/dL Final   02/22/2020 12.9 11.1 - 15.9 g/dL Final   03/21/2019 13.6 12.0 - 15.9 g/dL Final   10/21/2017 13.0 11.1 - 15.9 g/dL Final   06/28/2016 13.9 11.9 - 15.5 g/dL Final   09/03/2015 12.4 11.9 - 15.5 g/dL Final   04/07/2014 12.5 11.9 - 15.5 g/dL Final     Platelets   Date Value Ref Range Status   10/30/2023 342 140 - 450 10*3/mm3 Final   09/30/2023 337 140 - 440 10*3/uL Final   06/16/2023 268 140 - 450 10*3/mm3 Final   11/03/2022 323 140 - 450 10*3/mm3 Final   05/18/2022 277 140 - 450 10*3/mm3 Final   11/05/2021 303 140 - 450 10*3/mm3 Final   05/25/2021 321 140 - 450 10*3/mm3 Final   05/25/2021 320 140 - 450 10*3/mm3 Final   04/07/2021 344 140 - 450 10*3/mm3 Final   03/31/2021 284 140 - 450 10*3/mm3 Final   02/22/2021 445 140 - 450 10*3/mm3 Final   01/14/2021 404 140 - 450 10*3/mm3 Final   12/09/2020 603 (H) 140 - 450 10*3/mm3 Final   12/02/2020 591 (H) 150 - 450 x10E3/uL Final   02/22/2020 354 150 - 450 x10E3/uL Final   03/21/2019 336 140 - 450 10*3/mm3 Final   10/21/2017 293 150 - 379 x10E3/uL Final   06/28/2016 289 140 - 500 10*3/mm3 Final   09/03/2015 300 140 - 500 K/Cumm  Final   04/07/2014 277 140 - 500 K/Cumm Final       Assessment & Plan   Anemia, unspecified type  - Ferritin  - Iron Profile  - Retic With IRF & RET-He    Monoclonal gammopathy  - DAVID+Protein Electro, 24-Hr Urine - Urine, Clean Catch  - Kappa / Lambda Light Chains, Urine, 24 Hour - Urine, Clean Catch          Wilma Longo   *IgG lambda monoclonal gammopathy  Work-up of celiac disease by Dr. Martin revealed elevated IgA.  He referred to Dr. Ken, allergy/immunology.  Dr. De La Paz did SIFE revealing IgG lambda monoclonal protein (not IgA).  10/30/2023: Ordered SPEP, SIFE, serum free light chains, 24-hour urine studies, and bone survey.  Explained to patient I doubt this is multiple myeloma considering total IgG not elevated and the elevated IgA is polyclonal.    *Leukocytosis  WBC normal through February 2020  WBC 16.8 on 12/2/2020.  WBC 13.2 on 12/9/2020.  Differentials unremarkable.  On 1/14/2021, WBC 11.6.  Differential unremarkable.  WBC 7.5, from 7.6, from 8    *Iron deficiency anemia  Hb normal through February 2020.  MCV around 90.  Hb 10 on 12/2/2020, 10.5 on 12/9/2020.  MCV 83.8.  On 1/14/2021, ferritin 112, 3% saturation, reticulated hemoglobin low end of normal at 30.4.  Hb up to normal, 12.4 (without iron replacement).  Hb normalized without iron replacement.  Perhaps this is because rectal bleeding which started early December 2020, decreased, although persists.  2/22/2021, ferritin 132, 8% saturation, Hb 11.6.    Difficulty tolerating oral iron due to colitis/diarrhea.  No significant improvement from oral iron, thought to be due to poor absorption.  Received 600 mg Venofer  Her insurance will not cover Injectafer.  It does cover Venofer.  5/25/2021: Ferritin 141, 25% saturation, Hb 13.  No need for IV iron. .  11/5/2021: Ferritin 94, 18% saturation, Hb 13.3.  No need for IV iron.  5/18/2022: Ferritin 74, 14% saturation, Hb 13.7.  No need for IV iron  11/3/2022: Ferritin 55, 19% saturation, Hb  14.  No need for IV iron.  6/16/2023: Ferritin 72, 21% saturation, Hb 12.9.  No need for IV iron  10/30/2023: Ferritin 42, 9% saturation, Hb 13.7.  Venofer 200 mg x 5 doses.  Venofer 200 mg x 5 doses.    *Lymphocytic colitis.  Because of this, patient states that she cannot tolerate oral iron.  Budesonide started through Dr. Martin, GI, 5/16/2023    *Etiology of iron deficiency  EGD and colonoscopy by Dr. Chadd Mann (who has since retired), on 9/6/2019.  Follows with Dr. Gibbons.  Dr. Martin is considering GI evaluation but waiting on pelvic floor dysfunction treatment first.  He reported essentially normal colonoscopy 2019    *Thrombocytosis  PLT normal through February 2020   on 12/2/2020.   on 12/9/2020.  On 1/14/2021,   , from 268, from 323, from 277, from 303    *Patient states she was diagnosed with bladder prol[facility]  Early April 2021 rectal/bladder/vaginal prolapse repair planned    *Moderate to severe right hydronephrosis, incidentally found on liver ultrasound, from 12/19/2020 (U/S was to evaluate high LFTs), ordered by PCP, Dr. Virgen  Patient states PCP ordered a subsequent CT abdomen.  Patient chose to delay having this done as she thought fixing the bladder prolapse might take care of the unilateral hydronephrosis.  Dr. Peter Vivar plans to treat the kidney stone.  Patient states the stone was removed through cystoscopy.    *Small ill-defined sclerotic abnormality L2, indeterminate   incidentally found on CT AP, 2/8/2021 to work-up hydroureteronephrosis which was felt to be due to a 6 mm right proximal ureteric calculus.  Bone scan 2/22/2021 unremarkable.  Therefore, plan no further work-up or follow-up of this    *Hematochezia  Specifically, pain with wiping (no blood mixed in stools), since early December 2020.  This has since improved.  Following with GI, Dr. Martin, on Monday, 1/18/2021  Continues to have no hematochezia since  prolapse surgery early April 2021  11/10/2022: Still having mild amounts of blood with wiping.  None mixed in stool.  Continues to follow with GI, appointment soon    *Overweight.  Being overweight can lead to cytopenias through hepatic steatosis.    Body mass index is 27.33 kg/m².  BMI 25 to <30 is overweight  BMI 30 to <35 is class 1 obesity  BMI 35 to <40 is class 2 obesity  BMI 40 or higher is class 3 obesity   Continuing to be overweight.  Ideally, lose weight    Plan  Venofer 200 mg x 5 doses  Appointment with me in 4 5 weeks.  1 week prior: Bone survey, 24-hour urine for UPEP, urine DAVID, urine free light chains.  Await serum protein studies from today    Usual plan is:  MD 6 months.  1 week prior: Ferritin, iron panel, CBC, reticulate hemoglobin  Her insurance does not cover Injectafer, but does cover Venofer    41 minutes.  Total time.  Same day.    Send a copy this to Dr. De La Paz, allergy immunology

## 2023-10-30 ENCOUNTER — OFFICE VISIT (OUTPATIENT)
Dept: ONCOLOGY | Facility: CLINIC | Age: 63
End: 2023-10-30
Payer: COMMERCIAL

## 2023-10-30 ENCOUNTER — LAB (OUTPATIENT)
Dept: LAB | Facility: HOSPITAL | Age: 63
End: 2023-10-30
Payer: COMMERCIAL

## 2023-10-30 VITALS
RESPIRATION RATE: 18 BRPM | SYSTOLIC BLOOD PRESSURE: 149 MMHG | BODY MASS INDEX: 27.02 KG/M2 | DIASTOLIC BLOOD PRESSURE: 90 MMHG | HEIGHT: 64 IN | OXYGEN SATURATION: 99 % | HEART RATE: 104 BPM | TEMPERATURE: 98 F | WEIGHT: 158.3 LBS

## 2023-10-30 DIAGNOSIS — D47.2 IGG LAMBDA MONOCLONAL GAMMOPATHY: ICD-10-CM

## 2023-10-30 DIAGNOSIS — D47.2 MONOCLONAL GAMMOPATHY: ICD-10-CM

## 2023-10-30 DIAGNOSIS — D64.9 ANEMIA, UNSPECIFIED TYPE: Primary | ICD-10-CM

## 2023-10-30 DIAGNOSIS — D64.9 ANEMIA, UNSPECIFIED TYPE: ICD-10-CM

## 2023-10-30 LAB
ALBUMIN SERPL-MCNC: 4 G/DL (ref 3.5–5.2)
ALBUMIN/GLOB SERPL: 1.2 G/DL
ALP SERPL-CCNC: 67 U/L (ref 39–117)
ALT SERPL W P-5'-P-CCNC: 12 U/L (ref 1–33)
ANION GAP SERPL CALCULATED.3IONS-SCNC: 14.1 MMOL/L (ref 5–15)
AST SERPL-CCNC: 22 U/L (ref 1–32)
BASOPHILS # BLD AUTO: 0.07 10*3/MM3 (ref 0–0.2)
BASOPHILS NFR BLD AUTO: 0.9 % (ref 0–1.5)
BILIRUB SERPL-MCNC: 0.4 MG/DL (ref 0–1.2)
BUN SERPL-MCNC: 9 MG/DL (ref 8–23)
BUN/CREAT SERPL: 10.2 (ref 7–25)
CALCIUM SPEC-SCNC: 9 MG/DL (ref 8.6–10.5)
CHLORIDE SERPL-SCNC: 100 MMOL/L (ref 98–107)
CO2 SERPL-SCNC: 27.9 MMOL/L (ref 22–29)
CREAT SERPL-MCNC: 0.88 MG/DL (ref 0.6–1.1)
DEPRECATED RDW RBC AUTO: 41.6 FL (ref 37–54)
EGFRCR SERPLBLD CKD-EPI 2021: 73.9 ML/MIN/1.73
EOSINOPHIL # BLD AUTO: 0.09 10*3/MM3 (ref 0–0.4)
EOSINOPHIL NFR BLD AUTO: 1.1 % (ref 0.3–6.2)
ERYTHROCYTE [DISTWIDTH] IN BLOOD BY AUTOMATED COUNT: 12.5 % (ref 12.3–15.4)
FERRITIN SERPL-MCNC: 42 NG/ML (ref 13–150)
GLOBULIN UR ELPH-MCNC: 3.3 GM/DL
GLUCOSE SERPL-MCNC: 100 MG/DL (ref 65–99)
HCT VFR BLD AUTO: 42.6 % (ref 34–46.6)
HGB BLD-MCNC: 13.7 G/DL (ref 12–15.9)
HGB RETIC QN AUTO: 31.2 PG (ref 29.8–36.1)
IMM GRANULOCYTES # BLD AUTO: 0.02 10*3/MM3 (ref 0–0.05)
IMM GRANULOCYTES NFR BLD AUTO: 0.2 % (ref 0–0.5)
IMM RETICS NFR: 9 % (ref 3–15.8)
IRON 24H UR-MRATE: 34 MCG/DL (ref 37–145)
IRON SATN MFR SERPL: 9 % (ref 20–50)
LYMPHOCYTES # BLD AUTO: 2.49 10*3/MM3 (ref 0.7–3.1)
LYMPHOCYTES NFR BLD AUTO: 30.3 % (ref 19.6–45.3)
MCH RBC QN AUTO: 29.1 PG (ref 26.6–33)
MCHC RBC AUTO-ENTMCNC: 32.2 G/DL (ref 31.5–35.7)
MCV RBC AUTO: 90.6 FL (ref 79–97)
MONOCYTES # BLD AUTO: 0.71 10*3/MM3 (ref 0.1–0.9)
MONOCYTES NFR BLD AUTO: 8.6 % (ref 5–12)
NEUTROPHILS NFR BLD AUTO: 4.85 10*3/MM3 (ref 1.7–7)
NEUTROPHILS NFR BLD AUTO: 58.9 % (ref 42.7–76)
NRBC BLD AUTO-RTO: 0 /100 WBC (ref 0–0.2)
PLATELET # BLD AUTO: 342 10*3/MM3 (ref 140–450)
PMV BLD AUTO: 10 FL (ref 6–12)
POTASSIUM SERPL-SCNC: 2.9 MMOL/L (ref 3.5–5.2)
PROT SERPL-MCNC: 7.3 G/DL (ref 6–8.5)
RBC # BLD AUTO: 4.7 10*6/MM3 (ref 3.77–5.28)
RETICS # AUTO: 0.05 10*6/MM3 (ref 0.02–0.13)
RETICS/RBC NFR AUTO: 1.12 % (ref 0.7–1.9)
SODIUM SERPL-SCNC: 142 MMOL/L (ref 136–145)
TIBC SERPL-MCNC: 358 MCG/DL (ref 298–536)
TRANSFERRIN SERPL-MCNC: 256 MG/DL (ref 200–360)
WBC NRBC COR # BLD: 8.23 10*3/MM3 (ref 3.4–10.8)

## 2023-10-30 PROCEDURE — 80053 COMPREHEN METABOLIC PANEL: CPT

## 2023-10-30 PROCEDURE — 82728 ASSAY OF FERRITIN: CPT | Performed by: INTERNAL MEDICINE

## 2023-10-30 PROCEDURE — 99215 OFFICE O/P EST HI 40 MIN: CPT | Performed by: INTERNAL MEDICINE

## 2023-10-30 PROCEDURE — 85025 COMPLETE CBC W/AUTO DIFF WBC: CPT

## 2023-10-30 PROCEDURE — 85046 RETICYTE/HGB CONCENTRATE: CPT | Performed by: INTERNAL MEDICINE

## 2023-10-30 PROCEDURE — 36415 COLL VENOUS BLD VENIPUNCTURE: CPT | Performed by: INTERNAL MEDICINE

## 2023-10-30 PROCEDURE — 83540 ASSAY OF IRON: CPT | Performed by: INTERNAL MEDICINE

## 2023-10-30 PROCEDURE — 84466 ASSAY OF TRANSFERRIN: CPT | Performed by: INTERNAL MEDICINE

## 2023-10-31 ENCOUNTER — DOCUMENTATION (OUTPATIENT)
Dept: ONCOLOGY | Facility: CLINIC | Age: 63
End: 2023-10-31
Payer: COMMERCIAL

## 2023-10-31 RX ORDER — POTASSIUM CHLORIDE 20 MEQ/1
TABLET, EXTENDED RELEASE ORAL
Qty: 10 TABLET | Refills: 1 | Status: SHIPPED | OUTPATIENT
Start: 2023-10-31

## 2023-10-31 RX ORDER — POTASSIUM CHLORIDE 1500 MG/1
TABLET, FILM COATED, EXTENDED RELEASE ORAL
Qty: 10 TABLET | Refills: 1 | Status: SHIPPED | OUTPATIENT
Start: 2023-10-31

## 2023-10-31 NOTE — PROGRESS NOTES
Yes.  Thank you.  Please send in potassium chloride 40 mill equivalents.  1 pill twice per day x5 days  ===View-only below this line===  ----- Message -----  From: Kim Chow RN  Sent: 10/31/2023  12:04 PM EDT  To: Jerman Dee II, MD    I phoned her about her Venofer and she said she was suppose to get potassium sent to the pharmacy? Her K+ was 2.9? I didn't see a note about it?

## 2023-11-01 LAB
ALBUMIN SERPL ELPH-MCNC: 3.7 G/DL (ref 2.9–4.4)
ALBUMIN/GLOB SERPL: 1.1 {RATIO} (ref 0.7–1.7)
ALPHA1 GLOB SERPL ELPH-MCNC: 0.2 G/DL (ref 0–0.4)
ALPHA2 GLOB SERPL ELPH-MCNC: 0.8 G/DL (ref 0.4–1)
B-GLOBULIN SERPL ELPH-MCNC: 1.2 G/DL (ref 0.7–1.3)
GAMMA GLOB SERPL ELPH-MCNC: 1.3 G/DL (ref 0.4–1.8)
GLOBULIN SER-MCNC: 3.6 G/DL (ref 2.2–3.9)
IGA SERPL-MCNC: 514 MG/DL (ref 87–352)
IGG SERPL-MCNC: 1316 MG/DL (ref 586–1602)
IGM SERPL-MCNC: 68 MG/DL (ref 26–217)
INTERPRETATION SERPL IEP-IMP: ABNORMAL
KAPPA LC FREE SER-MCNC: 52.5 MG/L (ref 3.3–19.4)
KAPPA LC FREE/LAMBDA FREE SER: 1.22 {RATIO} (ref 0.26–1.65)
LABORATORY COMMENT REPORT: ABNORMAL
LAMBDA LC FREE SERPL-MCNC: 43 MG/L (ref 5.7–26.3)
M PROTEIN SERPL ELPH-MCNC: 0.1 G/DL
PROT SERPL-MCNC: 7.3 G/DL (ref 6–8.5)

## 2023-11-08 ENCOUNTER — INFUSION (OUTPATIENT)
Dept: ONCOLOGY | Facility: HOSPITAL | Age: 63
End: 2023-11-08
Payer: COMMERCIAL

## 2023-11-08 VITALS
TEMPERATURE: 96.8 F | SYSTOLIC BLOOD PRESSURE: 160 MMHG | RESPIRATION RATE: 16 BRPM | WEIGHT: 157.4 LBS | DIASTOLIC BLOOD PRESSURE: 82 MMHG | BODY MASS INDEX: 27.17 KG/M2 | HEART RATE: 92 BPM | OXYGEN SATURATION: 98 %

## 2023-11-08 DIAGNOSIS — D50.9 IRON DEFICIENCY ANEMIA, UNSPECIFIED IRON DEFICIENCY ANEMIA TYPE: Primary | ICD-10-CM

## 2023-11-08 PROCEDURE — 96374 THER/PROPH/DIAG INJ IV PUSH: CPT

## 2023-11-08 PROCEDURE — 25010000002 IRON SUCROSE PER 1 MG: Performed by: INTERNAL MEDICINE

## 2023-11-08 PROCEDURE — 63710000001 DIPHENHYDRAMINE PER 50 MG: Performed by: INTERNAL MEDICINE

## 2023-11-08 PROCEDURE — 25810000003 SODIUM CHLORIDE 0.9 % SOLUTION: Performed by: INTERNAL MEDICINE

## 2023-11-08 RX ORDER — SODIUM CHLORIDE 9 MG/ML
20 INJECTION, SOLUTION INTRAVENOUS ONCE
Status: COMPLETED | OUTPATIENT
Start: 2023-11-08 | End: 2023-11-08

## 2023-11-08 RX ORDER — DIPHENHYDRAMINE HCL 25 MG
25 CAPSULE ORAL ONCE
Status: COMPLETED | OUTPATIENT
Start: 2023-11-08 | End: 2023-11-08

## 2023-11-08 RX ORDER — ACETAMINOPHEN 325 MG/1
650 TABLET ORAL ONCE
Status: COMPLETED | OUTPATIENT
Start: 2023-11-08 | End: 2023-11-08

## 2023-11-08 RX ADMIN — ACETAMINOPHEN 650 MG: 325 TABLET ORAL at 14:09

## 2023-11-08 RX ADMIN — SODIUM CHLORIDE 20 ML/HR: 9 INJECTION, SOLUTION INTRAVENOUS at 14:44

## 2023-11-08 RX ADMIN — IRON SUCROSE 200 MG: 20 INJECTION, SOLUTION INTRAVENOUS at 14:45

## 2023-11-08 RX ADMIN — DIPHENHYDRAMINE HYDROCHLORIDE 25 MG: 25 CAPSULE ORAL at 14:09

## 2023-11-10 ENCOUNTER — INFUSION (OUTPATIENT)
Dept: ONCOLOGY | Facility: HOSPITAL | Age: 63
End: 2023-11-10
Payer: COMMERCIAL

## 2023-11-10 VITALS
SYSTOLIC BLOOD PRESSURE: 160 MMHG | DIASTOLIC BLOOD PRESSURE: 90 MMHG | BODY MASS INDEX: 27.31 KG/M2 | OXYGEN SATURATION: 97 % | HEART RATE: 103 BPM | TEMPERATURE: 97 F | WEIGHT: 158.2 LBS | RESPIRATION RATE: 16 BRPM

## 2023-11-10 DIAGNOSIS — D50.9 IRON DEFICIENCY ANEMIA, UNSPECIFIED IRON DEFICIENCY ANEMIA TYPE: Primary | ICD-10-CM

## 2023-11-10 PROCEDURE — 63710000001 DIPHENHYDRAMINE PER 50 MG: Performed by: INTERNAL MEDICINE

## 2023-11-10 PROCEDURE — 96374 THER/PROPH/DIAG INJ IV PUSH: CPT

## 2023-11-10 PROCEDURE — 25010000002 IRON SUCROSE PER 1 MG: Performed by: INTERNAL MEDICINE

## 2023-11-10 PROCEDURE — 25810000003 SODIUM CHLORIDE 0.9 % SOLUTION: Performed by: INTERNAL MEDICINE

## 2023-11-10 RX ORDER — DIPHENHYDRAMINE HCL 25 MG
25 CAPSULE ORAL ONCE
Status: COMPLETED | OUTPATIENT
Start: 2023-11-10 | End: 2023-11-10

## 2023-11-10 RX ORDER — ACETAMINOPHEN 325 MG/1
650 TABLET ORAL ONCE
Status: COMPLETED | OUTPATIENT
Start: 2023-11-10 | End: 2023-11-10

## 2023-11-10 RX ORDER — SODIUM CHLORIDE 9 MG/ML
20 INJECTION, SOLUTION INTRAVENOUS ONCE
Status: COMPLETED | OUTPATIENT
Start: 2023-11-10 | End: 2023-11-10

## 2023-11-10 RX ADMIN — DIPHENHYDRAMINE HYDROCHLORIDE 25 MG: 25 CAPSULE ORAL at 14:29

## 2023-11-10 RX ADMIN — ACETAMINOPHEN 650 MG: 325 TABLET ORAL at 14:29

## 2023-11-10 RX ADMIN — IRON SUCROSE 200 MG: 20 INJECTION, SOLUTION INTRAVENOUS at 14:58

## 2023-11-10 RX ADMIN — SODIUM CHLORIDE 20 ML/HR: 9 INJECTION, SOLUTION INTRAVENOUS at 14:58

## 2023-11-13 ENCOUNTER — LAB (OUTPATIENT)
Dept: LAB | Facility: HOSPITAL | Age: 63
End: 2023-11-13
Payer: COMMERCIAL

## 2023-11-14 RX ORDER — POTASSIUM CHLORIDE 20 MEQ/1
TABLET, EXTENDED RELEASE ORAL
Qty: 10 TABLET | Refills: 1 | OUTPATIENT
Start: 2023-11-14

## 2023-11-15 ENCOUNTER — INFUSION (OUTPATIENT)
Dept: ONCOLOGY | Facility: HOSPITAL | Age: 63
End: 2023-11-15
Payer: COMMERCIAL

## 2023-11-15 VITALS
RESPIRATION RATE: 16 BRPM | TEMPERATURE: 97.7 F | HEART RATE: 78 BPM | DIASTOLIC BLOOD PRESSURE: 90 MMHG | WEIGHT: 155.8 LBS | OXYGEN SATURATION: 97 % | SYSTOLIC BLOOD PRESSURE: 147 MMHG | BODY MASS INDEX: 26.89 KG/M2

## 2023-11-15 DIAGNOSIS — D50.9 IRON DEFICIENCY ANEMIA, UNSPECIFIED IRON DEFICIENCY ANEMIA TYPE: Primary | ICD-10-CM

## 2023-11-15 LAB
ALBUMIN 24H MFR UR ELPH: 37.9 %
ALPHA1 GLOB 24H MFR UR ELPH: 2.6 %
ALPHA2 GLOB 24H MFR UR ELPH: 13.2 %
B-GLOBULIN MFR UR ELPH: 22 %
GAMMA GLOB 24H MFR UR ELPH: 24.3 %
HIV 1 & 2 AB SER-IMP: NORMAL
INTERPRETATION UR IFE-IMP: NORMAL
KAPPA LC FREE 24H UR-MRATE: 86.71 MG/24 HR
KAPPA LC FREE UR-MCNC: 115.61 MG/L (ref 1.17–86.46)
KAPPA LC FREE/LAMBDA FREE UR: 12.92 (ref 1.83–14.26)
LAMBDA LC FREE 24H UR-MRATE: 6.71 MG/24 HR
LAMBDA LC FREE UR-MCNC: 8.95 MG/L (ref 0.27–15.21)
M PROTEIN 24H MFR UR ELPH: NORMAL %
PROT 24H UR-MRATE: 100 MG/24 HR (ref 30–150)
PROT UR-MCNC: 13.3 MG/DL

## 2023-11-15 PROCEDURE — 25810000003 SODIUM CHLORIDE 0.9 % SOLUTION: Performed by: INTERNAL MEDICINE

## 2023-11-15 PROCEDURE — 25010000002 IRON SUCROSE PER 1 MG: Performed by: INTERNAL MEDICINE

## 2023-11-15 PROCEDURE — 63710000001 DIPHENHYDRAMINE PER 50 MG: Performed by: INTERNAL MEDICINE

## 2023-11-15 PROCEDURE — 96374 THER/PROPH/DIAG INJ IV PUSH: CPT

## 2023-11-15 RX ORDER — SODIUM CHLORIDE 9 MG/ML
20 INJECTION, SOLUTION INTRAVENOUS ONCE
Status: COMPLETED | OUTPATIENT
Start: 2023-11-15 | End: 2023-11-15

## 2023-11-15 RX ORDER — ACETAMINOPHEN 325 MG/1
650 TABLET ORAL ONCE
Status: COMPLETED | OUTPATIENT
Start: 2023-11-15 | End: 2023-11-15

## 2023-11-15 RX ORDER — DIPHENHYDRAMINE HCL 25 MG
25 CAPSULE ORAL ONCE
Status: COMPLETED | OUTPATIENT
Start: 2023-11-15 | End: 2023-11-15

## 2023-11-15 RX ADMIN — SODIUM CHLORIDE 20 ML/HR: 9 INJECTION, SOLUTION INTRAVENOUS at 14:48

## 2023-11-15 RX ADMIN — IRON SUCROSE 200 MG: 20 INJECTION, SOLUTION INTRAVENOUS at 14:48

## 2023-11-15 RX ADMIN — DIPHENHYDRAMINE HYDROCHLORIDE 25 MG: 25 CAPSULE ORAL at 14:25

## 2023-11-15 RX ADMIN — ACETAMINOPHEN 650 MG: 325 TABLET ORAL at 14:24

## 2023-11-17 ENCOUNTER — INFUSION (OUTPATIENT)
Dept: ONCOLOGY | Facility: HOSPITAL | Age: 63
End: 2023-11-17
Payer: COMMERCIAL

## 2023-11-17 VITALS
HEART RATE: 90 BPM | TEMPERATURE: 97.1 F | OXYGEN SATURATION: 98 % | WEIGHT: 156 LBS | RESPIRATION RATE: 16 BRPM | BODY MASS INDEX: 26.93 KG/M2 | DIASTOLIC BLOOD PRESSURE: 93 MMHG | SYSTOLIC BLOOD PRESSURE: 163 MMHG

## 2023-11-17 DIAGNOSIS — D50.9 IRON DEFICIENCY ANEMIA, UNSPECIFIED IRON DEFICIENCY ANEMIA TYPE: Primary | ICD-10-CM

## 2023-11-17 PROCEDURE — 96375 TX/PRO/DX INJ NEW DRUG ADDON: CPT

## 2023-11-17 PROCEDURE — 25010000002 IRON SUCROSE PER 1 MG: Performed by: INTERNAL MEDICINE

## 2023-11-17 PROCEDURE — 63710000001 DIPHENHYDRAMINE PER 50 MG: Performed by: INTERNAL MEDICINE

## 2023-11-17 PROCEDURE — 25810000003 SODIUM CHLORIDE 0.9 % SOLUTION: Performed by: INTERNAL MEDICINE

## 2023-11-17 PROCEDURE — 96374 THER/PROPH/DIAG INJ IV PUSH: CPT

## 2023-11-17 RX ORDER — DIPHENHYDRAMINE HCL 25 MG
25 CAPSULE ORAL ONCE
Status: COMPLETED | OUTPATIENT
Start: 2023-11-17 | End: 2023-11-17

## 2023-11-17 RX ORDER — SODIUM CHLORIDE 9 MG/ML
20 INJECTION, SOLUTION INTRAVENOUS ONCE
Status: COMPLETED | OUTPATIENT
Start: 2023-11-17 | End: 2023-11-17

## 2023-11-17 RX ORDER — ACETAMINOPHEN 325 MG/1
650 TABLET ORAL ONCE
Status: COMPLETED | OUTPATIENT
Start: 2023-11-17 | End: 2023-11-17

## 2023-11-17 RX ADMIN — SODIUM CHLORIDE 20 ML/HR: 9 INJECTION, SOLUTION INTRAVENOUS at 14:54

## 2023-11-17 RX ADMIN — IRON SUCROSE 200 MG: 20 INJECTION, SOLUTION INTRAVENOUS at 15:10

## 2023-11-17 RX ADMIN — ACETAMINOPHEN 650 MG: 325 TABLET ORAL at 15:04

## 2023-11-17 RX ADMIN — DIPHENHYDRAMINE HYDROCHLORIDE 25 MG: 25 CAPSULE ORAL at 15:04

## 2023-11-28 ENCOUNTER — HOSPITAL ENCOUNTER (OUTPATIENT)
Dept: GENERAL RADIOLOGY | Facility: HOSPITAL | Age: 63
Discharge: HOME | End: 2023-11-28
Admitting: INTERNAL MEDICINE
Payer: COMMERCIAL

## 2023-11-28 ENCOUNTER — INFUSION (OUTPATIENT)
Dept: ONCOLOGY | Facility: HOSPITAL | Age: 63
End: 2023-11-28
Payer: COMMERCIAL

## 2023-11-28 VITALS
DIASTOLIC BLOOD PRESSURE: 87 MMHG | RESPIRATION RATE: 16 BRPM | OXYGEN SATURATION: 97 % | WEIGHT: 156.2 LBS | SYSTOLIC BLOOD PRESSURE: 144 MMHG | TEMPERATURE: 97.1 F | BODY MASS INDEX: 26.96 KG/M2 | HEART RATE: 102 BPM

## 2023-11-28 DIAGNOSIS — D50.9 IRON DEFICIENCY ANEMIA, UNSPECIFIED IRON DEFICIENCY ANEMIA TYPE: Primary | ICD-10-CM

## 2023-11-28 DIAGNOSIS — D64.9 ANEMIA, UNSPECIFIED TYPE: ICD-10-CM

## 2023-11-28 DIAGNOSIS — D47.2 MONOCLONAL GAMMOPATHY: ICD-10-CM

## 2023-11-28 PROCEDURE — 63710000001 DIPHENHYDRAMINE PER 50 MG: Performed by: INTERNAL MEDICINE

## 2023-11-28 PROCEDURE — 96374 THER/PROPH/DIAG INJ IV PUSH: CPT

## 2023-11-28 PROCEDURE — 25010000002 IRON SUCROSE PER 1 MG: Performed by: INTERNAL MEDICINE

## 2023-11-28 PROCEDURE — 77075 RADEX OSSEOUS SURVEY COMPL: CPT

## 2023-11-28 PROCEDURE — 25810000003 SODIUM CHLORIDE 0.9 % SOLUTION: Performed by: INTERNAL MEDICINE

## 2023-11-28 RX ORDER — SODIUM CHLORIDE 9 MG/ML
20 INJECTION, SOLUTION INTRAVENOUS ONCE
Status: COMPLETED | OUTPATIENT
Start: 2023-11-28 | End: 2023-11-28

## 2023-11-28 RX ORDER — ACETAMINOPHEN 325 MG/1
650 TABLET ORAL ONCE
Status: COMPLETED | OUTPATIENT
Start: 2023-11-28 | End: 2023-11-28

## 2023-11-28 RX ORDER — DIPHENHYDRAMINE HCL 25 MG
25 CAPSULE ORAL ONCE
Status: COMPLETED | OUTPATIENT
Start: 2023-11-28 | End: 2023-11-28

## 2023-11-28 RX ADMIN — DIPHENHYDRAMINE HYDROCHLORIDE 25 MG: 25 CAPSULE ORAL at 14:20

## 2023-11-28 RX ADMIN — IRON SUCROSE 200 MG: 20 INJECTION, SOLUTION INTRAVENOUS at 14:42

## 2023-11-28 RX ADMIN — ACETAMINOPHEN 650 MG: 325 TABLET ORAL at 14:20

## 2023-11-28 RX ADMIN — SODIUM CHLORIDE 20 ML/HR: 9 INJECTION, SOLUTION INTRAVENOUS at 14:41

## 2023-12-08 ENCOUNTER — OFFICE VISIT (OUTPATIENT)
Dept: ONCOLOGY | Facility: CLINIC | Age: 63
End: 2023-12-08
Payer: COMMERCIAL

## 2023-12-08 VITALS
SYSTOLIC BLOOD PRESSURE: 165 MMHG | HEIGHT: 63 IN | OXYGEN SATURATION: 100 % | HEART RATE: 80 BPM | BODY MASS INDEX: 27.55 KG/M2 | TEMPERATURE: 98 F | RESPIRATION RATE: 16 BRPM | DIASTOLIC BLOOD PRESSURE: 95 MMHG | WEIGHT: 155.5 LBS

## 2023-12-08 DIAGNOSIS — D50.9 IRON DEFICIENCY ANEMIA, UNSPECIFIED IRON DEFICIENCY ANEMIA TYPE: Primary | ICD-10-CM

## 2023-12-08 NOTE — PROGRESS NOTES
.     REASON FOR FOLLOWUP :   Leukocytosis and anemia and thrombocytosis, MGUS    HISTORY OF PRESENT ILLNESS:  The patient is a 63 y.o. year old female  who is here for follow-up with the above-mentioned history.    No new problems.  No fever, chills, weight loss, night sweats.  No pain.    Past Medical History:   Diagnosis Date    Anemia     Anxiety     Osman esophagus ?    Benign essential hypertension 02/01/2015    Cancer     Cardiomyopathy     Colitis 06/12/2009    Depression     Fibromyalgia     History of gestational diabetes     Hyperlipidemia 04/13/2011 2/1/2015    Hypothyroidism 02/01/2015    Insomnia 02/03/2015    Irritable bowel syndrome 11/30/2009    Panic attacks     Rectocele     Skin cancer of arm     Vaginal prolapse     Vitamin D deficiency 07/19/2012     Past Surgical History:   Procedure Laterality Date    ANTERIOR AND POSTERIOR VAGINAL REPAIR N/A 04/06/2021    Procedure: POSTERIOR VAGINAL REPAIR, CYSTOSCOPY;  Surgeon: Cora Gustafson MD;  Location: [facility] OR;  Service: Gynecology;  Laterality: N/A;    BLADDER REPAIR  09/2015    X2    COLONOSCOPY  2010    wnl    COLONOSCOPY N/A 09/09/2016    Procedure: COLONOSCOPY into cecum and terminal ileum with biopsies;  Surgeon: Orlin Mann MD;  Location: [facility] ENDOSCOPY;  Service:     COLONOSCOPY N/A 09/06/2019    Procedure: COLONOSCOPY TO CECUM AND INTO TERMINAL ILEUM;  Surgeon: Orlin Mann MD;  Location: [facility] ENDOSCOPY;  Service: Gastroenterology    COLONOSCOPY W/ POLYPECTOMY N/A 02/20/2023    Procedure: COLONOSCOPY INTO CECUM AND TI WITH BIOPSIES;  Surgeon: Basilio Martin MD;  Location: [facility] ENDOSCOPY;  Service: Gastroenterology;  Laterality: N/A;  PRE: COLITIS  POST: NORMAL COLON    CYSTOSCOPY W/ URETERAL STENT PLACEMENT      ENDOSCOPY N/A 09/06/2019    Procedure: ESOPHAGOGASTRODUODENOSCOPY WITH COLD BIOPSIES AND 48F HERNÁNDEZ DILATION;  Surgeon: Orlin Mann MD;  Location: [facility] ENDOSCOPY;  Service:  Gastroenterology    ENDOSCOPY N/A 02/20/2023    Procedure: ESOPHAGOGASTRODUODENOSCOPY WITH BIOPSIES;  Surgeon: Basilio Martin MD;  Location: [facility] ENDOSCOPY;  Service: Gastroenterology;  Laterality: N/A;  PRE: MENDEZ'S  POST: HIATAL HERNIA, GASTRITIS    HYSTERECTOMY  12/2013    MOUTH SURGERY      SACROCOLPOPEXY N/A 04/06/2021    Procedure: LARPAROSCOPY COLPOPEXY, BILATERAL SALPINGECTOMY;  Surgeon: Cora Gustafson MD;  Location: [facility] MAIN OR;  Service: Gynecology;  Laterality: N/A;    SKIN CANCER EXCISION      UPPER GASTROINTESTINAL ENDOSCOPY  9/6/3019       MEDICATIONS    Current Outpatient Medications:     ALPRAZolam (Xanax) 0.5 MG tablet, Take 1 tablet by mouth Daily As Needed for Anxiety. D/c previous order, Disp: 30 tablet, Rfl: 2    DULoxetine (CYMBALTA) 60 MG capsule, Take 1 capsule by mouth Daily., Disp: 90 capsule, Rfl: 1    levothyroxine (SYNTHROID, LEVOTHROID) 75 MCG tablet, Take 1 tablet by mouth Daily., Disp: 90 tablet, Rfl: 1    lisinopril (PRINIVIL,ZESTRIL) 20 MG tablet, Take 1 tablet by mouth Daily., Disp: 90 tablet, Rfl: 1    naproxen (NAPROSYN) 500 MG tablet, Take 1 tablet by mouth Daily. (Patient taking differently: Take 1 tablet by mouth Daily As Needed.), Disp: 30 tablet, Rfl: 5    potassium chloride (K-DUR,KLOR-CON) 20 MEQ CR tablet, Take 2 tabs (40meq) daily for 5 days, Disp: 10 tablet, Rfl: 1    potassium chloride (K-DUR,KLOR-CON) 20 MEQ tablet controlled-release ER tablet, Take 2 tabs (40 meq) daily for 5 days, Disp: 10 tablet, Rfl: 1    rosuvastatin (CRESTOR) 5 MG tablet, Take 1 tablet by mouth Daily., Disp: 90 tablet, Rfl: 1    tretinoin (RETIN-A) 0.025 % cream, , Disp: , Rfl:     valACYclovir (VALTREX) 500 MG tablet, Take 1 tablet by mouth As Needed., Disp: , Rfl:     vitamin C (ASCORBIC ACID) 250 MG tablet, Take 2 tablets by mouth Every Night., Disp: , Rfl:     vitamin D (ERGOCALCIFEROL) 1.25 MG (64162 UT) capsule capsule, Take 1 capsule by mouth 1 (One) Time Per Week., Disp:  15 capsule, Rfl: 3    Budesonide (ENTOCORT EC) 3 MG 24 hr capsule, Take 3 tablets every morning x 30 days, then take 2 tablets every morning for 30 days and then one tablet every morning for 30 days and stop., Disp: 180 capsule, Rfl: 0    meclizine (ANTIVERT) 25 MG tablet, Take 1 tablet by mouth Every Night., Disp: 30 tablet, Rfl: 1    ALLERGIES:   No Known Allergies    SOCIAL HISTORY:       Social History     Socioeconomic History    Marital status:      Spouse name: Ruben   Tobacco Use    Smoking status: Never    Smokeless tobacco: Never   Vaping Use    Vaping Use: Never used   Substance and Sexual Activity    Alcohol use: Not Currently     Comment: less than once per  month    Drug use: No    Sexual activity: Yes     Partners: Male     Birth control/protection: Post-menopausal         FAMILY HISTORY:  Family History   Problem Relation Age of Onset    Depression Mother     Heart disease Mother     Stroke Mother     Heart attack Mother     Heart failure Mother     Hyperlipidemia Mother     Hypertension Mother     Arthritis Father     Heart disease Father     Skin cancer Father     Heart attack Father     Heart failure Father     Heart disease Maternal Grandmother     Stroke Maternal Grandfather     Stroke Paternal Grandfather     Hypertension Sister     Hypothyroidism Sister     Hypertension Brother     Hypothyroidism Daughter     Colon cancer Neg Hx     Colon polyps Neg Hx     Liver disease Neg Hx     Rectal cancer Neg Hx     Stomach cancer Neg Hx     Liver cancer Neg Hx     Malig Hyperthermia Neg Hx        REVIEW OF SYSTEMS:  Review of Systems   Constitutional:  Negative for activity change.   HENT:  Negative for nosebleeds and trouble swallowing.    Respiratory:  Negative for shortness of breath and wheezing.    Cardiovascular:  Negative for chest pain and palpitations.   Gastrointestinal:  Negative for constipation, diarrhea and nausea.   Genitourinary:  Negative for dysuria and hematuria.  "  Musculoskeletal:  Negative for arthralgias and myalgias.   Skin:  Negative for rash and wound.   Neurological:  Negative for seizures and syncope.   Hematological:  Negative for adenopathy. Does not bruise/bleed easily.   Psychiatric/Behavioral:  Negative for confusion.               Vitals:    12/08/23 1257   BP: 165/95  Comment: patient was running late for appt   Pulse: 80   Resp: 16   Temp: 98 °F (36.7 °C)   TempSrc: Temporal   SpO2: 100%   Weight: 70.5 kg (155 lb 8 oz)   Height: 160 cm (63\")   PainSc: 0-No pain           12/8/2023    12:58 PM   Current Status   ECOG score 0      PHYSICAL EXAM:          CONSTITUTIONAL:  Vital signs reviewed.  No distress, looks comfortable.  EYES:  Conjunctiva and lids unremarkable.  PERRLA  EARS,NOSE,MOUTH,THROAT:  Ears and nose appear unremarkable.  Lips, teeth, gums appear unremarkable.  RESPIRATORY:  Normal respiratory effort.  Lungs clear to auscultation bilaterally.  CARDIOVASCULAR:  Normal S1, S2.  No murmurs rubs or gallops.  No significant lower extremity edema.  GASTROINTESTINAL: Abdomen appears unremarkable.  Nontender.  No hepatomegaly.  No splenomegaly.  LYMPHATIC:  No cervical, supraclavicular, axillary lymphadenopathy.  SKIN:  Warm.  No rashes.  PSYCHIATRIC:  Normal judgment and insight.  Normal mood and affect.       RECENT LABS:        WBC   Date Value Ref Range Status   10/30/2023 8.23 3.40 - 10.80 10*3/mm3 Final   09/30/2023 5.76 4.5 - 11.0 10*3/uL Final   06/16/2023 7.54 3.40 - 10.80 10*3/mm3 Final   11/03/2022 7.63 3.40 - 10.80 10*3/mm3 Final   05/18/2022 7.95 3.40 - 10.80 10*3/mm3 Final   11/05/2021 6.26 3.40 - 10.80 10*3/mm3 Final   05/25/2021 5.42 3.40 - 10.80 10*3/mm3 Final   05/25/2021 5.63 3.40 - 10.80 10*3/mm3 Final   04/07/2021 12.93 (H) 3.40 - 10.80 10*3/mm3 Final   03/31/2021 5.44 3.40 - 10.80 10*3/mm3 Final   02/22/2021 9.80 3.40 - 10.80 10*3/mm3 Final   01/14/2021 11.62 (H) 3.40 - 10.80 10*3/mm3 Final   12/09/2020 13.23 (H) 3.40 - 10.80 " 10*3/mm3 Final   12/02/2020 16.8 (H) 3.4 - 10.8 x10E3/uL Final   02/22/2020 6.2 3.4 - 10.8 x10E3/uL Final   03/21/2019 6.56 3.40 - 10.80 10*3/mm3 Final   10/21/2017 8.3 3.4 - 10.8 x10E3/uL Final   06/28/2016 7.94 4.50 - 10.70 10*3/mm3 Final   09/03/2015 7.56 4.50 - 10.70 K/Cumm Final   04/07/2014 6.03 4.50 - 10.70 K/Cumm Final     Hemoglobin   Date Value Ref Range Status   10/30/2023 13.7 12.0 - 15.9 g/dL Final   09/30/2023 13.4 12.0 - 16.0 g/dL Final   06/16/2023 12.9 12.0 - 15.9 g/dL Final   11/03/2022 14.0 12.0 - 15.9 g/dL Final   05/18/2022 13.7 12.0 - 15.9 g/dL Final   11/05/2021 13.3 12.0 - 15.9 g/dL Final   05/25/2021 13.2 12.0 - 15.9 g/dL Final   05/25/2021 13.0 12.0 - 15.9 g/dL Final   04/07/2021 11.4 (L) 12.0 - 15.9 g/dL Final   03/31/2021 12.5 12.0 - 15.9 g/dL Final   02/22/2021 11.6 (L) 12.0 - 15.9 g/dL Final   01/14/2021 12.4 12.0 - 15.9 g/dL Final   12/09/2020 10.5 (L) 12.0 - 15.9 g/dL Final   12/02/2020 10.0 (L) 11.1 - 15.9 g/dL Final   02/22/2020 12.9 11.1 - 15.9 g/dL Final   03/21/2019 13.6 12.0 - 15.9 g/dL Final   10/21/2017 13.0 11.1 - 15.9 g/dL Final   06/28/2016 13.9 11.9 - 15.5 g/dL Final   09/03/2015 12.4 11.9 - 15.5 g/dL Final   04/07/2014 12.5 11.9 - 15.5 g/dL Final     Platelets   Date Value Ref Range Status   10/30/2023 342 140 - 450 10*3/mm3 Final   09/30/2023 337 140 - 440 10*3/uL Final   06/16/2023 268 140 - 450 10*3/mm3 Final   11/03/2022 323 140 - 450 10*3/mm3 Final   05/18/2022 277 140 - 450 10*3/mm3 Final   11/05/2021 303 140 - 450 10*3/mm3 Final   05/25/2021 321 140 - 450 10*3/mm3 Final   05/25/2021 320 140 - 450 10*3/mm3 Final   04/07/2021 344 140 - 450 10*3/mm3 Final   03/31/2021 284 140 - 450 10*3/mm3 Final   02/22/2021 445 140 - 450 10*3/mm3 Final   01/14/2021 404 140 - 450 10*3/mm3 Final   12/09/2020 603 (H) 140 - 450 10*3/mm3 Final   12/02/2020 591 (H) 150 - 450 x10E3/uL Final   02/22/2020 354 150 - 450 x10E3/uL Final   03/21/2019 336 140 - 450 10*3/mm3 Final   10/21/2017 293  150 - 379 x10E3/uL Final   06/28/2016 289 140 - 500 10*3/mm3 Final   09/03/2015 300 140 - 500 K/Cumm Final   04/07/2014 277 140 - 500 K/Cumm Final       Assessment & Plan   Iron deficiency anemia, unspecified iron deficiency anemia type  - Basic Metabolic Panel  - DAVID,PE and FLC, Serum            Wilma Longo   *IgG lambda MGUS  Work-up of celiac disease by Dr. Martin revealed elevated IgA.  He referred to Dr. Ken, allergy/immunology.  Dr. De La Paz did SIFE revealing IgG lambda monoclonal protein (not IgA).  10/30/2023: Ordered SPEP, SIFE, serum free light chains, 24-hour urine studies, and bone survey.  Explained to patient I doubt this is multiple myeloma considering total IgG not elevated and the elevated IgA is polyclonal.  10/2/2023: Serum: IgG lambda monoclonal protein.  M spike 0.1.  K/L ratio normal, 1.22.  24-hour urine 11/13/2023: DAVID: No UPEP: No M spike.  Urine K/L ratio normal, 12.9.  Bone survey 11/28/2023: Negative.    *Leukocytosis  WBC normal through February 2020  WBC 16.8 on 12/2/2020.  WBC 13.2 on 12/9/2020.  Differentials unremarkable.  On 1/14/2021, WBC 11.6.  Differential unremarkable.  WBC 8.3, from 7.5, from 7.6, from 8    *Iron deficiency anemia  Hb normal through February 2020.  MCV around 90.  Hb 10 on 12/2/2020, 10.5 on 12/9/2020.  MCV 83.8.  On 1/14/2021, ferritin 112, 3% saturation, reticulated hemoglobin low end of normal at 30.4.  Hb up to normal, 12.4 (without iron replacement).  Hb normalized without iron replacement.  Perhaps this is because rectal bleeding which started early December 2020, decreased, although persists.  2/22/2021, ferritin 132, 8% saturation, Hb 11.6.    Difficulty tolerating oral iron due to colitis/diarrhea.  No significant improvement from oral iron, thought to be due to poor absorption.  Received 600 mg Venofer  Her insurance will not cover Injectafer.  It does cover Venofer.  5/25/2021: Ferritin 141, 25% saturation, Hb 13.  No need for IV iron.  .  11/5/2021: Ferritin 94, 18% saturation, Hb 13.3.  No need for IV iron.  5/18/2022: Ferritin 74, 14% saturation, Hb 13.7.  No need for IV iron  11/3/2022: Ferritin 55, 19% saturation, Hb 14.  No need for IV iron.  6/16/2023: Ferritin 72, 21% saturation, Hb 12.9.  No need for IV iron  10/30/2023: Ferritin 42, 9% saturation, Hb 13.7.  Venofer 200 mg x 5 doses.  Venofer 200 mg x 5 doses.  Check iron labs again at next visit    *Lymphocytic colitis.  Because of this, patient states that she cannot tolerate oral iron.  Budesonide started through Dr. Martin, GI, 5/16/2023    *Etiology of iron deficiency  EGD and colonoscopy by Dr. Chadd Mann (who has since retired), on 9/6/2019.  Follows with Dr. Gibbons.  Dr. Martin is considering GI evaluation but waiting on pelvic floor dysfunction treatment first.  He reported essentially normal colonoscopy 2019    *Thrombocytosis  PLT normal through February 2020   on 12/2/2020.   on 12/9/2020.  On 1/14/2021,   , from 268, from 323, from 277, from 303  Check PLT again at next visit    *Patient states she was diagnosed with [facility]  Early April 2021 rectal/bladder/vaginal prolapse repair planned    *Moderate to severe right hydronephrosis, incidentally found on liver ultrasound, from 12/19/2020 (U/S was to evaluate high LFTs), ordered by PCP, Dr. Virgen  Patient states PCP ordered a subsequent CT abdomen.  Patient chose to delay having this done as she thought fixing the bladder prolapse might take care of the unilateral hydronephrosis.  Dr. Peter Vivar plans to treat the kidney stone.  Patient states the stone was removed through cystoscopy.    *Small ill-defined sclerotic abnormality L2, indeterminate   incidentally found on CT AP, 2/8/2021 to work-up hydroureteronephrosis which was felt to be due to a 6 mm right proximal ureteric calculus.  Bone scan 2/22/2021 unremarkable.  Therefore, plan no further  work-up or follow-up of this    *Hematochezia  Specifically, pain with wiping (no blood mixed in stools), since early December 2020.  This has since improved.  Following with GI, Dr. Martin, on Monday, 1/18/2021  Continues to have no hematochezia since prolapse surgery early April 2021  11/10/2022: Still having mild amounts of blood with wiping.  None mixed in stool.  Continues to follow with GI, appointment soon    *Overweight.  Being overweight can lead to cytopenias through hepatic steatosis.    Body mass index is 27.55 kg/m².  BMI 25 to <30 is overweight  BMI 30 to <35 is class 1 obesity  BMI 35 to <40 is class 2 obesity  BMI 40 or higher is class 3 obesity   Continuing to be overweight.  Ideally, lose weight    Plan  MD 6 months. 2 weeks prior: Ferritin, iron panel, CBC, reticulate hemoglobin, BMP, SPEP, SIFE, serum free light chains.   Her insurance does not cover Injectafer, but does cover Venofer      Send a copy this to Dr. De La Paz, allergy immunology    40 minutes.  Total time.  Same day.

## 2024-02-08 ENCOUNTER — OFFICE VISIT (OUTPATIENT)
Dept: FAMILY MEDICINE CLINIC | Facility: CLINIC | Age: 64
End: 2024-02-08
Payer: COMMERCIAL

## 2024-02-08 VITALS
BODY MASS INDEX: 27.64 KG/M2 | HEIGHT: 63 IN | SYSTOLIC BLOOD PRESSURE: 152 MMHG | DIASTOLIC BLOOD PRESSURE: 86 MMHG | OXYGEN SATURATION: 97 % | WEIGHT: 156 LBS | HEART RATE: 113 BPM

## 2024-02-08 DIAGNOSIS — J10.1 INFLUENZA A: Primary | ICD-10-CM

## 2024-02-08 LAB
EXPIRATION DATE: ABNORMAL
FLUAV AG UPPER RESP QL IA.RAPID: DETECTED
FLUBV AG UPPER RESP QL IA.RAPID: NOT DETECTED
INTERNAL CONTROL: ABNORMAL
Lab: ABNORMAL
SARS-COV-2 AG UPPER RESP QL IA.RAPID: NOT DETECTED

## 2024-02-08 PROCEDURE — 99214 OFFICE O/P EST MOD 30 MIN: CPT | Performed by: FAMILY MEDICINE

## 2024-02-08 PROCEDURE — 87428 SARSCOV & INF VIR A&B AG IA: CPT | Performed by: FAMILY MEDICINE

## 2024-02-08 RX ORDER — OSELTAMIVIR PHOSPHATE 75 MG/1
75 CAPSULE ORAL 2 TIMES DAILY
Qty: 10 CAPSULE | Refills: 0 | Status: SHIPPED | OUTPATIENT
Start: 2024-02-08 | End: 2024-02-13

## 2024-02-08 NOTE — PROGRESS NOTES
"Chief Complaint  Cough    Subjective        Cough       Arline presents to [facility] for    Flulike symptoms since Monday.  Symptoms worsen or so by Tuesday afternoon or evening.  She has had low-grade fever aches and cough that is nonproductive.  Home testing for COVID was negative.  no one else in the household is currently ill.  Review of Systems   Respiratory:  Positive for cough.         Objective   Vital Signs:   Vitals:    02/08/24 1515   BP: 152/86   BP Location: Right arm   Patient Position: Sitting   Cuff Size: Adult   Pulse: 113   SpO2: 97%   Weight: 70.8 kg (156 lb)   Height: 160 cm (62.99\")            12/8/2023    12:59 PM   PHQ-2/PHQ-9 Depression Screening   Little Interest or Pleasure in Doing Things 0-->not at all   Feeling Down, Depressed or Hopeless 1-->several days   PHQ-9: Brief Depression Severity Measure Score 1                 Physical Exam  Vitals reviewed.   Constitutional:       Appearance: She is ill-appearing (mild).   HENT:      Right Ear: Tympanic membrane normal.      Left Ear: Tympanic membrane normal.   Cardiovascular:      Rate and Rhythm: Normal rate and regular rhythm.   Pulmonary:      Effort: Pulmonary effort is normal. No respiratory distress.      Breath sounds: Normal breath sounds.   Lymphadenopathy:      Cervical: No cervical adenopathy.   Skin:     General: Skin is warm and dry.   Neurological:      Mental Status: She is alert.   Psychiatric:         Mood and Affect: Mood normal.          Result Review :     The following data was reviewed by: Jerman Johnson MD on 02/08/2024:  POCT SARS-CoV-2 Antigen JOSEE + Flu (02/08/2024 15:38)          Assessment and Plan    Assessment & Plan  Influenza A  Symptomatic treatment with fluids rest and Tylenol as needed for aches and pains and low-grade fever.  Tamiflu x 5 days.  Isolate until symptom-free for 48 hours.  Patient missed work since Tuesday.  She plans on returning to work next Monday.  Note " created.    Orders Placed This Encounter   Procedures    POCT SARS-CoV-2 Antigen JOSEE + Flu     New Medications Ordered This Visit   Medications    oseltamivir (Tamiflu) 75 MG capsule     Sig: Take 1 capsule by mouth 2 (Two) Times a Day for 5 days.     Dispense:  10 capsule     Refill:  0          Follow Up   Return if symptoms worsen or fail to improve.  Patient was given instructions and counseling regarding her condition or for health maintenance advice. Please see specific information pulled into the AVS if appropriate.

## 2024-02-08 NOTE — LETTER
February 8, 2024     Patient: Wilma Longo   YOB: 1960   Date of Visit: 2/8/2024       To Whom It May Concern:    It is my medical opinion that Wilma Longo may return to work next Monday. 2/12/24. Work absence is due to acute illness. Please excuse her absence from work since Tuesday of this week.        Sincerely,        Jerman Johnson MD    CC: No Recipients

## 2024-02-08 NOTE — ASSESSMENT & PLAN NOTE
Symptomatic treatment with fluids rest and Tylenol as needed for aches and pains and low-grade fever.  Tamiflu x 5 days.  Isolate until symptom-free for 48 hours.  Patient missed work since Tuesday.  She plans on returning to work next Monday.  Note created.

## 2024-05-13 DIAGNOSIS — E03.9 ACQUIRED HYPOTHYROIDISM: Chronic | ICD-10-CM

## 2024-05-14 RX ORDER — LEVOTHYROXINE SODIUM 0.07 MG/1
75 TABLET ORAL DAILY
Qty: 14 TABLET | Refills: 0 | Status: SHIPPED | OUTPATIENT
Start: 2024-05-14

## 2024-05-20 DIAGNOSIS — F39 MOOD DISORDER: Chronic | ICD-10-CM

## 2024-05-21 RX ORDER — ALPRAZOLAM 0.5 MG/1
0.5 TABLET ORAL DAILY PRN
Qty: 30 TABLET | OUTPATIENT
Start: 2024-05-21

## 2024-05-31 ENCOUNTER — TELEPHONE (OUTPATIENT)
Dept: ONCOLOGY | Facility: CLINIC | Age: 64
End: 2024-05-31

## 2024-05-31 NOTE — TELEPHONE ENCOUNTER
"    Caller: Wilma Longo \"Arline\"    Relationship to patient: Self    Best call back number: [phone]    Type of visit: VITALS AND F/U APPT    Requested date: PLEASE CALL PATIENT TO R/S - SHE CAN DO [facility] OR EP LOCATION    If rescheduling, when is the original appointment: 7/5  "

## 2024-06-04 DIAGNOSIS — E03.9 ACQUIRED HYPOTHYROIDISM: Chronic | ICD-10-CM

## 2024-06-04 RX ORDER — LEVOTHYROXINE SODIUM 0.07 MG/1
75 TABLET ORAL DAILY
Qty: 30 TABLET | Refills: 0 | Status: SHIPPED | OUTPATIENT
Start: 2024-06-04

## 2024-06-20 DIAGNOSIS — I10 BENIGN ESSENTIAL HYPERTENSION: Chronic | ICD-10-CM

## 2024-06-20 RX ORDER — LISINOPRIL 20 MG/1
20 TABLET ORAL DAILY
Qty: 30 TABLET | Refills: 0 | Status: SHIPPED | OUTPATIENT
Start: 2024-06-20

## 2024-07-11 ENCOUNTER — LAB (OUTPATIENT)
Dept: LAB | Facility: HOSPITAL | Age: 64
End: 2024-07-11
Payer: COMMERCIAL

## 2024-07-11 DIAGNOSIS — D47.2 IGG LAMBDA MONOCLONAL GAMMOPATHY: ICD-10-CM

## 2024-07-11 DIAGNOSIS — D47.2 MONOCLONAL GAMMOPATHY: ICD-10-CM

## 2024-07-11 DIAGNOSIS — D64.9 ANEMIA, UNSPECIFIED TYPE: ICD-10-CM

## 2024-07-11 DIAGNOSIS — D50.9 IRON DEFICIENCY ANEMIA, UNSPECIFIED IRON DEFICIENCY ANEMIA TYPE: ICD-10-CM

## 2024-07-11 DIAGNOSIS — D50.9 IRON DEFICIENCY ANEMIA, UNSPECIFIED IRON DEFICIENCY ANEMIA TYPE: Primary | ICD-10-CM

## 2024-07-11 LAB
ANION GAP SERPL CALCULATED.3IONS-SCNC: 11 MMOL/L (ref 5–15)
BASOPHILS # BLD AUTO: 0.06 10*3/MM3 (ref 0–0.2)
BASOPHILS NFR BLD AUTO: 0.7 % (ref 0–1.5)
BUN SERPL-MCNC: 12 MG/DL (ref 8–23)
BUN/CREAT SERPL: 12.2 (ref 7–25)
CALCIUM SPEC-SCNC: 9.1 MG/DL (ref 8.6–10.5)
CHLORIDE SERPL-SCNC: 103 MMOL/L (ref 98–107)
CO2 SERPL-SCNC: 30 MMOL/L (ref 22–29)
CREAT SERPL-MCNC: 0.98 MG/DL (ref 0.57–1)
DEPRECATED RDW RBC AUTO: 44.2 FL (ref 37–54)
EGFRCR SERPLBLD CKD-EPI 2021: 64.6 ML/MIN/1.73
EOSINOPHIL # BLD AUTO: 0.22 10*3/MM3 (ref 0–0.4)
EOSINOPHIL NFR BLD AUTO: 2.7 % (ref 0.3–6.2)
ERYTHROCYTE [DISTWIDTH] IN BLOOD BY AUTOMATED COUNT: 13.2 % (ref 12.3–15.4)
FERRITIN SERPL-MCNC: 218 NG/ML (ref 13–150)
GLUCOSE SERPL-MCNC: 97 MG/DL (ref 65–99)
HCT VFR BLD AUTO: 41.9 % (ref 34–46.6)
HGB BLD-MCNC: 13.3 G/DL (ref 12–15.9)
HGB RETIC QN AUTO: 32.6 PG (ref 29.8–36.1)
IMM GRANULOCYTES # BLD AUTO: 0.03 10*3/MM3 (ref 0–0.05)
IMM GRANULOCYTES NFR BLD AUTO: 0.4 % (ref 0–0.5)
IMM RETICS NFR: 9.1 % (ref 3–15.8)
IRON 24H UR-MRATE: 32 MCG/DL (ref 37–145)
IRON SATN MFR SERPL: 10 % (ref 20–50)
LYMPHOCYTES # BLD AUTO: 3.45 10*3/MM3 (ref 0.7–3.1)
LYMPHOCYTES NFR BLD AUTO: 42.3 % (ref 19.6–45.3)
MCH RBC QN AUTO: 29.4 PG (ref 26.6–33)
MCHC RBC AUTO-ENTMCNC: 31.7 G/DL (ref 31.5–35.7)
MCV RBC AUTO: 92.5 FL (ref 79–97)
MONOCYTES # BLD AUTO: 0.72 10*3/MM3 (ref 0.1–0.9)
MONOCYTES NFR BLD AUTO: 8.8 % (ref 5–12)
NEUTROPHILS NFR BLD AUTO: 3.68 10*3/MM3 (ref 1.7–7)
NEUTROPHILS NFR BLD AUTO: 45.1 % (ref 42.7–76)
NRBC BLD AUTO-RTO: 0 /100 WBC (ref 0–0.2)
PLATELET # BLD AUTO: 290 10*3/MM3 (ref 140–450)
PMV BLD AUTO: 9.2 FL (ref 6–12)
POTASSIUM SERPL-SCNC: 3.4 MMOL/L (ref 3.5–5.2)
RBC # BLD AUTO: 4.53 10*6/MM3 (ref 3.77–5.28)
RETICS # AUTO: 0.09 10*6/MM3 (ref 0.02–0.13)
RETICS/RBC NFR AUTO: 2.08 % (ref 0.7–1.9)
SODIUM SERPL-SCNC: 144 MMOL/L (ref 136–145)
TIBC SERPL-MCNC: 310 MCG/DL (ref 298–536)
TRANSFERRIN SERPL-MCNC: 208 MG/DL (ref 200–360)
WBC NRBC COR # BLD AUTO: 8.16 10*3/MM3 (ref 3.4–10.8)

## 2024-07-11 PROCEDURE — 84466 ASSAY OF TRANSFERRIN: CPT

## 2024-07-11 PROCEDURE — 36415 COLL VENOUS BLD VENIPUNCTURE: CPT

## 2024-07-11 PROCEDURE — 80048 BASIC METABOLIC PNL TOTAL CA: CPT

## 2024-07-11 PROCEDURE — 83540 ASSAY OF IRON: CPT

## 2024-07-11 PROCEDURE — 85046 RETICYTE/HGB CONCENTRATE: CPT

## 2024-07-11 PROCEDURE — 82728 ASSAY OF FERRITIN: CPT

## 2024-07-11 PROCEDURE — 85025 COMPLETE CBC W/AUTO DIFF WBC: CPT

## 2024-07-12 LAB
ALBUMIN SERPL ELPH-MCNC: 3.6 G/DL (ref 2.9–4.4)
ALBUMIN/GLOB SERPL: 1.1 {RATIO} (ref 0.7–1.7)
ALPHA1 GLOB SERPL ELPH-MCNC: 0.2 G/DL (ref 0–0.4)
ALPHA2 GLOB SERPL ELPH-MCNC: 0.8 G/DL (ref 0.4–1)
B-GLOBULIN SERPL ELPH-MCNC: 1.1 G/DL (ref 0.7–1.3)
GAMMA GLOB SERPL ELPH-MCNC: 1.4 G/DL (ref 0.4–1.8)
GLOBULIN SER-MCNC: 3.6 G/DL (ref 2.2–3.9)
IGA SERPL-MCNC: 461 MG/DL (ref 87–352)
IGG SERPL-MCNC: 1389 MG/DL (ref 586–1602)
IGM SERPL-MCNC: 64 MG/DL (ref 26–217)
INTERPRETATION SERPL IEP-IMP: ABNORMAL
KAPPA LC FREE SER-MCNC: 51 MG/L (ref 3.3–19.4)
KAPPA LC FREE/LAMBDA FREE SER: 1.11 {RATIO} (ref 0.26–1.65)
LABORATORY COMMENT REPORT: ABNORMAL
LAMBDA LC FREE SERPL-MCNC: 46.1 MG/L (ref 5.7–26.3)
M PROTEIN SERPL ELPH-MCNC: 0.1 G/DL
PROT SERPL-MCNC: 7.2 G/DL (ref 6–8.5)

## 2024-07-13 DIAGNOSIS — M79.7 FIBROMYALGIA: Chronic | ICD-10-CM

## 2024-07-13 DIAGNOSIS — E03.9 ACQUIRED HYPOTHYROIDISM: Chronic | ICD-10-CM

## 2024-07-13 DIAGNOSIS — E78.2 MIXED HYPERLIPIDEMIA: Chronic | ICD-10-CM

## 2024-07-14 RX ORDER — ROSUVASTATIN CALCIUM 5 MG/1
5 TABLET, COATED ORAL DAILY
Qty: 30 TABLET | Refills: 0 | Status: SHIPPED | OUTPATIENT
Start: 2024-07-14

## 2024-07-14 RX ORDER — DULOXETIN HYDROCHLORIDE 60 MG/1
60 CAPSULE, DELAYED RELEASE ORAL DAILY
Qty: 30 CAPSULE | Refills: 0 | Status: SHIPPED | OUTPATIENT
Start: 2024-07-14

## 2024-07-14 RX ORDER — LEVOTHYROXINE SODIUM 0.07 MG/1
75 TABLET ORAL DAILY
Qty: 30 TABLET | Refills: 0 | Status: SHIPPED | OUTPATIENT
Start: 2024-07-14

## 2024-07-22 DIAGNOSIS — I10 BENIGN ESSENTIAL HYPERTENSION: Chronic | ICD-10-CM

## 2024-07-22 RX ORDER — LISINOPRIL 20 MG/1
20 TABLET ORAL DAILY
Qty: 15 TABLET | Refills: 0 | Status: SHIPPED | OUTPATIENT
Start: 2024-07-22

## 2024-07-25 ENCOUNTER — OFFICE VISIT (OUTPATIENT)
Dept: ONCOLOGY | Facility: CLINIC | Age: 64
End: 2024-07-25
Payer: COMMERCIAL

## 2024-07-25 VITALS
HEIGHT: 63 IN | TEMPERATURE: 98.6 F | HEART RATE: 88 BPM | DIASTOLIC BLOOD PRESSURE: 80 MMHG | OXYGEN SATURATION: 97 % | BODY MASS INDEX: 28.62 KG/M2 | WEIGHT: 161.5 LBS | SYSTOLIC BLOOD PRESSURE: 145 MMHG | RESPIRATION RATE: 16 BRPM

## 2024-07-25 DIAGNOSIS — D50.9 IRON DEFICIENCY ANEMIA, UNSPECIFIED IRON DEFICIENCY ANEMIA TYPE: Primary | ICD-10-CM

## 2024-07-25 DIAGNOSIS — D47.2 MONOCLONAL GAMMOPATHY: ICD-10-CM

## 2024-07-25 DIAGNOSIS — D47.2 IGG LAMBDA MONOCLONAL GAMMOPATHY: ICD-10-CM

## 2024-07-25 PROCEDURE — 99214 OFFICE O/P EST MOD 30 MIN: CPT | Performed by: NURSE PRACTITIONER

## 2024-07-25 NOTE — PROGRESS NOTES
.     REASON FOR FOLLOWUP :   Leukocytosis and anemia and thrombocytosis, MGUS    HISTORY OF PRESENT ILLNESS:  The patient is a 64 y.o. year old female  who is here for follow-up with the above-mentioned history.    The patient returns the office today, 7/25/2024 for 6-month follow-up and review.  She continues to struggle with colitis symptoms with intermittent bright red blood in her stool.  She also continues to have fatigue.  She reports occasional night sweats..    Past Medical History:   Diagnosis Date    Anemia     Anxiety     Osman esophagus ?    Benign essential hypertension 02/01/2015    Cancer     Cardiomyopathy     Colitis 06/12/2009    Depression     Fibromyalgia     History of gestational diabetes     Hyperlipidemia 04/13/2011 2/1/2015    Hypothyroidism 02/01/2015    Insomnia 02/03/2015    Irritable bowel syndrome 11/30/2009    Panic attacks     Rectocele     Skin cancer of arm     Vaginal prolapse     Vitamin D deficiency 07/19/2012     Past Surgical History:   Procedure Laterality Date    ANTERIOR AND POSTERIOR VAGINAL REPAIR N/A 04/06/2021    Procedure: POSTERIOR VAGINAL REPAIR, CYSTOSCOPY;  Surgeon: Cora Gustafson MD;  Location: [facility] OR;  Service: Gynecology;  Laterality: N/A;    BLADDER REPAIR  09/2015    X2    COLONOSCOPY  2010    wnl    COLONOSCOPY N/A 09/09/2016    Procedure: COLONOSCOPY into cecum and terminal ileum with biopsies;  Surgeon: Orlin Mann MD;  Location: [facility] ENDOSCOPY;  Service:     COLONOSCOPY N/A 09/06/2019    Procedure: COLONOSCOPY TO CECUM AND INTO TERMINAL ILEUM;  Surgeon: Orlin Mann MD;  Location: [facility] ENDOSCOPY;  Service: Gastroenterology    COLONOSCOPY W/ POLYPECTOMY N/A 02/20/2023    Procedure: COLONOSCOPY INTO CECUM AND TI WITH BIOPSIES;  Surgeon: Basilio Martin MD;  Location: [facility] ENDOSCOPY;  Service: Gastroenterology;  Laterality: N/A;  PRE: COLITIS  POST: NORMAL COLON    CYSTOSCOPY W/ URETERAL STENT PLACEMENT      ENDOSCOPY  N/A 09/06/2019    Procedure: ESOPHAGOGASTRODUODENOSCOPY WITH COLD BIOPSIES AND 48F HERNÁNDEZ DILATION;  Surgeon: Orlin Mann MD;  Location: [facility] ENDOSCOPY;  Service: Gastroenterology    ENDOSCOPY N/A 02/20/2023    Procedure: ESOPHAGOGASTRODUODENOSCOPY WITH BIOPSIES;  Surgeon: Basilio Martin MD;  Location: [facility] ENDOSCOPY;  Service: Gastroenterology;  Laterality: N/A;  PRE: MENDEZ'S  POST: HIATAL HERNIA, GASTRITIS    HYSTERECTOMY  12/2013    MOUTH SURGERY      SACROCOLPOPEXY N/A 04/06/2021    Procedure: LARPAROSCOPY COLPOPEXY, BILATERAL SALPINGECTOMY;  Surgeon: Cora Gustafson MD;  Location: [facility] MAIN OR;  Service: Gynecology;  Laterality: N/A;    SKIN CANCER EXCISION      UPPER GASTROINTESTINAL ENDOSCOPY  9/6/3019       MEDICATIONS    Current Outpatient Medications:     ALPRAZolam (Xanax) 0.5 MG tablet, Take 1 tablet by mouth Daily As Needed for Anxiety. D/c previous order, Disp: 30 tablet, Rfl: 2    DULoxetine (CYMBALTA) 60 MG capsule, TAKE 1 CAPSULE BY MOUTH DAILY, Disp: 30 capsule, Rfl: 0    levothyroxine (SYNTHROID, LEVOTHROID) 75 MCG tablet, TAKE 1 TABLET BY MOUTH DAILY, Disp: 30 tablet, Rfl: 0    lisinopril (PRINIVIL,ZESTRIL) 20 MG tablet, TAKE 1 TABLET BY MOUTH DAILY, Disp: 15 tablet, Rfl: 0    naproxen (NAPROSYN) 500 MG tablet, Take 1 tablet by mouth Daily. (Patient taking differently: Take 1 tablet by mouth Daily As Needed.), Disp: 30 tablet, Rfl: 5    potassium chloride (K-DUR,KLOR-CON) 20 MEQ CR tablet, Take 2 tabs (40meq) daily for 5 days, Disp: 10 tablet, Rfl: 1    potassium chloride (K-DUR,KLOR-CON) 20 MEQ tablet controlled-release ER tablet, Take 2 tabs (40 meq) daily for 5 days, Disp: 10 tablet, Rfl: 1    rosuvastatin (CRESTOR) 5 MG tablet, TAKE 1 TABLET BY MOUTH DAILY, Disp: 30 tablet, Rfl: 0    tretinoin (RETIN-A) 0.025 % cream, , Disp: , Rfl:     valACYclovir (VALTREX) 500 MG tablet, Take 1 tablet by mouth As Needed., Disp: , Rfl:     vitamin C (ASCORBIC ACID) 250 MG tablet, Take  "2 tablets by mouth Every Night., Disp: , Rfl:     vitamin D (ERGOCALCIFEROL) 1.25 MG (20297 UT) capsule capsule, Take 1 capsule by mouth 1 (One) Time Per Week., Disp: 15 capsule, Rfl: 3    ALLERGIES:   No Known Allergies    SOCIAL HISTORY:       Social History     Socioeconomic History    Marital status:      Spouse name: Ruben   Tobacco Use    Smoking status: Never    Smokeless tobacco: Never   Vaping Use    Vaping status: Never Used   Substance and Sexual Activity    Alcohol use: Not Currently     Comment: less than once per  month    Drug use: No    Sexual activity: Yes     Partners: Male     Birth control/protection: Post-menopausal         FAMILY HISTORY:  Family History   Problem Relation Age of Onset    Depression Mother     Heart disease Mother     Stroke Mother     Heart attack Mother     Heart failure Mother     Hyperlipidemia Mother     Hypertension Mother     Arthritis Father     Heart disease Father     Skin cancer Father     Heart attack Father     Heart failure Father     Heart disease Maternal Grandmother     Stroke Maternal Grandfather     Stroke Paternal Grandfather     Hypertension Sister     Hypothyroidism Sister     Hypertension Brother     Hypothyroidism Daughter     Colon cancer Neg Hx     Colon polyps Neg Hx     Liver disease Neg Hx     Rectal cancer Neg Hx     Stomach cancer Neg Hx     Liver cancer Neg Hx     Malig Hyperthermia Neg Hx        REVIEW OF SYSTEMS:  Review of Systems  ROS as per HPI         Vitals:    07/25/24 1544   BP: 145/80   Pulse: 88   Resp: 16   Temp: 98.6 °F (37 °C)   TempSrc: Oral   SpO2: 97%   Weight: 73.3 kg (161 lb 8 oz)   Height: 160 cm (62.99\")   PainSc: 0-No pain           7/25/2024     3:43 PM   Current Status   ECOG score 0      PHYSICAL EXAM:    CONSTITUTIONAL:  Vital signs reviewed.  No distress, looks comfortable.  EYES:  Conjunctiva and lids unremarkable.  PERRLA  EARS,NOSE,MOUTH,THROAT:  Ears and nose appear unremarkable.  Lips, teeth, gums appear " unremarkable.  RESPIRATORY:  Normal respiratory effort.  Lungs clear to auscultation bilaterally.  CARDIOVASCULAR:  Normal S1, S2.  No murmurs rubs or gallops.  No significant lower extremity edema.  GASTROINTESTINAL: Abdomen appears unremarkable.  Nontender.  No hepatomegaly.  No splenomegaly.  LYMPHATIC:  No cervical, supraclavicular, axillary lymphadenopathy.  SKIN:  Warm.  No rashes.  PSYCHIATRIC:  Normal judgment and insight.  Normal mood and affect.       RECENT LABS:  CBC & Differential (07/11/2024 15:35)  Retic With IRF & RET-He (07/11/2024 15:35)  Ferritin (07/11/2024 15:35)  Iron Profile (07/11/2024 15:35)  DAVID,PE and FLC, Serum (07/11/2024 15:35)  Basic Metabolic Panel (07/11/2024 15:35)    Assessment & Plan   Iron deficiency anemia, unspecified iron deficiency anemia type  - CBC & Differential  - Retic With IRF & RET-He  - Ferritin  - Iron Profile  - Basic Metabolic Panel  - DAVID, PE & Free LT Chains, Ser    Monoclonal gammopathy  - CBC & Differential  - Retic With IRF & RET-He  - Ferritin  - Iron Profile  - Basic Metabolic Panel  - DAVID, PE & Free LT Chains, Ser    IgG lambda monoclonal gammopathy  - CBC & Differential  - Retic With IRF & RET-He  - Ferritin  - Iron Profile  - Basic Metabolic Panel  - DAVID, PE & Free LT Chains, Ser              Wilma Longo   *IgG lambda MGUS  Work-up of celiac disease by Dr. Martin revealed elevated IgA.  He referred to Dr. Ken, allergy/immunology.  Dr. De La Paz did SIFE revealing IgG lambda monoclonal protein (not IgA).  10/30/2023: Ordered SPEP, SIFE, serum free light chains, 24-hour urine studies, and bone survey.  Explained to patient I doubt this is multiple myeloma considering total IgG not elevated and the elevated IgA is polyclonal.  10/2/2023: Serum: IgG lambda monoclonal protein.  M spike 0.1.  K/L ratio normal, 1.22.  24-hour urine 11/13/2023: DAVID: No UPEP: No M spike.  Urine K/L ratio normal, 12.9.  Bone survey 11/28/2023: Negative.  7/5/2024 IgG  lambda monoclonal protein: M spike 0.1.  Kappa lambda ratio normal 1.11.    *Leukocytosis  WBC normal through February 2020  WBC 16.8 on 12/2/2020.  WBC 13.2 on 12/9/2020.  Differentials unremarkable.  On 1/14/2021, WBC 11.6.  Differential unremarkable.  WBC currently normal at 8.16    *Iron deficiency anemia  Hb normal through February 2020.  MCV around 90.  Hb 10 on 12/2/2020, 10.5 on 12/9/2020.  MCV 83.8.  On 1/14/2021, ferritin 112, 3% saturation, reticulated hemoglobin low end of normal at 30.4.  Hb up to normal, 12.4 (without iron replacement).  Hb normalized without iron replacement.  Perhaps this is because rectal bleeding which started early December 2020, decreased, although persists.  2/22/2021, ferritin 132, 8% saturation, Hb 11.6.    Difficulty tolerating oral iron due to colitis/diarrhea.  No significant improvement from oral iron, thought to be due to poor absorption.  Received 600 mg Venofer  Her insurance will not cover Injectafer.  It does cover Venofer.  5/25/2021: Ferritin 141, 25% saturation, Hb 13.  No need for IV iron. .  11/5/2021: Ferritin 94, 18% saturation, Hb 13.3.  No need for IV iron.  5/18/2022: Ferritin 74, 14% saturation, Hb 13.7.  No need for IV iron  11/3/2022: Ferritin 55, 19% saturation, Hb 14.  No need for IV iron.  6/16/2023: Ferritin 72, 21% saturation, Hb 12.9.  No need for IV iron  10/30/2023: Ferritin 42, 9% saturation, Hb 13.7.  Venofer 200 mg x 5 doses.  Venofer 200 mg x 5 doses.  7/25/2024 most recent labs 7/11/2024 she is iron replete with a hemoglobin at 13.3, ferritin 218, iron saturation 10%.    *Lymphocytic colitis.  Because of this, patient states that she cannot tolerate oral iron.  Budesonide started through Dr. Martin, GI, 5/16/2023    *Etiology of iron deficiency  EGD and colonoscopy by Dr. Chadd Mann (who has since retired), on 9/6/2019.  Follows with Dr. Gibbons.  Dr. Martin is considering GI evaluation but waiting on pelvic floor dysfunction treatment  first.  He reported essentially normal colonoscopy 2019    *Thrombocytosis  PLT normal through February 2020   on 12/2/2020.   on 12/9/2020.  On 1/14/2021,   Platelets normal at 290    *Patient states she was diagnosed with bladder prolapse upon [facility].  Early April 2021 rectal/bladder/vaginal prolapse repair planned    *Moderate to severe right hydronephrosis, incidentally found on liver ultrasound, from 12/19/2020 (U/S was to evaluate high LFTs), ordered by PCP, Dr. Virgen  Patient states PCP ordered a subsequent CT abdomen.  Patient chose to delay having this done as she thought fixing the bladder prolapse might take care of the unilateral hydronephrosis.  Dr. Peter Vivar plans to treat the kidney stone.  Patient states the stone was removed through cystoscopy.    *Small ill-defined sclerotic abnormality L2, indeterminate   incidentally found on CT AP, 2/8/2021 to work-up hydroureteronephrosis which was felt to be due to a 6 mm right proximal ureteric calculus.  Bone scan 2/22/2021 unremarkable.  Therefore, plan no further work-up or follow-up of this    *Hematochezia  Specifically, pain with wiping (no blood mixed in stools), since early December 2020.  This has since improved.  Following with GI, Dr. Martin, on Monday, 1/18/2021  Continues to have no hematochezia since prolapse surgery early April 2021  11/10/2022: Still having mild amounts of blood with wiping.  None mixed in stool.  Continues to follow with GI, appointment soon    *Overweight.  Being overweight can lead to cytopenias through hepatic steatosis.    Body mass index is 28.62 kg/m².  BMI 25 to <30 is overweight  BMI 30 to <35 is class 1 obesity  BMI 35 to <40 is class 2 obesity  BMI 40 or higher is class 3 obesity   Continuing to be overweight.  Ideally, lose weight    Plan  Return in 3 months for CBC, ferritin, iron profile and reticulated hemoglobin with RN review  MD 6 months. 2 weeks prior:  Ferritin, iron panel, CBC, reticulate hemoglobin, BMP, SPEP, SIFE, serum free light chains.   Her insurance does not cover Injectafer, but does cover Venofer    I spent 30 minutes caring for Wilma on this date of service. This time includes time spent by me in the following activities: preparing for the visit, reviewing tests, obtaining and/or reviewing a separately obtained history, performing a medically appropriate examination and/or evaluation, counseling and educating the patient/family/caregiver, ordering medications, tests, or procedures, documenting information in the medical record, and care coordination.       Crystal Grissom, APRN.  07/25/2024

## 2024-07-30 NOTE — PROGRESS NOTES
Chief Complaint:   Chief Complaint   Patient presents with    Hypertension     Med refill due   No labs   [employer] / CSA    bp elevated to discuss per pt     Hyperlipidemia    Anxiety    Depression    Hypothyroidism    Vitamin D Deficiency    FOLLOW UP HEMATOLOGY PER PT        Wilma STILL Longo 64 y.o. female who presents today for Medical Management of the below listed issues. She  has a problem list of   Patient Active Problem List   Diagnosis    Colitis    Depression    Benign essential hypertension    Hypothyroidism    Impaired fasting glucose    Insomnia    Irritable bowel syndrome    Hyperlipidemia    Mood disorder    Vitamin D deficiency    Fibromyalgia    Dysphagia    Adhesive capsulitis of left shoulder    Anemia    Iron deficiency anemia    Malabsorption of iron    Prolapse of female pelvic organs    Pelvic prolapse    S/P laparoscopy    Osman's esophagus without dysplasia    Monoclonal gammopathy    Influenza A   .  Since the last visit, She has overall felt well.  she has been compliant with   Current Outpatient Medications:     ALPRAZolam (Xanax) 0.5 MG tablet, Take 1 tablet by mouth Daily As Needed for Anxiety. D/c previous order, Disp: 30 tablet, Rfl: 2    DULoxetine (CYMBALTA) 60 MG capsule, Take 1 capsule by mouth Daily., Disp: 90 capsule, Rfl: 1    levothyroxine (SYNTHROID, LEVOTHROID) 75 MCG tablet, Take 1 tablet by mouth Daily., Disp: 90 tablet, Rfl: 1    lisinopril (PRINIVIL,ZESTRIL) 20 MG tablet, Take 1 tablet by mouth Daily., Disp: 90 tablet, Rfl: 1    naproxen (NAPROSYN) 500 MG tablet, Take 1 tablet by mouth Daily As Needed for Moderate Pain., Disp: 180 tablet, Rfl: 1    rosuvastatin (CRESTOR) 5 MG tablet, Take 1 tablet by mouth Daily., Disp: 90 tablet, Rfl: 1    vitamin D (ERGOCALCIFEROL) 1.25 MG (34805 UT) capsule capsule, Take 1 capsule by mouth 1 (One) Time Per Week., Disp: 15 capsule, Rfl: 3    amLODIPine (Norvasc) 5 MG tablet, Take 1 tablet by mouth Daily., Disp: 90 tablet, Rfl: 1     "potassium chloride (K-DUR,KLOR-CON) 20 MEQ CR tablet, Take 2 tabs (40meq) daily for 5 days, Disp: 10 tablet, Rfl: 1    potassium chloride (K-DUR,KLOR-CON) 20 MEQ tablet controlled-release ER tablet, Take 2 tabs (40 meq) daily for 5 days, Disp: 10 tablet, Rfl: 1    tretinoin (RETIN-A) 0.025 % cream, , Disp: , Rfl:     valACYclovir (VALTREX) 500 MG tablet, Take 1 tablet by mouth As Needed., Disp: , Rfl:     vitamin C (ASCORBIC ACID) 250 MG tablet, Take 2 tablets by mouth Every Night., Disp: , Rfl: .  She denies medication side effects.    All of the other chronic condition(s) listed above are stable w/o issues.    /94   Pulse 90   Temp 97.5 °F (36.4 °C) (Oral)   Resp 18   Ht 160 cm (62.99\")   Wt 73.5 kg (162 lb)   LMP  (LMP Unknown)   SpO2 90%   BMI 28.71 kg/m²     Results for orders placed or performed in visit on 07/11/24   Basic Metabolic Panel    Specimen: Blood   Result Value Ref Range    Glucose 97 65 - 99 mg/dL    BUN 12 8 - 23 mg/dL    Creatinine 0.98 0.57 - 1.00 mg/dL    Sodium 144 136 - 145 mmol/L    Potassium 3.4 (L) 3.5 - 5.2 mmol/L    Chloride 103 98 - 107 mmol/L    CO2 30.0 (H) 22.0 - 29.0 mmol/L    Calcium 9.1 8.6 - 10.5 mg/dL    BUN/Creatinine Ratio 12.2 7.0 - 25.0    Anion Gap 11.0 5.0 - 15.0 mmol/L    eGFR 64.6 >60.0 mL/min/1.73   DAVID,PE and FLC, Serum    Specimen: Blood   Result Value Ref Range    IgG 1389 586 - 1602 mg/dL    IgA 461 (H) 87 - 352 mg/dL    IgM 64 26 - 217 mg/dL    Total Protein 7.2 6.0 - 8.5 g/dL    Albumin 3.6 2.9 - 4.4 g/dL    Alpha-1-Globulin 0.2 0.0 - 0.4 g/dL    Alpha-2-Globulin 0.8 0.4 - 1.0 g/dL    Beta Globulin 1.1 0.7 - 1.3 g/dL    Gamma Globulin 1.4 0.4 - 1.8 g/dL    M-Leroy 0.1 (H) Not Observed g/dL    Globulin 3.6 2.2 - 3.9 g/dL    A/G Ratio 1.1 0.7 - 1.7    Immunofixation Reflex, Serum Comment (A)     Please note Comment     Free Light Chain, [place] 51.0 (H) 3.3 - 19.4 mg/L    Free Lambda Light Chains 46.1 (H) 5.7 - 26.3 mg/L    Kappa/Lambda Ratio 1.11 0.26 " - 1.65   Iron Profile    Specimen: Blood   Result Value Ref Range    Iron 32 (L) 37 - 145 mcg/dL    Iron Saturation (TSAT) 10 (L) 20 - 50 %    Transferrin 208 200 - 360 mg/dL    TIBC 310 298 - 536 mcg/dL   Ferritin    Specimen: Blood   Result Value Ref Range    Ferritin 218.00 (H) 13.00 - 150.00 ng/mL   Retic With IRF & RET-He    Specimen: Blood   Result Value Ref Range    Immature Reticulocyte Fraction 9.1 3.0 - 15.8 %    Reticulocyte % 2.08 (H) 0.70 - 1.90 %    Reticulocyte Absolute 0.0942 0.0200 - 0.1300 10*6/mm3    Reticulocyte Hgb 32.6 29.8 - 36.1 pg   CBC Auto Differential    Specimen: Blood   Result Value Ref Range    WBC 8.16 3.40 - 10.80 10*3/mm3    RBC 4.53 3.77 - 5.28 10*6/mm3    Hemoglobin 13.3 12.0 - 15.9 g/dL    Hematocrit 41.9 34.0 - 46.6 %    MCV 92.5 79.0 - 97.0 fL    MCH 29.4 26.6 - 33.0 pg    MCHC 31.7 31.5 - 35.7 g/dL    RDW 13.2 12.3 - 15.4 %    RDW-SD 44.2 37.0 - 54.0 fl    MPV 9.2 6.0 - 12.0 fL    Platelets 290 140 - 450 10*3/mm3    Neutrophil % 45.1 42.7 - 76.0 %    Lymphocyte % 42.3 19.6 - 45.3 %    Monocyte % 8.8 5.0 - 12.0 %    Eosinophil % 2.7 0.3 - 6.2 %    Basophil % 0.7 0.0 - 1.5 %    Immature Grans % 0.4 0.0 - 0.5 %    Neutrophils, Absolute 3.68 1.70 - 7.00 10*3/mm3    Lymphocytes, Absolute 3.45 (H) 0.70 - 3.10 10*3/mm3    Monocytes, Absolute 0.72 0.10 - 0.90 10*3/mm3    Eosinophils, Absolute 0.22 0.00 - 0.40 10*3/mm3    Basophils, Absolute 0.06 0.00 - 0.20 10*3/mm3    Immature Grans, Absolute 0.03 0.00 - 0.05 10*3/mm3    nRBC 0.0 0.0 - 0.2 /100 WBC             The following portions of the patient's history were reviewed and updated as appropriate: allergies, current medications, past family history, past medical history, past social history, past surgical history, and problem list.    Review of Systems   Constitutional:  Positive for fatigue (chronic; snores; night sweats). Negative for activity change, chills and fever.   Respiratory:  Negative for cough.    Cardiovascular:   Negative for chest pain.   Psychiatric/Behavioral:  Negative for dysphoric mood.        Objective     BMI is >= 25 and <30. (Overweight) The following options were offered after discussion;: exercise counseling/recommendations and nutrition counseling/recommendations        Physical Exam  Vitals and nursing note reviewed.   Constitutional:       General: She is not in acute distress.     Appearance: She is well-developed.   Cardiovascular:      Rate and Rhythm: Normal rate and regular rhythm.   Pulmonary:      Effort: Pulmonary effort is normal.      Breath sounds: Normal breath sounds.   Neurological:      Mental Status: She is alert and oriented to person, place, and time.   Psychiatric:         Behavior: Behavior normal.         Thought Content: Thought content normal.             Diagnoses and all orders for this visit:    1. Benign essential hypertension (Primary)  Comments:  Uncontrolled; medication added  Orders:  -     lisinopril (PRINIVIL,ZESTRIL) 20 MG tablet; Take 1 tablet by mouth Daily.  Dispense: 90 tablet; Refill: 1  -     Comprehensive metabolic panel  -     Lipid panel  -     CBC and Differential  -     amLODIPine (Norvasc) 5 MG tablet; Take 1 tablet by mouth Daily.  Dispense: 90 tablet; Refill: 1    2. Fibromyalgia  -     DULoxetine (CYMBALTA) 60 MG capsule; Take 1 capsule by mouth Daily.  Dispense: 90 capsule; Refill: 1    3. Acquired hypothyroidism  -     levothyroxine (SYNTHROID, LEVOTHROID) 75 MCG tablet; Take 1 tablet by mouth Daily.  Dispense: 90 tablet; Refill: 1  -     TSH  -     T4, Free    4. Mixed hyperlipidemia  -     rosuvastatin (CRESTOR) 5 MG tablet; Take 1 tablet by mouth Daily.  Dispense: 90 tablet; Refill: 1  -     Lipid panel    5. Vitamin D deficiency  -     vitamin D (ERGOCALCIFEROL) 1.25 MG (94738 UT) capsule capsule; Take 1 capsule by mouth 1 (One) Time Per Week.  Dispense: 15 capsule; Refill: 3  -     Vitamin D,25-Hydroxy    6. Mood disorder  -     ALPRAZolam (Xanax) 0.5 MG  tablet; Take 1 tablet by mouth Daily As Needed for Anxiety. D/c previous order  Dispense: 30 tablet; Refill: 2    7. Impaired fasting glucose  -     Hemoglobin A1c    8. Medication management  -     ToxAssure Flex 15, Ur -    9. Adhesive capsulitis of left shoulder  -     naproxen (NAPROSYN) 500 MG tablet; Take 1 tablet by mouth Daily As Needed for Moderate Pain.  Dispense: 180 tablet; Refill: 1    10. Snoring  -     Ambulatory Referral to Sleep Medicine    11. Other fatigue  -     Ambulatory Referral to Sleep Medicine    Diet/exercise/weight management to treat the glucose discussed.

## 2024-07-31 ENCOUNTER — OFFICE VISIT (OUTPATIENT)
Dept: FAMILY MEDICINE CLINIC | Facility: CLINIC | Age: 64
End: 2024-07-31
Payer: COMMERCIAL

## 2024-07-31 VITALS
HEIGHT: 63 IN | RESPIRATION RATE: 18 BRPM | HEART RATE: 90 BPM | TEMPERATURE: 97.5 F | BODY MASS INDEX: 28.7 KG/M2 | OXYGEN SATURATION: 90 % | DIASTOLIC BLOOD PRESSURE: 94 MMHG | SYSTOLIC BLOOD PRESSURE: 144 MMHG | WEIGHT: 162 LBS

## 2024-07-31 DIAGNOSIS — R06.83 SNORING: ICD-10-CM

## 2024-07-31 DIAGNOSIS — F39 MOOD DISORDER: Chronic | ICD-10-CM

## 2024-07-31 DIAGNOSIS — I10 BENIGN ESSENTIAL HYPERTENSION: Primary | Chronic | ICD-10-CM

## 2024-07-31 DIAGNOSIS — Z79.899 MEDICATION MANAGEMENT: ICD-10-CM

## 2024-07-31 DIAGNOSIS — R53.83 OTHER FATIGUE: ICD-10-CM

## 2024-07-31 DIAGNOSIS — E78.2 MIXED HYPERLIPIDEMIA: Chronic | ICD-10-CM

## 2024-07-31 DIAGNOSIS — M79.7 FIBROMYALGIA: Chronic | ICD-10-CM

## 2024-07-31 DIAGNOSIS — R73.01 IMPAIRED FASTING GLUCOSE: ICD-10-CM

## 2024-07-31 DIAGNOSIS — E03.9 ACQUIRED HYPOTHYROIDISM: Chronic | ICD-10-CM

## 2024-07-31 DIAGNOSIS — M75.02 ADHESIVE CAPSULITIS OF LEFT SHOULDER: Chronic | ICD-10-CM

## 2024-07-31 DIAGNOSIS — E55.9 VITAMIN D DEFICIENCY: Chronic | ICD-10-CM

## 2024-07-31 PROCEDURE — 99214 OFFICE O/P EST MOD 30 MIN: CPT | Performed by: FAMILY MEDICINE

## 2024-07-31 RX ORDER — ERGOCALCIFEROL 1.25 MG/1
50000 CAPSULE ORAL WEEKLY
Qty: 15 CAPSULE | Refills: 3 | Status: SHIPPED | OUTPATIENT
Start: 2024-07-31

## 2024-07-31 RX ORDER — NAPROXEN 500 MG/1
500 TABLET ORAL DAILY PRN
Qty: 180 TABLET | Refills: 1 | Status: SHIPPED | OUTPATIENT
Start: 2024-07-31

## 2024-07-31 RX ORDER — AMLODIPINE BESYLATE 5 MG/1
5 TABLET ORAL DAILY
Qty: 90 TABLET | Refills: 1 | Status: SHIPPED | OUTPATIENT
Start: 2024-07-31

## 2024-07-31 RX ORDER — DULOXETIN HYDROCHLORIDE 60 MG/1
60 CAPSULE, DELAYED RELEASE ORAL DAILY
Qty: 90 CAPSULE | Refills: 1 | Status: SHIPPED | OUTPATIENT
Start: 2024-07-31

## 2024-07-31 RX ORDER — LEVOTHYROXINE SODIUM 0.07 MG/1
75 TABLET ORAL DAILY
Qty: 90 TABLET | Refills: 1 | Status: SHIPPED | OUTPATIENT
Start: 2024-07-31

## 2024-07-31 RX ORDER — ROSUVASTATIN CALCIUM 5 MG/1
5 TABLET, COATED ORAL DAILY
Qty: 90 TABLET | Refills: 1 | Status: SHIPPED | OUTPATIENT
Start: 2024-07-31

## 2024-07-31 RX ORDER — LISINOPRIL 20 MG/1
20 TABLET ORAL DAILY
Qty: 90 TABLET | Refills: 1 | Status: SHIPPED | OUTPATIENT
Start: 2024-07-31

## 2024-07-31 RX ORDER — ALPRAZOLAM 0.5 MG/1
0.5 TABLET ORAL DAILY PRN
Qty: 30 TABLET | Refills: 2 | Status: SHIPPED | OUTPATIENT
Start: 2024-07-31

## 2024-08-06 LAB
1OH-MIDAZOLAM UR QL SCN: NOT DETECTED NG/MG CREAT
25(OH)D3+25(OH)D2 SERPL-MCNC: 36.2 NG/ML (ref 30–100)
6MAM UR QL SCN: NEGATIVE NG/ML
7AMINOCLONAZEPAM/CREAT UR: NOT DETECTED NG/MG CREAT
A-OH ALPRAZ/CREAT UR: NOT DETECTED NG/MG CREAT
A-OH-TRIAZOLAM/CREAT UR CFM: NOT DETECTED NG/MG CREAT
ALBUMIN SERPL-MCNC: 3.8 G/DL (ref 3.9–4.9)
ALP SERPL-CCNC: 71 IU/L (ref 44–121)
ALPRAZ/CREAT UR CFM: NOT DETECTED NG/MG CREAT
ALT SERPL-CCNC: 14 IU/L (ref 0–32)
AMPHETAMINES UR QL SCN: NEGATIVE NG/ML
AST SERPL-CCNC: 20 IU/L (ref 0–40)
BARBITURATES UR QL SCN: NEGATIVE NG/ML
BASOPHILS # BLD AUTO: 0.1 X10E3/UL (ref 0–0.2)
BASOPHILS NFR BLD AUTO: 1 %
BENZODIAZ SCN METH UR: NEGATIVE
BILIRUB SERPL-MCNC: 0.5 MG/DL (ref 0–1.2)
BUN SERPL-MCNC: 14 MG/DL (ref 8–27)
BUN/CREAT SERPL: 14 (ref 12–28)
BUPRENORPHINE UR QL SCN: NEGATIVE
BUPRENORPHINE/CREAT UR: NOT DETECTED NG/MG CREAT
CALCIUM SERPL-MCNC: 9.2 MG/DL (ref 8.7–10.3)
CANNABINOIDS UR QL SCN: NEGATIVE NG/ML
CHLORIDE SERPL-SCNC: 104 MMOL/L (ref 96–106)
CHOLEST SERPL-MCNC: 184 MG/DL (ref 100–199)
CLONAZEPAM/CREAT UR CFM: NOT DETECTED NG/MG CREAT
CO2 SERPL-SCNC: 27 MMOL/L (ref 20–29)
COCAINE+BZE UR QL SCN: NEGATIVE NG/ML
CREAT SERPL-MCNC: 0.99 MG/DL (ref 0.57–1)
CREAT UR-MCNC: 228 MG/DL
DESALKYLFLURAZ/CREAT UR: NOT DETECTED NG/MG CREAT
DIAZEPAM/CREAT UR: NOT DETECTED NG/MG CREAT
EGFRCR SERPLBLD CKD-EPI 2021: 64 ML/MIN/1.73
EOSINOPHIL # BLD AUTO: 0.2 X10E3/UL (ref 0–0.4)
EOSINOPHIL NFR BLD AUTO: 3 %
ERYTHROCYTE [DISTWIDTH] IN BLOOD BY AUTOMATED COUNT: 13.1 % (ref 11.7–15.4)
ETHANOL UR QL SCN: NEGATIVE G/DL
FENTANYL CTO UR SCN-MCNC: NEGATIVE NG/ML
FENTANYL/CREAT UR: NOT DETECTED NG/MG CREAT
FLUNITRAZEPAM UR QL SCN: NOT DETECTED NG/MG CREAT
GLOBULIN SER CALC-MCNC: 3.1 G/DL (ref 1.5–4.5)
GLUCOSE SERPL-MCNC: 108 MG/DL (ref 70–99)
HBA1C MFR BLD: 5.8 % (ref 4.8–5.6)
HCT VFR BLD AUTO: 41.8 % (ref 34–46.6)
HDLC SERPL-MCNC: 45 MG/DL
HGB BLD-MCNC: 13.4 G/DL (ref 11.1–15.9)
IMM GRANULOCYTES # BLD AUTO: 0 X10E3/UL (ref 0–0.1)
IMM GRANULOCYTES NFR BLD AUTO: 0 %
LDLC SERPL CALC-MCNC: 109 MG/DL (ref 0–99)
LORAZEPAM/CREAT UR: NOT DETECTED NG/MG CREAT
LYMPHOCYTES # BLD AUTO: 2.3 X10E3/UL (ref 0.7–3.1)
LYMPHOCYTES NFR BLD AUTO: 33 %
MCH RBC QN AUTO: 29.5 PG (ref 26.6–33)
MCHC RBC AUTO-ENTMCNC: 32.1 G/DL (ref 31.5–35.7)
MCV RBC AUTO: 92 FL (ref 79–97)
METHADONE UR QL SCN: NEGATIVE NG/ML
METHADONE+METAB UR QL SCN: NEGATIVE NG/ML
MIDAZOLAM/CREAT UR CFM: NOT DETECTED NG/MG CREAT
MONOCYTES # BLD AUTO: 0.7 X10E3/UL (ref 0.1–0.9)
MONOCYTES NFR BLD AUTO: 10 %
NEUTROPHILS # BLD AUTO: 3.7 X10E3/UL (ref 1.4–7)
NEUTROPHILS NFR BLD AUTO: 53 %
NORBUPRENORPHINE/CREAT UR: NOT DETECTED NG/MG CREAT
NORDIAZEPAM/CREAT UR: NOT DETECTED NG/MG CREAT
NORFENTANYL/CREAT UR: NOT DETECTED NG/MG CREAT
NORFLUNITRAZEPAM UR-MCNC: NOT DETECTED NG/MG CREAT
OPIATES UR SCN-MCNC: NEGATIVE NG/ML
OXAZEPAM/CREAT UR: NOT DETECTED NG/MG CREAT
OXYCODONE CTO UR SCN-MCNC: NEGATIVE NG/ML
PCP UR QL SCN: NEGATIVE NG/ML
PLATELET # BLD AUTO: 305 X10E3/UL (ref 150–450)
POTASSIUM SERPL-SCNC: 3.8 MMOL/L (ref 3.5–5.2)
PRESCRIBED MEDICATIONS: NORMAL
PROT SERPL-MCNC: 6.9 G/DL (ref 6–8.5)
RBC # BLD AUTO: 4.54 X10E6/UL (ref 3.77–5.28)
SODIUM SERPL-SCNC: 143 MMOL/L (ref 134–144)
T4 FREE SERPL-MCNC: 1.41 NG/DL (ref 0.82–1.77)
TAPENTADOL CTO UR SCN-MCNC: NEGATIVE NG/ML
TEMAZEPAM/CREAT UR: NOT DETECTED NG/MG CREAT
TRAMADOL UR QL SCN: NEGATIVE NG/ML
TRIGL SERPL-MCNC: 169 MG/DL (ref 0–149)
TSH SERPL DL<=0.005 MIU/L-ACNC: 1.46 UIU/ML (ref 0.45–4.5)
VLDLC SERPL CALC-MCNC: 30 MG/DL (ref 5–40)
WBC # BLD AUTO: 6.9 X10E3/UL (ref 3.4–10.8)

## 2024-08-14 DIAGNOSIS — I10 BENIGN ESSENTIAL HYPERTENSION: Chronic | ICD-10-CM

## 2024-08-14 RX ORDER — LISINOPRIL 20 MG/1
20 TABLET ORAL DAILY
Qty: 15 TABLET | OUTPATIENT
Start: 2024-08-14

## 2024-09-18 ENCOUNTER — OFFICE VISIT (OUTPATIENT)
Dept: SLEEP MEDICINE | Facility: HOSPITAL | Age: 64
End: 2024-09-18
Payer: COMMERCIAL

## 2024-09-18 VITALS — HEART RATE: 108 BPM | OXYGEN SATURATION: 98 % | BODY MASS INDEX: 28.53 KG/M2 | HEIGHT: 63 IN | WEIGHT: 161 LBS

## 2024-09-18 DIAGNOSIS — E66.3 OVERWEIGHT WITH BODY MASS INDEX (BMI) 25.0-29.9: ICD-10-CM

## 2024-09-18 DIAGNOSIS — G47.10 HYPERSOMNIA: ICD-10-CM

## 2024-09-18 DIAGNOSIS — R06.83 SNORING: ICD-10-CM

## 2024-09-18 DIAGNOSIS — G47.30 SLEEP APNEA, UNSPECIFIED TYPE: Primary | ICD-10-CM

## 2024-09-18 DIAGNOSIS — G47.8 NON-RESTORATIVE SLEEP: ICD-10-CM

## 2024-09-18 PROCEDURE — G0463 HOSPITAL OUTPT CLINIC VISIT: HCPCS

## 2024-10-09 ENCOUNTER — OFFICE VISIT (OUTPATIENT)
Dept: GASTROENTEROLOGY | Facility: CLINIC | Age: 64
End: 2024-10-09
Payer: COMMERCIAL

## 2024-10-09 VITALS
BODY MASS INDEX: 27.31 KG/M2 | HEIGHT: 64 IN | SYSTOLIC BLOOD PRESSURE: 156 MMHG | HEART RATE: 93 BPM | TEMPERATURE: 97.7 F | DIASTOLIC BLOOD PRESSURE: 96 MMHG | WEIGHT: 160 LBS

## 2024-10-09 DIAGNOSIS — K58.0 IRRITABLE BOWEL SYNDROME WITH DIARRHEA: Primary | ICD-10-CM

## 2024-10-09 DIAGNOSIS — R19.7 DIARRHEA, UNSPECIFIED TYPE: ICD-10-CM

## 2024-10-09 DIAGNOSIS — K52.832 LYMPHOCYTIC COLITIS: ICD-10-CM

## 2024-10-09 RX ORDER — CHOLESTYRAMINE 4 G/9G
4 POWDER, FOR SUSPENSION ORAL 2 TIMES DAILY
Qty: 378 G | Refills: 3 | Status: SHIPPED | OUTPATIENT
Start: 2024-10-09

## 2024-10-09 RX ORDER — SACCHAROMYCES BOULARDII 250 MG
250 CAPSULE ORAL 2 TIMES DAILY
COMMUNITY

## 2024-10-09 NOTE — PROGRESS NOTES
"Chief Complaint  Lymphocytic colitis, Abdominal Pain, and Diarrhea    Subjective          History of Present Illness    Wilma Longo is a  64 y.o. female presents for follow-up on lymphocytic colitis and history of IBS.  She is a patient of Dr. Martin last seen on 8/18/2023.  She is new to me.    She was treated for lymphocytic colitis back in 2023 with budesonide taper.  Office notes indicate improvement in diarrhea but not resolution with budesonide.  Fecal incontinence persisted as well.  Today, she reports persistent diarrhea, including mucus, urgency. Stools are worse postprandial. 1 imodium slows down diarrhea. Nocturnal diarrhea. Lower abdominal cramping relieved with a BM. This is affecting her life, has to work from home, can't go out of the house due to this. Blood with wiping on the toilet paper, occasionally in toilet bowel.     She tried gluten free which seemed to help some.    She went to PT for pelvic floor dysfunction. She was also diagnosed with MGUS since we last saw her-, hematology    7/31/2024 labs showed normal TSH, T4, CMP other than albumin 3.8, CBC, vitamin D 36.2.    9/12/2023 pancreatic fecal elastase was normal at 436.  8/18/2023 negative celiac panel--was gluten free at the time.     2/20/2023 colonoscopy showed normal macroscopically with biopsies consistent with lymphocytic colitis.  2/20/2023 EGD showed variable Z-line, 3 cm hiatal hernia, patchy mild gastritis.  Pathology with chronic inactive gastritis, negative H. Pylori; GEJ: Columnar mucosa with chronic inflammation, negative intestinal metaplasia.    Objective   Vital Signs:   /96   Pulse 93   Temp 97.7 °F (36.5 °C)   Ht 162.6 cm (64\")   Wt 72.6 kg (160 lb)   BMI 27.46 kg/m²       Physical Exam  Vitals reviewed.   Constitutional:       General: She is awake. She is not in acute distress.     Appearance: Normal appearance. She is well-developed and well-groomed.   HENT:      Head: Normocephalic. "   Pulmonary:      Effort: Pulmonary effort is normal. No respiratory distress.   Abdominal:      General: Abdomen is flat. Bowel sounds are normal. There is no distension.      Palpations: Abdomen is soft. There is no hepatomegaly or mass.      Tenderness: There is abdominal tenderness in the right lower quadrant, suprapubic area and left lower quadrant. There is no guarding or rebound.   Skin:     Coloration: Skin is not pale.   Neurological:      Mental Status: She is alert and oriented to person, place, and time.      Gait: Gait is intact.   Psychiatric:         Mood and Affect: Mood and affect normal.         Speech: Speech normal.         Behavior: Behavior is cooperative.         Judgment: Judgment normal.          Result Review :             Assessment and Plan    Diagnoses and all orders for this visit:    1. Irritable bowel syndrome with diarrhea (Primary)  -     Gastrointestinal Panel, PCR - Stool, Per Rectum  -     Clostridioides difficile EIA - Stool, Per Rectum  -     Calprotectin, Fecal - Stool, Per Rectum  -     Celiac Comprehensive Panel  -     CT Abdomen Pelvis With Contrast    2. Lymphocytic colitis  -     Gastrointestinal Panel, PCR - Stool, Per Rectum  -     Clostridioides difficile EIA - Stool, Per Rectum  -     Calprotectin, Fecal - Stool, Per Rectum  -     Celiac Comprehensive Panel  -     CT Abdomen Pelvis With Contrast    3. Diarrhea, unspecified type  -     Gastrointestinal Panel, PCR - Stool, Per Rectum  -     Clostridioides difficile EIA - Stool, Per Rectum  -     Calprotectin, Fecal - Stool, Per Rectum  -     Celiac Comprehensive Panel  -     CT Abdomen Pelvis With Contrast    Other orders  -     cholestyramine (QUESTRAN) 4 GM/DOSE powder; Take 1 packet by mouth 2 (Two) Times a Day. mixed with a liquid  Dispense: 378 g; Refill: 3    She does have a history of lymphocytic colitis with partial improvement in diarrhea with budesonide taper in the past.  Will recheck stool studies above to  rule out infection and assess for inflammation.  She requests retesting for celiac disease as she was gluten free at the time of last testing.    Will also proceed with CT scan given lower abdominal discomfort and severity of symptoms including nocturnal stools.    In the meantime, trial cholestyramine up to 3 times daily to control diarrhea.  Avoid taking cholestyramine with other meds--counseled.     She may need another budesonide trial.    Follow Up   Return in about 5 weeks (around 11/14/2024).    Dragon dictation used throughout this note.     Crystal Smith PA-C

## 2024-10-10 LAB
ENDOMYSIUM IGA SER QL: NEGATIVE
GLIADIN PEPTIDE IGA SER-ACNC: 6 UNITS (ref 0–19)
GLIADIN PEPTIDE IGG SER-ACNC: 6 UNITS (ref 0–19)
IGA SERPL-MCNC: 488 MG/DL (ref 87–352)
TTG IGA SER-ACNC: <2 U/ML (ref 0–3)
TTG IGG SER-ACNC: 5 U/ML (ref 0–5)

## 2024-10-12 LAB — C DIFF TOX A+B STL QL IA: NEGATIVE

## 2024-10-15 LAB

## 2024-10-16 ENCOUNTER — LAB (OUTPATIENT)
Dept: LAB | Facility: HOSPITAL | Age: 64
End: 2024-10-16
Payer: COMMERCIAL

## 2024-10-16 ENCOUNTER — APPOINTMENT (OUTPATIENT)
Dept: ONCOLOGY | Facility: HOSPITAL | Age: 64
End: 2024-10-16
Payer: COMMERCIAL

## 2024-10-16 ENCOUNTER — PATIENT MESSAGE (OUTPATIENT)
Dept: GASTROENTEROLOGY | Facility: CLINIC | Age: 64
End: 2024-10-16
Payer: COMMERCIAL

## 2024-10-16 DIAGNOSIS — D50.9 IRON DEFICIENCY ANEMIA, UNSPECIFIED IRON DEFICIENCY ANEMIA TYPE: ICD-10-CM

## 2024-10-16 LAB
BASOPHILS # BLD AUTO: 0.06 10*3/MM3 (ref 0–0.2)
BASOPHILS NFR BLD AUTO: 0.7 % (ref 0–1.5)
DEPRECATED RDW RBC AUTO: 41.9 FL (ref 37–54)
EOSINOPHIL # BLD AUTO: 0.18 10*3/MM3 (ref 0–0.4)
EOSINOPHIL NFR BLD AUTO: 2.1 % (ref 0.3–6.2)
ERYTHROCYTE [DISTWIDTH] IN BLOOD BY AUTOMATED COUNT: 12.5 % (ref 12.3–15.4)
FERRITIN SERPL-MCNC: 229 NG/ML (ref 13–150)
HCT VFR BLD AUTO: 39.8 % (ref 34–46.6)
HGB BLD-MCNC: 12.7 G/DL (ref 12–15.9)
HGB RETIC QN AUTO: 31.1 PG (ref 29.8–36.1)
IMM GRANULOCYTES # BLD AUTO: 0.02 10*3/MM3 (ref 0–0.05)
IMM GRANULOCYTES NFR BLD AUTO: 0.2 % (ref 0–0.5)
IMM RETICS NFR: 8 % (ref 3–15.8)
IRON 24H UR-MRATE: 27 MCG/DL (ref 37–145)
IRON SATN MFR SERPL: 9 % (ref 20–50)
LYMPHOCYTES # BLD AUTO: 3.44 10*3/MM3 (ref 0.7–3.1)
LYMPHOCYTES NFR BLD AUTO: 41 % (ref 19.6–45.3)
MCH RBC QN AUTO: 29.2 PG (ref 26.6–33)
MCHC RBC AUTO-ENTMCNC: 31.9 G/DL (ref 31.5–35.7)
MCV RBC AUTO: 91.5 FL (ref 79–97)
MONOCYTES # BLD AUTO: 0.72 10*3/MM3 (ref 0.1–0.9)
MONOCYTES NFR BLD AUTO: 8.6 % (ref 5–12)
NEUTROPHILS NFR BLD AUTO: 3.98 10*3/MM3 (ref 1.7–7)
NEUTROPHILS NFR BLD AUTO: 47.4 % (ref 42.7–76)
NRBC BLD AUTO-RTO: 0 /100 WBC (ref 0–0.2)
PLATELET # BLD AUTO: 316 10*3/MM3 (ref 140–450)
PMV BLD AUTO: 9.4 FL (ref 6–12)
RBC # BLD AUTO: 4.35 10*6/MM3 (ref 3.77–5.28)
RETICS # AUTO: 0.07 10*6/MM3 (ref 0.02–0.13)
RETICS/RBC NFR AUTO: 1.54 % (ref 0.7–1.9)
TIBC SERPL-MCNC: 311 MCG/DL (ref 298–536)
TRANSFERRIN SERPL-MCNC: 209 MG/DL (ref 200–360)
WBC NRBC COR # BLD AUTO: 8.4 10*3/MM3 (ref 3.4–10.8)

## 2024-10-16 PROCEDURE — 82728 ASSAY OF FERRITIN: CPT

## 2024-10-16 PROCEDURE — 85046 RETICYTE/HGB CONCENTRATE: CPT

## 2024-10-16 PROCEDURE — 36415 COLL VENOUS BLD VENIPUNCTURE: CPT

## 2024-10-16 PROCEDURE — 83540 ASSAY OF IRON: CPT

## 2024-10-16 PROCEDURE — 84466 ASSAY OF TRANSFERRIN: CPT

## 2024-10-16 PROCEDURE — 85025 COMPLETE CBC W/AUTO DIFF WBC: CPT

## 2024-10-16 NOTE — PROGRESS NOTES
No evidence of inflammation on your fecal calprotectin.  If you have colitis, this should be elevated so I do not think your current diarrhea is related to lymphocytic colitis.  Lets see what your CT scan shows in a couple weeks.  Is the cholestyramine helping at all?

## 2024-10-17 ENCOUNTER — TELEPHONE (OUTPATIENT)
Dept: ONCOLOGY | Facility: CLINIC | Age: 64
End: 2024-10-17
Payer: COMMERCIAL

## 2024-10-17 RX ORDER — MONTELUKAST SODIUM 4 MG/1
1 TABLET, CHEWABLE ORAL 2 TIMES DAILY
Qty: 180 TABLET | Refills: 1 | Status: SHIPPED | OUTPATIENT
Start: 2024-10-17

## 2024-10-17 NOTE — TELEPHONE ENCOUNTER
----- Message from Crystal Grissom sent at 10/17/2024 11:39 AM EDT -----  Please see message below. Can we get her set up for 50% of typical recommended iron.    Thanks    Merle  ----- Message -----  From: Jerman Dee II, MD  Sent: 10/17/2024   7:32 AM EDT  To: HEATH Platt    Unless someone needs iron very frequently and drops their numbers very quickly, with a pattern like this usually give half the replacement dose of IV iron.  So when the ferritin is normal or high but the saturation is around 10% or less like in this case I would recommend 1 dose Injectafer or 1 dose Feraheme or 50% of the calculated dose of INFeD  ----- Message -----  From: Crystal Grissom APRN  Sent: 10/16/2024   4:50 PM EDT  To: Jerman Dee II, MD    What your thoughts on these labs?  Do you feel she needs additional IV iron?  Previously her ferritin was so low and we did give her IV iron therapy. Now her ferritin is up, though her saturation remains low.    Thanks    Merle  ----- Message -----  From: Lab, Background User  Sent: 10/16/2024   2:59 PM EDT  To: HEATH Platt

## 2024-10-17 NOTE — TELEPHONE ENCOUNTER
Called the patient to go over the labs and to let her know the reasoning behind the IV iron. She v/u and was appreciative of the information

## 2024-10-24 ENCOUNTER — INFUSION (OUTPATIENT)
Dept: ONCOLOGY | Facility: HOSPITAL | Age: 64
End: 2024-10-24
Payer: COMMERCIAL

## 2024-10-24 VITALS
RESPIRATION RATE: 16 BRPM | TEMPERATURE: 97.5 F | BODY MASS INDEX: 27.46 KG/M2 | DIASTOLIC BLOOD PRESSURE: 79 MMHG | WEIGHT: 160 LBS | OXYGEN SATURATION: 95 % | HEART RATE: 87 BPM | SYSTOLIC BLOOD PRESSURE: 132 MMHG

## 2024-10-24 DIAGNOSIS — D50.9 IRON DEFICIENCY ANEMIA, UNSPECIFIED IRON DEFICIENCY ANEMIA TYPE: Primary | ICD-10-CM

## 2024-10-24 PROCEDURE — 96376 TX/PRO/DX INJ SAME DRUG ADON: CPT

## 2024-10-24 PROCEDURE — 96366 THER/PROPH/DIAG IV INF ADDON: CPT

## 2024-10-24 PROCEDURE — 96375 TX/PRO/DX INJ NEW DRUG ADDON: CPT

## 2024-10-24 PROCEDURE — 25010000002 IRON DEXTRAN PER 50 MG: Performed by: NURSE PRACTITIONER

## 2024-10-24 PROCEDURE — 25810000003 SODIUM CHLORIDE 0.9 % SOLUTION: Performed by: NURSE PRACTITIONER

## 2024-10-24 PROCEDURE — 25010000002 HYDROCORTISONE SOD SUC (PF) 100 MG RECONSTITUTED SOLUTION: Performed by: NURSE PRACTITIONER

## 2024-10-24 PROCEDURE — 96365 THER/PROPH/DIAG IV INF INIT: CPT

## 2024-10-24 PROCEDURE — 25810000003 SODIUM CHLORIDE 0.9 % SOLUTION 1,000 ML FLEX CONT: Performed by: NURSE PRACTITIONER

## 2024-10-24 PROCEDURE — 25010000002 DIPHENHYDRAMINE PER 50 MG: Performed by: NURSE PRACTITIONER

## 2024-10-24 RX ORDER — SODIUM CHLORIDE 9 MG/ML
20 INJECTION, SOLUTION INTRAVENOUS ONCE
Status: COMPLETED | OUTPATIENT
Start: 2024-10-24 | End: 2024-10-24

## 2024-10-24 RX ORDER — FAMOTIDINE 10 MG/ML
20 INJECTION, SOLUTION INTRAVENOUS ONCE
Status: COMPLETED | OUTPATIENT
Start: 2024-10-24 | End: 2024-10-24

## 2024-10-24 RX ORDER — ACETAMINOPHEN 325 MG/1
650 TABLET ORAL ONCE
Status: COMPLETED | OUTPATIENT
Start: 2024-10-24 | End: 2024-10-24

## 2024-10-24 RX ORDER — DIPHENHYDRAMINE HYDROCHLORIDE 50 MG/ML
25 INJECTION INTRAMUSCULAR; INTRAVENOUS ONCE
Status: COMPLETED | OUTPATIENT
Start: 2024-10-24 | End: 2024-10-24

## 2024-10-24 RX ADMIN — FAMOTIDINE 20 MG: 10 INJECTION INTRAVENOUS at 08:46

## 2024-10-24 RX ADMIN — DIPHENHYDRAMINE HYDROCHLORIDE 25 MG: 50 INJECTION INTRAMUSCULAR; INTRAVENOUS at 08:48

## 2024-10-24 RX ADMIN — SODIUM CHLORIDE 450 MG: 9 INJECTION, SOLUTION INTRAVENOUS at 10:41

## 2024-10-24 RX ADMIN — SODIUM CHLORIDE 20 ML/HR: 9 INJECTION, SOLUTION INTRAVENOUS at 08:43

## 2024-10-24 RX ADMIN — HYDROCORTISONE SODIUM SUCCINATE 50 MG: 100 INJECTION, POWDER, FOR SOLUTION INTRAMUSCULAR; INTRAVENOUS at 08:44

## 2024-10-24 RX ADMIN — ACETAMINOPHEN 650 MG: 325 TABLET ORAL at 08:43

## 2024-10-24 RX ADMIN — SODIUM CHLORIDE 25 MG: 9 INJECTION, SOLUTION INTRAVENOUS at 09:26

## 2024-11-08 ENCOUNTER — HOSPITAL ENCOUNTER (OUTPATIENT)
Dept: CT IMAGING | Facility: HOSPITAL | Age: 64
Discharge: HOME | End: 2024-11-08
Admitting: PHYSICIAN ASSISTANT
Payer: COMMERCIAL

## 2024-11-08 PROCEDURE — 25510000001 IOPAMIDOL 61 % SOLUTION: Performed by: PHYSICIAN ASSISTANT

## 2024-11-08 PROCEDURE — 0 DIATRIZOATE MEGLUMINE & SODIUM PER 1 ML: Performed by: PHYSICIAN ASSISTANT

## 2024-11-08 PROCEDURE — 74177 CT ABD & PELVIS W/CONTRAST: CPT

## 2024-11-08 RX ORDER — DIATRIZOATE MEGLUMINE AND DIATRIZOATE SODIUM 660; 100 MG/ML; MG/ML
30 SOLUTION ORAL; RECTAL
Status: COMPLETED | OUTPATIENT
Start: 2024-11-08 | End: 2024-11-08

## 2024-11-08 RX ORDER — IOPAMIDOL 612 MG/ML
100 INJECTION, SOLUTION INTRAVASCULAR
Status: COMPLETED | OUTPATIENT
Start: 2024-11-08 | End: 2024-11-08

## 2024-11-08 RX ADMIN — IOPAMIDOL 85 ML: 612 INJECTION, SOLUTION INTRAVENOUS at 16:26

## 2024-11-08 RX ADMIN — DIATRIZOATE MEGLUMINE AND DIATRIZOATE SODIUM 30 ML: 660; 100 LIQUID ORAL; RECTAL at 13:30

## 2024-11-14 ENCOUNTER — OFFICE VISIT (OUTPATIENT)
Dept: GASTROENTEROLOGY | Facility: CLINIC | Age: 64
End: 2024-11-14
Payer: COMMERCIAL

## 2024-11-14 VITALS
TEMPERATURE: 97 F | SYSTOLIC BLOOD PRESSURE: 147 MMHG | WEIGHT: 162 LBS | BODY MASS INDEX: 26.99 KG/M2 | DIASTOLIC BLOOD PRESSURE: 85 MMHG | HEIGHT: 65 IN | HEART RATE: 96 BPM

## 2024-11-14 DIAGNOSIS — K58.0 IRRITABLE BOWEL SYNDROME WITH DIARRHEA: Primary | ICD-10-CM

## 2024-11-14 NOTE — PROGRESS NOTES
"Chief Complaint  Diarrhea    Subjective          History of Present Illness    Wilma Longo is a  64 y.o. female presents for follow-up on diarrhea with history of IBS and lymphocytic colitis.  She is a patient Dr. Martin last seen by me on 10/9/2024.    Had worsening diarrhea at last visit with mucus, urgency, some nocturnal, lower abdominal cramping, worse postprandial.  When Imodium helped some.  Calprotectin was normal. Colestid TID which has really helped her. Still some diarrhea but more predictable and much less urgent without leaking.     11/13/2024 CT scan with contrast showed new small hiatal hernia, paucity of formed stool but no definitive colitis.  Normal small bowel.  Gallbladder, pancreas, liver.    10/11/2024 stool studies showed negative GI PCR, C. difficile, and fecal calprotectin 48.  Negative celiac panel.    From 10/9/2024 office note:  She tried gluten free which seemed to help some.     She went to PT for pelvic floor dysfunction. She was also diagnosed with MGUS since we last saw her-, hematology     7/31/2024 labs showed normal TSH, T4, CMP other than albumin 3.8, CBC, vitamin D 36.2.     9/12/2023 pancreatic fecal elastase was normal at 436.  8/18/2023 negative celiac panel--was gluten free at the time.      2/20/2023 colonoscopy showed normal macroscopically with biopsies consistent with lymphocytic colitis. No FH CRC. Recall 10 years.   2/20/2023 EGD showed variable Z-line, 3 cm hiatal hernia, patchy mild gastritis.  Pathology with chronic inactive gastritis, negative H. Pylori; GEJ: Columnar mucosa with chronic inflammation, negative intestinal metaplasia.       Objective   Vital Signs:   /85   Pulse 96   Temp 97 °F (36.1 °C) (Temporal)   Ht 164.6 cm (64.8\")   Wt 73.5 kg (162 lb)   BMI 27.12 kg/m²       Physical Exam  Vitals reviewed.   Constitutional:       General: She is awake. She is not in acute distress.     Appearance: Normal appearance. She is " well-developed and well-groomed.   HENT:      Head: Normocephalic.   Pulmonary:      Effort: Pulmonary effort is normal. No respiratory distress.   Skin:     Coloration: Skin is not pale.   Neurological:      Mental Status: She is alert and oriented to person, place, and time.      Gait: Gait is intact.   Psychiatric:         Mood and Affect: Mood and affect normal.         Speech: Speech normal.         Behavior: Behavior is cooperative.         Judgment: Judgment normal.          Result Review :             Assessment and Plan    Diagnoses and all orders for this visit:    1. Irritable bowel syndrome with diarrhea (Primary)    Other orders  -     riFAXIMin (Xifaxan) 550 MG tablet; Take 1 tablet by mouth Every 8 (Eight) Hours for 14 days.  Dispense: 42 tablet; Refill: 0    Overall better but still with diarrhea-trial course of xifaxan.     If persistent with diarrhea, ok to try Colestid 2 pills twice daily.     Follow Up   Return in about 4 months (around 3/14/2025).    Dragon dictation used throughout this note.     Crystal Smith PA-C

## 2024-11-18 ENCOUNTER — SPECIALTY PHARMACY (OUTPATIENT)
Dept: GASTROENTEROLOGY | Facility: CLINIC | Age: 64
End: 2024-11-18
Payer: COMMERCIAL

## 2024-11-18 NOTE — PROGRESS NOTES
PA approved for xifaxan  Exp 11/28/24    $25  Left voicemail for pharmacy     Willow Verdugo  Specialty Pharmacy Technician

## 2025-01-02 ENCOUNTER — TELEPHONE (OUTPATIENT)
Dept: ONCOLOGY | Facility: CLINIC | Age: 65
End: 2025-01-02
Payer: COMMERCIAL

## 2025-01-02 NOTE — TELEPHONE ENCOUNTER
"  Caller: Wilma Longo \"Arline\"    Relationship to patient: Self    Best call back number: [phone]    Chief complaint: COVID POSITIVE    Type of visit: LABS, VITALS, AND FOLLOW UP    Requested date: ANY     If rescheduling, when is the original appointment: 01/06 AND 01/13    Additional notes: PLEASE CALL TO R/S.     "

## 2025-01-29 ENCOUNTER — LAB (OUTPATIENT)
Dept: LAB | Facility: HOSPITAL | Age: 65
End: 2025-01-29
Payer: COMMERCIAL

## 2025-01-29 DIAGNOSIS — D47.2 IGG LAMBDA MONOCLONAL GAMMOPATHY: ICD-10-CM

## 2025-01-29 DIAGNOSIS — D47.2 MONOCLONAL GAMMOPATHY: ICD-10-CM

## 2025-01-29 DIAGNOSIS — D50.9 IRON DEFICIENCY ANEMIA, UNSPECIFIED IRON DEFICIENCY ANEMIA TYPE: ICD-10-CM

## 2025-01-29 LAB
ANION GAP SERPL CALCULATED.3IONS-SCNC: 11.9 MMOL/L (ref 5–15)
BASOPHILS # BLD AUTO: 0.06 10*3/MM3 (ref 0–0.2)
BASOPHILS NFR BLD AUTO: 0.9 % (ref 0–1.5)
BUN SERPL-MCNC: 11 MG/DL (ref 8–23)
BUN/CREAT SERPL: 11.8 (ref 7–25)
CALCIUM SPEC-SCNC: 8.5 MG/DL (ref 8.6–10.5)
CHLORIDE SERPL-SCNC: 103 MMOL/L (ref 98–107)
CO2 SERPL-SCNC: 25.1 MMOL/L (ref 22–29)
CREAT SERPL-MCNC: 0.93 MG/DL (ref 0.57–1)
DEPRECATED RDW RBC AUTO: 42.4 FL (ref 37–54)
EGFRCR SERPLBLD CKD-EPI 2021: 68.8 ML/MIN/1.73
EOSINOPHIL # BLD AUTO: 0.15 10*3/MM3 (ref 0–0.4)
EOSINOPHIL NFR BLD AUTO: 2.2 % (ref 0.3–6.2)
ERYTHROCYTE [DISTWIDTH] IN BLOOD BY AUTOMATED COUNT: 12.9 % (ref 12.3–15.4)
FERRITIN SERPL-MCNC: 264 NG/ML (ref 13–150)
GLUCOSE SERPL-MCNC: 100 MG/DL (ref 65–99)
HCT VFR BLD AUTO: 40.9 % (ref 34–46.6)
HGB BLD-MCNC: 13.2 G/DL (ref 12–15.9)
HGB RETIC QN AUTO: 33.1 PG (ref 29.8–36.1)
IMM GRANULOCYTES # BLD AUTO: 0.02 10*3/MM3 (ref 0–0.05)
IMM GRANULOCYTES NFR BLD AUTO: 0.3 % (ref 0–0.5)
IMM RETICS NFR: 11.1 % (ref 3–15.8)
IRON 24H UR-MRATE: 74 MCG/DL (ref 37–145)
IRON SATN MFR SERPL: 27 % (ref 20–50)
LYMPHOCYTES # BLD AUTO: 2.25 10*3/MM3 (ref 0.7–3.1)
LYMPHOCYTES NFR BLD AUTO: 32.5 % (ref 19.6–45.3)
MCH RBC QN AUTO: 29.1 PG (ref 26.6–33)
MCHC RBC AUTO-ENTMCNC: 32.3 G/DL (ref 31.5–35.7)
MCV RBC AUTO: 90.1 FL (ref 79–97)
MONOCYTES # BLD AUTO: 0.67 10*3/MM3 (ref 0.1–0.9)
MONOCYTES NFR BLD AUTO: 9.7 % (ref 5–12)
NEUTROPHILS NFR BLD AUTO: 3.78 10*3/MM3 (ref 1.7–7)
NEUTROPHILS NFR BLD AUTO: 54.4 % (ref 42.7–76)
NRBC BLD AUTO-RTO: 0 /100 WBC (ref 0–0.2)
PLATELET # BLD AUTO: 284 10*3/MM3 (ref 140–450)
PMV BLD AUTO: 9.5 FL (ref 6–12)
POTASSIUM SERPL-SCNC: 3.2 MMOL/L (ref 3.5–5.2)
RBC # BLD AUTO: 4.54 10*6/MM3 (ref 3.77–5.28)
RETICS # AUTO: 0.08 10*6/MM3 (ref 0.02–0.13)
RETICS/RBC NFR AUTO: 1.67 % (ref 0.7–1.9)
SODIUM SERPL-SCNC: 140 MMOL/L (ref 136–145)
TIBC SERPL-MCNC: 279 MCG/DL (ref 298–536)
TRANSFERRIN SERPL-MCNC: 187 MG/DL (ref 200–360)
WBC NRBC COR # BLD AUTO: 6.93 10*3/MM3 (ref 3.4–10.8)

## 2025-01-29 PROCEDURE — 82728 ASSAY OF FERRITIN: CPT

## 2025-01-29 PROCEDURE — 84466 ASSAY OF TRANSFERRIN: CPT

## 2025-01-29 PROCEDURE — 80048 BASIC METABOLIC PNL TOTAL CA: CPT

## 2025-01-29 PROCEDURE — 85046 RETICYTE/HGB CONCENTRATE: CPT

## 2025-01-29 PROCEDURE — 83540 ASSAY OF IRON: CPT

## 2025-01-29 PROCEDURE — 85025 COMPLETE CBC W/AUTO DIFF WBC: CPT

## 2025-02-03 LAB
ALBUMIN SERPL ELPH-MCNC: 3.2 G/DL (ref 2.9–4.4)
ALBUMIN/GLOB SERPL: 0.9 {RATIO} (ref 0.7–1.7)
ALPHA1 GLOB SERPL ELPH-MCNC: 0.2 G/DL (ref 0–0.4)
ALPHA2 GLOB SERPL ELPH-MCNC: 0.9 G/DL (ref 0.4–1)
B-GLOBULIN SERPL ELPH-MCNC: 1.3 G/DL (ref 0.7–1.3)
GAMMA GLOB SERPL ELPH-MCNC: 1.5 G/DL (ref 0.4–1.8)
GLOBULIN SER-MCNC: 3.9 G/DL (ref 2.2–3.9)
IGA SERPL-MCNC: 482 MG/DL (ref 87–352)
IGG SERPL-MCNC: 1450 MG/DL (ref 586–1602)
IGM SERPL-MCNC: 69 MG/DL (ref 26–217)
INTERPRETATION SERPL IEP-IMP: ABNORMAL
KAPPA LC FREE SER-MCNC: 66.8 MG/L (ref 3.3–19.4)
KAPPA LC FREE/LAMBDA FREE SER: 1.5 {RATIO} (ref 0.26–1.65)
LABORATORY COMMENT REPORT: ABNORMAL
LAMBDA LC FREE SERPL-MCNC: 44.5 MG/L (ref 5.7–26.3)
M PROTEIN SERPL ELPH-MCNC: 0.1 G/DL
PROT SERPL-MCNC: 7.1 G/DL (ref 6–8.5)

## 2025-02-04 DIAGNOSIS — E78.2 MIXED HYPERLIPIDEMIA: Chronic | ICD-10-CM

## 2025-02-04 RX ORDER — ROSUVASTATIN CALCIUM 5 MG/1
5 TABLET, COATED ORAL DAILY
Qty: 30 TABLET | Refills: 0 | Status: SHIPPED | OUTPATIENT
Start: 2025-02-04

## 2025-02-06 ENCOUNTER — OFFICE VISIT (OUTPATIENT)
Dept: ONCOLOGY | Facility: CLINIC | Age: 65
End: 2025-02-06
Payer: COMMERCIAL

## 2025-02-06 VITALS
RESPIRATION RATE: 16 BRPM | WEIGHT: 160 LBS | HEART RATE: 86 BPM | SYSTOLIC BLOOD PRESSURE: 145 MMHG | OXYGEN SATURATION: 98 % | DIASTOLIC BLOOD PRESSURE: 85 MMHG | BODY MASS INDEX: 26.66 KG/M2 | TEMPERATURE: 98.4 F | HEIGHT: 65 IN

## 2025-02-06 DIAGNOSIS — D47.2 MONOCLONAL GAMMOPATHY: ICD-10-CM

## 2025-02-06 DIAGNOSIS — D50.9 IRON DEFICIENCY ANEMIA, UNSPECIFIED IRON DEFICIENCY ANEMIA TYPE: Primary | ICD-10-CM

## 2025-02-06 PROCEDURE — 99214 OFFICE O/P EST MOD 30 MIN: CPT | Performed by: INTERNAL MEDICINE

## 2025-02-06 NOTE — PROGRESS NOTES
.     REASON FOR FOLLOWUP :   Leukocytosis and anemia and thrombocytosis, MGUS    HISTORY OF PRESENT ILLNESS:  The patient is a 64 y.o. year old female  who is here for follow-up with the above-mentioned history.    No new problems.  No drenching night sweats.  No fever, chills, weight loss.    Past Medical History:   Diagnosis Date    Anemia     Anxiety     Osman esophagus ?    Benign essential hypertension 02/01/2015    Cancer     Cardiomyopathy     Colitis 06/12/2009    Depression     Fibromyalgia     History of gestational diabetes     Hyperlipidemia 04/13/2011    Hypothyroidism 02/01/2015    Insomnia 02/03/2015    Irritable bowel syndrome 11/30/2009    Panic attacks     Rectocele     Skin cancer of arm     Vaginal prolapse     Vitamin D deficiency 07/19/2012     Past Surgical History:   Procedure Laterality Date    ANTERIOR AND POSTERIOR VAGINAL REPAIR N/A 04/06/2021    Procedure: POSTERIOR VAGINAL REPAIR, CYSTOSCOPY;  Surgeon: Cora Gustafson MD;  Location: [facility] OR;  Service: Gynecology;  Laterality: N/A;    BLADDER REPAIR  09/2015    X2    COLONOSCOPY  2010    wnl    COLONOSCOPY N/A 09/09/2016    Procedure: COLONOSCOPY into cecum and terminal ileum with biopsies;  Surgeon: Orlin Mann MD;  Location: [facility] ENDOSCOPY;  Service:     COLONOSCOPY N/A 09/06/2019    Procedure: COLONOSCOPY TO CECUM AND INTO TERMINAL ILEUM;  Surgeon: Orlin Mann MD;  Location: [facility] ENDOSCOPY;  Service: Gastroenterology    COLONOSCOPY W/ POLYPECTOMY N/A 02/20/2023    Procedure: COLONOSCOPY INTO CECUM AND TI WITH BIOPSIES;  Surgeon: Basilio Martin MD;  Location: [facility] ENDOSCOPY;  Service: Gastroenterology;  Laterality: N/A;  PRE: COLITIS  POST: NORMAL COLON    CYSTOSCOPY W/ URETERAL STENT PLACEMENT      ENDOSCOPY N/A 09/06/2019    Procedure: ESOPHAGOGASTRODUODENOSCOPY WITH COLD BIOPSIES AND 48F HERNÁNDEZ DILATION;  Surgeon: Orlin Mann MD;  Location: [facility] ENDOSCOPY;  Service: Gastroenterology     ENDOSCOPY N/A 02/20/2023    Procedure: ESOPHAGOGASTRODUODENOSCOPY WITH BIOPSIES;  Surgeon: Basilio Martin MD;  Location: [facility] ENDOSCOPY;  Service: Gastroenterology;  Laterality: N/A;  PRE: MENDEZ'S  POST: HIATAL HERNIA, GASTRITIS    HYSTERECTOMY  12/2013    MOUTH SURGERY      SACROCOLPOPEXY N/A 04/06/2021    Procedure: LARPAROSCOPY COLPOPEXY, BILATERAL SALPINGECTOMY;  Surgeon: Cora Gustafson MD;  Location: [facility] MAIN OR;  Service: Gynecology;  Laterality: N/A;    SKIN CANCER EXCISION      UPPER GASTROINTESTINAL ENDOSCOPY  9/6/3019       MEDICATIONS    Current Outpatient Medications:     ALPRAZolam (Xanax) 0.5 MG tablet, Take 1 tablet by mouth Daily As Needed for Anxiety. D/c previous order, Disp: 30 tablet, Rfl: 2    amLODIPine (Norvasc) 5 MG tablet, Take 1 tablet by mouth Daily., Disp: 90 tablet, Rfl: 1    colestipol (Colestid) 1 g tablet, Take 1 tablet by mouth 2 (Two) Times a Day., Disp: 180 tablet, Rfl: 1    Cranberry 125 MG tablet, Take  by mouth., Disp: , Rfl:     DULoxetine (CYMBALTA) 60 MG capsule, Take 1 capsule by mouth Daily., Disp: 90 capsule, Rfl: 1    levothyroxine (SYNTHROID, LEVOTHROID) 75 MCG tablet, Take 1 tablet by mouth Daily., Disp: 90 tablet, Rfl: 1    lisinopril (PRINIVIL,ZESTRIL) 20 MG tablet, Take 1 tablet by mouth Daily., Disp: 90 tablet, Rfl: 1    naproxen (NAPROSYN) 500 MG tablet, Take 1 tablet by mouth Daily As Needed for Moderate Pain., Disp: 180 tablet, Rfl: 1    rosuvastatin (CRESTOR) 5 MG tablet, TAKE 1 TABLET BY MOUTH DAILY, Disp: 30 tablet, Rfl: 0    saccharomyces boulardii (FLORASTOR) 250 MG capsule, Take 1 capsule by mouth 2 (Two) Times a Day., Disp: , Rfl:     valACYclovir (VALTREX) 500 MG tablet, Take 1 tablet by mouth As Needed., Disp: , Rfl:     vitamin C (ASCORBIC ACID) 250 MG tablet, Take 2 tablets by mouth Every Night., Disp: , Rfl:     ALLERGIES:   No Known Allergies    SOCIAL HISTORY:       Social History     Socioeconomic History    Marital status:  "     Spouse name: Ruben   Tobacco Use    Smoking status: Never    Smokeless tobacco: Never   Vaping Use    Vaping status: Never Used   Substance and Sexual Activity    Alcohol use: Not Currently     Comment: less than once per  month    Drug use: No    Sexual activity: Yes     Partners: Male     Birth control/protection: Post-menopausal         FAMILY HISTORY:  Family History   Problem Relation Age of Onset    Depression Mother     Heart disease Mother     Stroke Mother     Heart attack Mother     Heart failure Mother     Hyperlipidemia Mother     Hypertension Mother     Arthritis Father     Heart disease Father     Skin cancer Father     Heart attack Father     Heart failure Father     Heart disease Maternal Grandmother     Stroke Maternal Grandfather     Stroke Paternal Grandfather     Hypertension Sister     Hypothyroidism Sister     Hypertension Brother     Hypothyroidism Daughter     Colon cancer Neg Hx     Colon polyps Neg Hx     Liver disease Neg Hx     Rectal cancer Neg Hx     Stomach cancer Neg Hx     Liver cancer Neg Hx     Malig Hyperthermia Neg Hx        REVIEW OF SYSTEMS:  Review of Systems   Constitutional:  Negative for activity change.   HENT:  Negative for nosebleeds and trouble swallowing.    Respiratory:  Negative for shortness of breath and wheezing.    Cardiovascular:  Negative for chest pain and palpitations.   Gastrointestinal:  Negative for constipation, diarrhea and nausea.   Genitourinary:  Negative for dysuria and hematuria.   Musculoskeletal:  Negative for arthralgias and myalgias.   Skin:  Negative for rash and wound.   Neurological:  Negative for seizures and syncope.   Hematological:  Negative for adenopathy. Does not bruise/bleed easily.   Psychiatric/Behavioral:  Negative for confusion.               Vitals:    02/06/25 0934   BP: 145/85   Pulse: 86   Resp: 16   Temp: 98.4 °F (36.9 °C)   TempSrc: Oral   SpO2: 98%   Weight: 72.6 kg (160 lb)   Height: 164 cm (64.57\")   PainSc: " 0-No pain           2/6/2025     9:35 AM   Current Status   ECOG score 0      PHYSICAL EXAM:            CONSTITUTIONAL:  Vital signs reviewed.  No distress, looks comfortable.  EYES:  Conjunctiva and lids unremarkable.  PERRLA  EARS,NOSE,MOUTH,THROAT:  Ears and nose appear unremarkable.  Lips, teeth, gums appear unremarkable.  RESPIRATORY:  Normal respiratory effort.  Lungs clear to auscultation bilaterally.  CARDIOVASCULAR:  Normal S1, S2.  No murmurs rubs or gallops.  No significant lower extremity edema.  GASTROINTESTINAL: Abdomen appears unremarkable.  Nontender.  No hepatomegaly.  No splenomegaly.  LYMPHATIC:  No cervical, supraclavicular, axillary lymphadenopathy.  SKIN:  Warm.  No rashes.  PSYCHIATRIC:  Normal judgment and insight.  Normal mood and affect.      RECENT LABS:        WBC   Date Value Ref Range Status   01/29/2025 6.93 3.40 - 10.80 10*3/mm3 Final   10/16/2024 8.40 3.40 - 10.80 10*3/mm3 Final   07/31/2024 6.9 3.4 - 10.8 x10E3/uL Final   07/11/2024 8.16 3.40 - 10.80 10*3/mm3 Final   10/30/2023 8.23 3.40 - 10.80 10*3/mm3 Final   09/30/2023 5.76 4.5 - 11.0 10*3/uL Final   06/16/2023 7.54 3.40 - 10.80 10*3/mm3 Final   11/03/2022 7.63 3.40 - 10.80 10*3/mm3 Final   05/18/2022 7.95 3.40 - 10.80 10*3/mm3 Final   11/05/2021 6.26 3.40 - 10.80 10*3/mm3 Final   05/25/2021 5.42 3.40 - 10.80 10*3/mm3 Final   05/25/2021 5.63 3.40 - 10.80 10*3/mm3 Final   04/07/2021 12.93 (H) 3.40 - 10.80 10*3/mm3 Final   03/31/2021 5.44 3.40 - 10.80 10*3/mm3 Final   02/22/2021 9.80 3.40 - 10.80 10*3/mm3 Final   01/14/2021 11.62 (H) 3.40 - 10.80 10*3/mm3 Final   12/09/2020 13.23 (H) 3.40 - 10.80 10*3/mm3 Final   12/02/2020 16.8 (H) 3.4 - 10.8 x10E3/uL Final   02/22/2020 6.2 3.4 - 10.8 x10E3/uL Final   03/21/2019 6.56 3.40 - 10.80 10*3/mm3 Final   10/21/2017 8.3 3.4 - 10.8 x10E3/uL Final   06/28/2016 7.94 4.50 - 10.70 10*3/mm3 Final   09/03/2015 7.56 4.50 - 10.70 K/Cumm Final   04/07/2014 6.03 4.50 - 10.70 K/Cumm Final      Hemoglobin   Date Value Ref Range Status   01/29/2025 13.2 12.0 - 15.9 g/dL Final   10/16/2024 12.7 12.0 - 15.9 g/dL Final   07/31/2024 13.4 11.1 - 15.9 g/dL Final   07/11/2024 13.3 12.0 - 15.9 g/dL Final   10/30/2023 13.7 12.0 - 15.9 g/dL Final   09/30/2023 13.4 12.0 - 16.0 g/dL Final   06/16/2023 12.9 12.0 - 15.9 g/dL Final   11/03/2022 14.0 12.0 - 15.9 g/dL Final   05/18/2022 13.7 12.0 - 15.9 g/dL Final   11/05/2021 13.3 12.0 - 15.9 g/dL Final   05/25/2021 13.2 12.0 - 15.9 g/dL Final   05/25/2021 13.0 12.0 - 15.9 g/dL Final   04/07/2021 11.4 (L) 12.0 - 15.9 g/dL Final   03/31/2021 12.5 12.0 - 15.9 g/dL Final   02/22/2021 11.6 (L) 12.0 - 15.9 g/dL Final   01/14/2021 12.4 12.0 - 15.9 g/dL Final   12/09/2020 10.5 (L) 12.0 - 15.9 g/dL Final   12/02/2020 10.0 (L) 11.1 - 15.9 g/dL Final   02/22/2020 12.9 11.1 - 15.9 g/dL Final   03/21/2019 13.6 12.0 - 15.9 g/dL Final   10/21/2017 13.0 11.1 - 15.9 g/dL Final   06/28/2016 13.9 11.9 - 15.5 g/dL Final   09/03/2015 12.4 11.9 - 15.5 g/dL Final   04/07/2014 12.5 11.9 - 15.5 g/dL Final     Platelets   Date Value Ref Range Status   01/29/2025 284 140 - 450 10*3/mm3 Final   10/16/2024 316 140 - 450 10*3/mm3 Final   07/31/2024 305 150 - 450 x10E3/uL Final   07/11/2024 290 140 - 450 10*3/mm3 Final   10/30/2023 342 140 - 450 10*3/mm3 Final   09/30/2023 337 140 - 440 10*3/uL Final   06/16/2023 268 140 - 450 10*3/mm3 Final   11/03/2022 323 140 - 450 10*3/mm3 Final   05/18/2022 277 140 - 450 10*3/mm3 Final   11/05/2021 303 140 - 450 10*3/mm3 Final   05/25/2021 321 140 - 450 10*3/mm3 Final   05/25/2021 320 140 - 450 10*3/mm3 Final   04/07/2021 344 140 - 450 10*3/mm3 Final   03/31/2021 284 140 - 450 10*3/mm3 Final   02/22/2021 445 140 - 450 10*3/mm3 Final   01/14/2021 404 140 - 450 10*3/mm3 Final   12/09/2020 603 (H) 140 - 450 10*3/mm3 Final   12/02/2020 591 (H) 150 - 450 x10E3/uL Final   02/22/2020 354 150 - 450 x10E3/uL Final   03/21/2019 336 140 - 450 10*3/mm3 Final   10/21/2017  293 150 - 379 x10E3/uL Final   06/28/2016 289 140 - 500 10*3/mm3 Final   09/03/2015 300 140 - 500 K/Cumm Final   04/07/2014 277 140 - 500 K/Cumm Final       Assessment & Plan   Iron deficiency anemia, unspecified iron deficiency anemia type  - Ferritin  - Iron Profile  - Retic With IRF & RET-He  - CBC & Differential    Monoclonal gammopathy  - Ferritin  - Iron Profile  - Retic With IRF & RET-He  - CBC & Differential              Wilma Longo   *IgG lambda MGUS  Work-up of celiac disease by Dr. Martin revealed elevated IgA.  He referred to Dr. Ken, allergy/immunology.  Dr. De La Paz did SIFE revealing IgG lambda monoclonal protein (not IgA).  10/30/2023: Ordered SPEP, SIFE, serum free light chains, 24-hour urine studies, and bone survey.  Explained to patient I doubt this is multiple myeloma considering total IgG not elevated and the elevated IgA is polyclonal.  10/2/2023: Serum: IgG lambda monoclonal protein.  M spike 0.1.  K/L ratio normal, 1.22.  24-hour urine 11/13/2023: DAVID: No UPEP: No M spike.  Urine K/L ratio normal, 12.9.  Bone survey 11/28/2023: Negative.  1/29/2025: M spike 0.1.  Serum K/L ratio 1.5, normal.  Hb 13.2, creatinine 0.9.  Calcium 8.5.    *Leukocytosis  WBC normal through February 2020  WBC 16.8 on 12/2/2020.  WBC 13.2 on 12/9/2020.  Differentials unremarkable.  On 1/14/2021, WBC 11.6.  Differential unremarkable.  WBC 6.9, from 8.3, from 7.5, from 7.6, from 8    *Iron deficiency anemia  Hb normal through February 2020.  MCV around 90.  Hb 10 on 12/2/2020, 10.5 on 12/9/2020.  MCV 83.8.  On 1/14/2021, ferritin 112, 3% saturation, reticulated hemoglobin low end of normal at 30.4.  Hb up to normal, 12.4 (without iron replacement).  Hb normalized without iron replacement.  Perhaps this is because rectal bleeding which started early December 2020, decreased, although persists.  2/22/2021, ferritin 132, 8% saturation, Hb 11.6.    Difficulty tolerating oral iron due to colitis/diarrhea.  No  significant improvement from oral iron, thought to be due to poor absorption.  Received 600 mg Venofer  Her insurance will not cover Injectafer.  It does cover Venofer.  5/25/2021: Ferritin 141, 25% saturation, Hb 13.  No need for IV iron.  11/5/2021: Ferritin 94, 18% saturation, Hb 13.3.  No need for IV iron.  5/18/2022: Ferritin 74, 14% saturation, Hb 13.7.  No need for IV iron  11/3/2022: Ferritin 55, 19% saturation, Hb 14.  No need for IV iron.  6/16/2023: Ferritin 72, 21% saturation, Hb 12.9.  No need for IV iron  10/30/2023: Ferritin 42, 9% saturation, Hb 13.7.  Venofer 200 mg x 5 doses.  Venofer 200 mg x 5 doses.  1/29/2025: Ferritin 264, 27% saturation, Hb 13.2..    *Lymphocytic colitis.  Because of this, patient states that she cannot tolerate oral iron.  Budesonide started through Dr. Martin, GI, 5/16/2023    *Etiology of iron deficiency  EGD and colonoscopy by Dr. Chadd Mann (who has since retired), on 9/6/2019.  Follows with Dr. Gibbons.  Dr. Martin is considering GI evaluation but waiting on pelvic floor dysfunction treatment first.  He reported essentially normal colonoscopy 2019    *Thrombocytosis  PLT normal through February 2020   on 12/2/2020.   on 12/9/2020.  On 1/14/2021,   , from 268, from 323, from 277, from 303      *Patient states she was diagnosed with bladder [facility]  Early April 2021 rectal/bladder/vaginal prolapse repair planned    *Moderate to severe right hydronephrosis, incidentally found on liver ultrasound, from 12/19/2020 (U/S was to evaluate high LFTs), ordered by PCP, Dr. Virgen  Patient states PCP ordered a subsequent CT abdomen.  Patient chose to delay having this done as she thought fixing the bladder prolapse might take care of the unilateral hydronephrosis.  Dr. Peter Vivar plans to treat the kidney stone.  Patient states the stone was removed through cystoscopy.    *Small ill-defined sclerotic  abnormality L2, indeterminate   incidentally found on CT AP, 2/8/2021 to work-up hydroureteronephrosis which was felt to be due to a 6 mm right proximal ureteric calculus.  Bone scan 2/22/2021 unremarkable.  Therefore, plan no further work-up or follow-up of this    *Hematochezia  Specifically, pain with wiping (no blood mixed in stools), since early December 2020.  This has since improved.  Following with GI, Dr. Martin, on Monday, 1/18/2021  Continues to have no hematochezia since prolapse surgery early April 2021  11/10/2022: Still having mild amounts of blood with wiping.  None mixed in stool.  Continues to follow with GI, appointment soon    *Overweight.  Being overweight can lead to cytopenias through hepatic steatosis.    Body mass index is 26.98 kg/m².  BMI 25 to <30 is overweight  BMI 30 to <35 is class 1 obesity  BMI 35 to <40 is class 2 obesity  BMI 40 or higher is class 3 obesity   Continues to be overweight.  Ideally, lose weight    Plan  MD 6 months.  At least 1 day prior: Ferritin, iron panel, CBC, reticulate hemoglobin   Annual: Iron, iron panel, CBC, reticulated hemoglobin, BMP, SPEP, SIFE, serum free light chains.   Her insurance does not cover Injectafer, but does cover Venofer      Send a copy this to Dr. De La Paz, allergy immunology

## 2025-02-21 DIAGNOSIS — I10 BENIGN ESSENTIAL HYPERTENSION: Chronic | ICD-10-CM

## 2025-02-21 DIAGNOSIS — E03.9 ACQUIRED HYPOTHYROIDISM: Chronic | ICD-10-CM

## 2025-02-21 DIAGNOSIS — M79.7 FIBROMYALGIA: Chronic | ICD-10-CM

## 2025-02-22 RX ORDER — LISINOPRIL 20 MG/1
20 TABLET ORAL DAILY
Qty: 30 TABLET | Refills: 0 | Status: SHIPPED | OUTPATIENT
Start: 2025-02-22

## 2025-02-22 RX ORDER — LEVOTHYROXINE SODIUM 75 UG/1
75 TABLET ORAL DAILY
Qty: 30 TABLET | Refills: 0 | Status: SHIPPED | OUTPATIENT
Start: 2025-02-22

## 2025-02-22 RX ORDER — DULOXETIN HYDROCHLORIDE 60 MG/1
60 CAPSULE, DELAYED RELEASE ORAL DAILY
Qty: 30 CAPSULE | Refills: 0 | Status: SHIPPED | OUTPATIENT
Start: 2025-02-22

## 2025-02-28 DIAGNOSIS — E78.2 MIXED HYPERLIPIDEMIA: Chronic | ICD-10-CM

## 2025-03-03 RX ORDER — ROSUVASTATIN CALCIUM 5 MG/1
5 TABLET, COATED ORAL DAILY
Qty: 14 TABLET | Refills: 0 | Status: SHIPPED | OUTPATIENT
Start: 2025-03-03

## 2025-03-03 RX ORDER — COLESTIPOL HYDROCHLORIDE 1 G/1
1 TABLET ORAL 3 TIMES DAILY
Qty: 270 TABLET | Refills: 1 | Status: SHIPPED | OUTPATIENT
Start: 2025-03-03

## 2025-03-17 DIAGNOSIS — E78.2 MIXED HYPERLIPIDEMIA: Chronic | ICD-10-CM

## 2025-03-17 RX ORDER — ROSUVASTATIN CALCIUM 5 MG/1
5 TABLET, COATED ORAL DAILY
Qty: 14 TABLET | Refills: 0 | OUTPATIENT
Start: 2025-03-17

## 2025-04-02 DIAGNOSIS — M79.7 FIBROMYALGIA: Chronic | ICD-10-CM

## 2025-04-02 DIAGNOSIS — I10 BENIGN ESSENTIAL HYPERTENSION: Chronic | ICD-10-CM

## 2025-04-02 DIAGNOSIS — E03.9 ACQUIRED HYPOTHYROIDISM: Chronic | ICD-10-CM

## 2025-04-02 RX ORDER — LISINOPRIL 20 MG/1
20 TABLET ORAL DAILY
Qty: 14 TABLET | Refills: 0 | Status: SHIPPED | OUTPATIENT
Start: 2025-04-02

## 2025-04-02 RX ORDER — DULOXETIN HYDROCHLORIDE 60 MG/1
60 CAPSULE, DELAYED RELEASE ORAL DAILY
Qty: 14 CAPSULE | Refills: 0 | Status: SHIPPED | OUTPATIENT
Start: 2025-04-02

## 2025-04-02 RX ORDER — LEVOTHYROXINE SODIUM 75 UG/1
75 TABLET ORAL DAILY
Qty: 14 TABLET | Refills: 0 | Status: SHIPPED | OUTPATIENT
Start: 2025-04-02

## 2025-04-17 RX ORDER — COLESTIPOL HYDROCHLORIDE 1 G/1
1 TABLET ORAL 2 TIMES DAILY
Qty: 180 TABLET | Refills: 1 | Status: SHIPPED | OUTPATIENT
Start: 2025-04-17

## 2025-04-21 DIAGNOSIS — I10 BENIGN ESSENTIAL HYPERTENSION: Chronic | ICD-10-CM

## 2025-04-21 RX ORDER — AMLODIPINE BESYLATE 5 MG/1
5 TABLET ORAL DAILY
Qty: 30 TABLET | Refills: 0 | Status: SHIPPED | OUTPATIENT
Start: 2025-04-21

## 2025-05-07 DIAGNOSIS — E03.9 ACQUIRED HYPOTHYROIDISM: Chronic | ICD-10-CM

## 2025-05-07 DIAGNOSIS — I10 BENIGN ESSENTIAL HYPERTENSION: Chronic | ICD-10-CM

## 2025-05-07 RX ORDER — LISINOPRIL 20 MG/1
20 TABLET ORAL DAILY
Qty: 14 TABLET | Refills: 0 | OUTPATIENT
Start: 2025-05-07

## 2025-05-07 RX ORDER — LEVOTHYROXINE SODIUM 75 UG/1
75 TABLET ORAL DAILY
Qty: 14 TABLET | Refills: 0 | OUTPATIENT
Start: 2025-05-07

## 2025-05-23 DIAGNOSIS — I10 BENIGN ESSENTIAL HYPERTENSION: Chronic | ICD-10-CM

## 2025-05-23 RX ORDER — AMLODIPINE BESYLATE 5 MG/1
5 TABLET ORAL DAILY
Qty: 14 TABLET | Refills: 0 | Status: SHIPPED | OUTPATIENT
Start: 2025-05-23

## 2025-06-05 ENCOUNTER — OFFICE VISIT (OUTPATIENT)
Dept: GASTROENTEROLOGY | Facility: CLINIC | Age: 65
End: 2025-06-05
Payer: COMMERCIAL

## 2025-06-05 VITALS
HEIGHT: 65 IN | DIASTOLIC BLOOD PRESSURE: 71 MMHG | BODY MASS INDEX: 26.19 KG/M2 | HEART RATE: 99 BPM | WEIGHT: 157.2 LBS | TEMPERATURE: 97.3 F | SYSTOLIC BLOOD PRESSURE: 116 MMHG

## 2025-06-05 DIAGNOSIS — K58.0 IRRITABLE BOWEL SYNDROME WITH DIARRHEA: Primary | ICD-10-CM

## 2025-06-05 RX ORDER — COLESTIPOL HYDROCHLORIDE 1 G/1
2 TABLET ORAL 2 TIMES DAILY
Qty: 360 TABLET | Refills: 3 | Status: SHIPPED | OUTPATIENT
Start: 2025-06-05

## 2025-06-05 NOTE — PROGRESS NOTES
"Chief Complaint  Diarrhea    Subjective          History of Present Illness    Wilma Longo is a  65 y.o. female presents for follow-up on diarrhea with history of IBS and lymphocytic colitis. She is a patient Dr. Martin last seen by me on 11/14/2024.    Following up on diarrhea with history of IBS D and lymphocytic colitis.  Fecal calprotectin was normal last year so we started her on Colestid 3-4/day which has really helped.  She continues to take this and is doing well. She is gluten and dairy free as well.     From 11/14/2024 office note:    2/20/2023 colonoscopy showed normal macroscopically with biopsies consistent with lymphocytic colitis. No FH CRC. Recall 10 years.   2/20/2023 EGD showed variable Z-line, 3 cm hiatal hernia, patchy mild gastritis.  Pathology with chronic inactive gastritis, negative H. Pylori; GEJ: Columnar mucosa with chronic inflammation, negative intestinal metaplasia.      Objective   Vital Signs:   /71   Pulse 99   Temp 97.3 °F (36.3 °C) (Temporal)   Ht 164 cm (64.57\")   Wt 71.3 kg (157 lb 3.2 oz)   BMI 26.51 kg/m²       Physical Exam  Vitals reviewed.   Constitutional:       General: She is awake. She is not in acute distress.     Appearance: Normal appearance. She is well-developed and well-groomed.   HENT:      Head: Normocephalic.   Pulmonary:      Effort: Pulmonary effort is normal. No respiratory distress.   Skin:     Coloration: Skin is not pale.   Neurological:      Mental Status: She is alert and oriented to person, place, and time.      Gait: Gait is intact.   Psychiatric:         Mood and Affect: Mood and affect normal.         Speech: Speech normal.         Behavior: Behavior is cooperative.         Judgment: Judgment normal.          Result Review :             Assessment and Plan    Diagnoses and all orders for this visit:    1. Irritable bowel syndrome with diarrhea (Primary)    Other orders  -     colestipol (COLESTID) 1 g tablet; Take 2 tablets by mouth " 2 (Two) Times a Day.  Dispense: 360 tablet; Refill: 3    Continue colestipol and diet changes. Takes separate from levothyroxine.     Follow Up   Return in about 1 year (around 6/5/2026).    Dragon dictation used throughout this note.     Crystal Smith PA-C

## 2025-06-07 DIAGNOSIS — I10 BENIGN ESSENTIAL HYPERTENSION: Chronic | ICD-10-CM

## 2025-06-08 RX ORDER — AMLODIPINE BESYLATE 5 MG/1
5 TABLET ORAL DAILY
Qty: 14 TABLET | Refills: 0 | OUTPATIENT
Start: 2025-06-08

## 2025-06-23 ENCOUNTER — HOSPITAL ENCOUNTER (OUTPATIENT)
Dept: CARDIOLOGY | Facility: HOSPITAL | Age: 65
Setting detail: HOSPITAL OUTPATIENT SURGERY
Discharge: HOME | End: 2025-06-23
Payer: COMMERCIAL

## 2025-06-23 ENCOUNTER — APPOINTMENT (OUTPATIENT)
Dept: GENERAL RADIOLOGY | Facility: HOSPITAL | Age: 65
End: 2025-06-23
Payer: COMMERCIAL

## 2025-06-23 ENCOUNTER — APPOINTMENT (OUTPATIENT)
Dept: CT IMAGING | Facility: HOSPITAL | Age: 65
End: 2025-06-23
Payer: COMMERCIAL

## 2025-06-23 ENCOUNTER — HOSPITAL ENCOUNTER (EMERGENCY)
Facility: HOSPITAL | Age: 65
Discharge: HOME | End: 2025-06-23
Attending: EMERGENCY MEDICINE
Payer: COMMERCIAL

## 2025-06-23 ENCOUNTER — HOSPITAL ENCOUNTER (OUTPATIENT)
Facility: HOSPITAL | Age: 65
Setting detail: HOSPITAL OUTPATIENT SURGERY
Discharge: HOME | End: 2025-06-23
Attending: INTERNAL MEDICINE | Admitting: INTERNAL MEDICINE
Payer: COMMERCIAL

## 2025-06-23 VITALS
HEART RATE: 94 BPM | BODY MASS INDEX: 26.8 KG/M2 | DIASTOLIC BLOOD PRESSURE: 81 MMHG | SYSTOLIC BLOOD PRESSURE: 134 MMHG | HEIGHT: 64 IN | WEIGHT: 157 LBS | RESPIRATION RATE: 18 BRPM | OXYGEN SATURATION: 95 %

## 2025-06-23 VITALS
OXYGEN SATURATION: 96 % | HEART RATE: 83 BPM | TEMPERATURE: 97.2 F | WEIGHT: 157 LBS | SYSTOLIC BLOOD PRESSURE: 133 MMHG | DIASTOLIC BLOOD PRESSURE: 74 MMHG | BODY MASS INDEX: 26.8 KG/M2 | HEIGHT: 64 IN | RESPIRATION RATE: 16 BRPM

## 2025-06-23 VITALS
OXYGEN SATURATION: 95 % | RESPIRATION RATE: 16 BRPM | HEART RATE: 94 BPM | SYSTOLIC BLOOD PRESSURE: 130 MMHG | BODY MASS INDEX: 26.98 KG/M2 | WEIGHT: 158 LBS | HEIGHT: 64 IN | TEMPERATURE: 98.1 F | DIASTOLIC BLOOD PRESSURE: 77 MMHG

## 2025-06-23 DIAGNOSIS — R07.9 ACUTE CHEST PAIN: Primary | ICD-10-CM

## 2025-06-23 DIAGNOSIS — R06.09 DOE (DYSPNEA ON EXERTION): ICD-10-CM

## 2025-06-23 DIAGNOSIS — R79.89 ELEVATED TROPONIN: ICD-10-CM

## 2025-06-23 DIAGNOSIS — R06.09 DYSPNEA ON EXERTION: ICD-10-CM

## 2025-06-23 DIAGNOSIS — R06.09 DOE (DYSPNEA ON EXERTION): Primary | ICD-10-CM

## 2025-06-23 LAB
ALBUMIN SERPL-MCNC: 4 G/DL (ref 3.5–5.2)
ALBUMIN/GLOB SERPL: 1.1 G/DL
ALP SERPL-CCNC: 69 U/L (ref 39–117)
ALT SERPL W P-5'-P-CCNC: 20 U/L (ref 1–33)
ANION GAP SERPL CALCULATED.3IONS-SCNC: 11.4 MMOL/L (ref 5–15)
AST SERPL-CCNC: 21 U/L (ref 1–32)
BASOPHILS # BLD AUTO: 0.04 10*3/MM3 (ref 0–0.2)
BASOPHILS NFR BLD AUTO: 0.3 % (ref 0–1.5)
BILIRUB SERPL-MCNC: 0.3 MG/DL (ref 0–1.2)
BUN SERPL-MCNC: 16.7 MG/DL (ref 8–23)
BUN/CREAT SERPL: 16.2 (ref 7–25)
CALCIUM SPEC-SCNC: 9.5 MG/DL (ref 8.6–10.5)
CHLORIDE SERPL-SCNC: 103 MMOL/L (ref 98–107)
CO2 SERPL-SCNC: 21.6 MMOL/L (ref 22–29)
CREAT SERPL-MCNC: 1.03 MG/DL (ref 0.57–1)
D DIMER PPP FEU-MCNC: 0.31 MCGFEU/ML (ref 0–0.65)
DEPRECATED RDW RBC AUTO: 43.3 FL (ref 37–54)
EGFRCR SERPLBLD CKD-EPI 2021: 60.5 ML/MIN/1.73
EOSINOPHIL # BLD AUTO: 0.15 10*3/MM3 (ref 0–0.4)
EOSINOPHIL NFR BLD AUTO: 1.1 % (ref 0.3–6.2)
ERYTHROCYTE [DISTWIDTH] IN BLOOD BY AUTOMATED COUNT: 12.7 % (ref 12.3–15.4)
GEN 5 1HR TROPONIN T REFLEX: 64 NG/L
GLOBULIN UR ELPH-MCNC: 3.7 GM/DL
GLUCOSE SERPL-MCNC: 124 MG/DL (ref 65–99)
HCT VFR BLD AUTO: 42.9 % (ref 34–46.6)
HGB BLD-MCNC: 13.6 G/DL (ref 12–15.9)
HOLD SPECIMEN: NORMAL
HOLD SPECIMEN: NORMAL
IMM GRANULOCYTES # BLD AUTO: 0.01 10*3/MM3 (ref 0–0.05)
IMM GRANULOCYTES NFR BLD AUTO: 0.1 % (ref 0–0.5)
LYMPHOCYTES # BLD AUTO: 4.06 10*3/MM3 (ref 0.7–3.1)
LYMPHOCYTES NFR BLD AUTO: 28.6 % (ref 19.6–45.3)
MAGNESIUM SERPL-MCNC: 2.2 MG/DL (ref 1.6–2.4)
MCH RBC QN AUTO: 29.1 PG (ref 26.6–33)
MCHC RBC AUTO-ENTMCNC: 31.7 G/DL (ref 31.5–35.7)
MCV RBC AUTO: 91.7 FL (ref 79–97)
MONOCYTES # BLD AUTO: 1.23 10*3/MM3 (ref 0.1–0.9)
MONOCYTES NFR BLD AUTO: 8.7 % (ref 5–12)
NEUTROPHILS NFR BLD AUTO: 61.2 % (ref 42.7–76)
NEUTROPHILS NFR BLD AUTO: 8.72 10*3/MM3 (ref 1.7–7)
NT-PROBNP SERPL-MCNC: 53.2 PG/ML (ref 0–900)
PLATELET # BLD AUTO: 318 10*3/MM3 (ref 140–450)
PMV BLD AUTO: 9.3 FL (ref 6–12)
POTASSIUM SERPL-SCNC: 3.4 MMOL/L (ref 3.5–5.2)
PROT SERPL-MCNC: 7.7 G/DL (ref 6–8.5)
QT INTERVAL: 363 MS
QTC INTERVAL: 502 MS
RBC # BLD AUTO: 4.68 10*6/MM3 (ref 3.77–5.28)
SODIUM SERPL-SCNC: 136 MMOL/L (ref 136–145)
TROPONIN T % DELTA: -11
TROPONIN T NUMERIC DELTA: -8 NG/L
TROPONIN T SERPL HS-MCNC: 72 NG/L
WBC NRBC COR # BLD AUTO: 14.21 10*3/MM3 (ref 3.4–10.8)
WHOLE BLOOD HOLD COAG: NORMAL
WHOLE BLOOD HOLD SPECIMEN: NORMAL

## 2025-06-23 PROCEDURE — 85025 COMPLETE CBC W/AUTO DIFF WBC: CPT | Performed by: EMERGENCY MEDICINE

## 2025-06-23 PROCEDURE — 83880 ASSAY OF NATRIURETIC PEPTIDE: CPT | Performed by: NURSE PRACTITIONER

## 2025-06-23 PROCEDURE — 25810000003 SODIUM CHLORIDE 0.9 % SOLUTION: Performed by: NURSE PRACTITIONER

## 2025-06-23 PROCEDURE — 36415 COLL VENOUS BLD VENIPUNCTURE: CPT

## 2025-06-23 PROCEDURE — C1894 INTRO/SHEATH, NON-LASER: HCPCS | Performed by: INTERNAL MEDICINE

## 2025-06-23 PROCEDURE — 25010000002 HEPARIN (PORCINE) PER 1000 UNITS: Performed by: INTERNAL MEDICINE

## 2025-06-23 PROCEDURE — 94760 N-INVAS EAR/PLS OXIMETRY 1: CPT

## 2025-06-23 PROCEDURE — 25010000002 LIDOCAINE 2% SOLUTION: Performed by: INTERNAL MEDICINE

## 2025-06-23 PROCEDURE — 84484 ASSAY OF TROPONIN QUANT: CPT | Performed by: EMERGENCY MEDICINE

## 2025-06-23 PROCEDURE — 25510000001 IOPAMIDOL PER 1 ML: Performed by: EMERGENCY MEDICINE

## 2025-06-23 PROCEDURE — 25010000002 FENTANYL CITRATE (PF) 50 MCG/ML SOLUTION: Performed by: INTERNAL MEDICINE

## 2025-06-23 PROCEDURE — 25510000001 IOPAMIDOL PER 1 ML: Performed by: INTERNAL MEDICINE

## 2025-06-23 PROCEDURE — 99285 EMERGENCY DEPT VISIT HI MDM: CPT

## 2025-06-23 PROCEDURE — 71046 X-RAY EXAM CHEST 2 VIEWS: CPT

## 2025-06-23 PROCEDURE — 93458 L HRT ARTERY/VENTRICLE ANGIO: CPT | Performed by: INTERNAL MEDICINE

## 2025-06-23 PROCEDURE — 80053 COMPREHEN METABOLIC PANEL: CPT | Performed by: EMERGENCY MEDICINE

## 2025-06-23 PROCEDURE — 71275 CT ANGIOGRAPHY CHEST: CPT

## 2025-06-23 PROCEDURE — 83735 ASSAY OF MAGNESIUM: CPT | Performed by: NURSE PRACTITIONER

## 2025-06-23 PROCEDURE — 93005 ELECTROCARDIOGRAM TRACING: CPT | Performed by: EMERGENCY MEDICINE

## 2025-06-23 PROCEDURE — 99204 OFFICE O/P NEW MOD 45 MIN: CPT | Performed by: INTERNAL MEDICINE

## 2025-06-23 PROCEDURE — 85379 FIBRIN DEGRADATION QUANT: CPT | Performed by: NURSE PRACTITIONER

## 2025-06-23 PROCEDURE — 96360 HYDRATION IV INFUSION INIT: CPT

## 2025-06-23 PROCEDURE — C1769 GUIDE WIRE: HCPCS | Performed by: INTERNAL MEDICINE

## 2025-06-23 PROCEDURE — 25010000002 MIDAZOLAM PER 1 MG: Performed by: INTERNAL MEDICINE

## 2025-06-23 RX ORDER — ASPIRIN 81 MG/1
81 TABLET, CHEWABLE ORAL DAILY
Start: 2025-06-23

## 2025-06-23 RX ORDER — SODIUM CHLORIDE 0.9 % (FLUSH) 0.9 %
10 SYRINGE (ML) INJECTION EVERY 12 HOURS SCHEDULED
Status: DISCONTINUED | OUTPATIENT
Start: 2025-06-23 | End: 2025-06-23 | Stop reason: HOSPADM

## 2025-06-23 RX ORDER — IOPAMIDOL 755 MG/ML
INJECTION, SOLUTION INTRAVASCULAR
Status: DISCONTINUED | OUTPATIENT
Start: 2025-06-23 | End: 2025-06-23 | Stop reason: HOSPADM

## 2025-06-23 RX ORDER — SODIUM CHLORIDE 9 MG/ML
40 INJECTION, SOLUTION INTRAVENOUS AS NEEDED
Status: DISCONTINUED | OUTPATIENT
Start: 2025-06-23 | End: 2025-06-23 | Stop reason: HOSPADM

## 2025-06-23 RX ORDER — ACETAMINOPHEN 325 MG/1
650 TABLET ORAL EVERY 4 HOURS PRN
Status: DISCONTINUED | OUTPATIENT
Start: 2025-06-23 | End: 2025-06-23 | Stop reason: HOSPADM

## 2025-06-23 RX ORDER — VERAPAMIL HYDROCHLORIDE 2.5 MG/ML
INJECTION INTRAVENOUS
Status: DISCONTINUED | OUTPATIENT
Start: 2025-06-23 | End: 2025-06-23 | Stop reason: HOSPADM

## 2025-06-23 RX ORDER — LIDOCAINE HYDROCHLORIDE 20 MG/ML
INJECTION, SOLUTION INFILTRATION; PERINEURAL
Status: DISCONTINUED | OUTPATIENT
Start: 2025-06-23 | End: 2025-06-23 | Stop reason: HOSPADM

## 2025-06-23 RX ORDER — ASPIRIN 325 MG
325 TABLET ORAL ONCE
Status: COMPLETED | OUTPATIENT
Start: 2025-06-23 | End: 2025-06-23

## 2025-06-23 RX ORDER — FENTANYL CITRATE 50 UG/ML
INJECTION, SOLUTION INTRAMUSCULAR; INTRAVENOUS
Status: DISCONTINUED | OUTPATIENT
Start: 2025-06-23 | End: 2025-06-23 | Stop reason: HOSPADM

## 2025-06-23 RX ORDER — SODIUM CHLORIDE 0.9 % (FLUSH) 0.9 %
10 SYRINGE (ML) INJECTION AS NEEDED
Status: DISCONTINUED | OUTPATIENT
Start: 2025-06-23 | End: 2025-06-23 | Stop reason: HOSPADM

## 2025-06-23 RX ORDER — IOPAMIDOL 755 MG/ML
100 INJECTION, SOLUTION INTRAVASCULAR
Status: COMPLETED | OUTPATIENT
Start: 2025-06-23 | End: 2025-06-23

## 2025-06-23 RX ORDER — MIDAZOLAM HYDROCHLORIDE 1 MG/ML
INJECTION, SOLUTION INTRAMUSCULAR; INTRAVENOUS
Status: DISCONTINUED | OUTPATIENT
Start: 2025-06-23 | End: 2025-06-23 | Stop reason: HOSPADM

## 2025-06-23 RX ORDER — HEPARIN SODIUM 1000 [USP'U]/ML
INJECTION, SOLUTION INTRAVENOUS; SUBCUTANEOUS
Status: DISCONTINUED | OUTPATIENT
Start: 2025-06-23 | End: 2025-06-23 | Stop reason: HOSPADM

## 2025-06-23 RX ORDER — SODIUM CHLORIDE 9 MG/ML
75 INJECTION, SOLUTION INTRAVENOUS CONTINUOUS
Status: DISCONTINUED | OUTPATIENT
Start: 2025-06-23 | End: 2025-06-23 | Stop reason: HOSPADM

## 2025-06-23 RX ADMIN — SODIUM CHLORIDE 500 ML: 9 INJECTION, SOLUTION INTRAVENOUS at 10:12

## 2025-06-23 RX ADMIN — IOPAMIDOL 64 ML: 755 INJECTION, SOLUTION INTRAVENOUS at 10:26

## 2025-06-23 RX ADMIN — ASPIRIN 325 MG: 325 TABLET ORAL at 09:39

## 2025-06-23 NOTE — DISCHARGE INSTRUCTIONS
[facility]  [address] [place] [address]    Coronary Angiogram (Radial/Ulnar Approach) After Care    Refer to this sheet in the next few weeks. These instructions provide you with information on caring for yourself after your procedure. Your caregiver may also give you more specific instructions. Your treatment has been planned according to current medical practices, but problems sometimes occur. Call your caregiver if you have any problems or questions after your procedure.    Home Care Instructions:  You may shower the day after the procedure. Remove the bandage (dressing) and gently wash the site with plain soap and water. Gently pat the site dry. You may apply a band aid daily for 2 days if desired.    Do not apply powder or lotion to the site.  Do not submerge the affected site in water for 3 to 5 days or until the site is completely healed.   Do not lift, push or pull anything over 5 pounds for 5 days after your procedure or as directed by your physician.  As a reference, a gallon of milk weighs 8 pounds.   Inspect the site at least twice daily. You may notice some bruising at the site and it may be tender for 1 to 2 weeks.     Increase your fluid intake for the next 2 days.    Keep arm elevated for 24 hours. For the remainder of the day, keep your arm in “Pledge of Allegiance” position when up and about.     You may drive 24 hours after the procedure unless otherwise instructed by your caregiver.  Do not operate machinery or power tools for 24 hours.  A responsible adult should be with you for the first 24 hours after you arrive home. Do not make any important legal decisions or sign legal papers for 24 hours.  Do not drink alcohol for 24 hours.    Metformin or any medications containing Metformin should not be taken for 48 hours after your procedure.      Call Your Doctor if:   You have unusual pain at the radial/ulnar (wrist) site.  You have redness, warmth, swelling, or pain at the  radial/ulnar (wrist) site.  You have drainage (other than a small amount of blood on the dressing).  `You have chills or a fever > 101.  Your arm becomes pale or dark, cool, tingly, or numb.  You develop chest pain, shortness of breath, feel faint or pass out.    You have heavy bleeding from the site, hold pressure on the site for 20 minutes.  If the bleeding stops, apply a fresh bandage and call your cardiologist.  However, if you        continue to have bleeding, call 911 and continue to apply pressure to the site.   You have any symptoms of a stroke.  Remember BE FAST  B-balance. Sudden trouble walking or loss of balance.  E-eyes.  Sudden changes in how you see or a sudden onset of a very bad headache.   F-face. Sudden weakness or loss of feeling of the face or facial droop on one side.   A-arms Sudden weakness or numbness in one arm.  One arm drifts down if they are both held out in front of you. This happens suddenly and usually on one side of the body.   S-speech.  Sudden trouble speaking, slurred speech or trouble understanding what are saying.   T-time  Time to call emergency services.  Write down the symptoms and the time they started.

## 2025-06-23 NOTE — DISCHARGE INSTRUCTIONS
"Leave here.  Go directly (no stops, no food or drink) to [facility] medical office.    Office is on 6th floor at the hospital    When you arrive tell them you are to go to \"[facility]\" (chest pain evaluation center) and you will be directed.    I discussed your visit with Dr. Deutsch and nurse Keturah.    Return Precautions    Although you are being discharged from the ED today, I encourage you to return for worsening symptoms.  Things can, and do, change such that treatment at home with medication may not be adequate.      Specifically, return for any of the following:    Chest pain, shortness of breath, pain or nausea and vomiting not controlled by medications provided.    Please make a follow up with your Primary Care Provider for a blood pressure recheck.     "

## 2025-06-23 NOTE — PROGRESS NOTES
Patient Name: Wilma Longo  :1960  65 y.o.    Date of Admission: 2025  Encounter Provider: Olga Durand MD  Date of Encounter Visit: 25  Place of Service: [facility]ARDIOLOGY [facility]  Referring Provider: Olga Durand MD      Chief complaint:  SOA    History of Present Illness:    This is a nice lady with hypertension and hyperlipidemia.  She woke up feeling short of breath.  She had discomfort throughout her chest and back.  The breathing was labored enough that she agreed with her daughter and went to the ER.  I have reviewed her labs and she has 2 elevated troponins with a delta of 8.    Past Medical History:   Diagnosis Date    Anemia 2022    Anxiety     Osman esophagus ?    Benign essential hypertension 2015    Cancer     Skin cancer    Cardiomyopathy     Colitis 2009    Depression     Diabetes mellitus 1994    Gestational    Fibromyalgia     Fibromyalgia, primary     History of gestational diabetes     Hyperlipidemia 2011    Hypothyroidism 2015    Inflammatory bowel disease 2009    Insomnia 2015    Irritable bowel syndrome 2009    Panic attacks     Rectocele     Skin cancer of arm     Vaginal prolapse     Vitamin D deficiency 2012       Past Surgical History:   Procedure Laterality Date    ANTERIOR AND POSTERIOR VAGINAL REPAIR N/A 2021    Procedure: POSTERIOR VAGINAL REPAIR, CYSTOSCOPY;  Surgeon: Cora Gustafson MD;  Location: [facility] OR;  Service: Gynecology;  Laterality: N/A;    BLADDER REPAIR  09/2015    X2    COLONOSCOPY      wnl    COLONOSCOPY N/A 2016    Procedure: COLONOSCOPY into cecum and terminal ileum with biopsies;  Surgeon: Orlin Mann MD;  Location: [facility] ENDOSCOPY;  Service:     COLONOSCOPY N/A 2019    Procedure: COLONOSCOPY TO CECUM AND INTO TERMINAL ILEUM;  Surgeon: Orlin Mann MD;  Location: [facility] ENDOSCOPY;  Service:  Gastroenterology    COLONOSCOPY W/ POLYPECTOMY N/A 02/20/2023    Procedure: COLONOSCOPY INTO CECUM AND TI WITH BIOPSIES;  Surgeon: Basilio Martin MD;  Location:  [facility] ENDOSCOPY;  Service: Gastroenterology;  Laterality: N/A;  PRE: COLITIS  POST: NORMAL COLON    CYSTOSCOPY W/ URETERAL STENT PLACEMENT      ENDOSCOPY N/A 09/06/2019    Procedure: ESOPHAGOGASTRODUODENOSCOPY WITH COLD BIOPSIES AND 48F HERNÁNDEZ DILATION;  Surgeon: Orlin Mann MD;  Location:  [facility] ENDOSCOPY;  Service: Gastroenterology    ENDOSCOPY N/A 02/20/2023    Procedure: ESOPHAGOGASTRODUODENOSCOPY WITH BIOPSIES;  Surgeon: Basilio Martin MD;  Location:  [facility] ENDOSCOPY;  Service: Gastroenterology;  Laterality: N/A;  PRE: MENDEZ'S  POST: HIATAL HERNIA, GASTRITIS    HYSTERECTOMY  12/2013    MOUTH SURGERY      SACROCOLPOPEXY N/A 04/06/2021    Procedure: LARPAROSCOPY COLPOPEXY, BILATERAL SALPINGECTOMY;  Surgeon: Cora Gustafson MD;  Location: [facility] MAIN OR;  Service: Gynecology;  Laterality: N/A;    SKIN CANCER EXCISION      UPPER GASTROINTESTINAL ENDOSCOPY  9/6/3019         Prior to Admission medications    Medication Sig Start Date End Date Taking? Authorizing Provider   ALPRAZolam (Xanax) 0.5 MG tablet Take 1 tablet by mouth Daily As Needed for Anxiety. D/c previous order 7/31/24   Jerman Virgen MD   amLODIPine (NORVASC) 5 MG tablet TAKE 1 TABLET BY MOUTH DAILY 5/23/25   Jerman Virgen MD   colestipol (COLESTID) 1 g tablet Take 2 tablets by mouth 2 (Two) Times a Day. 6/5/25   Crystal Smith PA-C   Cranberry 125 MG tablet Take  by mouth.    Provider, MD Leoncio   DULoxetine (CYMBALTA) 60 MG capsule TAKE 1 CAPSULE BY MOUTH DAILY 4/2/25   Jerman Virgen MD   levothyroxine (SYNTHROID, LEVOTHROID) 75 MCG tablet TAKE 1 TABLET BY MOUTH DAILY 4/2/25   Jerman Virgen MD   lisinopril (PRINIVIL,ZESTRIL) 20 MG tablet TAKE 1 TABLET BY MOUTH DAILY 4/2/25   Jerman Virgen MD   naproxen (NAPROSYN) 500 MG tablet Take 1 tablet by  "mouth Daily As Needed for Moderate Pain. 7/31/24   Jerman Virgen MD   rosuvastatin (CRESTOR) 5 MG tablet TAKE 1 TABLET BY MOUTH DAILY 3/3/25   Jerman Virgen MD   valACYclovir (VALTREX) 500 MG tablet Take 1 tablet by mouth As Needed.    Provider, MD Leoncio   vitamin C (ASCORBIC ACID) 250 MG tablet Take 2 tablets by mouth Every Night.    Provider, MD Leoncio       No Known Allergies    Social History     Socioeconomic History    Marital status:      Spouse name: Ruben   Tobacco Use    Smoking status: Never    Smokeless tobacco: Never   Vaping Use    Vaping status: Never Used   Substance and Sexual Activity    Alcohol use: Not Currently     Comment: less than once per  month    Drug use: No    Sexual activity: Yes     Partners: Male     Birth control/protection: Post-menopausal, Hysterectomy       Family History   Problem Relation Age of Onset    Depression Mother     Heart disease Mother     Stroke Mother     Heart attack Mother     Heart failure Mother     Hyperlipidemia Mother     Hypertension Mother     Irritable bowel syndrome Mother     Arthritis Father     Heart disease Father     Skin cancer Father     Heart attack Father     Heart failure Father     Heart disease Maternal Grandmother     Stroke Maternal Grandfather     Stroke Paternal Grandfather     Hypertension Sister     Hypothyroidism Sister     Hypertension Brother     Hypothyroidism Daughter     Thyroid disease Sister     Colon cancer Neg Hx     Colon polyps Neg Hx     Liver disease Neg Hx     Rectal cancer Neg Hx     Stomach cancer Neg Hx     Liver cancer Neg Hx     Malig Hyperthermia Neg Hx             Objective:     Vitals:    06/23/25 1158 06/23/25 1200   BP: 134/81    BP Location: Left arm    Patient Position: Sitting    Pulse: 94    Resp: 18    SpO2: 95% 95%   Weight: 71.2 kg (157 lb)    Height: 162.6 cm (64\")      Body mass index is 26.95 kg/m².      Lab Review:     Results from last 7 days   Lab Units 06/23/25  0925   SODIUM " mmol/L 136   POTASSIUM mmol/L 3.4*   CHLORIDE mmol/L 103   CO2 mmol/L 21.6*   BUN mg/dL 16.7   CREATININE mg/dL 1.03*   CALCIUM mg/dL 9.5   BILIRUBIN mg/dL 0.3   ALK PHOS U/L 69   ALT (SGPT) U/L 20   AST (SGOT) U/L 21   GLUCOSE mg/dL 124*     Results from last 7 days   Lab Units 06/23/25  1026 06/23/25  0925   HSTROP T ng/L 64* 72*     Results from last 7 days   Lab Units 06/23/25  0925   WBC 10*3/mm3 14.21*   HEMOGLOBIN g/dL 13.6   HEMATOCRIT % 42.9   PLATELETS 10*3/mm3 318         Results from last 7 days   Lab Units 06/23/25  0925   MAGNESIUM mg/dL 2.2           Procedures    I reviewed her EKG from the emergency department she does not have acute changes.    Assessment and Plan:       1.  Chest pain and dyspnea on exertion with an abnormal troponin.  Her symptoms are concerning for ischemia.  I recommend she have a heart catheterization today.  I do not have a good explanation for why her troponin would be so elevated.  EKG without acute changes.  Plan is for heart catheterization today.  I would also like to get an echo but heart catheterization takes priority.  D-dimer and BNP normal.  CT angiogram without thromboembolic disease.  I reviewed the CT images myself and there is quite a bit of motion artifact but I do not see significant coronary artery calcification.  Heart chamber sizes appear to be normal.  2.  Hypertension  3.  Hyperlipidemia  4.  Hypothyroid.    Olga Durand MD  06/23/25  13:22 EDT

## 2025-06-23 NOTE — ED NOTES
Pt discharged at this time. Pt instructed to go straight to [facility] [place]. Pt verbalized understanding.

## 2025-06-23 NOTE — FSED PROVIDER NOTE
"        EMERGENCY DEPARTMENT ENCOUNTER    Room Number:    Date seen:  2025  Time seen: 09:21 EDT  PCP: Jerman Virgen MD  Historian: Patient    Discussed/obtained information from independent historians: n/a    HPI:  Chief complaint:chest pain, shortness of breath  A complete HPI/ROS/PMH/PSH/SH/FH are unobtainable due to: n/a  Context:Wilma Longo is a 65 y.o. female with past medical history significant for hypertension, hypothyroid, IBS who presents to the ED with c/o chest pain described as pressure, moderate dyspnea on exertion, feeling \"overextended\" and decreased energy some of these symptoms have been ongoing but this am she developed upper back pain and noticed her BP was lower than usual and her HR was elevated.  She states a simple task such as taking a laundry basket from one room to another leaves her winded.  This am she started to have upper back pain and lightheadedness and this prompted her to come for evaluation.  She has seen Cardiology with [facility] but not recently. She DENIES: fever, chills, coughing or URI symptoms, no recent travel or h/o blood clots. She has chronic diarrhea and bile acid malabsorption issues.  She states both her parents are  with heart disease    External (non-ED) record review:  I found a [facility]ology note dating back to  (Dr. Kammerling) and she was seen for dyspnea on exertion    Chronic or social conditions impacting care:n/a    ALLERGIES  Patient has no known allergies.    PAST MEDICAL HISTORY  Active Ambulatory Problems     Diagnosis Date Noted    Colitis 2009    Depression     Benign essential hypertension 2015    Hypothyroidism 2015    Impaired fasting glucose 2015    Insomnia 2015    Irritable bowel syndrome 2009    Hyperlipidemia 2011    Mood disorder 2011    Vitamin D deficiency 2012    Fibromyalgia 2017    Dysphagia 2019    Adhesive capsulitis of left shoulder " 08/24/2020    Anemia 01/14/2021    Iron deficiency anemia 03/02/2021    Malabsorption of iron 03/02/2021    Prolapse of female pelvic organs 04/05/2021    Pelvic prolapse 04/06/2021    S/P laparoscopy 04/06/2021    Osman's esophagus without dysplasia 11/30/2022    Monoclonal gammopathy 10/30/2023    Influenza A 02/08/2024    CORONA (dyspnea on exertion) 06/23/2025     Resolved Ambulatory Problems     Diagnosis Date Noted    No Resolved Ambulatory Problems     Past Medical History:   Diagnosis Date    Anxiety     Osman esophagus ?    Cancer     Cardiomyopathy     Fibromyalgia, primary     Gestational diabetes mellitus 06/1994    History of gestational diabetes     Inflammatory bowel disease 06/12/2009    Panic attacks     Primary bile acid malabsorption     Rectocele     Skin cancer of arm     Vaginal prolapse        PAST SURGICAL HISTORY  Past Surgical History:   Procedure Laterality Date    ANTERIOR AND POSTERIOR VAGINAL REPAIR N/A 04/06/2021    Procedure: POSTERIOR VAGINAL REPAIR, CYSTOSCOPY;  Surgeon: Cora Gustafson MD;  Location: [facility] MAIN OR;  Service: Gynecology;  Laterality: N/A;    BLADDER REPAIR  09/2015    X2    COLONOSCOPY  2010    wnl    COLONOSCOPY N/A 09/09/2016    Procedure: COLONOSCOPY into cecum and terminal ileum with biopsies;  Surgeon: Orlin Mann MD;  Location: [facility] ENDOSCOPY;  Service:     COLONOSCOPY N/A 09/06/2019    Procedure: COLONOSCOPY TO CECUM AND INTO TERMINAL ILEUM;  Surgeon: Orlin Mann MD;  Location: [facility] ENDOSCOPY;  Service: Gastroenterology    COLONOSCOPY W/ POLYPECTOMY N/A 02/20/2023    Procedure: COLONOSCOPY INTO CECUM AND TI WITH BIOPSIES;  Surgeon: Basilio Martin MD;  Location: [facility] ENDOSCOPY;  Service: Gastroenterology;  Laterality: N/A;  PRE: COLITIS  POST: NORMAL COLON    CYSTOSCOPY W/ URETERAL STENT PLACEMENT      ENDOSCOPY N/A 09/06/2019    Procedure: ESOPHAGOGASTRODUODENOSCOPY WITH COLD BIOPSIES AND 48F HERNÁNDEZ DILATION;  Surgeon: Mackenzie  Orlin ENNIS MD;  Location: [facility] ENDOSCOPY;  Service: Gastroenterology    ENDOSCOPY N/A 02/20/2023    Procedure: ESOPHAGOGASTRODUODENOSCOPY WITH BIOPSIES;  Surgeon: Basilio Martin MD;  Location: [facility] ENDOSCOPY;  Service: Gastroenterology;  Laterality: N/A;  PRE: MENDEZ'S  POST: HIATAL HERNIA, GASTRITIS    HYSTERECTOMY  12/2013    MOUTH SURGERY      multiple teeth extraction    SACROCOLPOPEXY N/A 04/06/2021    Procedure: LARPAROSCOPY COLPOPEXY, BILATERAL SALPINGECTOMY;  Surgeon: Cora Gustafson MD;  Location: [facility] MAIN OR;  Service: Gynecology;  Laterality: N/A;    SKIN CANCER EXCISION      UPPER GASTROINTESTINAL ENDOSCOPY  09/06/2019       FAMILY HISTORY  Family History   Problem Relation Age of Onset    Depression Mother     Heart disease Mother     Stroke Mother     Heart attack Mother     Heart failure Mother     Hyperlipidemia Mother     Hypertension Mother     Irritable bowel syndrome Mother     Arthritis Father     Heart disease Father     Skin cancer Father     Heart attack Father     Heart failure Father     Heart disease Maternal Grandmother     Stroke Maternal Grandfather     Stroke Paternal Grandfather     Hypertension Sister     Hypothyroidism Sister     Hypertension Brother     Hypothyroidism Daughter     Thyroid disease Sister     Colon cancer Neg Hx     Colon polyps Neg Hx     Liver disease Neg Hx     Rectal cancer Neg Hx     Stomach cancer Neg Hx     Liver cancer Neg Hx     Malig Hyperthermia Neg Hx        SOCIAL HISTORY  Social History     Socioeconomic History    Marital status:      Spouse name: Ruben   Tobacco Use    Smoking status: Never    Smokeless tobacco: Never   Vaping Use    Vaping status: Never Used   Substance and Sexual Activity    Alcohol use: Not Currently     Comment: less than once per  month    Drug use: No    Sexual activity: Yes     Partners: Male     Birth control/protection: Post-menopausal, Hysterectomy       REVIEW OF SYSTEMS  Review of Systems    All systems  reviewed and negative except for those discussed in HPI.     PHYSICAL EXAM    I have reviewed the triage vital signs and nursing notes.  Vitals:    06/23/25 1102   BP:    Pulse: 94   Resp:    Temp:    SpO2: 95%     Physical Exam    GENERAL: not distressed  HENT: nares patent  EYES: no scleral icterus  NECK: no ROM limitations  CV: regular rhythm, mildly tachycardic, I don't appreciate a murmur  RESPIRATORY: normal effort, does have some increased work of breathing when up and ambulating.   ABDOMEN: soft  : deferred  MUSCULOSKELETAL: no deformity  NEURO: alert, moves all extremities, follows commands  SKIN: warm, dry    LAB RESULTS  Recent Results (from the past 24 hours)   ECG 12 Lead Chest Pain    Collection Time: 06/23/25  9:20 AM   Result Value Ref Range    QT Interval 363 ms    QTC Interval 502 ms   Comprehensive Metabolic Panel    Collection Time: 06/23/25  9:25 AM    Specimen: Blood   Result Value Ref Range    Glucose 124 (H) 65 - 99 mg/dL    BUN 16.7 8.0 - 23.0 mg/dL    Creatinine 1.03 (H) 0.57 - 1.00 mg/dL    Sodium 136 136 - 145 mmol/L    Potassium 3.4 (L) 3.5 - 5.2 mmol/L    Chloride 103 98 - 107 mmol/L    CO2 21.6 (L) 22.0 - 29.0 mmol/L    Calcium 9.5 8.6 - 10.5 mg/dL    Total Protein 7.7 6.0 - 8.5 g/dL    Albumin 4.0 3.5 - 5.2 g/dL    ALT (SGPT) 20 1 - 33 U/L    AST (SGOT) 21 1 - 32 U/L    Alkaline Phosphatase 69 39 - 117 U/L    Total Bilirubin 0.3 0.0 - 1.2 mg/dL    Globulin 3.7 gm/dL    A/G Ratio 1.1 g/dL    BUN/Creatinine Ratio 16.2 7.0 - 25.0    Anion Gap 11.4 5.0 - 15.0 mmol/L    eGFR 60.5 >60.0 mL/min/1.73   High Sensitivity Troponin T    Collection Time: 06/23/25  9:25 AM    Specimen: Blood   Result Value Ref Range    HS Troponin T 72 (C) <14 ng/L   Green Top (Gel)    Collection Time: 06/23/25  9:25 AM   Result Value Ref Range    Extra Tube Hold for add-ons.    Lavender Top    Collection Time: 06/23/25  9:25 AM   Result Value Ref Range    Extra Tube hold for add-on    Gold Top - SST     Collection Time: 06/23/25  9:25 AM   Result Value Ref Range    Extra Tube Hold for add-ons.    Light Blue Top    Collection Time: 06/23/25  9:25 AM   Result Value Ref Range    Extra Tube Hold for add-ons.    CBC Auto Differential    Collection Time: 06/23/25  9:25 AM    Specimen: Blood   Result Value Ref Range    WBC 14.21 (H) 3.40 - 10.80 10*3/mm3    RBC 4.68 3.77 - 5.28 10*6/mm3    Hemoglobin 13.6 12.0 - 15.9 g/dL    Hematocrit 42.9 34.0 - 46.6 %    MCV 91.7 79.0 - 97.0 fL    MCH 29.1 26.6 - 33.0 pg    MCHC 31.7 31.5 - 35.7 g/dL    RDW 12.7 12.3 - 15.4 %    RDW-SD 43.3 37.0 - 54.0 fl    MPV 9.3 6.0 - 12.0 fL    Platelets 318 140 - 450 10*3/mm3    Neutrophil % 61.2 42.7 - 76.0 %    Lymphocyte % 28.6 19.6 - 45.3 %    Monocyte % 8.7 5.0 - 12.0 %    Eosinophil % 1.1 0.3 - 6.2 %    Basophil % 0.3 0.0 - 1.5 %    Immature Grans % 0.1 0.0 - 0.5 %    Neutrophils, Absolute 8.72 (H) 1.70 - 7.00 10*3/mm3    Lymphocytes, Absolute 4.06 (H) 0.70 - 3.10 10*3/mm3    Monocytes, Absolute 1.23 (H) 0.10 - 0.90 10*3/mm3    Eosinophils, Absolute 0.15 0.00 - 0.40 10*3/mm3    Basophils, Absolute 0.04 0.00 - 0.20 10*3/mm3    Immature Grans, Absolute 0.01 0.00 - 0.05 10*3/mm3   BNP    Collection Time: 06/23/25  9:25 AM    Specimen: Blood   Result Value Ref Range    proBNP 53.2 0.0 - 900.0 pg/mL   D-dimer, Quantitative    Collection Time: 06/23/25  9:25 AM    Specimen: Blood   Result Value Ref Range    D-Dimer, Quantitative 0.31 0.00 - 0.65 MCGFEU/mL   Magnesium    Collection Time: 06/23/25  9:25 AM    Specimen: Blood   Result Value Ref Range    Magnesium 2.2 1.6 - 2.4 mg/dL   High Sensitivity Troponin T 1Hr    Collection Time: 06/23/25 10:26 AM    Specimen: Blood   Result Value Ref Range    HS Troponin T 64 (C) <14 ng/L    Troponin T Numeric Delta -8 ng/L    Troponin T % Delta -11 Abnormal if >/= 20%       Ordered the above labs and independently interpreted results.  My findings will be discussed in the ED course or medical decision making  section below    RADIOLOGY RESULTS  Cardiac Catheterization/Vascular Study  Result Date: 6/23/2025  CONCLUSION: 1.  Coronary artery disease: *10% mid LAD stenosis *70% ostial stenosis of a small caliber diagonal branch (not amenable to PCI) *No significant disease of left main, ramus intermedius, left circumflex and right coronary artery 2.  LVEDP 12 to 16 mmHg 3.  Normal left ventricular systolic function wall motion RECOMMENDATIONS: Medical management.  Proceed with echocardiogram as an outpatient as planned.  Will discharge home later today and further follow-up and recommendations per Dr. Durand. FINDINGS: ELECTIVE CORONARY ANGIOGRAMS: 1.  Left main artery: Large-caliber vessel with 0% stenosis.  The vessel trifurcates into a left anterior descending, ramus intermedius, left circumflex arteries. 2.  Left anterior descending artery: Approximately a moderate caliber vessel with 0% stenosis.  Proximal vessel gives rise to a small caliber first diagonal branch with 70% ostial stenosis.  The mid LAD tapers to a small caliber with 10 to 20% stenosis.  The distal LAD remains a small caliber vessel with no significant disease. 3.  Ramus intermedius: Small caliber vessel with 0% stenosis. 4.  Left circumflex artery: Small caliber vessel with 0% proximal to distal stenosis.  The distal vessel gives rise to a small caliber obtuse marginal branch with no significant disease. 5.  Right coronary artery: Moderate caliber vessel with luminal irregularities involving the proximal to mid vessel.  The distal vessel bifurcates into a small caliber posterior descending and posterolateral branches both with no significant disease.  This is a right dominant system. LEFT VENTRICULAR HEMODYNAMICS: 1.  Left ventricular pressure: 135/12-16 2.  Aortic pressure: 128/63 LEFT VENTRICULOGRAM: Normal left ventricular systolic function wall motion with a visually estimated ejection fraction of greater than 60%. PROCEDURE: After informed consent  was obtained the patient brought to the cardiac catheterization laboratory where her right wrist was prepped and draped in a sterile manner.  1 mL of 2% lidocaine was infused subcutaneously to the right wrist.  Access to the right radial artery was obtained using a micropuncture needle followed placement of a 5/6 Malian slender glide sheath. Selective coronary angiogram performed single 5 Malian FL 3.5 and a 5 Malian FR4 diagnostic catheters.  A left heart catheterization and a left ventriculogram were performed seeing a 5 Malian radial pigtail catheter. Following completion of the procedure all wires and catheters were removed.  Radial sheath was removed and a TR band placed.  After hemostasis was achieved the patient was transferred to the recovery area in stable condition.     CT Angiogram Chest Pulmonary Embolism  Result Date: 6/23/2025   CT ANGIOGRAM CHEST PULMONARY EMBOLISM-  HISTORY: Shortness of air, chest pain. Evaluate for pulmonary embolus.  TECHNIQUE:  CT angiogram of the chest with IV contrast and coronal, sagittal, and 3-D imaging.  Radiation dose reduction techniques were utilized, including automated exposure control and exposure modulation based on body size.  COMPARISON: CT abdomen and pelvis 11/08/2024.  FINDINGS: No intrapulmonary arterial filling defect to diagnose a pulmonary embolus. No evidence for acute aortic abnormality. No endotracheal or central endobronchial lesion is evident. No evidence for mediastinal or axillary alexa enlargement. Small to moderate hiatal hernia. Mild subsegmental lingular, right middle lobe, lower lobe atelectasis. No suspicious pulmonary nodule or pleural effusion or infiltrate.      1. No evidence for pulmonary thromboembolic disease or acute abnormality in the chest. 2. Small to moderate hiatal hernia.   This report was finalized on 6/23/2025 10:54 AM by Esteban Johnson M.D on Workstation: [id number]      XR Chest 2 View  Result Date: 6/23/2025  XR CHEST 2  VW-  CLINICAL HISTORY:  Chest Pain Triage Protocol  COMPARISON:  None  FINDINGS: PA and lateral views of the chest demonstrates mild dextroscoliosis of the thoracic spine. The heart is within normal limits in size. Linear areas of increased density are noted involving the lung bases bilaterally consistent with atelectasis/scarring. There is no evidence of consolidation, effusion or of congestive failure.    This report was finalized on 6/23/2025 10:02 AM by Dr. Fransisco Givens M.D on Workstation: [id number]         Ordered the above noted radiological studies.  Independently interpreted by me.  My findings will be discussed in the medical decision section below.     PROGRESS, DATA ANALYSIS, CONSULTS AND MEDICAL DECISION MAKING    Please note that this section constitutes my independent interpretation of clinical data including lab results, radiology, EKG's.  This constitutes my independent professional opinion regarding differential diagnosis and management of this patient.  It may include any factors such as history from outside sources, review of external records, social determinants of health, management of medications, response to those treatments, and discussions with other providers.    ED Course as of 06/23/25 1546   Mon Jun 23, 2025   0944 EKG          EKG time: 0920  Rhythm/Rate: 115, sinus tachycardia  P waves and WY: normal WY, normal OLIVIER  QRS, axis: normal QRS and axis, prolonged QTI  ST and T waves: no DEL    Interpreted Contemporaneously by me, independently viewed  Paired to prior dated 9/29/2017 tracing is similar heart rate is lower [EW]   1004 Hemoglobin: 13.6  Doubt shortness of breath and elevated HR due to acute blood loss anemia based on Hemoglobin 13.6 [EW]   1006 I viewed CXR in PACS.  My independent interpretation is no acute infiltrate.  [EW]   1010 Dimer negative but troponin quite high, she has had ASA.  [EW]   1017 I had the nurse ambulate patient while checking oxygen saturation.  She  did not develop any hypoxia but her HR did increase 115-120.  She was very winded with ambulation and had to take it very slow.  [EW]   1047 Repeat troponin 64 [EW]   1052 CTA  negative for PE.  Consulting LCG at this time.  [EW]   1101 Discussed pt with Dr. Deutsch, on call for [facility].  He recommends admission for cardiac evaluation center and will have nurse call me back.  [EW]   1105 [place] [facility] [place]  [place] has advised for me to discharge patient from here and given me the location of where patient is to go for further testing. She will go to [facility] 6th floor [facility] and [place] [facility] [place].  [EW]   1114 I updated patient and her daughter on plan for admission to the chest pain evaluation center.  I have discussed and also typed out and highlighted on the discharge papers where exactly they need to go. [EW]      ED Course User Index  [EW] La Kim, APRN     Orders placed during this visit:  Orders Placed This Encounter   Procedures    XR Chest 2 View    CT Angiogram Chest Pulmonary Embolism    Ophiem Draw    Comprehensive Metabolic Panel    High Sensitivity Troponin T    CBC Auto Differential    BNP    D-dimer, Quantitative    Magnesium    High Sensitivity Troponin T 1Hr    Undress and Gown    Continuous Pulse Oximetry    Check Pulse Oximetry while ambulating    ECG 12 Lead Chest Pain    CBC & Differential    Green Top (Gel)    Lavender Top    Gold Top - SST    Light Blue Top    ED Acknowledgement Form Needed;            Medical Decision Making  Problems Addressed:  Acute chest pain: complicated acute illness or injury  Dyspnea on exertion: complicated acute illness or injury  Elevated troponin: complicated acute illness or injury    Amount and/or Complexity of Data Reviewed  Labs: ordered. Decision-making details documented in ED Course.  Radiology: ordered.  ECG/medicine tests: ordered.    Risk  OTC drugs.  Prescription drug  management.    Pt presents with worsening of dyspnea on exertion, chest tightness and new upper back pain today. I considered ACS, PE.  Exam without evidence of volume overload so doubt heart failure. EKG without signs of active ischemia. Given the timing of pain to ER presentation, single troponin elevated and delta 8 even though 2nd troponin was improved to 64.  Her dimer was negative but I still proceeded with CTA which was negative for PE.  She is not hypoxic, did have some tachycardia.  I consulted with Dr. Deutsch and it was thought patient would be good candidate for chest pain evaluation center and she was discharged from here and went there for further workup.  HEART score:7  so plan to admit patient for risk stratification    DIAGNOSIS  Final diagnoses:   Acute chest pain   Dyspnea on exertion   Elevated troponin          Medication List        Changed      amLODIPine 5 MG tablet  Commonly known as: NORVASC  TAKE 1 TABLET BY MOUTH DAILY  What changed: when to take this     DULoxetine 60 MG capsule  Commonly known as: CYMBALTA  TAKE 1 CAPSULE BY MOUTH DAILY  What changed: when to take this     lisinopril 20 MG tablet  Commonly known as: PRINIVIL,ZESTRIL  TAKE 1 TABLET BY MOUTH DAILY  What changed: when to take this     rosuvastatin 5 MG tablet  Commonly known as: CRESTOR  TAKE 1 TABLET BY MOUTH DAILY  What changed: when to take this              FOLLOW-UP  [facility] CARDIOLOGY  [address]  [place] [address] [phone]  Go to           Latest Documented Vital Signs:  As of 15:46 EDT  BP- 130/77 HR- 94 Temp- 98.1 °F (36.7 °C) (Oral) O2 sat- 95%    Appropriate PPE utilized throughout this patient encounter to include mask, if indicated, per current protocol. Hand hygiene was performed before donning PPE and after removal when leaving the room.    Please note that portions of this were completed with a voice recognition program.     Note Disclaimer: At [facility], we believe that sharing information builds trust and better relationships. You are receiving this note because you are receiving care at [facility] or recently visited. It is possible you will see health information before a provider has talked with you about it. This kind of information can be easy to misunderstand. To help you fully understand what it means for your health, we urge you to discuss this note with your provider.

## 2025-06-23 NOTE — PROGRESS NOTES
PT being transferred to  for heart cath via WC with RN and daughter. Daughter has belongings. CL ready for PT so ECHO can be done later per Dr. Durand. PT has 20g IV to RFA.

## 2025-06-23 NOTE — Clinical Note
Allergies reviewed.  H&P note has been confirmed for the patient. Procedural consent has been signed.  Staff has reviewed the patient's labs. Prep Survey      Flowsheet Row Responses   [facility] facility patient discharged from? Non-BH   Is LACE score < 7 ? Non- Discharge   Eligibility [facility]   Date of Admission 09/02/23   Date of Discharge 09/08/23   Discharge Disposition Home or Self Care   Discharge diagnosis Colitis   Does the patient have one of the following disease processes/diagnoses(primary or secondary)? Other   Prep survey completed? Yes            Keysha ELIAS - Registered Nurse

## 2025-06-23 NOTE — H&P (VIEW-ONLY)
Patient Name: Wilma Longo  :1960  65 y.o.    Date of Admission: 2025  Encounter Provider: Olga Durand MD  Date of Encounter Visit: 25  Place of Service: [facility] CARDIOLOGY CARDIAC EVALUATION CLINIC  Referring Provider: Olga Durand MD      Chief complaint:  SOA    History of Present Illness:    This is a nice lady with hypertension and hyperlipidemia.  She woke up feeling short of breath.  She had discomfort throughout her chest and back.  The breathing was labored enough that she agreed with her daughter and went to the ER.  I have reviewed her labs and she has 2 elevated troponins with a delta of 8.    Past Medical History:   Diagnosis Date    Anemia 2022    Anxiety     Osman esophagus ?    Benign essential hypertension 2015    Cancer     Skin cancer    Cardiomyopathy     Colitis 2009    Depression     Diabetes mellitus 1994    Gestational    Fibromyalgia     Fibromyalgia, primary     History of gestational diabetes     Hyperlipidemia 2011    Hypothyroidism 2015    Inflammatory bowel disease 2009    Insomnia 2015    Irritable bowel syndrome 2009    Panic attacks     Rectocele     Skin cancer of arm     Vaginal prolapse     Vitamin D deficiency 2012       Past Surgical History:   Procedure Laterality Date    ANTERIOR AND POSTERIOR VAGINAL REPAIR N/A 2021    Procedure: POSTERIOR VAGINAL REPAIR, CYSTOSCOPY;  Surgeon: Cora Gustafson MD;  Location: [facility] OR;  Service: Gynecology;  Laterality: N/A;    BLADDER REPAIR  09/2015    X2    COLONOSCOPY      wnl    COLONOSCOPY N/A 2016    Procedure: COLONOSCOPY into cecum and terminal ileum with biopsies;  Surgeon: Orlin Mann MD;  Location: [facility] ENDOSCOPY;  Service:     COLONOSCOPY N/A 2019    Procedure: COLONOSCOPY TO CECUM AND INTO TERMINAL ILEUM;  Surgeon: Orlin Mann MD;  Location: [facility] ENDOSCOPY;  Service:  Gastroenterology    COLONOSCOPY W/ POLYPECTOMY N/A 02/20/2023    Procedure: COLONOSCOPY INTO CECUM AND TI WITH BIOPSIES;  Surgeon: Basilio Martin MD;  Location:  [facility] ENDOSCOPY;  Service: Gastroenterology;  Laterality: N/A;  PRE: COLITIS  POST: NORMAL COLON    CYSTOSCOPY W/ URETERAL STENT PLACEMENT      ENDOSCOPY N/A 09/06/2019    Procedure: ESOPHAGOGASTRODUODENOSCOPY WITH COLD BIOPSIES AND 48F HERNÁNDEZ DILATION;  Surgeon: Orlin Mann MD;  Location:  [facility] ENDOSCOPY;  Service: Gastroenterology    ENDOSCOPY N/A 02/20/2023    Procedure: ESOPHAGOGASTRODUODENOSCOPY WITH BIOPSIES;  Surgeon: Basilio Martin MD;  Location:  [facility] ENDOSCOPY;  Service: Gastroenterology;  Laterality: N/A;  PRE: MENDEZ'S  POST: HIATAL HERNIA, GASTRITIS    HYSTERECTOMY  12/2013    MOUTH SURGERY      SACROCOLPOPEXY N/A 04/06/2021    Procedure: LARPAROSCOPY COLPOPEXY, BILATERAL SALPINGECTOMY;  Surgeon: Cora Gustafson MD;  Location: [facility] MAIN OR;  Service: Gynecology;  Laterality: N/A;    SKIN CANCER EXCISION      UPPER GASTROINTESTINAL ENDOSCOPY  9/6/3019         Prior to Admission medications    Medication Sig Start Date End Date Taking? Authorizing Provider   ALPRAZolam (Xanax) 0.5 MG tablet Take 1 tablet by mouth Daily As Needed for Anxiety. D/c previous order 7/31/24   Jerman Virgen MD   amLODIPine (NORVASC) 5 MG tablet TAKE 1 TABLET BY MOUTH DAILY 5/23/25   Jerman Virgen MD   colestipol (COLESTID) 1 g tablet Take 2 tablets by mouth 2 (Two) Times a Day. 6/5/25   Crystal Smith PA-C   Cranberry 125 MG tablet Take  by mouth.    Provider, MD Leoncio   DULoxetine (CYMBALTA) 60 MG capsule TAKE 1 CAPSULE BY MOUTH DAILY 4/2/25   Jerman Virgen MD   levothyroxine (SYNTHROID, LEVOTHROID) 75 MCG tablet TAKE 1 TABLET BY MOUTH DAILY 4/2/25   Jerman Virgen MD   lisinopril (PRINIVIL,ZESTRIL) 20 MG tablet TAKE 1 TABLET BY MOUTH DAILY 4/2/25   Jerman Virgen MD   naproxen (NAPROSYN) 500 MG tablet Take 1 tablet by  "mouth Daily As Needed for Moderate Pain. 7/31/24   Jerman Virgen MD   rosuvastatin (CRESTOR) 5 MG tablet TAKE 1 TABLET BY MOUTH DAILY 3/3/25   Jerman Virgen MD   valACYclovir (VALTREX) 500 MG tablet Take 1 tablet by mouth As Needed.    Provider, MD Leoncio   vitamin C (ASCORBIC ACID) 250 MG tablet Take 2 tablets by mouth Every Night.    Provider, MD Leoncio       No Known Allergies    Social History     Socioeconomic History    Marital status:      Spouse name: Ruben   Tobacco Use    Smoking status: Never    Smokeless tobacco: Never   Vaping Use    Vaping status: Never Used   Substance and Sexual Activity    Alcohol use: Not Currently     Comment: less than once per  month    Drug use: No    Sexual activity: Yes     Partners: Male     Birth control/protection: Post-menopausal, Hysterectomy       Family History   Problem Relation Age of Onset    Depression Mother     Heart disease Mother     Stroke Mother     Heart attack Mother     Heart failure Mother     Hyperlipidemia Mother     Hypertension Mother     Irritable bowel syndrome Mother     Arthritis Father     Heart disease Father     Skin cancer Father     Heart attack Father     Heart failure Father     Heart disease Maternal Grandmother     Stroke Maternal Grandfather     Stroke Paternal Grandfather     Hypertension Sister     Hypothyroidism Sister     Hypertension Brother     Hypothyroidism Daughter     Thyroid disease Sister     Colon cancer Neg Hx     Colon polyps Neg Hx     Liver disease Neg Hx     Rectal cancer Neg Hx     Stomach cancer Neg Hx     Liver cancer Neg Hx     Malig Hyperthermia Neg Hx             Objective:     Vitals:    06/23/25 1158 06/23/25 1200   BP: 134/81    BP Location: Left arm    Patient Position: Sitting    Pulse: 94    Resp: 18    SpO2: 95% 95%   Weight: 71.2 kg (157 lb)    Height: 162.6 cm (64\")      Body mass index is 26.95 kg/m².      Lab Review:     Results from last 7 days   Lab Units 06/23/25  0925   SODIUM " mmol/L 136   POTASSIUM mmol/L 3.4*   CHLORIDE mmol/L 103   CO2 mmol/L 21.6*   BUN mg/dL 16.7   CREATININE mg/dL 1.03*   CALCIUM mg/dL 9.5   BILIRUBIN mg/dL 0.3   ALK PHOS U/L 69   ALT (SGPT) U/L 20   AST (SGOT) U/L 21   GLUCOSE mg/dL 124*     Results from last 7 days   Lab Units 06/23/25  1026 06/23/25  0925   HSTROP T ng/L 64* 72*     Results from last 7 days   Lab Units 06/23/25  0925   WBC 10*3/mm3 14.21*   HEMOGLOBIN g/dL 13.6   HEMATOCRIT % 42.9   PLATELETS 10*3/mm3 318         Results from last 7 days   Lab Units 06/23/25  0925   MAGNESIUM mg/dL 2.2           Procedures    I reviewed her EKG from the emergency department she does not have acute changes.    Assessment and Plan:       1.  Chest pain and dyspnea on exertion with an abnormal troponin.  Her symptoms are concerning for ischemia.  I recommend she have a heart catheterization today.  I do not have a good explanation for why her troponin would be so elevated.  EKG without acute changes.  Plan is for heart catheterization today.  I would also like to get an echo but heart catheterization takes priority.  D-dimer and BNP normal.  CT angiogram without thromboembolic disease.  I reviewed the CT images myself and there is quite a bit of motion artifact but I do not see significant coronary artery calcification.  Heart chamber sizes appear to be normal.  2.  Hypertension  3.  Hyperlipidemia  4.  Hypothyroid.    Olga Durand MD  06/23/25  13:22 EDT     (3) walks occasionally

## 2025-06-25 ENCOUNTER — HOSPITAL ENCOUNTER (OUTPATIENT)
Dept: CARDIOLOGY | Facility: HOSPITAL | Age: 65
Discharge: HOME | End: 2025-06-25
Admitting: INTERNAL MEDICINE
Payer: COMMERCIAL

## 2025-06-25 VITALS
BODY MASS INDEX: 26.8 KG/M2 | DIASTOLIC BLOOD PRESSURE: 78 MMHG | HEIGHT: 64 IN | SYSTOLIC BLOOD PRESSURE: 121 MMHG | HEART RATE: 101 BPM | WEIGHT: 157 LBS

## 2025-06-25 DIAGNOSIS — R06.09 DOE (DYSPNEA ON EXERTION): ICD-10-CM

## 2025-06-25 LAB
AORTIC DIMENSIONLESS INDEX: 0.8 (DI)
ASCENDING AORTA: 2.8 CM
AV MEAN PRESS GRAD SYS DOP V1V2: 4 MMHG
AV VMAX SYS DOP: 135 CM/SEC
BH CV ECHO MEAS - ACS: 1.91 CM
BH CV ECHO MEAS - AO MAX PG: 7.3 MMHG
BH CV ECHO MEAS - AO ROOT AREA (BSA CORRECTED): 1.6 CM2
BH CV ECHO MEAS - AO ROOT DIAM: 2.9 CM
BH CV ECHO MEAS - AO V2 VTI: 21 CM
BH CV ECHO MEAS - AVA(I,D): 2.49 CM2
BH CV ECHO MEAS - EDV(CUBED): 71.3 ML
BH CV ECHO MEAS - EDV(MOD-SP2): 62 ML
BH CV ECHO MEAS - EDV(MOD-SP4): 58 ML
BH CV ECHO MEAS - EF(MOD-SP2): 58.1 %
BH CV ECHO MEAS - EF(MOD-SP4): 67.2 %
BH CV ECHO MEAS - ESV(MOD-SP2): 26 ML
BH CV ECHO MEAS - ESV(MOD-SP4): 19 ML
BH CV ECHO MEAS - FS: 32.2 %
BH CV ECHO MEAS - IVS/LVPW: 1.29 CM
BH CV ECHO MEAS - IVSD: 0.85 CM
BH CV ECHO MEAS - LAT PEAK E' VEL: 9 CM/SEC
BH CV ECHO MEAS - LV DIASTOLIC VOL/BSA (35-75): 32.9 CM2
BH CV ECHO MEAS - LV MASS(C)D: 91.2 GRAMS
BH CV ECHO MEAS - LV MAX PG: 3.9 MMHG
BH CV ECHO MEAS - LV MEAN PG: 2 MMHG
BH CV ECHO MEAS - LV SYSTOLIC VOL/BSA (12-30): 10.8 CM2
BH CV ECHO MEAS - LV V1 MAX: 98.5 CM/SEC
BH CV ECHO MEAS - LV V1 VTI: 16.8 CM
BH CV ECHO MEAS - LVIDD: 4.1 CM
BH CV ECHO MEAS - LVIDS: 2.8 CM
BH CV ECHO MEAS - LVOT AREA: 3.1 CM2
BH CV ECHO MEAS - LVOT DIAM: 1.99 CM
BH CV ECHO MEAS - LVPWD: 0.66 CM
BH CV ECHO MEAS - MED PEAK E' VEL: 6.1 CM/SEC
BH CV ECHO MEAS - MV A DUR: 0.11 SEC
BH CV ECHO MEAS - MV A MAX VEL: 96.4 CM/SEC
BH CV ECHO MEAS - MV DEC SLOPE: 672.2 CM/SEC2
BH CV ECHO MEAS - MV DEC TIME: 0.16 SEC
BH CV ECHO MEAS - MV E MAX VEL: 62.9 CM/SEC
BH CV ECHO MEAS - MV E/A: 0.65
BH CV ECHO MEAS - MV MAX PG: 3.7 MMHG
BH CV ECHO MEAS - MV MEAN PG: 1.94 MMHG
BH CV ECHO MEAS - MV P1/2T: 40.4 MSEC
BH CV ECHO MEAS - MV V2 VTI: 18.9 CM
BH CV ECHO MEAS - MVA(P1/2T): 5.4 CM2
BH CV ECHO MEAS - MVA(VTI): 2.8 CM2
BH CV ECHO MEAS - PA V2 MAX: 112.6 CM/SEC
BH CV ECHO MEAS - PULM A REVS DUR: 0.12 SEC
BH CV ECHO MEAS - PULM A REVS VEL: 70.6 CM/SEC
BH CV ECHO MEAS - PULM DIAS VEL: 38.1 CM/SEC
BH CV ECHO MEAS - PULM S/D: 1.26
BH CV ECHO MEAS - PULM SYS VEL: 47.9 CM/SEC
BH CV ECHO MEAS - QP/QS: 0.84
BH CV ECHO MEAS - RAP SYSTOLE: 3 MMHG
BH CV ECHO MEAS - RV MAX PG: 2.31 MMHG
BH CV ECHO MEAS - RV V1 MAX: 76 CM/SEC
BH CV ECHO MEAS - RV V1 VTI: 13.6 CM
BH CV ECHO MEAS - RVOT DIAM: 2.02 CM
BH CV ECHO MEAS - SV(LVOT): 52.2 ML
BH CV ECHO MEAS - SV(MOD-SP2): 36 ML
BH CV ECHO MEAS - SV(MOD-SP4): 39 ML
BH CV ECHO MEAS - SV(RVOT): 43.7 ML
BH CV ECHO MEAS - SVI(LVOT): 29.6 ML/M2
BH CV ECHO MEAS - SVI(MOD-SP2): 20.4 ML/M2
BH CV ECHO MEAS - SVI(MOD-SP4): 22.1 ML/M2
BH CV ECHO MEAS - TAPSE (>1.6): 1.45 CM
BH CV ECHO MEASUREMENTS AVERAGE E/E' RATIO: 8.33
BH CV XLRA - RV BASE: 2.8 CM
BH CV XLRA - RV LENGTH: 5.4 CM
BH CV XLRA - RV MID: 1.45 CM
BH CV XLRA - TDI S': 12 CM/SEC
LEFT ATRIUM VOLUME INDEX: 22.2 ML/M2
LV EF BIPLANE MOD: 62.7 %
SINUS: 3 CM
STJ: 2.7 CM

## 2025-06-25 PROCEDURE — 25510000001 PERFLUTREN 6.52 MG/ML SUSPENSION 2 ML VIAL: Performed by: INTERNAL MEDICINE

## 2025-06-25 PROCEDURE — 93306 TTE W/DOPPLER COMPLETE: CPT

## 2025-06-25 RX ADMIN — PERFLUTREN 1 ML: 6.52 INJECTION, SUSPENSION INTRAVENOUS at 13:25

## 2025-06-26 ENCOUNTER — TELEPHONE (OUTPATIENT)
Dept: CARDIOLOGY | Facility: CLINIC | Age: 65
End: 2025-06-26
Payer: COMMERCIAL

## 2025-06-26 NOTE — TELEPHONE ENCOUNTER
Pt called back. Went over results and recommendations. Scheduled to see Maria R next week (no appts with Debbie). Pt verbalized understanding.    Thank you,    Tess Bustillos, RN  Triage [facility]  06/26/25 15:31 EDT

## 2025-06-26 NOTE — TELEPHONE ENCOUNTER
Called and left VM, will continue to try to reach pt.    HUB- please put patient straight through to triage    Melissa Salmeron, RN  Triage RN  06/26/25 14:30 EDT

## 2025-06-26 NOTE — TELEPHONE ENCOUNTER
Please let her know that I did speak with Dr. Guzman to review the heart catheterization the day it was performed and she did not have any blockages that were amenable to stents.  She had an echo done yesterday which showed normal LV function and normal diastolic function and normal valve disease.  Everything looks pretty good.  She should have a nurse practitioner follow-up for her heart catheterization next week.

## 2025-06-27 ENCOUNTER — PATIENT ROUNDING (BHMG ONLY) (OUTPATIENT)
Dept: URGENT CARE | Facility: CLINIC | Age: 65
End: 2025-06-27
Payer: COMMERCIAL

## 2025-06-27 NOTE — ED NOTES
Thank you for letting us care for you during your recent visit to [facility]. We would love to hear about your experience with us.     We’re always looking for ways to make our patients’ experiences even better. Do you have any recommendations on ways we may improve?    I appreciate you taking the time to respond. Please be on the lookout for a survey about your recent visit from Ivone Pierce via text or email. We would greatly appreciate if you could complete the survey. We want your voice to be heard and we value your feedback.     Thank you for choosing [facility] for your healthcare needs.

## 2025-07-02 ENCOUNTER — OFFICE VISIT (OUTPATIENT)
Age: 65
End: 2025-07-02
Payer: COMMERCIAL

## 2025-07-02 VITALS
DIASTOLIC BLOOD PRESSURE: 78 MMHG | SYSTOLIC BLOOD PRESSURE: 114 MMHG | HEART RATE: 80 BPM | WEIGHT: 154.4 LBS | HEIGHT: 64 IN | BODY MASS INDEX: 26.36 KG/M2

## 2025-07-02 DIAGNOSIS — E78.2 MIXED HYPERLIPIDEMIA: Chronic | ICD-10-CM

## 2025-07-02 DIAGNOSIS — I10 BENIGN ESSENTIAL HYPERTENSION: ICD-10-CM

## 2025-07-02 DIAGNOSIS — I25.10 CORONARY ARTERY DISEASE INVOLVING NATIVE CORONARY ARTERY OF NATIVE HEART WITHOUT ANGINA PECTORIS: Primary | ICD-10-CM

## 2025-07-02 PROCEDURE — 99214 OFFICE O/P EST MOD 30 MIN: CPT | Performed by: NURSE PRACTITIONER

## 2025-07-02 PROCEDURE — 93000 ELECTROCARDIOGRAM COMPLETE: CPT | Performed by: NURSE PRACTITIONER

## 2025-07-02 RX ORDER — ROSUVASTATIN CALCIUM 20 MG/1
20 TABLET, COATED ORAL DAILY
Qty: 90 TABLET | Refills: 0 | Status: SHIPPED | OUTPATIENT
Start: 2025-07-02

## 2025-07-02 NOTE — PROGRESS NOTES
Date of Office Visit: 2025  Encounter Provider: HEATH Mishra  Place of Service: [facility] CARDIOLOGY  Patient Name: Wilma Longo  :1960    Chief Complaint   Patient presents with    Follow-up     From Heart Cath    :     HPI: Wilma Longo is a 65 y.o. female patient of with hypertension and hyperlipidemia.  On , she woke up with shortness of breath and chest and back discomfort.  She went to the emergency room and was subsequently directed to our cardiac evaluation center where she was seen by Dr. Durand.  Her troponin was elevated and she was subsequently referred for a cardiac catheterization.  This demonstrated 70% ostial stenosis of a small caliber diagonal branch not amendable to PCI.  Medical therapy was recommended.  She was also scheduled for an outpatient echocardiogram.  On , this demonstrated normal LV function and no significant valvular abnormalities.  She is here today for follow-up.    She has been doing well.  She denies any recurrent chest pain.  She denies any shortness of breath, palpitations, edema, dizziness, or syncope.    Past Medical History:   Diagnosis Date    Anemia 2022    Anxiety     Osman esophagus ?    Benign essential hypertension 2015    Cancer     Skin cancer    Cardiomyopathy     Colitis 2009    Depression     Fibromyalgia     Fibromyalgia, primary     Gestational diabetes mellitus 1994    History of gestational diabetes     Hyperlipidemia 2011    Hypothyroidism 2015    Inflammatory bowel disease 2009    Insomnia 2015    Irritable bowel syndrome 2009    Panic attacks     Primary bile acid malabsorption     fecal incontinence    Rectocele     Skin cancer of arm     Vaginal prolapse     Vitamin D deficiency 2012       Past Surgical History:   Procedure Laterality Date    ANTERIOR AND POSTERIOR VAGINAL REPAIR N/A 2021    Procedure: POSTERIOR VAGINAL REPAIR,  CYSTOSCOPY;  Surgeon: Cora Gustafson MD;  Location: [facility] MAIN OR;  Service: Gynecology;  Laterality: N/A;    BLADDER REPAIR  09/2015    X2    CARDIAC CATHETERIZATION N/A 6/23/2025    Procedure: Left Heart Cath;  Surgeon: Dedra Guzman MD;  Location:  [facility] CATH INVASIVE LOCATION;  Service: Cardiovascular;  Laterality: N/A;    CARDIAC CATHETERIZATION N/A 6/23/2025    Procedure: Coronary angiography;  Surgeon: Dedra Guzman MD;  Location[facility] CATH INVASIVE LOC[facility];  Service: Cardiovascular;  Laterality: N/A;    CARDIAC CATHETERIZATION N/A 6/23/2025    Procedure: Left ventriculography;  Surgeon: Dedra Guzman MD;  Location: [facility] CATH INVASIVE LOCATION;  Service: Cardiovascular;  Laterality: N/A;    COLONOSCOPY  2010    wnl    COLONOSCOPY N/A 09/09/2016    Procedure: COLONOSCOPY into cecum and terminal ileum with biopsies;  Surgeon: Orlin Mann MD;  Location: [facility] ENDOSCOPY;  Service:     COLONOSCOPY N/A 09/06/2019    Procedure: COLONOSCOPY TO CECUM AND INTO TERMINAL ILEUM;  Surgeon: Orlin Mann MD;  Location: [facility] ENDOSCOPY;  Service: Gastroenterology    COLONOSCOPY W/ POLYPECTOMY N/A 02/20/2023    Procedure: COLONOSCOPY INTO CECUM AND TI WITH BIOPSIES;  Surgeon: Basilio Martin MD;  Location: [facility] ENDOSCOPY;  Service: Gastroenterology;  Laterality: N/A;  PRE: COLITIS  POST: NORMAL COLON    CYSTOSCOPY W/ URETERAL STENT PLACEMENT      ENDOSCOPY N/A 09/06/2019    Procedure: ESOPHAGOGASTRODUODENOSCOPY WITH COLD BIOPSIES AND 48F HERNÁNDEZ DILATION;  Surgeon: Orlin Mann MD;  Location: [facility] ENDOSCOPY;  Service: Gastroenterology    ENDOSCOPY N/A 02/20/2023    Procedure: ESOPHAGOGASTRODUODENOSCOPY WITH BIOPSIES;  Surgeon: Basilio Martin MD;  Location: [facility] ENDOSCOPY;  Service: Gastroenterology;  Laterality: N/A;  PRE: MENDEZ'S  POST: HIATAL HERNIA, GASTRITIS    HYSTERECTOMY  12/2013    MOUTH SURGERY      multiple teeth extraction    SACROCOLPOPEXY N/A 04/06/2021     Procedure: LARPAROSCOPY COLPOPEXY, BILATERAL SALPINGECTOMY;  Surgeon: Cora Gustafson MD;  Location: [facility] MAIN OR;  Service: Gynecology;  Laterality: N/A;    SKIN CANCER EXCISION      UPPER GASTROINTESTINAL ENDOSCOPY  09/06/2019       Social History     Socioeconomic History    Marital status:      Spouse name: Ruben   Tobacco Use    Smoking status: Never    Smokeless tobacco: Never   Vaping Use    Vaping status: Never Used   Substance and Sexual Activity    Alcohol use: Not Currently     Comment: less than once per  month    Drug use: No    Sexual activity: Yes     Partners: Male     Birth control/protection: Post-menopausal, Hysterectomy       Family History   Problem Relation Age of Onset    Depression Mother     Heart disease Mother     Stroke Mother     Heart attack Mother     Heart failure Mother     Hyperlipidemia Mother     Hypertension Mother     Irritable bowel syndrome Mother     Arthritis Father     Heart disease Father     Skin cancer Father     Heart attack Father     Heart failure Father     Heart disease Maternal Grandmother     Stroke Maternal Grandfather     Stroke Paternal Grandfather     Hypertension Sister     Hypothyroidism Sister     Hypertension Brother     Hypothyroidism Daughter     Thyroid disease Sister     Colon cancer Neg Hx     Colon polyps Neg Hx     Liver disease Neg Hx     Rectal cancer Neg Hx     Stomach cancer Neg Hx     Liver cancer Neg Hx     Malig Hyperthermia Neg Hx        Review of Systems   Constitutional: Negative.   Cardiovascular: Negative.  Negative for chest pain, dyspnea on exertion, leg swelling, orthopnea, paroxysmal nocturnal dyspnea and syncope.   Respiratory: Negative.     Hematologic/Lymphatic: Negative for bleeding problem.   Musculoskeletal:  Negative for falls.   Gastrointestinal:  Negative for melena.   Neurological:  Negative for dizziness and light-headedness.       No Known Allergies      Current Outpatient Medications:     ALPRAZolam (Xanax) 0.5 MG  "tablet, Take 1 tablet by mouth Daily As Needed for Anxiety. D/c previous order, Disp: 30 tablet, Rfl: 2    amLODIPine (NORVASC) 5 MG tablet, TAKE 1 TABLET BY MOUTH DAILY (Patient taking differently: Take 1 tablet by mouth Every Night.), Disp: 14 tablet, Rfl: 0    aspirin 81 MG chewable tablet, Chew 1 tablet Daily., Disp: , Rfl:     cephalexin (KEFLEX) 500 MG capsule, Take 1 capsule by mouth 3 (Three) Times a Day for 7 days., Disp: 21 capsule, Rfl: 0    colestipol (COLESTID) 1 g tablet, Take 2 tablets by mouth 2 (Two) Times a Day., Disp: 360 tablet, Rfl: 3    Cranberry 125 MG tablet, Take 1 tablet by mouth Daily., Disp: , Rfl:     DULoxetine (CYMBALTA) 60 MG capsule, TAKE 1 CAPSULE BY MOUTH DAILY (Patient taking differently: Take 1 capsule by mouth Every Night.), Disp: 14 capsule, Rfl: 0    levothyroxine (SYNTHROID, LEVOTHROID) 75 MCG tablet, TAKE 1 TABLET BY MOUTH DAILY, Disp: 14 tablet, Rfl: 0    lisinopril (PRINIVIL,ZESTRIL) 20 MG tablet, TAKE 1 TABLET BY MOUTH DAILY (Patient taking differently: Take 1 tablet by mouth Every Night.), Disp: 14 tablet, Rfl: 0    naproxen (NAPROSYN) 500 MG tablet, Take 1 tablet by mouth Daily As Needed for Moderate Pain., Disp: 180 tablet, Rfl: 1    rosuvastatin (CRESTOR) 20 MG tablet, Take 1 tablet by mouth Daily., Disp: 90 tablet, Rfl: 0    valACYclovir (VALTREX) 500 MG tablet, Take 1 tablet by mouth As Needed., Disp: , Rfl:     vitamin C (ASCORBIC ACID) 250 MG tablet, Take 2 tablets by mouth Every Night., Disp: , Rfl:       Objective:     Vitals:    07/02/25 1248   BP: 114/78   Pulse: 80   Weight: 70 kg (154 lb 6.4 oz)   Height: 162.6 cm (64.02\")     Body mass index is 26.49 kg/m².    PHYSICAL EXAM:    Neck:      Vascular: No JVD.   Pulmonary:      Effort: Pulmonary effort is normal.      Breath sounds: Normal breath sounds.   Cardiovascular:      Normal rate. Regular rhythm.      Murmurs: There is no murmur.      No gallop.  No click. No rub.   Pulses:     Intact distal pulses. "           ECG 12 Lead    Date/Time: 7/2/2025 1:22 PM  Performed by: Maria R Bardales APRN    Authorized by: Maria R Bardales APRN  Comparison: compared with previous ECG from 6/23/2025  Similar to previous ECG  Rhythm: sinus rhythm  Rate: normal  BPM: 80            Assessment:       Diagnosis Plan   1. Coronary artery disease involving native coronary artery of native heart without angina pectoris  ECG 12 Lead    Lipid Panel    Apolipoprotein B    Lipoprotein A (LPA)      2. Benign essential hypertension        3. Mixed hyperlipidemia  rosuvastatin (CRESTOR) 20 MG tablet    Lipid Panel    Apolipoprotein B    Lipoprotein A (LPA)        Orders Placed This Encounter   Procedures    Lipid Panel     Standing Status:   Future     Expected Date:   7/7/2025     Expiration Date:   7/2/2026     Release to patient:   Routine Release [[id number]]    Apolipoprotein B     Release to patient:   Routine Release [[id number]]    Lipoprotein A (LPA)     Standing Status:   Future     Expected Date:   7/7/2025     Expiration Date:   7/2/2026     Release to patient:   Routine Release [[id number]]    ECG 12 Lead     This order was created via procedure documentation     Release to patient:   Routine Release [[id number]]          Plan:       1.  Coronary artery disease.  Recent cardiac catheterization demonstrated 70% ostial stenosis of a small caliber diagonal not amendable to PCI.  Medical therapy was recommended.  She denies any recurrent symptoms.  Continue aspirin, rosuvastatin, and amlodipine.      2.  Hypertension.  Her blood pressure is stable.  Continue amlodipine and lisinopril.      3.  Hyperlipidemia.  Recommended increasing the rosuvastatin to 20 mg and repeating a lipid panel and lipid particles in 6 to 8 weeks.      I think she is doing well.  She will follow-up with Dr. Durand in 1 month.      As always, it has been a pleasure to participate in your patient's care.      Sincerely,         HEATH Cantu

## 2025-07-13 ENCOUNTER — HOSPITAL ENCOUNTER (INPATIENT)
Facility: HOSPITAL | Age: 65
LOS: 4 days | Discharge: HOME | DRG: 854 | End: 2025-07-17
Attending: EMERGENCY MEDICINE | Admitting: STUDENT IN AN ORGANIZED HEALTH CARE EDUCATION/TRAINING PROGRAM
Payer: COMMERCIAL

## 2025-07-13 ENCOUNTER — APPOINTMENT (OUTPATIENT)
Dept: GENERAL RADIOLOGY | Facility: HOSPITAL | Age: 65
DRG: 854 | End: 2025-07-13
Payer: COMMERCIAL

## 2025-07-13 ENCOUNTER — ANESTHESIA (OUTPATIENT)
Dept: PERIOP | Facility: HOSPITAL | Age: 65
End: 2025-07-13
Payer: COMMERCIAL

## 2025-07-13 ENCOUNTER — ANESTHESIA EVENT (OUTPATIENT)
Dept: PERIOP | Facility: HOSPITAL | Age: 65
End: 2025-07-13
Payer: COMMERCIAL

## 2025-07-13 ENCOUNTER — APPOINTMENT (OUTPATIENT)
Dept: CT IMAGING | Facility: HOSPITAL | Age: 65
DRG: 854 | End: 2025-07-13
Payer: COMMERCIAL

## 2025-07-13 DIAGNOSIS — N39.0 ACUTE UTI: ICD-10-CM

## 2025-07-13 DIAGNOSIS — R79.89 ELEVATED TROPONIN: ICD-10-CM

## 2025-07-13 DIAGNOSIS — N20.0 NEPHROLITHIASIS: ICD-10-CM

## 2025-07-13 DIAGNOSIS — R19.7 DIARRHEA, UNSPECIFIED TYPE: ICD-10-CM

## 2025-07-13 DIAGNOSIS — E87.6 HYPOKALEMIA: ICD-10-CM

## 2025-07-13 DIAGNOSIS — R07.9 CHEST PAIN, UNSPECIFIED TYPE: Primary | ICD-10-CM

## 2025-07-13 DIAGNOSIS — A41.9 SEPSIS, DUE TO UNSPECIFIED ORGANISM, UNSPECIFIED WHETHER ACUTE ORGAN DYSFUNCTION PRESENT: ICD-10-CM

## 2025-07-13 DIAGNOSIS — D72.829 LEUKOCYTOSIS, UNSPECIFIED TYPE: ICD-10-CM

## 2025-07-13 LAB
ALBUMIN SERPL-MCNC: 3.7 G/DL (ref 3.5–5.2)
ALBUMIN/GLOB SERPL: 0.9 G/DL
ALP SERPL-CCNC: 78 U/L (ref 39–117)
ALT SERPL W P-5'-P-CCNC: 17 U/L (ref 1–33)
ANION GAP SERPL CALCULATED.3IONS-SCNC: 14.9 MMOL/L (ref 5–15)
AST SERPL-CCNC: 20 U/L (ref 1–32)
BACTERIA UR QL AUTO: ABNORMAL /HPF
BASOPHILS # BLD AUTO: 0.04 10*3/MM3 (ref 0–0.2)
BASOPHILS NFR BLD AUTO: 0.2 % (ref 0–1.5)
BILIRUB SERPL-MCNC: 0.5 MG/DL (ref 0–1.2)
BILIRUB UR QL STRIP: NEGATIVE
BUN SERPL-MCNC: 19 MG/DL (ref 8–23)
BUN/CREAT SERPL: 15.1 (ref 7–25)
CALCIUM SPEC-SCNC: 9.1 MG/DL (ref 8.6–10.5)
CHLORIDE SERPL-SCNC: 97 MMOL/L (ref 98–107)
CLARITY UR: CLEAR
CO2 SERPL-SCNC: 24.1 MMOL/L (ref 22–29)
COLOR UR: YELLOW
CREAT SERPL-MCNC: 1.26 MG/DL (ref 0.57–1)
D DIMER PPP FEU-MCNC: 1.28 MCGFEU/ML (ref 0–0.65)
D-LACTATE SERPL-SCNC: 0.5 MMOL/L (ref 0.5–2)
D-LACTATE SERPL-SCNC: 2.2 MMOL/L (ref 0.5–2)
DEPRECATED RDW RBC AUTO: 40.5 FL (ref 37–54)
EGFRCR SERPLBLD CKD-EPI 2021: 47.5 ML/MIN/1.73
EOSINOPHIL # BLD AUTO: 0.07 10*3/MM3 (ref 0–0.4)
EOSINOPHIL NFR BLD AUTO: 0.4 % (ref 0.3–6.2)
ERYTHROCYTE [DISTWIDTH] IN BLOOD BY AUTOMATED COUNT: 12.6 % (ref 12.3–15.4)
GEN 5 1HR TROPONIN T REFLEX: 64 NG/L
GLOBULIN UR ELPH-MCNC: 4 GM/DL
GLUCOSE BLDC GLUCOMTR-MCNC: 66 MG/DL (ref 70–130)
GLUCOSE SERPL-MCNC: 138 MG/DL (ref 65–99)
GLUCOSE UR STRIP-MCNC: NEGATIVE MG/DL
HCT VFR BLD AUTO: 36.8 % (ref 34–46.6)
HGB BLD-MCNC: 12.2 G/DL (ref 12–15.9)
HGB UR QL STRIP.AUTO: ABNORMAL
HOLD SPECIMEN: NORMAL
HYALINE CASTS UR QL AUTO: ABNORMAL /LPF
IMM GRANULOCYTES # BLD AUTO: 0.05 10*3/MM3 (ref 0–0.05)
IMM GRANULOCYTES NFR BLD AUTO: 0.3 % (ref 0–0.5)
KETONES UR QL STRIP: ABNORMAL
LEUKOCYTE ESTERASE UR QL STRIP.AUTO: ABNORMAL
LYMPHOCYTES # BLD AUTO: 2.4 10*3/MM3 (ref 0.7–3.1)
LYMPHOCYTES NFR BLD AUTO: 14.8 % (ref 19.6–45.3)
MAGNESIUM SERPL-MCNC: 1.8 MG/DL (ref 1.6–2.4)
MCH RBC QN AUTO: 29.3 PG (ref 26.6–33)
MCHC RBC AUTO-ENTMCNC: 33.2 G/DL (ref 31.5–35.7)
MCV RBC AUTO: 88.5 FL (ref 79–97)
MONOCYTES # BLD AUTO: 1.25 10*3/MM3 (ref 0.1–0.9)
MONOCYTES NFR BLD AUTO: 7.7 % (ref 5–12)
NEUTROPHILS NFR BLD AUTO: 12.4 10*3/MM3 (ref 1.7–7)
NEUTROPHILS NFR BLD AUTO: 76.6 % (ref 42.7–76)
NITRITE UR QL STRIP: NEGATIVE
NRBC BLD AUTO-RTO: 0 /100 WBC (ref 0–0.2)
PH UR STRIP.AUTO: 6.5 [PH] (ref 5–8)
PLATELET # BLD AUTO: 283 10*3/MM3 (ref 140–450)
PMV BLD AUTO: 9.7 FL (ref 6–12)
POTASSIUM SERPL-SCNC: 2.8 MMOL/L (ref 3.5–5.2)
PROCALCITONIN SERPL-MCNC: 20.7 NG/ML (ref 0–0.25)
PROT SERPL-MCNC: 7.7 G/DL (ref 6–8.5)
PROT UR QL STRIP: ABNORMAL
QT INTERVAL: 355 MS
QTC INTERVAL: 490 MS
RBC # BLD AUTO: 4.16 10*6/MM3 (ref 3.77–5.28)
RBC # UR STRIP: ABNORMAL /HPF
REF LAB TEST METHOD: ABNORMAL
SODIUM SERPL-SCNC: 136 MMOL/L (ref 136–145)
SP GR UR STRIP: 1.03 (ref 1–1.03)
SQUAMOUS #/AREA URNS HPF: ABNORMAL /HPF
TROPONIN T % DELTA: -19
TROPONIN T NUMERIC DELTA: -15 NG/L
TROPONIN T SERPL HS-MCNC: 79 NG/L
UROBILINOGEN UR QL STRIP: ABNORMAL
WBC # UR STRIP: ABNORMAL /HPF
WBC NRBC COR # BLD AUTO: 16.21 10*3/MM3 (ref 3.4–10.8)
WHOLE BLOOD HOLD COAG: NORMAL
WHOLE BLOOD HOLD SPECIMEN: NORMAL

## 2025-07-13 PROCEDURE — 25810000003 SODIUM CHLORIDE 0.9% - IBW FOR BMI > 30 0.9 % SOLUTION: Performed by: EMERGENCY MEDICINE

## 2025-07-13 PROCEDURE — 84484 ASSAY OF TROPONIN QUANT: CPT | Performed by: EMERGENCY MEDICINE

## 2025-07-13 PROCEDURE — 25010000002 VANCOMYCIN 10 G RECONSTITUTED SOLUTION: Performed by: EMERGENCY MEDICINE

## 2025-07-13 PROCEDURE — 25010000002 PIPERACILLIN SOD-TAZOBACTAM PER 1 G: Performed by: EMERGENCY MEDICINE

## 2025-07-13 PROCEDURE — 25810000003 SODIUM CHLORIDE 0.9 % SOLUTION: Performed by: EMERGENCY MEDICINE

## 2025-07-13 PROCEDURE — 25010000002 LIDOCAINE 2% SOLUTION: Performed by: NURSE ANESTHETIST, CERTIFIED REGISTERED

## 2025-07-13 PROCEDURE — 71275 CT ANGIOGRAPHY CHEST: CPT

## 2025-07-13 PROCEDURE — 25510000001 IOPAMIDOL PER 1 ML: Performed by: EMERGENCY MEDICINE

## 2025-07-13 PROCEDURE — 25810000003 LACTATED RINGERS PER 1000 ML: Performed by: NURSE ANESTHETIST, CERTIFIED REGISTERED

## 2025-07-13 PROCEDURE — 25010000002 POTASSIUM CHLORIDE 10 MEQ/100ML SOLUTION: Performed by: EMERGENCY MEDICINE

## 2025-07-13 PROCEDURE — 83735 ASSAY OF MAGNESIUM: CPT | Performed by: EMERGENCY MEDICINE

## 2025-07-13 PROCEDURE — C1758 CATHETER, URETERAL: HCPCS | Performed by: STUDENT IN AN ORGANIZED HEALTH CARE EDUCATION/TRAINING PROGRAM

## 2025-07-13 PROCEDURE — 74177 CT ABD & PELVIS W/CONTRAST: CPT

## 2025-07-13 PROCEDURE — C1769 GUIDE WIRE: HCPCS | Performed by: STUDENT IN AN ORGANIZED HEALTH CARE EDUCATION/TRAINING PROGRAM

## 2025-07-13 PROCEDURE — 93005 ELECTROCARDIOGRAM TRACING: CPT | Performed by: EMERGENCY MEDICINE

## 2025-07-13 PROCEDURE — 82948 REAGENT STRIP/BLOOD GLUCOSE: CPT

## 2025-07-13 PROCEDURE — 85025 COMPLETE CBC W/AUTO DIFF WBC: CPT

## 2025-07-13 PROCEDURE — C2617 STENT, NON-COR, TEM W/O DEL: HCPCS | Performed by: STUDENT IN AN ORGANIZED HEALTH CARE EDUCATION/TRAINING PROGRAM

## 2025-07-13 PROCEDURE — 25010000002 PROPOFOL 10 MG/ML EMULSION: Performed by: NURSE ANESTHETIST, CERTIFIED REGISTERED

## 2025-07-13 PROCEDURE — 93005 ELECTROCARDIOGRAM TRACING: CPT

## 2025-07-13 PROCEDURE — 87040 BLOOD CULTURE FOR BACTERIA: CPT | Performed by: EMERGENCY MEDICINE

## 2025-07-13 PROCEDURE — 81001 URINALYSIS AUTO W/SCOPE: CPT | Performed by: EMERGENCY MEDICINE

## 2025-07-13 PROCEDURE — 71045 X-RAY EXAM CHEST 1 VIEW: CPT

## 2025-07-13 PROCEDURE — 84145 PROCALCITONIN (PCT): CPT | Performed by: EMERGENCY MEDICINE

## 2025-07-13 PROCEDURE — 93010 ELECTROCARDIOGRAM REPORT: CPT | Performed by: INTERNAL MEDICINE

## 2025-07-13 PROCEDURE — 83605 ASSAY OF LACTIC ACID: CPT | Performed by: EMERGENCY MEDICINE

## 2025-07-13 PROCEDURE — 36415 COLL VENOUS BLD VENIPUNCTURE: CPT | Performed by: EMERGENCY MEDICINE

## 2025-07-13 PROCEDURE — 87086 URINE CULTURE/COLONY COUNT: CPT | Performed by: NURSE PRACTITIONER

## 2025-07-13 PROCEDURE — 80053 COMPREHEN METABOLIC PANEL: CPT | Performed by: EMERGENCY MEDICINE

## 2025-07-13 PROCEDURE — 85379 FIBRIN DEGRADATION QUANT: CPT | Performed by: EMERGENCY MEDICINE

## 2025-07-13 PROCEDURE — 99291 CRITICAL CARE FIRST HOUR: CPT

## 2025-07-13 DEVICE — URETERAL STENT
Type: IMPLANTABLE DEVICE | Site: URETER | Status: FUNCTIONAL
Brand: CONTOUR™

## 2025-07-13 RX ORDER — IOPAMIDOL 755 MG/ML
100 INJECTION, SOLUTION INTRAVASCULAR
Status: COMPLETED | OUTPATIENT
Start: 2025-07-13 | End: 2025-07-13

## 2025-07-13 RX ORDER — MORPHINE SULFATE 2 MG/ML
2 INJECTION, SOLUTION INTRAMUSCULAR; INTRAVENOUS
Status: DISCONTINUED | OUTPATIENT
Start: 2025-07-13 | End: 2025-07-17 | Stop reason: HOSPADM

## 2025-07-13 RX ORDER — ONDANSETRON 2 MG/ML
4 INJECTION INTRAMUSCULAR; INTRAVENOUS EVERY 6 HOURS PRN
Status: DISCONTINUED | OUTPATIENT
Start: 2025-07-13 | End: 2025-07-17 | Stop reason: HOSPADM

## 2025-07-13 RX ORDER — POLYETHYLENE GLYCOL 3350 17 G/17G
17 POWDER, FOR SOLUTION ORAL DAILY PRN
Status: DISCONTINUED | OUTPATIENT
Start: 2025-07-13 | End: 2025-07-17 | Stop reason: HOSPADM

## 2025-07-13 RX ORDER — BISACODYL 10 MG
10 SUPPOSITORY, RECTAL RECTAL DAILY PRN
Status: DISCONTINUED | OUTPATIENT
Start: 2025-07-13 | End: 2025-07-17 | Stop reason: HOSPADM

## 2025-07-13 RX ORDER — SODIUM CHLORIDE 0.9 % (FLUSH) 0.9 %
10 SYRINGE (ML) INJECTION AS NEEDED
Status: DISCONTINUED | OUTPATIENT
Start: 2025-07-13 | End: 2025-07-17 | Stop reason: HOSPADM

## 2025-07-13 RX ORDER — BISACODYL 5 MG/1
5 TABLET, DELAYED RELEASE ORAL DAILY PRN
Status: DISCONTINUED | OUTPATIENT
Start: 2025-07-13 | End: 2025-07-17 | Stop reason: HOSPADM

## 2025-07-13 RX ORDER — ONDANSETRON 4 MG/1
4 TABLET, ORALLY DISINTEGRATING ORAL EVERY 6 HOURS PRN
Status: DISCONTINUED | OUTPATIENT
Start: 2025-07-13 | End: 2025-07-17 | Stop reason: HOSPADM

## 2025-07-13 RX ORDER — POTASSIUM CHLORIDE 1500 MG/1
40 TABLET, EXTENDED RELEASE ORAL
Status: COMPLETED | OUTPATIENT
Start: 2025-07-13 | End: 2025-07-13

## 2025-07-13 RX ORDER — ASPIRIN 325 MG
325 TABLET ORAL ONCE
Status: COMPLETED | OUTPATIENT
Start: 2025-07-13 | End: 2025-07-13

## 2025-07-13 RX ORDER — ALUMINA, MAGNESIA, AND SIMETHICONE 2400; 2400; 240 MG/30ML; MG/30ML; MG/30ML
15 SUSPENSION ORAL EVERY 6 HOURS PRN
Status: DISCONTINUED | OUTPATIENT
Start: 2025-07-13 | End: 2025-07-17 | Stop reason: HOSPADM

## 2025-07-13 RX ORDER — VANCOMYCIN/0.9 % SOD CHLORIDE 1.5G/250ML
20 PLASTIC BAG, INJECTION (ML) INTRAVENOUS ONCE
Status: COMPLETED | OUTPATIENT
Start: 2025-07-13 | End: 2025-07-14

## 2025-07-13 RX ORDER — POTASSIUM CHLORIDE 7.45 MG/ML
10 INJECTION INTRAVENOUS
Status: COMPLETED | OUTPATIENT
Start: 2025-07-13 | End: 2025-07-13

## 2025-07-13 RX ORDER — NITROGLYCERIN 0.4 MG/1
0.4 TABLET SUBLINGUAL
Status: DISCONTINUED | OUTPATIENT
Start: 2025-07-13 | End: 2025-07-17 | Stop reason: HOSPADM

## 2025-07-13 RX ORDER — AMOXICILLIN 250 MG
2 CAPSULE ORAL 2 TIMES DAILY PRN
Status: DISCONTINUED | OUTPATIENT
Start: 2025-07-13 | End: 2025-07-17 | Stop reason: HOSPADM

## 2025-07-13 RX ADMIN — SODIUM CHLORIDE 1700 ML: 9 INJECTION, SOLUTION INTRAVENOUS at 19:58

## 2025-07-13 RX ADMIN — PIPERACILLIN AND TAZOBACTAM 3.38 G: 3; .375 INJECTION, POWDER, FOR SOLUTION INTRAVENOUS at 21:16

## 2025-07-13 RX ADMIN — PROPOFOL 200 MG: 10 INJECTION, EMULSION INTRAVENOUS at 23:56

## 2025-07-13 RX ADMIN — IOPAMIDOL 100 ML: 755 INJECTION, SOLUTION INTRAVENOUS at 20:53

## 2025-07-13 RX ADMIN — POTASSIUM CHLORIDE 40 MEQ: 1500 TABLET, EXTENDED RELEASE ORAL at 21:58

## 2025-07-13 RX ADMIN — ASPIRIN 325 MG ORAL TABLET 325 MG: 325 PILL ORAL at 19:51

## 2025-07-13 RX ADMIN — SODIUM CHLORIDE 1500 MG: 9 INJECTION, SOLUTION INTRAVENOUS at 22:32

## 2025-07-13 RX ADMIN — POTASSIUM CHLORIDE 10 MEQ: 7.46 INJECTION, SOLUTION INTRAVENOUS at 19:59

## 2025-07-13 RX ADMIN — LIDOCAINE HYDROCHLORIDE 100 MG: 20 INJECTION, SOLUTION INFILTRATION; PERINEURAL at 23:56

## 2025-07-13 RX ADMIN — POTASSIUM CHLORIDE 40 MEQ: 1500 TABLET, EXTENDED RELEASE ORAL at 19:52

## 2025-07-13 RX ADMIN — POTASSIUM CHLORIDE 10 MEQ: 7.46 INJECTION, SOLUTION INTRAVENOUS at 22:33

## 2025-07-13 RX ADMIN — SODIUM CHLORIDE, POTASSIUM CHLORIDE, SODIUM LACTATE AND CALCIUM CHLORIDE: 600; 310; 30; 20 INJECTION, SOLUTION INTRAVENOUS at 23:47

## 2025-07-13 RX ADMIN — Medication 140 MG: at 23:56

## 2025-07-13 NOTE — ED TRIAGE NOTES
Pt reports chest pain that started last night, felt freezing cold, also diarrhea, nausea, states MI on 6/23

## 2025-07-13 NOTE — ED PROVIDER NOTES
EMERGENCY DEPARTMENT ENCOUNTER    Room Number:  BRET/BRET  Date of encounter:  7/13/2025  PCP: Jerman Virgen MD  Historian: Patient and significant other  Relevant information and history provided by sources other than the patient will be included below and in the ED Course.  Review of pertinent past medical records may also be included in record below and ED Course.    HPI:  Chief Complaint: Chest pain, diarrhea, feeling weak  A complete HPI/ROS/PMH/PSH/SH/FH are unobtainable due to: Not applicable.   Context: Wilma Longo is a 65 y.o. female who presents to the ED c/o symptoms of the chest pain started yesterday around 2 in the afternoon.  Feels like a tightness right in the center of her chest.  Nothing makes the pain worse.  Is not worse with exertion is not worse with breathing.  This pain does not radiate to her back like her pain that she had a couple weeks ago.  This is in her chest.  She has no weakness or numbness to her extremities.  She states that when she does take a Xanax it helps a little bit.  She did take a Xanax a few hours prior to arrival here and it did not help so she came here because of persistent.  She does have shortness of breath but does have anxiety.  She describes it as she feels that she cannot get enough air.  The other thing that she has is she has a tremendous amount of diarrhea.  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  She is on colestipol for this.  For the past 24 hours the diarrhea has significantly increased.  She is having over 20 episodes of diarrhea a day.  A little bit of blood-tinged diarrhea from her fissure.  No black diarrhea.  When she does have the diarrhea she has mild abdominal pain.  She has had chills but no definitive fever.  These chills were episodic she had them yesterday and then again today.  She feels weak she has no energy.  She has had bouts of nausea but no vomiting.  She is not eating or drinking as much.   She sometimes feels that eating and drinking will make the diarrhea worse.  Denies any new swelling to lower extremities denies any new pain to lower extremities.  Denies any headache        Previous Episodes: The chest tightness is similar to what she has had before.  The diarrhea is similar to what she has had before but is significantly increased in volume.  Current Symptoms: See above    MEDICAL HISTORY REVIEWED  I reviewed the note from Dr. Durand from 6/23/2025 she has a history of hypertension and hyperlipidemia.  [facility] with chest pain through to her back she had 2 troponins that were elevated with a delta of 8.  EKG was unremarkable patient had a D-dimer and a BNP that was normal at that time CT angiogram showed no acute abnormality and no PE  She did have a heart cath she had 70% mid LAD stenosis 70% ostial stenosis no significant disease of the left main ramus intermedius or left circumflex or right coronary artery normal left ventricular systolic function.  Medical manage was recommended.  She did also have an echo at that time her ejection fraction was 62.7 which was normal diastolic function was normal as well.      PAST MEDICAL HISTORY  Active Ambulatory Problems     Diagnosis Date Noted    Colitis 06/12/2009    Depression     Benign essential hypertension 02/01/2015    Hypothyroidism 02/01/2015    Impaired fasting glucose 02/01/2015    Insomnia 02/03/2015    Irritable bowel syndrome 11/30/2009    Hyperlipidemia 04/13/2011    Mood disorder 04/13/2011    Vitamin D deficiency 07/19/2012    Fibromyalgia 12/21/2017    Dysphagia 08/06/2019    Adhesive capsulitis of left shoulder 08/24/2020    Anemia 01/14/2021    Iron deficiency anemia 03/02/2021    Malabsorption of iron 03/02/2021    Prolapse of female pelvic organs 04/05/2021    Pelvic prolapse 04/06/2021    S/P laparoscopy 04/06/2021    Osman's esophagus without dysplasia 11/30/2022    Monoclonal  gammopathy 10/30/2023    Influenza A 02/08/2024    CORONA (dyspnea on exertion) 06/23/2025    Coronary artery disease involving native coronary artery of native heart without angina pectoris 07/02/2025     Resolved Ambulatory Problems     Diagnosis Date Noted    No Resolved Ambulatory Problems     Past Medical History:   Diagnosis Date    Anxiety     Osman esophagus ?    Cancer     Cardiomyopathy     Fibromyalgia, primary     Gestational diabetes mellitus 06/1994    History of gestational diabetes     Inflammatory bowel disease 06/12/2009    Panic attacks     Primary bile acid malabsorption     Rectocele     Skin cancer of arm     Vaginal prolapse          PAST SURGICAL HISTORY  Past Surgical History:   Procedure Laterality Date    ANTERIOR AND POSTERIOR VAGINAL REPAIR N/A 04/06/2021    Procedure: POSTERIOR VAGINAL REPAIR, CYSTOSCOPY;  Surgeon: Cora Gustafson MD;  Location: [facility] MAIN OR;  Service: Gynecology;  Laterality: N/A;    BLADDER REPAIR  09/2015    X2    CARDIAC CATHETERIZATION N/A 6/23/2025    Procedure: Left Heart Cath;  Surgeon: Dedra Guzman MD;  Location: [facility] CATH INVASIVE LOCATION;  Service: Cardiovascular;  Laterality: N/A;    CARDIAC CATHETERIZATION N/A 6/23/2025    Procedure: Coronary angiography;  Surgeon: Dedra Guzman MD;  Location: [facility] CATH INVASIVE LOCATION;  Service: Cardiovascular;  Laterality: N/A;    CARDIAC CATHETERIZATION N/A 6/23/2025    Procedure: Left ventriculography;  Surgeon: Dedra Guzman MD;  Location: [facility] CATH INVASIVE LOCATION;  Service: Cardiovascular;  Laterality: N/A;    COLONOSCOPY  2010    wnl    COLONOSCOPY N/A 09/09/2016    Procedure: COLONOSCOPY into cecum and terminal ileum with biopsies;  Surgeon: Orlin Mann MD;  Location: [facility] ENDOSCOPY;  Service:     COLONOSCOPY N/A 09/06/2019    Procedure: COLONOSCOPY TO CECUM AND INTO TERMINAL ILEUM;  Surgeon: Orlin Mann MD;  Location: [facility] ENDOSCOPY;  Service: Gastroenterology    COLONOSCOPY  W/ POLYPECTOMY N/A 02/20/2023    Procedure: COLONOSCOPY INTO CECUM AND TI WITH BIOPSIES;  Surgeon: Basilio Martin MD;  Location: [facility] ENDOSCOPY;  Service: Gastroenterology;  Laterality: N/A;  PRE: COLITIS  POST: NORMAL COLON    CYSTOSCOPY W/ URETERAL STENT PLACEMENT      ENDOSCOPY N/A 09/06/2019    Procedure: ESOPHAGOGASTRODUODENOSCOPY WITH COLD BIOPSIES AND 48F HERNÁNDEZ DILATION;  Surgeon: Orlin Mann MD;  [facility] END[facility];  Service: Gastroenterology    ENDOSCOPY N/A 02/20/2023    Procedure: ESOPHAGOGASTRODUODENOSCOPY WITH BIOPSIES;  Surgeon: Basilio Martin MD;  Location: [facility] ENDOSCOPY;  Service: Gastroenterology;  Laterality: N/A;  PRE: MENDEZ'S  POST: HIATAL HERNIA, GASTRITIS    HYSTERECTOMY  12/2013    MOUTH SURGERY      multiple teeth extraction    SACROCOLPOPEXY N/A 04/06/2021    Procedure: LARPAROSCOPY COLPOPEXY, BILATERAL SALPINGECTOMY;  Surgeon: Cora Gustafson MD;  Location: [facility] MAIN OR;  Service: Gynecology;  Laterality: N/A;    SKIN CANCER EXCISION      UPPER GASTROINTESTINAL ENDOSCOPY  09/06/2019         FAMILY HISTORY  Family History   Problem Relation Age of Onset    Depression Mother     Heart disease Mother     Stroke Mother     Heart attack Mother     Heart failure Mother     Hyperlipidemia Mother     Hypertension Mother     Irritable bowel syndrome Mother     Arthritis Father     Heart disease Father     Skin cancer Father     Heart attack Father     Heart failure Father     Heart disease Maternal Grandmother     Stroke Maternal Grandfather     Stroke Paternal Grandfather     Hypertension Sister     Hypothyroidism Sister     Hypertension Brother     Hypothyroidism Daughter     Thyroid disease Sister     Colon cancer Neg Hx     Colon polyps Neg Hx     Liver disease Neg Hx     Rectal cancer Neg Hx     Stomach cancer Neg Hx     Liver cancer Neg Hx     Malig Hyperthermia Neg Hx          SOCIAL HISTORY  Social History     Socioeconomic History    Marital status:       Spouse name: Ruben   Tobacco Use    Smoking status: Never    Smokeless tobacco: Never   Vaping Use    Vaping status: Never Used   Substance and Sexual Activity    Alcohol use: Not Currently     Comment: less than once per  month    Drug use: No    Sexual activity: Yes     Partners: Male     Birth control/protection: Post-menopausal, Hysterectomy         ALLERGIES  Patient has no known allergies.        REVIEW OF SYSTEMS  Review of Systems     All systems reviewed and negative except for those discussed in HPI.       PHYSICAL EXAM    I have reviewed the triage vital signs and nursing notes.    ED Triage Vitals   Temp Heart Rate Resp BP SpO2   07/13/25 1750 07/13/25 1750 07/13/25 1750 07/13/25 1754 07/13/25 1750   99.1 °F (37.3 °C) (!) 137 20 150/73 97 %      Temp src Heart Rate Source Patient Position BP Location FiO2 (%)   -- -- 07/13/25 1754 07/13/25 1754 --     Sitting Right arm        GENERAL: This is an anxious elderly female.  She does look a little weak.  She does have some mild hyperventilation.  No acute distress.Vital signs on my initial evaluation her heart rate is in the low 100s on my exam it is a sinus tachycardia her O2 sat is 100% on room air.  She is afebrile her blood pressure is unremarkable.  HENT: nares patent  Head/neck/ face are symmetric without gross deformity, signs of trauma, or swelling  EYES: no scleral icterus, no conjunctival pallor.  NECK: Supple, no meningismus  CV: regular rhythm, tachycardia with intact distal pulses.  No murmur or rub  RESPIRATORY: normal effort and no respiratory distress.  Clear to auscultation bilaterally  ABDOMEN: soft and nontender.  Obese.  Very mild discomfort in lower abdomen with palpation.  No guarding or rebound.  Normal bowel sounds  MUSCULOSKELETAL: no deformity.  Intact distal pulses to upper lower extremities that are equal strong and symmetric.  NEURO: alert and appropriate, moves all extremities, follows commands.  No focal motor or  sensory changes.  SKIN: warm, dry    Vital signs and nursing notes reviewed.        LAB RESULTS  Recent Results (from the past 24 hours)   ECG 12 Lead ED Triage Standing Order; Chest Pain    Collection Time: 07/13/25  5:51 PM   Result Value Ref Range    QT Interval 355 ms    QTC Interval 490 ms   Comprehensive Metabolic Panel    Collection Time: 07/13/25  5:53 PM    Specimen: Blood   Result Value Ref Range    Glucose 138 (H) 65 - 99 mg/dL    BUN 19.0 8.0 - 23.0 mg/dL    Creatinine 1.26 (H) 0.57 - 1.00 mg/dL    Sodium 136 136 - 145 mmol/L    Potassium 2.8 (L) 3.5 - 5.2 mmol/L    Chloride 97 (L) 98 - 107 mmol/L    CO2 24.1 22.0 - 29.0 mmol/L    Calcium 9.1 8.6 - 10.5 mg/dL    Total Protein 7.7 6.0 - 8.5 g/dL    Albumin 3.7 3.5 - 5.2 g/dL    ALT (SGPT) 17 1 - 33 U/L    AST (SGOT) 20 1 - 32 U/L    Alkaline Phosphatase 78 39 - 117 U/L    Total Bilirubin 0.5 0.0 - 1.2 mg/dL    Globulin 4.0 gm/dL    A/G Ratio 0.9 g/dL    BUN/Creatinine Ratio 15.1 7.0 - 25.0    Anion Gap 14.9 5.0 - 15.0 mmol/L    eGFR 47.5 (L) >60.0 mL/min/1.73   High Sensitivity Troponin T    Collection Time: 07/13/25  5:53 PM    Specimen: Blood   Result Value Ref Range    HS Troponin T 79 (C) <14 ng/L   Green Top (Gel)    Collection Time: 07/13/25  5:53 PM   Result Value Ref Range    Extra Tube Hold for add-ons.    Lavender Top    Collection Time: 07/13/25  5:53 PM   Result Value Ref Range    Extra Tube hold for add-on    Gold Top - SST    Collection Time: 07/13/25  5:53 PM   Result Value Ref Range    Extra Tube Hold for add-ons.    Light Blue Top    Collection Time: 07/13/25  5:53 PM   Result Value Ref Range    Extra Tube Hold for add-ons.    CBC Auto Differential    Collection Time: 07/13/25  5:53 PM    Specimen: Blood   Result Value Ref Range    WBC 16.21 (H) 3.40 - 10.80 10*3/mm3    RBC 4.16 3.77 - 5.28 10*6/mm3    Hemoglobin 12.2 12.0 - 15.9 g/dL    Hematocrit 36.8 34.0 - 46.6 %    MCV 88.5 79.0 - 97.0 fL    MCH 29.3 26.6 - 33.0 pg    MCHC 33.2 31.5  - 35.7 g/dL    RDW 12.6 12.3 - 15.4 %    RDW-SD 40.5 37.0 - 54.0 fl    MPV 9.7 6.0 - 12.0 fL    Platelets 283 140 - 450 10*3/mm3    Neutrophil % 76.6 (H) 42.7 - 76.0 %    Lymphocyte % 14.8 (L) 19.6 - 45.3 %    Monocyte % 7.7 5.0 - 12.0 %    Eosinophil % 0.4 0.3 - 6.2 %    Basophil % 0.2 0.0 - 1.5 %    Immature Grans % 0.3 0.0 - 0.5 %    Neutrophils, Absolute 12.40 (H) 1.70 - 7.00 10*3/mm3    Lymphocytes, Absolute 2.40 0.70 - 3.10 10*3/mm3    Monocytes, Absolute 1.25 (H) 0.10 - 0.90 10*3/mm3    Eosinophils, Absolute 0.07 0.00 - 0.40 10*3/mm3    Basophils, Absolute 0.04 0.00 - 0.20 10*3/mm3    Immature Grans, Absolute 0.05 0.00 - 0.05 10*3/mm3    nRBC 0.0 0.0 - 0.2 /100 WBC   D-dimer, Quantitative    Collection Time: 07/13/25  5:53 PM    Specimen: Blood   Result Value Ref Range    D-Dimer, Quantitative 1.28 (H) 0.00 - 0.65 MCGFEU/mL   Procalcitonin    Collection Time: 07/13/25  5:53 PM    Specimen: Blood   Result Value Ref Range    Procalcitonin 20.70 (H) 0.00 - 0.25 ng/mL   Magnesium    Collection Time: 07/13/25  5:53 PM    Specimen: Blood   Result Value Ref Range    Magnesium 1.8 1.6 - 2.4 mg/dL   Green Top (Gel)    Collection Time: 07/13/25  7:05 PM   Result Value Ref Range    Extra Tube Hold for add-ons.    High Sensitivity Troponin T 1Hr    Collection Time: 07/13/25  7:05 PM    Specimen: Arm, Right; Blood   Result Value Ref Range    HS Troponin T 64 (C) <14 ng/L    Troponin T Numeric Delta -15 ng/L    Troponin T % Delta -19 Abnormal if >/= 20%   Lactic Acid, Plasma    Collection Time: 07/13/25  7:05 PM    Specimen: Arm, Right; Blood   Result Value Ref Range    Lactate 2.2 (C) 0.5 - 2.0 mmol/L   STAT Lactic Acid, Reflex    Collection Time: 07/13/25 10:10 PM    Specimen: Blood   Result Value Ref Range    Lactate 0.5 0.5 - 2.0 mmol/L   Urinalysis With Microscopic If Indicated (No Culture) - Urine, Clean Catch    Collection Time: 07/13/25 10:28 PM    Specimen: Urine, Clean Catch   Result Value Ref Range    Color, UA  Yellow Yellow, Straw    Appearance, UA Clear Clear    pH, UA 6.5 5.0 - 8.0    Specific Gravity, UA 1.028 1.005 - 1.030    Glucose, UA Negative Negative    Ketones, UA Trace (A) Negative    Bilirubin, UA Negative Negative    Blood, UA Trace (A) Negative    Protein, UA Trace (A) Negative    Leuk Esterase, UA Small (1+) (A) Negative    Nitrite, UA Negative Negative    Urobilinogen, UA 0.2 E.U./dL 0.2 - 1.0 E.U./dL   Urinalysis, Microscopic Only - Urine, Clean Catch    Collection Time: 07/13/25 10:28 PM    Specimen: Urine, Clean Catch   Result Value Ref Range    RBC, UA 3-5 (A) None Seen, 0-2 /HPF    WBC, UA 11-20 (A) None Seen, 0-2 /HPF    Bacteria, UA Trace (A) None Seen /HPF    Squamous Epithelial Cells, UA 0-2 None Seen, 0-2 /HPF    Hyaline Casts, UA None Seen None Seen /LPF    Methodology Automated Microscopy        Ordered the above labs and independently reviewed the results.        RADIOLOGY  CT Abdomen Pelvis With Contrast  CT Abdomen Pelvis With Contrast, CT Angiogram Chest Pulmonary Embolism  Result Date: 7/13/2025  CTA CHEST, CT ABDOMEN AND PELVIS  HISTORY: Pulmonary Embolism; R07.9-Chest pain, unspecified; R79.89-Other specified abnormal findings of blood chemistry; E87.6-Hypokalemia; R19.7-Diarrhea, unspecified; D72.829-Elevated white blood cell count, unspecified; A41.9-Sepsis, unspecified organism  COMPARISON: None  TECHNIQUE: CT angiography was performed of the chest with axial images as well as coronal and sagittal reformatted MIP images provided following the administration of IV contrast.  3-D surface rendered reformats were obtained of the pulmonary arteries and aorta.  CT of the abdomen and pelvis obtained following administration of IV contrast. Coronal and sagittal reconstructions were obtained.   Radiation dose reduction techniques were utilized, including automated exposure control, and exposure modulation based on body size.  FINDINGS:  Chest CTA:  There is mild bibasilar atelectasis, but  there is no evidence of acute pulmonary infiltrate, pleural effusion, pneumothorax or suspicious nodule.  The thoracic aorta is normal in caliber, and there is no evidence of dissection.  There are coronary atherosclerotic vascular calcifications.  There is no suspicious mediastinal adenopathy or other mass, though there is a small to moderate hiatal hernia.  Bolus timing is adequate, and there is no evidence of pulmonary embolism.  Abdomen: The liver and gallbladder are normal.  The spleen and pancreas appear normal.  Both adrenal glands are normal. There is no evidence of bowel obstruction. The aorta is normal in caliber.   : There is mild atrophic change in the right kidney and there is a right renal 4 mm nonobstructing calculus, but there is no right hydronephrosis. No left renal calculi are seen, but there is mild to moderate left hydronephrosis, and the left ureter is dilated down into the pelvis, to the level of an approximately 4 mm stone, 8 to 10 cm above the ureterovesical junction. No bladder calculi are seen.  Pelvis:  The appendix is clearly identified, and is normal.  There is diverticulosis, but there is no CT evidence of diverticulitis.  There is no pelvic adenopathy or other soft tissue mass.  There is no CT evidence of hernia or bowel obstruction.  There is no acute bony abnormality.       1.  Pulmonary arteries are well-opacified, and there is no evidence of pulmonary embolism.  2.  There is minimal bibasilar dependent atelectasis, but the lung parenchyma is otherwise normal. No acute pulmonary parenchymal abnormality is seen.  3.   Mild to moderate left hydronephrosis secondary to a 4 mm stone in the distal left ureter, 8 to 10 cm above the ureterovesical junction.      This report was finalized on 7/13/2025 10:15 PM by Dr. Wojciech Roth M.D on Workstation: [id number]      XR Chest 1 View  Result Date: 7/13/2025  XR CHEST 1 VW-  HISTORY: Chest pain. Reported recent heart attack.   COMPARISON: Chest radiograph 6/23/2025  FINDINGS: A single view of the chest was obtained.  Support Devices:  None. Cardiac Silhouette/Mediastinum/Wendie:  The cardiac, mediastinal, and hilar contours are within normal limits. Lungs/Pleural Spaces:  The lungs and pleural spaces are clear. Chest Wall/Diaphragm/Upper Abdomen: There is a hiatal hernia. The visualized osseous structures are similar.   CONCLUSION(S):   1.  No focal consolidation or effusion. 2.  Hiatal hernia.  This report was finalized on 7/13/2025 6:35 PM by Dr. La Blount M.D on Workstation: [id number]        I ordered the above noted radiological studies. Reviewed by me and discussed with radiologist.  See dictation for official radiology interpretation.      PROCEDURES    Critical Care    Performed by: Tashi Najera MD  Authorized by: Milton De Paz MD    Critical care provider statement:     Critical care time (minutes): 45.    Critical care time was exclusive of:  Separately billable procedures and treating other patients    Critical care was necessary to treat or prevent imminent or life-threatening deterioration of the following conditions:  Sepsis    Critical care was time spent personally by me on the following activities:  Blood draw for specimens, development of treatment plan with patient or surrogate, discussions with consultants, evaluation of patient's response to treatment, examination of patient, obtaining history from patient or surrogate, review of old charts, re-evaluation of patient's condition, pulse oximetry, ordering and review of radiographic studies, ordering and review of laboratory studies and ordering and performing treatments and interventions    I assumed direction of critical care for this patient from another provider in my specialty: no      Care discussed with: admitting provider          MEDICATIONS GIVEN IN ER    Medications   sodium chloride 0.9 % flush 10 mL (has no administration in time range)    sodium chloride 0.9 % flush 10 mL (has no administration in time range)   Potassium Replacement - Follow Nurse / BPA Driven Protocol (has no administration in time range)   potassium chloride 10 mEq in 100 mL IVPB (10 mEq Intravenous New Bag 7/13/25 2233)   vancomycin IVPB 1500 mg in 0.9% NaCl (Premix) 500 mL (1,500 mg Intravenous New Bag 7/13/25 2232)   aspirin tablet 325 mg (325 mg Oral Given 7/13/25 1951)   potassium chloride (KLOR-CON M20) CR tablet 40 mEq (40 mEq Oral Given 7/13/25 2158)   piperacillin-tazobactam (ZOSYN) 3.375 g IVPB in 100 mL NS MBP (CD) (0 g Intravenous Stopped 7/13/25 2232)   sodium chloride 0.9% - IBW for BMI > 30 bolus 1,700 mL (0 mL Intravenous Stopped 7/13/25 2232)   iopamidol (ISOVUE-370) 76 % injection 100 mL (100 mL Intravenous Given 7/13/25 2053)         All labs have been independently reviewed by me.  All radiology studies have been reviewed by me and I discussed with radiologist dictating the report when indicated below.  All EKG's independently viewed and interpreted by me.  Discussion below represents my analysis of pertinent findings related to patient's condition, differential diagnosis, treatment plan and final disposition.        PROGRESS, DATA ANALYSIS, CONSULTS, AND MEDICAL DECISION MAKING    This is a patient that presents with chest pain.  Recent chest pain evaluation showed an unremarkable cardiac cath as well as an echo but her troponins were mildly elevated.  Her EKG shows no acute change here.  Organ to check cardiac enzymes.  Clinically she does look dehydrated.  She has had tremendous amount of diarrhea and small amount of p.o. intake.  Will get a give her some IV fluids.  She does not want anything for pain or anxiety at this time.  Informed the patient and the spouse of the test that we will order.  All questions answered at this time.    DDx includes acute coronary syndrome, pulmonary embolism, thoracic aortic dissection, pneumonia, pneumothorax,  musculoskeletal pain, GERD or esophageal spasm, PUD, esophagitis, anxiety, myocarditis/pericarditis, esophageal rupture, pancreatitis.       ED Course as of 07/13/25 2319   Sun Jul 13, 2025   1821 WBC(!): 16.21  2 weeks ago when she was seen in the hospital Beatrizan was 14,000. [MM]   1835 HS Troponin T(!!): 79  When you look 2 weeks ago when she came in and had elevation of her troponin it was 72 and the repeat was 64.  It is elevated here 79. [MM]   1836 WBC(!): 16.21 [MM]   1837 Potassium(!): 2.8 [MM]   1847 My own independent interpretation of the EKG that was on at 5:51 PM reveals a rate of 115 it is a sinus tachycardia narrow complex Q waves in the inferior leads normal axis I do not appreciate any obvious acute injury pattern QT looks normal when I compare this to the previous EKG that was done on6/23/2025 I do not see any significant change. [MM]   1946 Lactate(!!): 2.2 [MM]   1946 Procalcitonin(!): 20.70 [MM]   1948 Chest x-ray shows no acute abnormality.  Please see complete dictated report from radiologist [MM]   2036 D-Dimer, Quant(!): 1.28 [MM]   2036 Procalcitonin(!): 20.70 [MM]   2036 HS Troponin T(!!): 64 [MM]   2222 I reviewed the CT scan of the chest abdomen pelvis report.  CT angiogram of the chest shows no pulmonary embolism there is minimal bibasilar dependent atelectasis but no obvious focal pneumonia or consolidation there is mild to moderate left hydronephrosis secondary to 4 mm stone in the distal left ureter that is 8 to 10 cm above the ureterovesicular junction.  No other evidence of infection seen on the CT scan of the abdomen pelvis. [MM]   2242 I talked with the urologist Dr. Zaidi and informed him about the results of the test my clinical concerns.  He is going to take the patient to surgery today.  Would like the patient admitted to medicine. [MM]   2243 On 6/25/2025 patient had a urine culture that showed E. coli and it was pansensitive. [MM]   2254 I have a very low clinical  suspicion that this is cardiac in etiology her EKG shows no acute changes.  Patient troponins have remained stable and similar to what they were 2 weeks ago.  She had a cardiac cath that essentially was unremarkable had 1 vessel with 70% stenosis. [MM]   2258 BP: 137/78 [MM]   2258 Heart Rate: 98 [MM]   2258 Resp: 20 [MM]   2258 SpO2: 100 % [MM]   2300 Leukocytes, UA(!): Small (1+) [MM]   2300 WBC, UA(!): 11-20 [MM]   2300 Bacteria, UA(!): Trace [MM]   2317 I did discuss the case with Mary who is the midlevel provider on for A.  It Dr. De Paz as the attending.  Informed her of the patient's presenting symptoms as well as my conversation with the urologist and the patient plan to go to surgery.  She agrees to admit after the patient is done with surgery. [MM]      ED Course User Index  [MM] Tashi Najera MD       AS OF 23:19 EDT VITALS:    BP - 137/78  HR - 98  TEMP - 99.1 °F (37.3 °C)  02 SATS - 100%    SOCIAL DETERMINANTS OF HEALTH THAT IMPACT OR LIMIT CARE (For example..Homelessness,safe discharge, inability to obtain care, follow up, or prescriptions):      DIAGNOSIS  Final diagnoses:   Chest pain, unspecified type   Elevated troponin   Hypokalemia   Diarrhea, unspecified type   Leukocytosis, unspecified type   Sepsis, due to unspecified organism, unspecified whether acute organ dysfunction present   Acute UTI   Nephrolithiasis         DISPOSITION  Patient is getting go to the operating room.          DICTATED UTILIZING DRAGON DICTATION    Note Disclaimer: At [facility], we believe that sharing information builds trust and better relationships. You are receiving this note because you recently visited [facility]. It is possible you will see health information before a provider has talked with you about it. This kind of information can be easy to misunderstand. To help you fully understand what it means for your health, we urge you to discuss this note with your provider.       Tashi Najera,  MD  07/13/25 9071

## 2025-07-14 ENCOUNTER — APPOINTMENT (OUTPATIENT)
Dept: CARDIOLOGY | Facility: HOSPITAL | Age: 65
DRG: 854 | End: 2025-07-14
Payer: COMMERCIAL

## 2025-07-14 ENCOUNTER — APPOINTMENT (OUTPATIENT)
Dept: GENERAL RADIOLOGY | Facility: HOSPITAL | Age: 65
DRG: 854 | End: 2025-07-14
Payer: COMMERCIAL

## 2025-07-14 PROBLEM — A41.9 SEPSIS: Status: ACTIVE | Noted: 2025-07-14

## 2025-07-14 PROBLEM — N17.9 AKI (ACUTE KIDNEY INJURY): Status: ACTIVE | Noted: 2025-07-14

## 2025-07-14 PROBLEM — N20.1 LEFT URETERAL STONE: Status: ACTIVE | Noted: 2025-07-14

## 2025-07-14 PROBLEM — E87.6 HYPOKALEMIA: Status: ACTIVE | Noted: 2025-07-14

## 2025-07-14 LAB
ANION GAP SERPL CALCULATED.3IONS-SCNC: 9.6 MMOL/L (ref 5–15)
BH CV LOWER VASCULAR LEFT COMMON FEMORAL AUGMENT: NORMAL
BH CV LOWER VASCULAR LEFT COMMON FEMORAL COMPETENT: NORMAL
BH CV LOWER VASCULAR LEFT COMMON FEMORAL COMPRESS: NORMAL
BH CV LOWER VASCULAR LEFT COMMON FEMORAL PHASIC: NORMAL
BH CV LOWER VASCULAR LEFT COMMON FEMORAL SPONT: NORMAL
BH CV LOWER VASCULAR LEFT DISTAL FEMORAL COMPRESS: NORMAL
BH CV LOWER VASCULAR LEFT GASTRONEMIUS COMPRESS: NORMAL
BH CV LOWER VASCULAR LEFT GREATER SAPH AK COMPRESS: NORMAL
BH CV LOWER VASCULAR LEFT GREATER SAPH BK COMPRESS: NORMAL
BH CV LOWER VASCULAR LEFT LESSER SAPH COMPRESS: NORMAL
BH CV LOWER VASCULAR LEFT MID FEMORAL AUGMENT: NORMAL
BH CV LOWER VASCULAR LEFT MID FEMORAL COMPETENT: NORMAL
BH CV LOWER VASCULAR LEFT MID FEMORAL COMPRESS: NORMAL
BH CV LOWER VASCULAR LEFT MID FEMORAL PHASIC: NORMAL
BH CV LOWER VASCULAR LEFT MID FEMORAL SPONT: NORMAL
BH CV LOWER VASCULAR LEFT PERONEAL COMPRESS: NORMAL
BH CV LOWER VASCULAR LEFT POPLITEAL AUGMENT: NORMAL
BH CV LOWER VASCULAR LEFT POPLITEAL COMPETENT: NORMAL
BH CV LOWER VASCULAR LEFT POPLITEAL COMPRESS: NORMAL
BH CV LOWER VASCULAR LEFT POPLITEAL PHASIC: NORMAL
BH CV LOWER VASCULAR LEFT POPLITEAL SPONT: NORMAL
BH CV LOWER VASCULAR LEFT POSTERIOR TIBIAL COMPRESS: NORMAL
BH CV LOWER VASCULAR LEFT PROFUNDA FEMORAL COMPRESS: NORMAL
BH CV LOWER VASCULAR LEFT PROXIMAL FEMORAL COMPRESS: NORMAL
BH CV LOWER VASCULAR LEFT SAPHENOFEMORAL JUNCTION COMPRESS: NORMAL
BH CV LOWER VASCULAR RIGHT COMMON FEMORAL AUGMENT: NORMAL
BH CV LOWER VASCULAR RIGHT COMMON FEMORAL COMPETENT: NORMAL
BH CV LOWER VASCULAR RIGHT COMMON FEMORAL COMPRESS: NORMAL
BH CV LOWER VASCULAR RIGHT COMMON FEMORAL PHASIC: NORMAL
BH CV LOWER VASCULAR RIGHT COMMON FEMORAL SPONT: NORMAL
BH CV LOWER VASCULAR RIGHT DISTAL FEMORAL COMPRESS: NORMAL
BH CV LOWER VASCULAR RIGHT GASTRONEMIUS COMPRESS: NORMAL
BH CV LOWER VASCULAR RIGHT GREATER SAPH AK COMPRESS: NORMAL
BH CV LOWER VASCULAR RIGHT GREATER SAPH BK COMPRESS: NORMAL
BH CV LOWER VASCULAR RIGHT LESSER SAPH COMPRESS: NORMAL
BH CV LOWER VASCULAR RIGHT MID FEMORAL AUGMENT: NORMAL
BH CV LOWER VASCULAR RIGHT MID FEMORAL COMPETENT: NORMAL
BH CV LOWER VASCULAR RIGHT MID FEMORAL COMPRESS: NORMAL
BH CV LOWER VASCULAR RIGHT MID FEMORAL PHASIC: NORMAL
BH CV LOWER VASCULAR RIGHT MID FEMORAL SPONT: NORMAL
BH CV LOWER VASCULAR RIGHT PERONEAL COMPRESS: NORMAL
BH CV LOWER VASCULAR RIGHT POPLITEAL AUGMENT: NORMAL
BH CV LOWER VASCULAR RIGHT POPLITEAL COMPETENT: NORMAL
BH CV LOWER VASCULAR RIGHT POPLITEAL COMPRESS: NORMAL
BH CV LOWER VASCULAR RIGHT POPLITEAL PHASIC: NORMAL
BH CV LOWER VASCULAR RIGHT POPLITEAL SPONT: NORMAL
BH CV LOWER VASCULAR RIGHT POSTERIOR TIBIAL COMPRESS: NORMAL
BH CV LOWER VASCULAR RIGHT PROFUNDA FEMORAL COMPRESS: NORMAL
BH CV LOWER VASCULAR RIGHT PROXIMAL FEMORAL COMPRESS: NORMAL
BH CV LOWER VASCULAR RIGHT SAPHENOFEMORAL JUNCTION COMPRESS: NORMAL
BUN SERPL-MCNC: 15 MG/DL (ref 8–23)
BUN/CREAT SERPL: 15.5 (ref 7–25)
CALCIUM SPEC-SCNC: 8.6 MG/DL (ref 8.6–10.5)
CHLORIDE SERPL-SCNC: 108 MMOL/L (ref 98–107)
CO2 SERPL-SCNC: 23.4 MMOL/L (ref 22–29)
CREAT SERPL-MCNC: 0.97 MG/DL (ref 0.57–1)
DEPRECATED RDW RBC AUTO: 41.2 FL (ref 37–54)
EGFRCR SERPLBLD CKD-EPI 2021: 65 ML/MIN/1.73
ERYTHROCYTE [DISTWIDTH] IN BLOOD BY AUTOMATED COUNT: 12.7 % (ref 12.3–15.4)
GLUCOSE BLDC GLUCOMTR-MCNC: 74 MG/DL (ref 70–130)
GLUCOSE SERPL-MCNC: 208 MG/DL (ref 65–99)
HCT VFR BLD AUTO: 34.5 % (ref 34–46.6)
HGB BLD-MCNC: 11.2 G/DL (ref 12–15.9)
MCH RBC QN AUTO: 29.1 PG (ref 26.6–33)
MCHC RBC AUTO-ENTMCNC: 32.5 G/DL (ref 31.5–35.7)
MCV RBC AUTO: 89.6 FL (ref 79–97)
PLATELET # BLD AUTO: 242 10*3/MM3 (ref 140–450)
PMV BLD AUTO: 9.8 FL (ref 6–12)
POTASSIUM SERPL-SCNC: 3.8 MMOL/L (ref 3.5–5.2)
RBC # BLD AUTO: 3.85 10*6/MM3 (ref 3.77–5.28)
SODIUM SERPL-SCNC: 141 MMOL/L (ref 136–145)
TROPONIN T SERPL HS-MCNC: 48 NG/L
WBC NRBC COR # BLD AUTO: 13.19 10*3/MM3 (ref 3.4–10.8)

## 2025-07-14 PROCEDURE — 25010000002 MORPHINE PER 10 MG: Performed by: STUDENT IN AN ORGANIZED HEALTH CARE EDUCATION/TRAINING PROGRAM

## 2025-07-14 PROCEDURE — 76000 FLUOROSCOPY <1 HR PHYS/QHP: CPT

## 2025-07-14 PROCEDURE — 83993 ASSAY FOR CALPROTECTIN FECAL: CPT | Performed by: NURSE PRACTITIONER

## 2025-07-14 PROCEDURE — 25010000002 ONDANSETRON PER 1 MG: Performed by: NURSE ANESTHETIST, CERTIFIED REGISTERED

## 2025-07-14 PROCEDURE — 25010000002 DEXAMETHASONE SODIUM PHOSPHATE 20 MG/5ML SOLUTION: Performed by: NURSE ANESTHETIST, CERTIFIED REGISTERED

## 2025-07-14 PROCEDURE — 85027 COMPLETE CBC AUTOMATED: CPT | Performed by: STUDENT IN AN ORGANIZED HEALTH CARE EDUCATION/TRAINING PROGRAM

## 2025-07-14 PROCEDURE — 25010000002 HYDROMORPHONE PER 4 MG: Performed by: NURSE ANESTHETIST, CERTIFIED REGISTERED

## 2025-07-14 PROCEDURE — 84484 ASSAY OF TROPONIN QUANT: CPT | Performed by: STUDENT IN AN ORGANIZED HEALTH CARE EDUCATION/TRAINING PROGRAM

## 2025-07-14 PROCEDURE — 99221 1ST HOSP IP/OBS SF/LOW 40: CPT | Performed by: NURSE PRACTITIONER

## 2025-07-14 PROCEDURE — 80048 BASIC METABOLIC PNL TOTAL CA: CPT | Performed by: STUDENT IN AN ORGANIZED HEALTH CARE EDUCATION/TRAINING PROGRAM

## 2025-07-14 PROCEDURE — 93970 EXTREMITY STUDY: CPT

## 2025-07-14 PROCEDURE — 25010000002 PIPERACILLIN SOD-TAZOBACTAM PER 1 G: Performed by: NURSE PRACTITIONER

## 2025-07-14 PROCEDURE — 82948 REAGENT STRIP/BLOOD GLUCOSE: CPT

## 2025-07-14 PROCEDURE — 93970 EXTREMITY STUDY: CPT | Performed by: SURGERY

## 2025-07-14 PROCEDURE — 0T778DZ DILATION OF LEFT URETER WITH INTRALUMINAL DEVICE, VIA NATURAL OR ARTIFICIAL OPENING ENDOSCOPIC: ICD-10-PCS | Performed by: STUDENT IN AN ORGANIZED HEALTH CARE EDUCATION/TRAINING PROGRAM

## 2025-07-14 RX ORDER — ROSUVASTATIN CALCIUM 20 MG/1
20 TABLET, COATED ORAL DAILY
Status: DISCONTINUED | OUTPATIENT
Start: 2025-07-14 | End: 2025-07-17 | Stop reason: HOSPADM

## 2025-07-14 RX ORDER — DIPHENHYDRAMINE HYDROCHLORIDE 50 MG/ML
12.5 INJECTION, SOLUTION INTRAMUSCULAR; INTRAVENOUS
Status: DISCONTINUED | OUTPATIENT
Start: 2025-07-14 | End: 2025-07-14 | Stop reason: HOSPADM

## 2025-07-14 RX ORDER — PROMETHAZINE HYDROCHLORIDE 25 MG/1
25 TABLET ORAL ONCE AS NEEDED
Status: DISCONTINUED | OUTPATIENT
Start: 2025-07-14 | End: 2025-07-14 | Stop reason: HOSPADM

## 2025-07-14 RX ORDER — HYDRALAZINE HYDROCHLORIDE 20 MG/ML
5 INJECTION INTRAMUSCULAR; INTRAVENOUS
Status: DISCONTINUED | OUTPATIENT
Start: 2025-07-14 | End: 2025-07-14 | Stop reason: HOSPADM

## 2025-07-14 RX ORDER — ALPRAZOLAM 0.5 MG
0.5 TABLET ORAL DAILY PRN
Status: DISCONTINUED | OUTPATIENT
Start: 2025-07-14 | End: 2025-07-17 | Stop reason: HOSPADM

## 2025-07-14 RX ORDER — ONDANSETRON 2 MG/ML
4 INJECTION INTRAMUSCULAR; INTRAVENOUS ONCE AS NEEDED
Status: DISCONTINUED | OUTPATIENT
Start: 2025-07-14 | End: 2025-07-14 | Stop reason: HOSPADM

## 2025-07-14 RX ORDER — LIDOCAINE HYDROCHLORIDE 20 MG/ML
INJECTION, SOLUTION INFILTRATION; PERINEURAL AS NEEDED
Status: DISCONTINUED | OUTPATIENT
Start: 2025-07-13 | End: 2025-07-14 | Stop reason: SURG

## 2025-07-14 RX ORDER — LEVOTHYROXINE SODIUM 75 UG/1
75 TABLET ORAL
Status: DISCONTINUED | OUTPATIENT
Start: 2025-07-14 | End: 2025-07-17 | Stop reason: HOSPADM

## 2025-07-14 RX ORDER — DULOXETIN HYDROCHLORIDE 60 MG/1
60 CAPSULE, DELAYED RELEASE ORAL NIGHTLY
Status: DISCONTINUED | OUTPATIENT
Start: 2025-07-14 | End: 2025-07-17 | Stop reason: HOSPADM

## 2025-07-14 RX ORDER — OXYCODONE AND ACETAMINOPHEN 7.5; 325 MG/1; MG/1
1 TABLET ORAL EVERY 4 HOURS PRN
Status: DISCONTINUED | OUTPATIENT
Start: 2025-07-14 | End: 2025-07-14 | Stop reason: HOSPADM

## 2025-07-14 RX ORDER — PROPOFOL 10 MG/ML
VIAL (ML) INTRAVENOUS AS NEEDED
Status: DISCONTINUED | OUTPATIENT
Start: 2025-07-13 | End: 2025-07-14 | Stop reason: SURG

## 2025-07-14 RX ORDER — FLUMAZENIL 0.1 MG/ML
0.2 INJECTION INTRAVENOUS AS NEEDED
Status: DISCONTINUED | OUTPATIENT
Start: 2025-07-14 | End: 2025-07-14 | Stop reason: HOSPADM

## 2025-07-14 RX ORDER — DEXAMETHASONE SODIUM PHOSPHATE 4 MG/ML
INJECTION, SOLUTION INTRA-ARTICULAR; INTRALESIONAL; INTRAMUSCULAR; INTRAVENOUS; SOFT TISSUE AS NEEDED
Status: DISCONTINUED | OUTPATIENT
Start: 2025-07-14 | End: 2025-07-14 | Stop reason: SURG

## 2025-07-14 RX ORDER — PROMETHAZINE HYDROCHLORIDE 25 MG/1
25 SUPPOSITORY RECTAL ONCE AS NEEDED
Status: DISCONTINUED | OUTPATIENT
Start: 2025-07-14 | End: 2025-07-14 | Stop reason: HOSPADM

## 2025-07-14 RX ORDER — AMLODIPINE BESYLATE 5 MG/1
5 TABLET ORAL NIGHTLY
Status: DISCONTINUED | OUTPATIENT
Start: 2025-07-14 | End: 2025-07-17 | Stop reason: HOSPADM

## 2025-07-14 RX ORDER — HYDROCODONE BITARTRATE AND ACETAMINOPHEN 5; 325 MG/1; MG/1
1 TABLET ORAL ONCE AS NEEDED
Status: DISCONTINUED | OUTPATIENT
Start: 2025-07-14 | End: 2025-07-14 | Stop reason: HOSPADM

## 2025-07-14 RX ORDER — SUCCINYLCHOLINE/SOD CL,ISO/PF 200MG/10ML
SYRINGE (ML) INTRAVENOUS AS NEEDED
Status: DISCONTINUED | OUTPATIENT
Start: 2025-07-13 | End: 2025-07-14 | Stop reason: SURG

## 2025-07-14 RX ORDER — NALOXONE HCL 0.4 MG/ML
0.2 VIAL (ML) INJECTION AS NEEDED
Status: DISCONTINUED | OUTPATIENT
Start: 2025-07-14 | End: 2025-07-14 | Stop reason: HOSPADM

## 2025-07-14 RX ORDER — ONDANSETRON 2 MG/ML
INJECTION INTRAMUSCULAR; INTRAVENOUS AS NEEDED
Status: DISCONTINUED | OUTPATIENT
Start: 2025-07-14 | End: 2025-07-14 | Stop reason: SURG

## 2025-07-14 RX ORDER — FENTANYL CITRATE 50 UG/ML
50 INJECTION, SOLUTION INTRAMUSCULAR; INTRAVENOUS
Status: DISCONTINUED | OUTPATIENT
Start: 2025-07-14 | End: 2025-07-14 | Stop reason: HOSPADM

## 2025-07-14 RX ORDER — ATROPINE SULFATE 0.4 MG/ML
0.4 INJECTION, SOLUTION INTRAMUSCULAR; INTRAVENOUS; SUBCUTANEOUS ONCE AS NEEDED
Status: DISCONTINUED | OUTPATIENT
Start: 2025-07-14 | End: 2025-07-14 | Stop reason: HOSPADM

## 2025-07-14 RX ORDER — DROPERIDOL 2.5 MG/ML
0.62 INJECTION, SOLUTION INTRAMUSCULAR; INTRAVENOUS
Status: DISCONTINUED | OUTPATIENT
Start: 2025-07-14 | End: 2025-07-14 | Stop reason: HOSPADM

## 2025-07-14 RX ORDER — EPHEDRINE SULFATE 50 MG/ML
5 INJECTION, SOLUTION INTRAVENOUS ONCE AS NEEDED
Status: DISCONTINUED | OUTPATIENT
Start: 2025-07-14 | End: 2025-07-14 | Stop reason: HOSPADM

## 2025-07-14 RX ORDER — CHOLESTYRAMINE LIGHT 4 G/5.7G
1 POWDER, FOR SUSPENSION ORAL DAILY
Status: DISCONTINUED | OUTPATIENT
Start: 2025-07-14 | End: 2025-07-14

## 2025-07-14 RX ORDER — IPRATROPIUM BROMIDE AND ALBUTEROL SULFATE 2.5; .5 MG/3ML; MG/3ML
3 SOLUTION RESPIRATORY (INHALATION) ONCE AS NEEDED
Status: DISCONTINUED | OUTPATIENT
Start: 2025-07-14 | End: 2025-07-14 | Stop reason: HOSPADM

## 2025-07-14 RX ORDER — HYDROMORPHONE HYDROCHLORIDE 1 MG/ML
0.25 INJECTION, SOLUTION INTRAMUSCULAR; INTRAVENOUS; SUBCUTANEOUS
Status: DISCONTINUED | OUTPATIENT
Start: 2025-07-14 | End: 2025-07-14 | Stop reason: HOSPADM

## 2025-07-14 RX ORDER — SODIUM CHLORIDE, SODIUM LACTATE, POTASSIUM CHLORIDE, CALCIUM CHLORIDE 600; 310; 30; 20 MG/100ML; MG/100ML; MG/100ML; MG/100ML
INJECTION, SOLUTION INTRAVENOUS CONTINUOUS PRN
Status: DISCONTINUED | OUTPATIENT
Start: 2025-07-13 | End: 2025-07-14 | Stop reason: SURG

## 2025-07-14 RX ORDER — ASPIRIN 81 MG/1
81 TABLET, CHEWABLE ORAL DAILY
Status: DISCONTINUED | OUTPATIENT
Start: 2025-07-14 | End: 2025-07-17 | Stop reason: HOSPADM

## 2025-07-14 RX ORDER — ACETAMINOPHEN 325 MG/1
650 TABLET ORAL EVERY 6 HOURS PRN
Status: DISCONTINUED | OUTPATIENT
Start: 2025-07-14 | End: 2025-07-17 | Stop reason: HOSPADM

## 2025-07-14 RX ORDER — LABETALOL HYDROCHLORIDE 5 MG/ML
5 INJECTION, SOLUTION INTRAVENOUS
Status: DISCONTINUED | OUTPATIENT
Start: 2025-07-14 | End: 2025-07-14 | Stop reason: HOSPADM

## 2025-07-14 RX ADMIN — MORPHINE SULFATE 2 MG: 2 INJECTION, SOLUTION INTRAMUSCULAR; INTRAVENOUS at 08:52

## 2025-07-14 RX ADMIN — PIPERACILLIN AND TAZOBACTAM 3.38 G: 3; .375 INJECTION, POWDER, FOR SOLUTION INTRAVENOUS at 07:43

## 2025-07-14 RX ADMIN — AMLODIPINE BESYLATE 5 MG: 5 TABLET ORAL at 03:51

## 2025-07-14 RX ADMIN — AMLODIPINE BESYLATE 5 MG: 5 TABLET ORAL at 23:19

## 2025-07-14 RX ADMIN — ONDANSETRON 4 MG: 2 INJECTION, SOLUTION INTRAMUSCULAR; INTRAVENOUS at 00:05

## 2025-07-14 RX ADMIN — DEXAMETHASONE SODIUM PHOSPHATE 8 MG: 4 INJECTION, SOLUTION INTRAMUSCULAR; INTRAVENOUS at 00:02

## 2025-07-14 RX ADMIN — MORPHINE SULFATE 2 MG: 2 INJECTION, SOLUTION INTRAMUSCULAR; INTRAVENOUS at 14:32

## 2025-07-14 RX ADMIN — LEVOTHYROXINE SODIUM 75 MCG: 0.07 TABLET ORAL at 07:42

## 2025-07-14 RX ADMIN — MORPHINE SULFATE 2 MG: 2 INJECTION, SOLUTION INTRAMUSCULAR; INTRAVENOUS at 21:30

## 2025-07-14 RX ADMIN — PIPERACILLIN AND TAZOBACTAM 3.38 G: 3; .375 INJECTION, POWDER, FOR SOLUTION INTRAVENOUS at 14:16

## 2025-07-14 RX ADMIN — DULOXETINE 60 MG: 60 CAPSULE, DELAYED RELEASE ORAL at 23:19

## 2025-07-14 RX ADMIN — OXYCODONE AND ACETAMINOPHEN 1 TABLET: 7.5; 325 TABLET ORAL at 00:37

## 2025-07-14 RX ADMIN — HYDROMORPHONE HYDROCHLORIDE 0.25 MG: 1 INJECTION, SOLUTION INTRAMUSCULAR; INTRAVENOUS; SUBCUTANEOUS at 00:41

## 2025-07-14 RX ADMIN — PIPERACILLIN AND TAZOBACTAM 3.38 G: 3; .375 INJECTION, POWDER, FOR SOLUTION INTRAVENOUS at 23:02

## 2025-07-14 RX ADMIN — MORPHINE SULFATE 2 MG: 2 INJECTION, SOLUTION INTRAMUSCULAR; INTRAVENOUS at 03:55

## 2025-07-14 RX ADMIN — ROSUVASTATIN CALCIUM 20 MG: 20 TABLET, FILM COATED ORAL at 08:53

## 2025-07-14 RX ADMIN — DULOXETINE 60 MG: 60 CAPSULE, DELAYED RELEASE ORAL at 03:51

## 2025-07-14 RX ADMIN — ASPIRIN 81 MG CHEWABLE TABLET 81 MG: 81 TABLET CHEWABLE at 08:53

## 2025-07-14 NOTE — ANESTHESIA POSTPROCEDURE EVALUATION
Patient: Wilma Longo    Procedure Summary       Date: 07/13/25 Room / Location: [facility] OR  / [facility] MAIN OR    Anesthesia Start: 2347 Anesthesia Stop: 07/14/25 0023    Procedure: CYSTOSCOPY URETERAL CATHETER/STENT INSERTION (Left) Diagnosis:     Surgeons: Maged Pedroza MD Provider: Jacquelin Ruelas MD    Anesthesia Type: general ASA Status: 3 - Emergent            Anesthesia Type: general    Vitals  Vitals Value Taken Time   /62 07/14/25 00:45   Temp 39.4 °C (102.9 °F) 07/14/25 00:26   Pulse 108 07/14/25 00:49   Resp 23 07/14/25 00:35   SpO2 90 % 07/14/25 00:49   Vitals shown include unfiled device data.        Post Anesthesia Care and Evaluation    Level of consciousness: awake  Pain management: adequate  Anesthetic complications: No anesthetic complications    Cardiovascular status: acceptable  Respiratory status: acceptable    Comments: /66 (BP Location: Left arm, Patient Position: Lying)   Pulse 112   Temp (!) 39.4 °C (102.9 °F) (Oral)   Resp 23   LMP  (LMP Unknown)   SpO2 91%

## 2025-07-14 NOTE — PLAN OF CARE
Goal Outcome Evaluation:              Outcome Evaluation: VSS temp better zosyn started maintain safety and continue to monitor.

## 2025-07-14 NOTE — CASE MANAGEMENT/SOCIAL WORK
Discharge Planning Assessment  [facility]     Patient Name: Wilma Longo  MRN: [id number]  Today's Date: 7/14/2025    Admit Date: 7/13/2025    Plan: Home with spouse   Discharge Needs Assessment       Row Name 07/14/25 1702       Living Environment    People in Home spouse    Current Living Arrangements home    Potentially Unsafe Housing Conditions none    Primary Care Provided by self    Provides Primary Care For no one    Family Caregiver if Needed none    Quality of Family Relationships helpful;involved;supportive    Able to Return to Prior Arrangements yes       Resource/Environmental Concerns    Resource/Environmental Concerns none    Transportation Concerns none       Transition Planning    Patient/Family Anticipates Transition to home with family    Patient/Family Anticipated Services at Transition none    Transportation Anticipated family or friend will provide       Discharge Needs Assessment    Readmission Within the Last 30 Days no previous admission in last 30 days    Equipment Currently Used at Home bp cuff    Anticipated Changes Related to Illness none    Equipment Needed After Discharge none                   Discharge Plan       Row Name 07/14/25 3555       Plan    Plan Home with spouse    Patient/Family in Agreement with Plan yes    Provided Post Acute Provider List? N/A  Plan is home    Provided Post Acute Provider Quality & Resource List? N/A  Plan is home    Plan Comments CCP met with the patient at bedside. Introduced self and explained role of CCP. The patient confirmed the information on her face sheet as accurate. Patients PCP is Jerman Virgen. Patient is enrolled into [facility]. Patient lives at home spouse. No DME is used. No HH history. Patient is independent at home. Works full time. Patient plans home at discharge with family transport. CCP following.                   Continued Care and Services - Admitted Since 7/13/2025    No active coordination exists.          Demographic Summary        Row Name 07/14/25 1650       General Information    Admission Type inpatient    Arrived From emergency department    Referral Source admission list    Reason for Consult discharge planning    Preferred Language English                   Functional Status       Row Name 07/14/25 1700       Functional Status    Usual Activity Tolerance good    Current Activity Tolerance good       Functional Status, IADL    Medications independent    Meal Preparation independent    Housekeeping independent    Laundry independent    Shopping independent       Mental Status    General Appearance WDL WDL       Mental Status Summary    Recent Changes in Mental Status/Cognitive Functioning no changes       Employment/    Employment Status retired                   Psychosocial    No documentation.                  Abuse/Neglect    No documentation.                  Legal    No documentation.                  Substance Abuse    No documentation.                  Patient Forms    No documentation.

## 2025-07-14 NOTE — CONSULTS
[facility]  Initial Inpatient Consult Note    Referring Provider: A     Reason for Consultation: Chronic diarrhea    Subjective     History of present illness:      Thank you for allowing us to participate in the care of this patient.    65 y.o. female who is known to our service with a history of irritable bowel syndrome diarrhea predominant, lymphocytic colitis and GERD who is currently admitted for urosepsis secondary to obstructing left ureteral stone status post stent placement in the OR whom we have been asked to see for chronic diarrhea.    This patient has struggled with chronic diarrhea for several years.  She is felt the best on colestipol for which she takes 2 pills twice daily.  With current urinary issues she had worsening of baseline IBS with increased diarrhea and even nausea and vomiting.  She feels much better today.  Denies rectal bleeding or rectal pain.  Appetite has been good.  Her weight has been stable.    2/20/2023 colonoscopy showed normal macroscopically with biopsies consistent with lymphocytic colitis. No FH CRC. Recall 10 years.     2/20/2023 EGD showed variable Z-line, 3 cm hiatal hernia, patchy mild gastritis.  Pathology with chronic inactive gastritis, negative H. Pylori; GEJ: Columnar mucosa with chronic inflammation, negative intestinal metaplasia.    Past Medical History:  Past Medical History:   Diagnosis Date    Anemia 5/2022    Anxiety     Osman esophagus ?    Benign essential hypertension 02/01/2015    Cancer     Skin cancer    Cardiomyopathy     Colitis 06/12/2009    Depression     Fibromyalgia     Fibromyalgia, primary     Gestational diabetes mellitus 06/1994    History of gestational diabetes     Hyperlipidemia 04/13/2011    Hypothyroidism 02/01/2015    Inflammatory bowel disease 06/12/2009    Insomnia 02/03/2015    Irritable bowel syndrome 11/30/2009    Panic attacks     Primary bile acid malabsorption     fecal incontinence    Rectocele      Skin cancer of arm     Vaginal prolapse     Vitamin D deficiency 07/19/2012     Past Surgical History:  Past Surgical History:   Procedure Laterality Date    ANTERIOR AND POSTERIOR VAGINAL REPAIR N/A 04/06/2021    Procedure: POSTERIOR VAGINAL REPAIR, CYSTOSCOPY;  Surgeon: Cora Gustafson MD;  Location: [facility] OR;  Service: Gynecology;  Laterality: N/A;    BLADDER REPAIR  09/2015    X2    CARDIAC CATHETERIZATION N/A 6/23/2025    Procedure: Left Heart Cath;  Surgeon: Dedra Guzman MD;  Location: [facility] CATH INVASIVE LOCATION;  Service: Cardiovascular;  Laterality: N/A;    CARDIAC CATHETERIZATION N/A 6/23/2025    Procedure: Coronary angiography;  Surgeon: Dedra Guzman MD;  [facility];  Service: Cardiovascular;  Laterality: N/A;    CARDIAC CATHETERIZATION N/A 6/23/2025    Procedure: Left ventriculography;  Surgeon: Dedra Guzman MD;  Location: [facility] CATH INVASIVE LOCATION;  Service: Cardiovascular;  Laterality: N/A;    COLONOSCOPY  2010    wnl    COLONOSCOPY N/A 09/09/2016    Procedure: COLONOSCOPY into cecum and terminal ileum with biopsies;  Surgeon: Orlin Mann MD;  Location: [facility] ENDOSCOPY;  Service:     COLONOSCOPY N/A 09/06/2019    Procedure: COLONOSCOPY TO CECUM AND INTO TERMINAL ILEUM;  Surgeon: Orlin Mann MD;  Location: [facility] ENDOSCOPY;  Service: Gastroenterology    COLONOSCOPY W/ POLYPECTOMY N/A 02/20/2023    Procedure: COLONOSCOPY INTO CECUM AND TI WITH BIOPSIES;  Surgeon: Basilio Martin MD;  Location: [facility] ENDOSCOPY;  Service: Gastroenterology;  Laterality: N/A;  PRE: COLITIS  POST: NORMAL COLON    CYSTOSCOPY W/ URETERAL STENT PLACEMENT      CYSTOSCOPY W/ URETERAL STENT PLACEMENT Left 7/13/2025    Procedure: CYSTOSCOPY URETERAL CATHETER/STENT INSERTION;  Surgeon: Maged Pedroza MD;  Location: [facility] OR;  Service: Urology;  Laterality: Left;    ENDOSCOPY N/A 09/06/2019    Procedure: ESOPHAGOGASTRODUODENOSCOPY WITH COLD BIOPSIES AND  48F HERNÁNDEZ DILATION;  Surgeon: Orlin Mann MD;  Location: [facility] ENDOSCOPY;  Service: Gastroenterology    ENDOSCOPY N/A 02/20/2023    Procedure: ESOPHAGOGASTRODUODENOSCOPY WITH BIOPSIES;  Surgeon: Basilio Martin MD;  [facility];  Service: Gastroenterology;  Laterality: N/A;  PRE: MENDEZ'S  POST: HIATAL HERNIA, GASTRITIS    HYSTERECTOMY  12/2013    MOUTH SURGERY      multiple teeth extraction    SACROCOLPOPEXY N/A 04/06/2021    Procedure: LARPAROSCOPY COLPOPEXY, BILATERAL SALPINGECTOMY;  Surgeon: Cora Gustafson MD;  Location: [facility] MAIN OR;  Service: Gynecology;  Laterality: N/A;    SKIN CANCER EXCISION      UPPER GASTROINTESTINAL ENDOSCOPY  09/06/2019      Social History:   Social History     Tobacco Use    Smoking status: Never    Smokeless tobacco: Never   Substance Use Topics    Alcohol use: Not Currently     Comment: less than once per  month      Family History:  Family History   Problem Relation Age of Onset    Depression Mother     Heart disease Mother     Stroke Mother     Heart attack Mother     Heart failure Mother     Hyperlipidemia Mother     Hypertension Mother     Irritable bowel syndrome Mother     Arthritis Father     Heart disease Father     Skin cancer Father     Heart attack Father     Heart failure Father     Heart disease Maternal Grandmother     Stroke Maternal Grandfather     Stroke Paternal Grandfather     Hypertension Sister     Hypothyroidism Sister     Hypertension Brother     Hypothyroidism Daughter     Thyroid disease Sister     Colon cancer Neg Hx     Colon polyps Neg Hx     Liver disease Neg Hx     Rectal cancer Neg Hx     Stomach cancer Neg Hx     Liver cancer Neg Hx     Malig Hyperthermia Neg Hx        Home Meds:  Medications Prior to Admission   Medication Sig Dispense Refill Last Dose/Taking    ALPRAZolam (Xanax) 0.5 MG tablet Take 1 tablet by mouth Daily As Needed for Anxiety. D/c previous order 30 tablet 2 Taking As Needed    amLODIPine (NORVASC)  5 MG tablet TAKE 1 TABLET BY MOUTH DAILY (Patient taking differently: Take 1 tablet by mouth Every Night.) 14 tablet 0 Taking Differently    aspirin 81 MG chewable tablet Chew 1 tablet Daily.   Taking    colestipol (COLESTID) 1 g tablet Take 2 tablets by mouth 2 (Two) Times a Day. 360 tablet 3 Taking    Cranberry 125 MG tablet Take 1 tablet by mouth Daily.   Taking    DULoxetine (CYMBALTA) 60 MG capsule TAKE 1 CAPSULE BY MOUTH DAILY (Patient taking differently: Take 1 capsule by mouth Every Night.) 14 capsule 0 Taking Differently    levothyroxine (SYNTHROID, LEVOTHROID) 75 MCG tablet TAKE 1 TABLET BY MOUTH DAILY 14 tablet 0 Taking    lisinopril (PRINIVIL,ZESTRIL) 20 MG tablet TAKE 1 TABLET BY MOUTH DAILY (Patient taking differently: Take 1 tablet by mouth Every Night.) 14 tablet 0 Taking Differently    naproxen (NAPROSYN) 500 MG tablet Take 1 tablet by mouth Daily As Needed for Moderate Pain. 180 tablet 1 Taking As Needed    rosuvastatin (CRESTOR) 20 MG tablet Take 1 tablet by mouth Daily. 90 tablet 0 Taking    vitamin C (ASCORBIC ACID) 250 MG tablet Take 2 tablets by mouth Every Night.   Taking    valACYclovir (VALTREX) 500 MG tablet Take 1 tablet by mouth As Needed.        Current Meds:   amLODIPine, 5 mg, Oral, Nightly  aspirin, 81 mg, Oral, Daily  DULoxetine, 60 mg, Oral, Nightly  levothyroxine, 75 mcg, Oral, Q AM  piperacillin-tazobactam, 3.375 g, Intravenous, Q8H  rosuvastatin, 20 mg, Oral, Daily      Allergies:  No Known Allergies    Objective:  Objective     Vital Signs  Temp:  [97.3 °F (36.3 °C)-102.9 °F (39.4 °C)] 97.3 °F (36.3 °C)  Heart Rate:  [] 78  Resp:  [15-31] 18  BP: (112-150)/(59-96) 112/64  Physical Exam:   Constitutional:    Alert, cooperative, in no acute distress    Abdomen:     Soft, nondistended, nontender; normal bowel sounds    Results Review:   I reviewed the patient's new clinical results.    Results from last 7 days   Lab Units 07/14/25  0846 07/13/25  1753   WBC 10*3/mm3 13.19*  "16.21*   HEMOGLOBIN g/dL 11.2* 12.2   HEMATOCRIT % 34.5 36.8   PLATELETS 10*3/mm3 242 283     Results from last 7 days   Lab Units 07/14/25  0846 07/13/25  1753   SODIUM mmol/L 141 136   POTASSIUM mmol/L 3.8 2.8*   CHLORIDE mmol/L 108* 97*   CO2 mmol/L 23.4 24.1   BUN mg/dL 15.0 19.0   CREATININE mg/dL 0.97 1.26*   CALCIUM mg/dL 8.6 9.1   BILIRUBIN mg/dL  --  0.5   ALK PHOS U/L  --  78   ALT (SGPT) U/L  --  17   AST (SGOT) U/L  --  20   GLUCOSE mg/dL 208* 138*         No results found for: \"LIPASE\"    Radiology:  FL C Arm During Surgery   Final Result      CT Abdomen Pelvis With Contrast   Final Result       1.  Pulmonary arteries are well-opacified, and there is no evidence of   pulmonary embolism.       2.  There is minimal bibasilar dependent atelectasis, but the lung   parenchyma is otherwise normal. No acute pulmonary parenchymal   abnormality is seen.       3.   Mild to moderate left hydronephrosis secondary to a 4 mm stone in   the distal left ureter, 8 to 10 cm above the ureterovesical junction.                       This report was finalized on 7/13/2025 10:15 PM by Dr. Wojciech Roth M.D on Workstation: [id number]          CT Angiogram Chest Pulmonary Embolism   Final Result       1.  Pulmonary arteries are well-opacified, and there is no evidence of   pulmonary embolism.       2.  There is minimal bibasilar dependent atelectasis, but the lung   parenchyma is otherwise normal. No acute pulmonary parenchymal   abnormality is seen.       3.   Mild to moderate left hydronephrosis secondary to a 4 mm stone in   the distal left ureter, 8 to 10 cm above the ureterovesical junction.                       This report was finalized on 7/13/2025 10:15 PM by Dr. Wojciech Roth M.D on Workstation: [id number]          XR Chest 1 View   Final Result          Assessment & Plan   Active Hospital Problems    Diagnosis     **Left ureteral stone     BALBINA (acute kidney injury)     Sepsis     Hypokalemia     Acute " UTI     Coronary artery disease involving native coronary artery of native heart without angina pectoris     Anxiety associated with depression     Benign essential hypertension     Hypothyroidism     Hyperlipidemia     Irritable bowel syndrome        Assessment:  Urosepsis secondary to left ureteral stone  History of chronic diarrhea, IBS-D and lymphocytic colitis likely exacerbated by #1    Plan:  Low residue diet as tolerated  Okay to resume home dosage of colestipol 2 tablets p.o. twice daily, patient can use home supply  Check fecal calprotectin  Consider budesonide pending patient's hospital course   CBC, CMP, CRP, sed rate and TSH in the morning    I discussed the patients findings and my recommendations with patient, family, and Dr. Cardenas.    Dragon dictation used throughout this note.            HEATH Caruso  [facility]  [address] [place] [address]  [phone] office  [phone] fax  [facility]

## 2025-07-14 NOTE — ED NOTES
Nursing report ED to floor  Wilma Longo  65 y.o.  female    HPI :  HPI  Stated Reason for Visit: cp  History Obtained From: patient    Chief Complaint  Chief Complaint   Patient presents with    Chest Pain       Admitting doctor:   Milton De Paz MD    Admitting diagnosis:   The primary encounter diagnosis was Chest pain, unspecified type. Diagnoses of Elevated troponin, Hypokalemia, Diarrhea, unspecified type, Leukocytosis, unspecified type, Sepsis, due to unspecified organism, unspecified whether acute organ dysfunction present, Acute UTI, and Nephrolithiasis were also pertinent to this visit.    Code status:   Current Code Status       Date Active Code Status Order ID Comments User Context       Prior            Allergies:   Patient has no known allergies.    Isolation:   No active isolations    Intake and Output    Intake/Output Summary (Last 24 hours) at 7/13/2025 2305  Last data filed at 7/13/2025 2232  Gross per 24 hour   Intake 1900 ml   Output --   Net 1900 ml       Weight:   There were no vitals filed for this visit.    Most recent vitals:   Vitals:    07/13/25 2118 07/13/25 2120 07/13/25 2200 07/13/25 2230   BP:  137/75 115/96 137/78   BP Location:  Left arm  Left arm   Patient Position:  Lying  Lying   Pulse:  108 120 98   Resp:  20  20   Temp:       SpO2: 97%  98% 100%       Active LDAs/IV Access:   Lines, Drains & Airways       Active LDAs       Name Placement date Placement time Site Days    Peripheral IV 07/13/25 1940 20 G Anterior;Distal;Right;Upper Arm 07/13/25 1940  Arm  less than 1                    Labs (abnormal labs have a star):   Labs Reviewed   COMPREHENSIVE METABOLIC PANEL - Abnormal; Notable for the following components:       Result Value    Glucose 138 (*)     Creatinine 1.26 (*)     Potassium 2.8 (*)     Chloride 97 (*)     eGFR 47.5 (*)     All other components within normal limits    Narrative:     GFR Categories in Chronic Kidney Disease (CKD)              GFR  Category          GFR (mL/min/1.73)    Interpretation  G1                    90 or greater        Normal or high (1)  G2                    60-89                Mild decrease (1)  G3a                   45-59                Mild to moderate decrease  G3b                   30-44                Moderate to severe decrease  G4                    15-29                Severe decrease  G5                    14 or less           Kidney failure    (1)In the absence of evidence of kidney disease, neither GFR category G1 or G2 fulfill the criteria for CKD.    eGFR calculation 2021 CKD-EPI creatinine equation, which does not include race as a factor   TROPONIN - Abnormal; Notable for the following components:    HS Troponin T 79 (*)     All other components within normal limits    Narrative:     High Sensitive Troponin T Reference Range:  <14.0 ng/L- Negative Female for AMI  <22.0 ng/L- Negative Male for AMI  >=14 - Abnormal Female indicating possible myocardial injury.  >=22 - Abnormal Male indicating possible myocardial injury.   Clinicians would have to utilize clinical acumen, EKG, Troponin, and serial changes to determine if it is an Acute Myocardial Infarction or myocardial injury due to an underlying chronic condition.        CBC WITH AUTO DIFFERENTIAL - Abnormal; Notable for the following components:    WBC 16.21 (*)     Neutrophil % 76.6 (*)     Lymphocyte % 14.8 (*)     Neutrophils, Absolute 12.40 (*)     Monocytes, Absolute 1.25 (*)     All other components within normal limits   HIGH SENSITIVITIY TROPONIN T 1HR - Abnormal; Notable for the following components:    HS Troponin T 64 (*)     All other components within normal limits    Narrative:     High Sensitive Troponin T Reference Range:  <14.0 ng/L- Negative Female for AMI  <22.0 ng/L- Negative Male for AMI  >=14 - Abnormal Female indicating possible myocardial injury.  >=22 - Abnormal Male indicating possible myocardial injury.   Clinicians would have to utilize  "clinical acumen, EKG, Troponin, and serial changes to determine if it is an Acute Myocardial Infarction or myocardial injury due to an underlying chronic condition.        D-DIMER, QUANTITATIVE - Abnormal; Notable for the following components:    D-Dimer, Quantitative 1.28 (*)     All other components within normal limits    Narrative:     According to the assay 's published package insert, a normal (<0.50 MCGFEU/mL) D-dimer result in conjunction with a non-high clinical probability assessment, excludes deep vein thrombosis (DVT) and pulmonary embolism (PE) with high sensitivity.    D-dimer values increase with age and this can make VTE exclusion of an older population difficult. To address this, the [employer], based on best available evidence and recent guidelines, recommends that clinicians use age-adjusted D-dimer thresholds in patients greater than 50 years of age with: a) a low probability of PE who do not meet all Pulmonary Embolism Rule Out Criteria, or b) in those with intermediate probability of PE.   The formula for an age-adjusted D-dimer cut-off is \"age/100\".  For example, a 60 year old patient would have an age-adjusted cut-off of 0.60 MCGFEU/mL and an 80 year old 0.80 MCGFEU/mL.   LACTIC ACID, PLASMA - Abnormal; Notable for the following components:    Lactate 2.2 (*)     All other components within normal limits   PROCALCITONIN - Abnormal; Notable for the following components:    Procalcitonin 20.70 (*)     All other components within normal limits    Narrative:     As a Marker for Sepsis (Non-Neonates):    1. <0.5 ng/mL represents a low risk of severe sepsis and/or septic shock.  2. >2 ng/mL represents a high risk of severe sepsis and/or septic shock.    As a Marker for Lower Respiratory Tract Infections that require antibiotic therapy:    PCT on Admission    Antibiotic Therapy       6-12 Hrs later    >0.5                Strongly Recommended  >0.25 - <0.5        " "Recommended   0.1 - 0.25          Discouraged              Remeasure/reassess PCT  <0.1                Strongly Discouraged     Remeasure/reassess PCT    As 28 day mortality risk marker: \"Change in Procalcitonin Result\" (>80% or <=80%) if Day 0 (or Day 1) and Day 4 values are available. Refer to http://www[email or web address] [place]    Change in PCT <=80%  A decrease of PCT levels below or equal to 80% defines a positive change in PCT test result representing a higher risk for 28-day all-cause mortality of patients diagnosed with severe sepsis for septic shock.    Change in PCT >80%  A decrease of PCT levels of more than 80% defines a negative change in PCT result representing a lower risk for 28-day all-cause mortality of patients diagnosed with severe sepsis or septic shock.      URINALYSIS W/ MICROSCOPIC IF INDICATED (NO CULTURE) - Abnormal; Notable for the following components:    Ketones, UA Trace (*)     Blood, UA Trace (*)     Protein, UA Trace (*)     Leuk Esterase, UA Small (1+) (*)     All other components within normal limits   URINALYSIS, MICROSCOPIC ONLY - Abnormal; Notable for the following components:    RBC, UA 3-5 (*)     WBC, UA 11-20 (*)     Bacteria, UA Trace (*)     All other components within normal limits   MAGNESIUM - Normal   LACTIC ACID, REFLEX - Normal   BLOOD CULTURE   BLOOD CULTURE   RAINBOW DRAW    Narrative:     The following orders were created for panel order [place] Draw.  Procedure                               Abnormality         Status                     ---------                               -----------         ------                     Green Top (Gel)[[id number]]                                  Final result               Lavender Top[[id number]]                                     Final result               Gold Top - [facility][[id number]]                                   Final result               Light Blue Top[[id number]]                                   Final result             "     Please view results for these tests on the individual orders.   RAINBOW DRAW    Narrative:     The following orders were created for panel order [place] Draw.  Procedure                               Abnormality         Status                     ---------                               -----------         ------                     Green Top (Gel)[[id number]]                                  Final result               Lavender Top[[id number]]                                                                Gold Top - SST[[id number]]                                                              Light Blue Top[[id number]]                                                                Please view results for these tests on the individual orders.   CBC AND DIFFERENTIAL    Narrative:     The following orders were created for panel order CBC & Differential.  Procedure                               Abnormality         Status                     ---------                               -----------         ------                     CBC Auto Differential[[id number]]        Abnormal            Final result                 Please view results for these tests on the individual orders.   GREEN TOP   LAVENDER TOP   GOLD TOP - SST   LIGHT BLUE TOP   GREEN TOP   LAVENDER TOP   GOLD TOP - SST   LIGHT BLUE TOP       EKG:   ECG 12 Lead ED Triage Standing Order; Chest Pain   Final Result   HEART YAUX=881  bpm   RR Ifzouoom=946  ms   WI Rcyzukzr=011  ms   P Horizontal Axis=25  deg   P Front Axis=44  deg   QRSD Interval=88  ms   QT Fhrgfeyf=462  ms   MWsN=176  ms   QRS Axis=38  deg   T Wave Axis=38  deg   - ABNORMAL ECG -   Sinus tachycardia   Inferior  infarct, old   When compared with ECG of 23-Jun-2025 09:20:24,   Significant repolarization change   Significant axis, voltage or hypertrophy change   Electronically Signed By: Efrem Beltre ([facility]) 2025-07-13 18:19:59   Date and Time of Study:2025-07-13 17:51:54      ECG 12 Lead Chest  Pain    (Results Pending)       Meds given in ED:   Medications   sodium chloride 0.9 % flush 10 mL (has no administration in time range)   sodium chloride 0.9 % flush 10 mL (has no administration in time range)   Potassium Replacement - Follow Nurse / BPA Driven Protocol (has no administration in time range)   potassium chloride 10 mEq in 100 mL IVPB (10 mEq Intravenous New Bag 7/13/25 2233)   vancomycin IVPB 1500 mg in 0.9% NaCl (Premix) 500 mL (1,500 mg Intravenous New Bag 7/13/25 2232)   aspirin tablet 325 mg (325 mg Oral Given 7/13/25 1951)   potassium chloride (KLOR-CON M20) CR tablet 40 mEq (40 mEq Oral Given 7/13/25 2158)   piperacillin-tazobactam (ZOSYN) 3.375 g IVPB in 100 mL NS MBP (CD) (0 g Intravenous Stopped 7/13/25 2232)   sodium chloride 0.9% - IBW for BMI > 30 bolus 1,700 mL (0 mL Intravenous Stopped 7/13/25 2232)   iopamidol (ISOVUE-370) 76 % injection 100 mL (100 mL Intravenous Given 7/13/25 2053)       Imaging results:  CT Abdomen Pelvis With Contrast  Result Date: 7/13/2025   1.  Pulmonary arteries are well-opacified, and there is no evidence of pulmonary embolism.  2.  There is minimal bibasilar dependent atelectasis, but the lung parenchyma is otherwise normal. No acute pulmonary parenchymal abnormality is seen.  3.   Mild to moderate left hydronephrosis secondary to a 4 mm stone in the distal left ureter, 8 to 10 cm above the ureterovesical junction.      This report was finalized on 7/13/2025 10:15 PM by Dr. Wojciech Roth M.D on Workstation: [id number]      CT Angiogram Chest Pulmonary Embolism  Result Date: 7/13/2025   1.  Pulmonary arteries are well-opacified, and there is no evidence of pulmonary embolism.  2.  There is minimal bibasilar dependent atelectasis, but the lung parenchyma is otherwise normal. No acute pulmonary parenchymal abnormality is seen.  3.   Mild to moderate left hydronephrosis secondary to a 4 mm stone in the distal left ureter, 8 to 10 cm above the  ureterovesical junction.      This report was finalized on 7/13/2025 10:15 PM by Dr. Wojciech Roth M.D on Workstation: [id number]        Ambulatory status:   - up ad stephan    Social issues:   Social History     Socioeconomic History    Marital status:      Spouse name: Ruben   Tobacco Use    Smoking status: Never    Smokeless tobacco: Never   Vaping Use    Vaping status: Never Used   Substance and Sexual Activity    Alcohol use: Not Currently     Comment: less than once per  month    Drug use: No    Sexual activity: Yes     Partners: Male     Birth control/protection: Post-menopausal, Hysterectomy       Peripheral Neurovascular  Peripheral Neurovascular (Adult)  Peripheral Neurovascular WDL: WDL    Neuro Cognitive  Neuro Cognitive (Adult)  Cognitive/Neuro/Behavioral WDL: WDL    Learning  Learning Assessment  Learning Readiness and Ability: no barriers identified    Respiratory  Respiratory WDL  Respiratory WDL: .WDL except  Rhythm/Pattern, Respiratory: shortness of breath  Expansion/Accessory Muscles/Retractions: no use of accessory muscles, no retractions, expansion symmetric  Mucous Membranes: moist, intact, pink  Cough Frequency: no cough    Abdominal Pain  Safety Interventions  Safety Precautions/Falls Reduction: assistive device/personal items within reach  All Alarms: none present    Pain Assessments  Pain (Adult)  (0-10) Pain Rating: Rest: 4  (0-10) Pain Rating: Activity: 4  Pain Location: abdomen, chest  Pain Management Interventions: pain medication given  Response to Pain Interventions: interventions effective per patient    NIH Stroke Scale       Kaylee Galdamez RN  07/13/25 23:05 EDT

## 2025-07-14 NOTE — PROGRESS NOTES
[facility] Services: Piperacillin-Tazobactam Consult    Pt Name: Wilma Longo   : 1960    Indication: Pyelonephritis/ Complicated UTI    Relevant clinical data and objective history reviewed:    Past Medical History:   Diagnosis Date    Anemia 2022    Anxiety     Osman esophagus ?    Benign essential hypertension 2015    Cancer     Skin cancer    Cardiomyopathy     Colitis 2009    Depression     Fibromyalgia     Fibromyalgia, primary     Gestational diabetes mellitus 1994    History of gestational diabetes     Hyperlipidemia 2011    Hypothyroidism 2015    Inflammatory bowel disease 2009    Insomnia 2015    Irritable bowel syndrome 2009    Panic attacks     Primary bile acid malabsorption     fecal incontinence    Rectocele     Skin cancer of arm     Vaginal prolapse     Vitamin D deficiency 2012     Creatinine   Date Value Ref Range Status   2025 1.26 (H) 0.57 - 1.00 mg/dL Final   2025 1.03 (H) 0.57 - 1.00 mg/dL Final   2025 0.93 0.57 - 1.00 mg/dL Final   2021 1.10 0.7 - 1.5 mg/dL Final     BUN   Date Value Ref Range Status   2025 19.0 8.0 - 23.0 mg/dL Final   2021 17 7 - 20 mg/dL Final     Estimated Creatinine Clearance: 42.7 mL/min (A) (by C-G formula based on SCr of 1.26 mg/dL (H)).    Lab Results   Component Value Date    WBC 16.21 (H) 2025     Temp Readings from Last 3 Encounters:   25 97.7 °F (36.5 °C) (Oral)   25 98.2 °F (36.8 °C) (Temporal)   25 97.2 °F (36.2 °C) (Temporal)      Assessment/Plan  Estimated CrCl >20 mL/min at this time; BMI 26.37 kg/m2  Will start piperacillin-tazobactam 3.375 g IV every 8 hours     Pharmacy will continue to follow daily while on piperacillin-tazobactam and adjust as needed. Thank you for this consult.    Chintan Rey PharmD   Clinical Pharmacist

## 2025-07-14 NOTE — CONSULTS
FIRST UROLOGY CONSULT      Patient Identification:  NAME:  Wilma Longo  Age:  65 y.o.   Sex:  female   :  1960   MRN:  [id number]     Chief complaint: Left flank pain    History of present illness:      65-year-old female admitted with left flank pain, nausea, vomiting, fevers    In hospital:  -AVSS, good UOP  -WBC -16  -Creat -1.26  -UA -3-5 RBC, 11-20 WBC, trace bacteria    Lactic acid 0.5 < 2.2    -CT -independently interpreted; left hydronephrosis secondary to a 5 mm distal left ureteral stone    Asked to see    Past medical history:  Past Medical History:   Diagnosis Date    Anemia 2022    Anxiety     Osman esophagus ?    Benign essential hypertension 2015    Cancer     Skin cancer    Cardiomyopathy     Colitis 2009    Depression     Fibromyalgia     Fibromyalgia, primary     Gestational diabetes mellitus 1994    History of gestational diabetes     Hyperlipidemia 2011    Hypothyroidism 2015    Inflammatory bowel disease 2009    Insomnia 2015    Irritable bowel syndrome 2009    Panic attacks     Primary bile acid malabsorption     fecal incontinence    Rectocele     Skin cancer of arm     Vaginal prolapse     Vitamin D deficiency 2012       Past surgical history:  Past Surgical History:   Procedure Laterality Date    ANTERIOR AND POSTERIOR VAGINAL REPAIR N/A 2021    Procedure: POSTERIOR VAGINAL REPAIR, CYSTOSCOPY;  Surgeon: Cora Gustafson MD;  Location: [facility];  Service: Gynecology;  Laterality: N/A;    BLADDER REPAIR  09/2015    X2    CARDIAC CATHETERIZATION N/A 2025    Procedure: Left Heart Cath;  Surgeon: Dedra Guzman MD;  Location: [facility] CATH INVASIVE LOCATION;  Service: Cardiovascular;  Laterality: N/A;    CARDIAC CATHETERIZATION N/A 2025    Procedure: Coronary angiography;  Surgeon: Dedra Guzman MD;  Location: [facility] CATH INVASIVE LOCATION;  Service: Cardiovascular;  Laterality: N/A;    CARDIAC  CATHETERIZATION N/A 6/23/2025    Procedure: Left ventriculography;  Surgeon: Dedra Guzman MD;  Location: [facility] CATH INVASIVE LOCATION;  Service: Cardiovascular;  Laterality: N/A;    COLONOSCOPY  2010    wnl    COLONOSCOPY N/A 09/09/2016    Procedure: COLONOSCOPY into cecum and terminal ileum with biopsies;  Surgeon: Orlin Mann MD;  Location: [facility] ENDOSCOPY;  Service:     COLONOSCOPY N/A 09/06/2019    Procedure: COLONOSCOPY TO CECUM AND INTO TERMINAL ILEUM;  Surgeon: Orlin Mann MD;  Location: [facility] ENDOSCOPY;  Service: Gastroenterology    COLONOSCOPY W/ POLYPECTOMY N/A 02/20/2023    Procedure: COLONOSCOPY INTO CECUM AND TI WITH BIOPSIES;  Surgeon: Basilio Martin MD;  Location: [facility] ENDOSCOPY;  Service: Gastroenterology;  Laterality: N/A;  PRE: COLITIS  POST: NORMAL COLON    CYSTOSCOPY W/ URETERAL STENT PLACEMENT      ENDOSCOPY N/A 09/06/2019    Procedure: ESOPHAGOGASTRODUODENOSCOPY WITH COLD BIOPSIES AND 48F HENRÁNDEZ DILATION;  Surgeon: Orlin Mann MD;  Location: [facility] ENDOSCOPY;  Service: Gastroenterology    ENDOSCOPY N/A 02/20/2023    Procedure: ESOPHAGOGASTRODUODENOSCOPY WITH BIOPSIES;  Surgeon: Basilio Martin MD;  Location: [facility] ENDOSCOPY;  Service: Gastroenterology;  Laterality: N/A;  PRE: MENDEZ'S  POST: HIATAL HERNIA, GASTRITIS    HYSTERECTOMY  12/2013    MOUTH SURGERY      multiple teeth extraction    SACROCOLPOPEXY N/A 04/06/2021    Procedure: LARPAROSCOPY COLPOPEXY, BILATERAL SALPINGECTOMY;  Surgeon: Cora Gustafson MD;  Location: [facility] MAIN OR;  Service: Gynecology;  Laterality: N/A;    SKIN CANCER EXCISION      UPPER GASTROINTESTINAL ENDOSCOPY  09/06/2019       Allergies:  Patient has no known allergies.    Home medications:  Medications Prior to Admission   Medication Sig Dispense Refill Last Dose/Taking    ALPRAZolam (Xanax) 0.5 MG tablet Take 1 tablet by mouth Daily As Needed for Anxiety. D/c previous order 30 tablet 2     amLODIPine (NORVASC) 5 MG  tablet TAKE 1 TABLET BY MOUTH DAILY (Patient taking differently: Take 1 tablet by mouth Every Night.) 14 tablet 0     aspirin 81 MG chewable tablet Chew 1 tablet Daily.       colestipol (COLESTID) 1 g tablet Take 2 tablets by mouth 2 (Two) Times a Day. 360 tablet 3     Cranberry 125 MG tablet Take 1 tablet by mouth Daily.       DULoxetine (CYMBALTA) 60 MG capsule TAKE 1 CAPSULE BY MOUTH DAILY (Patient taking differently: Take 1 capsule by mouth Every Night.) 14 capsule 0     levothyroxine (SYNTHROID, LEVOTHROID) 75 MCG tablet TAKE 1 TABLET BY MOUTH DAILY 14 tablet 0     lisinopril (PRINIVIL,ZESTRIL) 20 MG tablet TAKE 1 TABLET BY MOUTH DAILY (Patient taking differently: Take 1 tablet by mouth Every Night.) 14 tablet 0     naproxen (NAPROSYN) 500 MG tablet Take 1 tablet by mouth Daily As Needed for Moderate Pain. 180 tablet 1     rosuvastatin (CRESTOR) 20 MG tablet Take 1 tablet by mouth Daily. 90 tablet 0     valACYclovir (VALTREX) 500 MG tablet Take 1 tablet by mouth As Needed.       vitamin C (ASCORBIC ACID) 250 MG tablet Take 2 tablets by mouth Every Night.           Hospital medications:  vancomycin, 20 mg/kg, Intravenous, Once           [Transfer Hold] aluminum-magnesium hydroxide-simethicone    [Transfer Hold] senna-docusate sodium **AND** [Transfer Hold] polyethylene glycol **AND** [Transfer Hold] bisacodyl **AND** [Transfer Hold] bisacodyl    [Transfer Hold] Morphine    [Transfer Hold] nitroglycerin    [Transfer Hold] ondansetron ODT **OR** [Transfer Hold] ondansetron    Potassium Replacement - Follow Nurse / BPA Driven Protocol    [Transfer Hold] sodium chloride    [Transfer Hold] sodium chloride    [Transfer Hold] sodium chloride    Family history:  Family History   Problem Relation Age of Onset    Depression Mother     Heart disease Mother     Stroke Mother     Heart attack Mother     Heart failure Mother     Hyperlipidemia Mother     Hypertension Mother     Irritable bowel syndrome Mother     Arthritis  Father     Heart disease Father     Skin cancer Father     Heart attack Father     Heart failure Father     Heart disease Maternal Grandmother     Stroke Maternal Grandfather     Stroke Paternal Grandfather     Hypertension Sister     Hypothyroidism Sister     Hypertension Brother     Hypothyroidism Daughter     Thyroid disease Sister     Colon cancer Neg Hx     Colon polyps Neg Hx     Liver disease Neg Hx     Rectal cancer Neg Hx     Stomach cancer Neg Hx     Liver cancer Neg Hx     Malig Hyperthermia Neg Hx        Social history:  Social History     Tobacco Use    Smoking status: Never    Smokeless tobacco: Never   Vaping Use    Vaping status: Never Used   Substance Use Topics    Alcohol use: Not Currently     Comment: less than once per  month    Drug use: No       Review of systems:      Positive for:  nothing  Negative for:  chest pain, cough, sob, o/w neg    Objective:  TMax 24 hours:   Temp (24hrs), Av.1 °F (37.3 °C), Min:99.1 °F (37.3 °C), Max:99.1 °F (37.3 °C)      Vitals Ranges:   Temp:  [99.1 °F (37.3 °C)] 99.1 °F (37.3 °C)  Heart Rate:  [] 98  Resp:  [20] 20  BP: (115-150)/(59-96) 137/78    Intake/Output Last 3 shifts:  No intake/output data recorded.     Physical Exam:    General Appearance:    Alert, cooperative, NAD   Back:     No CVA tenderness   Lungs:     Respirations unlabored, no wheezing    Heart:    RRR, intact peripheral pulses   Abdomen:     Soft, NDNT, no masses, no guarding   :    Pelvic not performed, bladder nondistended and nontender   Neuro/Psych:   Orientation intact, mood/affect pleasant       Results review:   I reviewed the patient's new clinical results.    Data review:  Lab Results (last 24 hours)       Procedure Component Value Units Date/Time    Urinalysis With Microscopic If Indicated (No Culture) - Urine, Clean Catch [[id number]]  (Abnormal) Collected: 25    Specimen: Urine, Clean Catch Updated: 25     Color, UA Yellow     Appearance, UA Clear  "    pH, UA 6.5     Specific Gravity, UA 1.028     Glucose, UA Negative     Ketones, UA Trace     Bilirubin, UA Negative     Blood, UA Trace     Protein, UA Trace     Leuk Esterase, UA Small (1+)     Nitrite, UA Negative     Urobilinogen, UA 0.2 E.U./dL    Urinalysis, Microscopic Only - Urine, Clean Catch [[id number]]  (Abnormal) Collected: 07/13/25 2228    Specimen: Urine, Clean Catch Updated: 07/13/25 2253     RBC, UA 3-5 /HPF      WBC, UA 11-20 /HPF      Bacteria, UA Trace /HPF      Squamous Epithelial Cells, UA 0-2 /HPF      Hyaline Casts, UA None Seen /LPF      Methodology Automated Microscopy    STAT Lactic Acid, Reflex [[id number]]  (Normal) Collected: 07/13/25 2210    Specimen: Blood Updated: 07/13/25 2250     Lactate 0.5 mmol/L     D-dimer, Quantitative [[id number]]  (Abnormal) Collected: 07/13/25 1753    Specimen: Blood Updated: 07/13/25 2029     D-Dimer, Quantitative 1.28 MCGFEU/mL     Narrative:      According to the assay 's published package insert, a normal (<0.50 MCGFEU/mL) D-dimer result in conjunction with a non-high clinical probability assessment, excludes deep vein thrombosis (DVT) and pulmonary embolism (PE) with high sensitivity.    D-dimer values increase with age and this can make VTE exclusion of an older population difficult. To address this, the [employer], based on best available evidence and recent guidelines, recommends that clinicians use age-adjusted D-dimer thresholds in patients greater than 50 years of age with: a) a low probability of PE who do not meet all Pulmonary Embolism Rule Out Criteria, or b) in those with intermediate probability of PE.   The formula for an age-adjusted D-dimer cut-off is \"age/100\".  For example, a 60 year old patient would have an age-adjusted cut-off of 0.60 MCGFEU/mL and an 80 year old 0.80 MCGFEU/mL.    High Sensitivity Troponin T 1Hr [[id number]]  (Abnormal) Collected: 07/13/25 1905    Specimen: Blood from Arm, Right " Updated: 07/13/25 1939     HS Troponin T 64 ng/L      Troponin T Numeric Delta -15 ng/L      Troponin T % Delta -19    Narrative:      High Sensitive Troponin T Reference Range:  <14.0 ng/L- Negative Female for AMI  <22.0 ng/L- Negative Male for AMI  >=14 - Abnormal Female indicating possible myocardial injury.  >=22 - Abnormal Male indicating possible myocardial injury.   Clinicians would have to utilize clinical acumen, EKG, Troponin, and serial changes to determine if it is an Acute Myocardial Infarction or myocardial injury due to an underlying chronic condition.         Lactic Acid, Plasma [[id number]]  (Abnormal) Collected: 07/13/25 1905    Specimen: Blood from Arm, Right Updated: 07/13/25 1938     Lactate 2.2 mmol/L     [place] Draw [[id number]] Collected: 07/13/25 1905    Specimen: Blood Updated: 07/13/25 1915    Narrative:      The following orders were created for panel order [place] Draw.  Procedure                               Abnormality         Status                     ---------                               -----------         ------                     Green Top (Gel)[[id number]]                                  Final result               Lavender Top[[id number]]                                                                Gold Top - SST[[id number]]                                                              Light Blue Top[[id number]]                                                                Please view results for these tests on the individual orders.    Green Top (Gel) [[id number]] Collected: 07/13/25 1905    Specimen: Blood from Arm, Right Updated: 07/13/25 1915     Extra Tube Hold for add-ons.     Comment: Auto resulted.       Blood Culture - Blood, Arm, Left [[id number]] Collected: 07/13/25 1906    Specimen: Blood from Arm, Left Updated: 07/13/25 1912    Blood Culture - Blood, Arm, Right [[id number]] Collected: 07/13/25 1905    Specimen: Blood from Arm, Right Updated: 07/13/25 1911     "Procalcitonin [[id number]]  (Abnormal) Collected: 07/13/25 1753    Specimen: Blood Updated: 07/13/25 1905     Procalcitonin 20.70 ng/mL     Narrative:      As a Marker for Sepsis (Non-Neonates):    1. <0.5 ng/mL represents a low risk of severe sepsis and/or septic shock.  2. >2 ng/mL represents a high risk of severe sepsis and/or septic shock.    As a Marker for Lower Respiratory Tract Infections that require antibiotic therapy:    PCT on Admission    Antibiotic Therapy       6-12 Hrs later    >0.5                Strongly Recommended  >0.25 - <0.5        Recommended   0.1 - 0.25          Discouraged              Remeasure/reassess PCT  <0.1                Strongly Discouraged     Remeasure/reassess PCT    As 28 day mortality risk marker: \"Change in Procalcitonin Result\" (>80% or <=80%) if Day 0 (or Day 1) and Day 4 values are available. Refer to http://www.SkyJ[employer]Genasyspct-calculator.com    Change in PCT <=80%  A decrease of PCT levels below or equal to 80% defines a positive change in PCT test result representing a higher risk for 28-day all-cause mortality of patients diagnosed with severe sepsis for septic shock.    Change in PCT >80%  A decrease of PCT levels of more than 80% defines a negative change in PCT result representing a lower risk for 28-day all-cause mortality of patients diagnosed with severe sepsis or septic shock.       Magnesium [[id number]]  (Normal) Collected: 07/13/25 1753    Specimen: Blood Updated: 07/13/25 1849     Magnesium 1.8 mg/dL     High Sensitivity Troponin T [[id number]]  (Abnormal) Collected: 07/13/25 1753    Specimen: Blood Updated: 07/13/25 1832     HS Troponin T 79 ng/L     Narrative:      High Sensitive Troponin T Reference Range:  <14.0 ng/L- Negative Female for AMI  <22.0 ng/L- Negative Male for AMI  >=14 - Abnormal Female indicating possible myocardial injury.  >=22 - Abnormal Male indicating possible myocardial injury.   Clinicians would have to utilize clinical acumen, EKG, " Troponin, and serial changes to determine if it is an Acute Myocardial Infarction or myocardial injury due to an underlying chronic condition.         Comprehensive Metabolic Panel [[id number]]  (Abnormal) Collected: 07/13/25 1753    Specimen: Blood Updated: 07/13/25 1825     Glucose 138 mg/dL      BUN 19.0 mg/dL      Creatinine 1.26 mg/dL      Sodium 136 mmol/L      Potassium 2.8 mmol/L      Chloride 97 mmol/L      CO2 24.1 mmol/L      Calcium 9.1 mg/dL      Total Protein 7.7 g/dL      Albumin 3.7 g/dL      ALT (SGPT) 17 U/L      AST (SGOT) 20 U/L      Alkaline Phosphatase 78 U/L      Total Bilirubin 0.5 mg/dL      Globulin 4.0 gm/dL      A/G Ratio 0.9 g/dL      BUN/Creatinine Ratio 15.1     Anion Gap 14.9 mmol/L      eGFR 47.5 mL/min/1.73     Narrative:      GFR Categories in Chronic Kidney Disease (CKD)              GFR Category          GFR (mL/min/1.73)    Interpretation  G1                    90 or greater        Normal or high (1)  G2                    60-89                Mild decrease (1)  G3a                   45-59                Mild to moderate decrease  G3b                   30-44                Moderate to severe decrease  G4                    15-29                Severe decrease  G5                    14 or less           Kidney failure    (1)In the absence of evidence of kidney disease, neither GFR category G1 or G2 fulfill the criteria for CKD.    eGFR calculation 2021 CKD-EPI creatinine equation, which does not include race as a factor    CBC & Differential [[id number]]  (Abnormal) Collected: 07/13/25 1753    Specimen: Blood Updated: 07/13/25 1803    Narrative:      The following orders were created for panel order CBC & Differential.  Procedure                               Abnormality         Status                     ---------                               -----------         ------                     CBC Auto Differential[[id number]]        Abnormal            Final result                  Please view results for these tests on the individual orders.    CBC Auto Differential [[id number]]  (Abnormal) Collected: 07/13/25 1753    Specimen: Blood Updated: 07/13/25 1803     WBC 16.21 10*3/mm3      RBC 4.16 10*6/mm3      Hemoglobin 12.2 g/dL      Hematocrit 36.8 %      MCV 88.5 fL      MCH 29.3 pg      MCHC 33.2 g/dL      RDW 12.6 %      RDW-SD 40.5 fl      MPV 9.7 fL      Platelets 283 10*3/mm3      Neutrophil % 76.6 %      Lymphocyte % 14.8 %      Monocyte % 7.7 %      Eosinophil % 0.4 %      Basophil % 0.2 %      Immature Grans % 0.3 %      Neutrophils, Absolute 12.40 10*3/mm3      Lymphocytes, Absolute 2.40 10*3/mm3      Monocytes, Absolute 1.25 10*3/mm3      Eosinophils, Absolute 0.07 10*3/mm3      Basophils, Absolute 0.04 10*3/mm3      Immature Grans, Absolute 0.05 10*3/mm3      nRBC 0.0 /100 WBC     [place] Draw [[id number]] Collected: 07/13/25 1753    Specimen: Blood Updated: 07/13/25 1800    Narrative:      The following orders were created for panel order [place] Draw.  Procedure                               Abnormality         Status                     ---------                               -----------         ------                     Green Top (Gel)[[id number]]                                  Final result               Lavender Top[[id number]]                                     Final result               Gold Top - SST[[id number]]                                   Final result               Light Blue Top[[id number]]                                   Final result                 Please view results for these tests on the individual orders.    Green Top (Gel) [[id number]] Collected: 07/13/25 1753    Specimen: Blood Updated: 07/13/25 1800     Extra Tube Hold for add-ons.     Comment: Auto resulted.       Lavender Top [[id number]] Collected: 07/13/25 1753    Specimen: Blood Updated: 07/13/25 1800     Extra Tube hold for add-on     Comment: Auto resulted       Gold Top - SST [[id number]]  Collected: 07/13/25 1753    Specimen: Blood Updated: 07/13/25 1800     Extra Tube Hold for add-ons.     Comment: Auto resulted.       Light Blue Top [[id number]] Collected: 07/13/25 1753    Specimen: Blood Updated: 07/13/25 1800     Extra Tube Hold for add-ons.     Comment: Auto resulted                Imaging:  Imaging Results (Last 24 Hours)       Procedure Component Value Units Date/Time    CT Abdomen Pelvis With Contrast [[id number]] Collected: 07/13/25 2206     Updated: 07/13/25 2218    Narrative:      CTA CHEST, CT ABDOMEN AND PELVIS     HISTORY: Pulmonary Embolism; R07.9-Chest pain, unspecified; R79.89-Other  specified abnormal findings of blood chemistry; E87.6-Hypokalemia;  R19.7-Diarrhea, unspecified; D72.829-Elevated white blood cell count,  unspecified; A41.9-Sepsis, unspecified organism     COMPARISON: None     TECHNIQUE: CT angiography was performed of the chest with axial images  as well as coronal and sagittal reformatted MIP images provided  following the administration of IV contrast.  3-D surface rendered  reformats were obtained of the pulmonary arteries and aorta.  CT of the  abdomen and pelvis obtained following administration of IV contrast.  Coronal and sagittal reconstructions were obtained.   Radiation dose  reduction techniques were utilized, including automated exposure  control, and exposure modulation based on body size.     FINDINGS:     Chest CTA:  There is mild bibasilar atelectasis, but there is no  evidence of acute pulmonary infiltrate, pleural effusion, pneumothorax  or suspicious nodule.  The thoracic aorta is normal in caliber, and  there is no evidence of dissection.  There are coronary atherosclerotic  vascular calcifications.  There is no suspicious mediastinal adenopathy  or other mass, though there is a small to moderate hiatal hernia.  Bolus  timing is adequate, and there is no evidence of pulmonary embolism.     Abdomen: The liver and gallbladder are normal.  The spleen  and pancreas  appear normal.  Both adrenal glands are normal. There is no evidence of  bowel obstruction. The aorta is normal in caliber.       : There is mild atrophic change in the right kidney and there is a  right renal 4 mm nonobstructing calculus, but there is no right  hydronephrosis. No left renal calculi are seen, but there is mild to  moderate left hydronephrosis, and the left ureter is dilated down into  the pelvis, to the level of an approximately 4 mm stone, 8 to 10 cm  above the ureterovesical junction. No bladder calculi are seen.     Pelvis:  The appendix is clearly identified, and is normal.  There is  diverticulosis, but there is no CT evidence of diverticulitis.  There is  no pelvic adenopathy or other soft tissue mass.  There is no CT evidence  of hernia or bowel obstruction.     There is no acute bony abnormality.       Impression:         1.  Pulmonary arteries are well-opacified, and there is no evidence of  pulmonary embolism.     2.  There is minimal bibasilar dependent atelectasis, but the lung  parenchyma is otherwise normal. No acute pulmonary parenchymal  abnormality is seen.     3.   Mild to moderate left hydronephrosis secondary to a 4 mm stone in  the distal left ureter, 8 to 10 cm above the ureterovesical junction.                 This report was finalized on 7/13/2025 10:15 PM by Dr. Wojciech Roth M.D on Workstation: [id number]       CT Angiogram Chest Pulmonary Embolism [[id number]] Collected: 07/13/25 2206     Updated: 07/13/25 2218    Narrative:      CTA CHEST, CT ABDOMEN AND PELVIS     HISTORY: Pulmonary Embolism; R07.9-Chest pain, unspecified; R79.89-Other  specified abnormal findings of blood chemistry; E87.6-Hypokalemia;  R19.7-Diarrhea, unspecified; D72.829-Elevated white blood cell count,  unspecified; A41.9-Sepsis, unspecified organism     COMPARISON: None     TECHNIQUE: CT angiography was performed of the chest with axial images  as well as coronal and sagittal  reformatted MIP images provided  following the administration of IV contrast.  3-D surface rendered  reformats were obtained of the pulmonary arteries and aorta.  CT of the  abdomen and pelvis obtained following administration of IV contrast.  Coronal and sagittal reconstructions were obtained.   Radiation dose  reduction techniques were utilized, including automated exposure  control, and exposure modulation based on body size.     FINDINGS:     Chest CTA:  There is mild bibasilar atelectasis, but there is no  evidence of acute pulmonary infiltrate, pleural effusion, pneumothorax  or suspicious nodule.  The thoracic aorta is normal in caliber, and  there is no evidence of dissection.  There are coronary atherosclerotic  vascular calcifications.  There is no suspicious mediastinal adenopathy  or other mass, though there is a small to moderate hiatal hernia.  Bolus  timing is adequate, and there is no evidence of pulmonary embolism.     Abdomen: The liver and gallbladder are normal.  The spleen and pancreas  appear normal.  Both adrenal glands are normal. There is no evidence of  bowel obstruction. The aorta is normal in caliber.       : There is mild atrophic change in the right kidney and there is a  right renal 4 mm nonobstructing calculus, but there is no right  hydronephrosis. No left renal calculi are seen, but there is mild to  moderate left hydronephrosis, and the left ureter is dilated down into  the pelvis, to the level of an approximately 4 mm stone, 8 to 10 cm  above the ureterovesical junction. No bladder calculi are seen.     Pelvis:  The appendix is clearly identified, and is normal.  There is  diverticulosis, but there is no CT evidence of diverticulitis.  There is  no pelvic adenopathy or other soft tissue mass.  There is no CT evidence  of hernia or bowel obstruction.     There is no acute bony abnormality.       Impression:         1.  Pulmonary arteries are well-opacified, and there is no  evidence of  pulmonary embolism.     2.  There is minimal bibasilar dependent atelectasis, but the lung  parenchyma is otherwise normal. No acute pulmonary parenchymal  abnormality is seen.     3.   Mild to moderate left hydronephrosis secondary to a 4 mm stone in  the distal left ureter, 8 to 10 cm above the ureterovesical junction.                 This report was finalized on 7/13/2025 10:15 PM by Dr. Wojciech Roth M.D on Workstation: [id number]       XR Chest 1 View [[id number]] Collected: 07/13/25 1831     Updated: 07/13/25 1838    Narrative:      XR CHEST 1 VW-     HISTORY: Chest pain. Reported recent heart attack.     COMPARISON: Chest radiograph 6/23/2025     FINDINGS: A single view of the chest was obtained.     Support Devices:  None.  Cardiac Silhouette/Mediastinum/Wendie:  The cardiac, mediastinal, and  hilar contours are within normal limits.  Lungs/Pleural Spaces:  The lungs and pleural spaces are clear.  Chest Wall/Diaphragm/Upper Abdomen: There is a hiatal hernia. The  visualized osseous structures are similar.        CONCLUSION(S):       1.  No focal consolidation or effusion.  2.  Hiatal hernia.     This report was finalized on 7/13/2025 6:35 PM by Dr. La Blount M.D  on Workstation: [id number]                Assessment:     Left urolithiasis  Left hydronephrosis  Acute renal failure  Urinary sepsis from obstructing left ureteral stone    Plan:     N.p.o.  Emergent left stent  Vancomycin and Zosyn in the ED  Admitted to medicine  RBL explained to patient and consent obtained  State for outpatient ureteroscopy and laser lithotripsy for definitive stone management    Maged Pedroza MD  07/13/25  23:41 EDT

## 2025-07-14 NOTE — PAYOR COMM NOTE
"Wilma Dow \"Arline\" (65 y.o. Female)      SEE FOR INPATIENT: ID# [id number]    UR DEPT: [phone],  [phone]    [facility]: NPI [id number] [facility]# [id number]    CELY RODGERS MD:  NPI- [id number]  CU-[phone]  FAX- [phone]    HARSHAL THOMPSON RN,CCP    N39.0, N20.1, N17.9, A41.9,  E87.6     Date of Birth   1960    Social Security Number       Address   [address] [place] [address]    Home Phone   [phone]    MRN   [id number]       [facility]    Marital Status                               Admission Date   7/13/2025    Admission Type   Emergency    Admitting Provider   Cely Rodgers MD    Attending Provider   Cely Rodgers MD    Department, Room/Bed   [address], N444/1       Discharge Date       Discharge Disposition       Discharge Destination                                 Attending Provider: Cely Rodgers MD    Allergies: No Known Allergies    Isolation: None   Infection: None   Code Status: CPR    Ht: 162.6 cm (64\")   Wt: 69.7 kg (153 lb 9.6 oz)    Admission Cmt: None   Principal Problem: Left ureteral stone [N20.1]                   Active Insurance as of 7/13/2025       Primary Coverage       Payor Plan Insurance Group Employer/Plan Group    [employer] BLUE CROSS [employer] BLUE CROSS BLUE [employer]IELD PPO [id number]       Payor Plan Address Payor Plan Phone Number Payor Plan Fax Number Effective Dates    [address] [phone]  1/1/2025 - None Entered    [place] [address]         Subscriber Name Subscriber Birth Date Member ID       WILMA DOW 1960 [id number]               Secondary Coverage       Payor Plan Insurance Group Employer/Plan Group    [facility] [id number]       Payor Plan Address Payor Plan Phone Number Payor Plan Fax Number Effective Dates    [address]   1/1/2016 - None Entered    [facility] [id number]         Subscriber Name Subscriber Birth Date " Member ID       BUTCH DOW [birth year]/1957 [id number]                     Emergency Contacts        (Rel.) Home Phone Work Phone Mobile Phone    Ruben Dow (Spouse) [phone] -- --    MAHNAZ ABDI (Daughter) [phone] -- --                 History & Physical        Olga Shore APRN at 07/14/25 0216       Attestation signed by Cely Clarke MD at 07/14/25 3993      I have reviewed the history and plan as obtained by HEATH Altamirano. I have personally examined the patient. I have reviewed available clinical results, imaging results, EKG/Telemetry results, and prior records and performed greater than 50% of the work for this patient. My exam confirms their physical findings and I agree with the plan as listed above, with the addition of the following:    Pleasant lady with history of CAD, hypertension, hyperlipidemia, bile acid malabsorption/chronic diarrhea, hypothyroidism who presented with chest pain, fatigue, weakness, she presented to the ER for further evaluation and was found to be meeting sepsis criteria, a UA was obtained which revealed UTI and a CT of the abdomen pelvis showed moderate left hydronephrosis with AN obstructing ureteral stone on the left.  She was taken by urology emergently to the OR for left ureteral stent placement.  This morning she reports resolution of her chest pain but continues to have some dysuria/urethral pain.    Constitutional:       General: Patient is not in acute distress.     Appearance: Normal appearance. Not toxic-appearing.   HENT:      Head: Normocephalic and atraumatic.   Cardiovascular:      Rate and Rhythm: Normal rate and regular rhythm.   Pulmonary:      Effort: Pulmonary effort is normal. No respiratory distress.      Breath sounds: Normal breath sounds. No wheezing or rhonchi.   Abdominal:      General: Bowel sounds are normal. There is no distension.      Palpations: Abdomen is soft.      Tenderness: There is no abdominal  tenderness. There is no guarding or rebound.   Musculoskeletal:         General: No swelling.      Right lower leg: No edema.      Left lower leg: No edema.   Skin:     General: Skin is warm and dry.   Neurological:      General: No focal deficit present.      Mental Status: Alert and oriented to person, place, and time.   Psychiatric:         Mood and Affect: Mood normal.         Behavior: Behavior normal.     Left ureteral stone with hydronephrosis  UTI with sepsis criteria (fever, WBC)  Lactic acidosis- resolved  - Urology consulted and took the patient for cystoscopy with ureteral stent placement, she is continuing on Zosyn while urine cultures are pending, I discussed with the patient today regarding her fever, elevated procalcitonin, white count and abnormal UA it would not surprise me if her blood cultures become positive in the next 24 to 48 hours  -Patient is agreeable to staying in the hospital to monitor cultures  -She is having some urethral pain still which will hopefully improve with treatment of UTI    CAD - recent cath last month with 70% ostial stenosis- not amenable to PCI, med mgmt recommended  Atypical chest pain  Elevated troponin  Recent admission for MI  HTN  HLD  - Chest pain resolved after stent placement, her troponin is lower than baseline  - she is continuing on aspirin and statin continue amlodipine    Bile acid malabsorption/chronic diarrhea  - cont home colestipol  - Patient reports chronic diarrhea with fecal leakage, had improved with colestipol when this was initiated last year however over the last month symptoms have continued to worsen, she is having diarrhea/fecal incontinence related to this daily; she follows with Crystal Smith in clinic, this is having significant impact on her life; we will ask GI to see her while she is here to see if they have any additional recs     Elevated d-dimer  - CTA chest neg as was b/l LE dopplers    Hypothyroidism  - levothyroxine                             Patient Name:  Wilma Longo  YOB: 1960  MRN:  [id number]  Admit Date:  7/13/2025  Patient Care Team:  Jerman Virgen MD as PCP - General  Jerman Virgen MD as PCP - Family Medicine  Orlin Mann MD as Consulting Physician (Gastroenterology)  Valery Irizarry MD as Consulting Physician (Obstetrics and Gynecology)  Kammerling, James M, MD as Consulting Physician (Cardiac Electrophysiology)  Estephanie Sanabria MD as Consulting Physician (Orthopedic Surgery)  Jerman Virgen MD as Referring Physician (Family Medicine)  Jerman Dee II, MD as Consulting Physician (Hematology and Oncology)  Crystal Grissom APRN as Nurse Practitioner (Oncology)      Subjective  History Present Illness     Chief Complaint   Patient presents with    Chest Pain       Ms. Longo is a 65 y.o. non-smoker with a history of hypertension, hyperlipidemia, coronary artery disease, irritable bowel syndrome, hypothyroidism, anxiety, and depression that presents to [facility] complaining of chest pain. She reports chest pain that began about 3 days ago. She describes the pain as mid-sternal, intermittent, moderate, and sharp in nature. She reports she had a MI in June, but this pain felt different from what she experienced then as there was no radiation into her neck. She also reports fatigue and generalized weakness. She states she told a family member about the chest pain yesterday, and she was encouraged to come to the ED. Cardiac work up in the ED showed troponin 79 and repeat 64. An EKG showed sinus tachycardia. She was also febrile in the ED. Her other lab work showed potassium 2.8, chloride 97, creatinine 1.26, GFR 47.5, procalcitonin 20.70, D Dimer 1.28, WBC 16.21, lactate 2.2, and repeat lactate 0.5. A urinalysis was positive for trace bacteria, WBCs, RBCs, trace protein, 1+ leukocytes, trace blood, and trace ketones. A CT angiogram of the chest showed no evidence of  pulmonary embolism, well opacified pulmonary arteries, and minimal basilar dependent atelectasis. A CT of the abdomen/pelvis showed mild to moderate left hydronephrosis secondary to a 4 mm stone in the distal left ureter, 8 to 10 cm above the ureterovesical junction. The ED physician spoke with Dr. Pedroza of urology, and the patient was taken emergently to the OR for left ureter stent placement.        History of Present Illness  Review of Systems   Constitutional:  Positive for fatigue. Negative for chills and fever.   HENT:  Negative for congestion and sore throat.    Eyes:  Negative for photophobia and visual disturbance.   Respiratory:  Negative for cough, shortness of breath (mild) and wheezing.    Cardiovascular:  Positive for chest pain. Negative for palpitations and leg swelling.   Gastrointestinal:  Negative for abdominal pain, nausea and vomiting.   Genitourinary:  Negative for decreased urine volume, dysuria, flank pain, frequency and hematuria.   Musculoskeletal:  Positive for myalgias. Negative for arthralgias.   Skin:  Negative for color change and pallor.   Neurological:  Positive for weakness (generalized). Negative for dizziness, light-headedness, numbness and headaches.        Personal History     Past Medical History:   Diagnosis Date    Anemia 5/2022    Anxiety     Osman esophagus ?    Benign essential hypertension 02/01/2015    Cancer     Skin cancer    Cardiomyopathy     Colitis 06/12/2009    Depression     Fibromyalgia     Fibromyalgia, primary     Gestational diabetes mellitus 06/1994    History of gestational diabetes     Hyperlipidemia 04/13/2011    Hypothyroidism 02/01/2015    Inflammatory bowel disease 06/12/2009    Insomnia 02/03/2015    Irritable bowel syndrome 11/30/2009    Panic attacks     Primary bile acid malabsorption     fecal incontinence    Rectocele     Skin cancer of arm     Vaginal prolapse     Vitamin D deficiency 07/19/2012     Past Surgical History:   Procedure  Laterality Date    ANTERIOR AND POSTERIOR VAGINAL REPAIR N/A 04/06/2021    Procedure: POSTERIOR VAGINAL REPAIR, CYSTOSCOPY;  Surgeon: Cora Gustafson MD;  Location: [facility] MAIN OR;  Service: Gynecology;  Laterality: N/A;    BLADDER REPAIR  09/2015    X2    CARDIAC CATHETERIZATION N/A 6/23/2025    Procedure: Left Heart Cath;  Surgeon: Dedra Guzman MD;  Location: [facility] CATH INVASIVE LOCATION;  Service: Cardiovascular;  Laterality: N/A;    CARDIAC CATHETERIZATION N/A 6/23/2025    Procedure: Coronary angiography;  Surgeon: Dedra Guzman MD;  Location: [facility] CATH INVASIVE LOCATION;  Service: Cardiovascular;  Laterality: N/A;    CARDIAC CATHETERIZATION N/A 6/23/2025    Procedure: Left ventriculography;  Surgeon: Dedra Guzman MD;  Location: [facility] CATH INVASIVE LOCATION;  Service: Cardiovascular;  Laterality: N/A;    COLONOSCOPY  2010    wnl    COLONOSCOPY N/A 09/09/2016    Procedure: COLONOSCOPY into cecum and terminal ileum with biopsies;  Surgeon: Orlin Mann MD;  Location: [facility] ENDOSCOPY;  Service:     COLONOSCOPY N/A 09/06/2019    Procedure: COLONOSCOPY TO CECUM AND INTO TERMINAL ILEUM;  Surgeon: Orlin Mann MD;  Location: [facility] ENDOSCOPY;  Service: Gastroenterology    COLONOSCOPY W/ POLYPECTOMY N/A 02/20/2023    Procedure: COLONOSCOPY INTO CECUM AND TI WITH BIOPSIES;  Surgeon: Basilio Martin MD;  Location: [facility] ENDOSCOPY;  Service: Gastroenterology;  Laterality: N/A;  PRE: COLITIS  POST: NORMAL COLON    CYSTOSCOPY W/ URETERAL STENT PLACEMENT      ENDOSCOPY N/A 09/06/2019    Procedure: ESOPHAGOGASTRODUODENOSCOPY WITH COLD BIOPSIES AND 48F HERNÁNDEZ DILATION;  Surgeon: Orlin Mann MD;  Location: [facility] ENDOSCOPY;  Service: Gastroenterology    ENDOSCOPY N/A 02/20/2023    Procedure: ESOPHAGOGASTRODUODENOSCOPY WITH BIOPSIES;  Surgeon: Basilio Martin MD;  Location: [facility] ENDOSCOPY;  Service: Gastroenterology;  Laterality: N/A;  PRE: MENDEZ'S  POST: HIATAL HERNIA,  GASTRITIS    HYSTERECTOMY  12/2013    MOUTH SURGERY      multiple teeth extraction    SACROCOLPOPEXY N/A 04/06/2021    Procedure: LARPAROSCOPY COLPOPEXY, BILATERAL SALPINGECTOMY;  Surgeon: Cora Gustafson MD;  Location: [facility] MAIN OR;  Service: Gynecology;  Laterality: N/A;    SKIN CANCER EXCISION      UPPER GASTROINTESTINAL ENDOSCOPY  09/06/2019     Family History   Problem Relation Age of Onset    Depression Mother     Heart disease Mother     Stroke Mother     Heart attack Mother     Heart failure Mother     Hyperlipidemia Mother     Hypertension Mother     Irritable bowel syndrome Mother     Arthritis Father     Heart disease Father     Skin cancer Father     Heart attack Father     Heart failure Father     Heart disease Maternal Grandmother     Stroke Maternal Grandfather     Stroke Paternal Grandfather     Hypertension Sister     Hypothyroidism Sister     Hypertension Brother     Hypothyroidism Daughter     Thyroid disease Sister     Colon cancer Neg Hx     Colon polyps Neg Hx     Liver disease Neg Hx     Rectal cancer Neg Hx     Stomach cancer Neg Hx     Liver cancer Neg Hx     Malig Hyperthermia Neg Hx      Social History     Tobacco Use    Smoking status: Never    Smokeless tobacco: Never   Vaping Use    Vaping status: Never Used   Substance Use Topics    Alcohol use: Not Currently     Comment: less than once per  month    Drug use: No     Medications Prior to Admission   Medication Sig Dispense Refill Last Dose/Taking    ALPRAZolam (Xanax) 0.5 MG tablet Take 1 tablet by mouth Daily As Needed for Anxiety. D/c previous order 30 tablet 2 Taking As Needed    amLODIPine (NORVASC) 5 MG tablet TAKE 1 TABLET BY MOUTH DAILY (Patient taking differently: Take 1 tablet by mouth Every Night.) 14 tablet 0 Taking Differently    aspirin 81 MG chewable tablet Chew 1 tablet Daily.   Taking    colestipol (COLESTID) 1 g tablet Take 2 tablets by mouth 2 (Two) Times a Day. 360 tablet 3 Taking    Cranberry 125 MG tablet Take 1  tablet by mouth Daily.   Taking    DULoxetine (CYMBALTA) 60 MG capsule TAKE 1 CAPSULE BY MOUTH DAILY (Patient taking differently: Take 1 capsule by mouth Every Night.) 14 capsule 0 Taking Differently    levothyroxine (SYNTHROID, LEVOTHROID) 75 MCG tablet TAKE 1 TABLET BY MOUTH DAILY 14 tablet 0 Taking    lisinopril (PRINIVIL,ZESTRIL) 20 MG tablet TAKE 1 TABLET BY MOUTH DAILY (Patient taking differently: Take 1 tablet by mouth Every Night.) 14 tablet 0 Taking Differently    naproxen (NAPROSYN) 500 MG tablet Take 1 tablet by mouth Daily As Needed for Moderate Pain. 180 tablet 1 Taking As Needed    rosuvastatin (CRESTOR) 20 MG tablet Take 1 tablet by mouth Daily. 90 tablet 0 Taking    vitamin C (ASCORBIC ACID) 250 MG tablet Take 2 tablets by mouth Every Night.   Taking    valACYclovir (VALTREX) 500 MG tablet Take 1 tablet by mouth As Needed.        Allergies:  No Known Allergies    Objective   Objective     Vital Signs  Temp:  [97.7 °F (36.5 °C)-102.9 °F (39.4 °C)] 97.7 °F (36.5 °C)  Heart Rate:  [] 74  Resp:  [15-31] 21  BP: (115-150)/(59-96) 122/62  SpO2:  [90 %-100 %] 99 %  on  Flow (L/min) (Oxygen Therapy):  [2] 2;   Device (Oxygen Therapy): nasal cannula  Body mass index is 26.37 kg/m².    Physical Exam  Vitals and nursing note reviewed.   Constitutional:       Appearance: Normal appearance.   HENT:      Head: Normocephalic and atraumatic.      Nose: Nose normal.      Mouth/Throat:      Mouth: Mucous membranes are moist.      Pharynx: Oropharynx is clear.   Eyes:      Extraocular Movements: Extraocular movements intact.      Conjunctiva/sclera: Conjunctivae normal.   Cardiovascular:      Rate and Rhythm: Normal rate and regular rhythm.      Pulses: Normal pulses.      Heart sounds: Normal heart sounds.   Pulmonary:      Effort: Pulmonary effort is normal.      Breath sounds: Normal breath sounds.   Abdominal:      General: Bowel sounds are normal. There is no distension.      Palpations: Abdomen is soft.       Tenderness: There is abdominal tenderness (suprapubic).   Musculoskeletal:         General: No swelling. Normal range of motion.      Cervical back: Normal range of motion and neck supple.   Skin:     General: Skin is warm.   Neurological:      General: No focal deficit present.      Mental Status: She is alert and oriented to person, place, and time.   Psychiatric:         Mood and Affect: Mood normal.         Behavior: Behavior normal.         Results Review:  I reviewed the patient's new clinical results.  I reviewed the patient's new imaging results and agree with the interpretation.  I reviewed the patient's other test results and agree with the interpretation  I personally viewed and interpreted the patient's EKG/Telemetry data  Discussed with ED provider.    Lab Results (last 24 hours)       Procedure Component Value Units Date/Time    CBC & Differential [[id number]]  (Abnormal) Collected: 07/13/25 1753    Specimen: Blood Updated: 07/13/25 1803    Narrative:      The following orders were created for panel order CBC & Differential.  Procedure                               Abnormality         Status                     ---------                               -----------         ------                     CBC Auto Differential[[id number]]        Abnormal            Final result                 Please view results for these tests on the individual orders.    Comprehensive Metabolic Panel [[id number]]  (Abnormal) Collected: 07/13/25 1753    Specimen: Blood Updated: 07/13/25 1825     Glucose 138 mg/dL      BUN 19.0 mg/dL      Creatinine 1.26 mg/dL      Sodium 136 mmol/L      Potassium 2.8 mmol/L      Chloride 97 mmol/L      CO2 24.1 mmol/L      Calcium 9.1 mg/dL      Total Protein 7.7 g/dL      Albumin 3.7 g/dL      ALT (SGPT) 17 U/L      AST (SGOT) 20 U/L      Alkaline Phosphatase 78 U/L      Total Bilirubin 0.5 mg/dL      Globulin 4.0 gm/dL      A/G Ratio 0.9 g/dL      BUN/Creatinine Ratio 15.1     Anion Gap 14.9  mmol/L      eGFR 47.5 mL/min/1.73     Narrative:      GFR Categories in Chronic Kidney Disease (CKD)              GFR Category          GFR (mL/min/1.73)    Interpretation  G1                    90 or greater        Normal or high (1)  G2                    60-89                Mild decrease (1)  G3a                   45-59                Mild to moderate decrease  G3b                   30-44                Moderate to severe decrease  G4                    15-29                Severe decrease  G5                    14 or less           Kidney failure    (1)In the absence of evidence of kidney disease, neither GFR category G1 or G2 fulfill the criteria for CKD.    eGFR calculation 2021 CKD-EPI creatinine equation, which does not include race as a factor    High Sensitivity Troponin T [[id number]]  (Abnormal) Collected: 07/13/25 1753    Specimen: Blood Updated: 07/13/25 1832     HS Troponin T 79 ng/L     Narrative:      High Sensitive Troponin T Reference Range:  <14.0 ng/L- Negative Female for AMI  <22.0 ng/L- Negative Male for AMI  >=14 - Abnormal Female indicating possible myocardial injury.  >=22 - Abnormal Male indicating possible myocardial injury.   Clinicians would have to utilize clinical acumen, EKG, Troponin, and serial changes to determine if it is an Acute Myocardial Infarction or myocardial injury due to an underlying chronic condition.         CBC Auto Differential [[id number]]  (Abnormal) Collected: 07/13/25 1753    Specimen: Blood Updated: 07/13/25 1803     WBC 16.21 10*3/mm3      RBC 4.16 10*6/mm3      Hemoglobin 12.2 g/dL      Hematocrit 36.8 %      MCV 88.5 fL      MCH 29.3 pg      MCHC 33.2 g/dL      RDW 12.6 %      RDW-SD 40.5 fl      MPV 9.7 fL      Platelets 283 10*3/mm3      Neutrophil % 76.6 %      Lymphocyte % 14.8 %      Monocyte % 7.7 %      Eosinophil % 0.4 %      Basophil % 0.2 %      Immature Grans % 0.3 %      Neutrophils, Absolute 12.40 10*3/mm3      Lymphocytes, Absolute 2.40 10*3/mm3  "     Monocytes, Absolute 1.25 10*3/mm3      Eosinophils, Absolute 0.07 10*3/mm3      Basophils, Absolute 0.04 10*3/mm3      Immature Grans, Absolute 0.05 10*3/mm3      nRBC 0.0 /100 WBC     D-dimer, Quantitative [[id number]]  (Abnormal) Collected: 07/13/25 1753    Specimen: Blood Updated: 07/13/25 2029     D-Dimer, Quantitative 1.28 MCGFEU/mL     Narrative:      According to the assay 's published package insert, a normal (<0.50 MCGFEU/mL) D-dimer result in conjunction with a non-high clinical probability assessment, excludes deep vein thrombosis (DVT) and pulmonary embolism (PE) with high sensitivity.    D-dimer values increase with age and this can make VTE exclusion of an older population difficult. To address this, the American College of Phys[employer], based on best available evidence and recent guidelines, recommends that clinicians use age-adjusted D-dimer thresholds in patients greater than 50 years of age with: a) a low probability of PE who do not meet all Pulmonary Embolism Rule Out Criteria, or b) in those with intermediate probability of PE.   The formula for an age-adjusted D-dimer cut-off is \"age/100\".  For example, a 60 year old patient would have an age-adjusted cut-off of 0.60 MCGFEU/mL and an 80 year old 0.80 MCGFEU/mL.    Procalcitonin [[id number]]  (Abnormal) Collected: 07/13/25 1753    Specimen: Blood Updated: 07/13/25 1905     Procalcitonin 20.70 ng/mL     Narrative:      As a Marker for Sepsis (Non-Neonates):    1. <0.5 ng/mL represents a low risk of severe sepsis and/or septic shock.  2. >2 ng/mL represents a high risk of severe sepsis and/or septic shock.    As a Marker for Lower Respiratory Tract Infections that require antibiotic therapy:    PCT on Admission    Antibiotic Therapy       6-12 Hrs later    >0.5                Strongly Recommended  >0.25 - <0.5        Recommended   0.1 - 0.25          Discouraged              Remeasure/reassess PCT  <0.1                Strongly " "Discouraged     Remeasure/reassess PCT    As 28 day mortality risk marker: \"Change in Procalcitonin Result\" (>80% or <=80%) if Day 0 (or Day 1) and Day 4 values are available. Refer to http://www.[facility]-pct-calculator.com    Change in PCT <=80%  A decrease of PCT levels below or equal to 80% defines a positive change in PCT test result representing a higher risk for 28-day all-cause mortality of patients diagnosed with severe sepsis for septic shock.    Change in PCT >80%  A decrease of PCT levels of more than 80% defines a negative change in PCT result representing a lower risk for 28-day all-cause mortality of patients diagnosed with severe sepsis or septic shock.       Magnesium [[id number]]  (Normal) Collected: 07/13/25 1753    Specimen: Blood Updated: 07/13/25 1849     Magnesium 1.8 mg/dL     High Sensitivity Troponin T 1Hr [[id number]]  (Abnormal) Collected: 07/13/25 1905    Specimen: Blood from Arm, Right Updated: 07/13/25 1939     HS Troponin T 64 ng/L      Troponin T Numeric Delta -15 ng/L      Troponin T % Delta -19    Narrative:      High Sensitive Troponin T Reference Range:  <14.0 ng/L- Negative Female for AMI  <22.0 ng/L- Negative Male for AMI  >=14 - Abnormal Female indicating possible myocardial injury.  >=22 - Abnormal Male indicating possible myocardial injury.   Clinicians would have to utilize clinical acumen, EKG, Troponin, and serial changes to determine if it is an Acute Myocardial Infarction or myocardial injury due to an underlying chronic condition.         Blood Culture - Blood, Arm, Right [[id number]] Collected: 07/13/25 1905    Specimen: Blood from Arm, Right Updated: 07/13/25 1911    Lactic Acid, Plasma [[id number]]  (Abnormal) Collected: 07/13/25 1905    Specimen: Blood from Arm, Right Updated: 07/13/25 1938     Lactate 2.2 mmol/L     Blood Culture - Blood, Arm, Left [[id number]] Collected: 07/13/25 1906    Specimen: Blood from Arm, Left Updated: 07/13/25 1912    STAT Lactic Acid, Reflex " [[id number]]  (Normal) Collected: 07/13/25 2210    Specimen: Blood Updated: 07/13/25 2250     Lactate 0.5 mmol/L     Urinalysis With Microscopic If Indicated (No Culture) - Urine, Clean Catch [[id number]]  (Abnormal) Collected: 07/13/25 2228    Specimen: Urine, Clean Catch Updated: 07/13/25 2253     Color, UA Yellow     Appearance, UA Clear     pH, UA 6.5     Specific Gravity, UA 1.028     Glucose, UA Negative     Ketones, UA Trace     Bilirubin, UA Negative     Blood, UA Trace     Protein, UA Trace     Leuk Esterase, UA Small (1+)     Nitrite, UA Negative     Urobilinogen, UA 0.2 E.U./dL    Urinalysis, Microscopic Only - Urine, Clean Catch [[id number]]  (Abnormal) Collected: 07/13/25 2228    Specimen: Urine, Clean Catch Updated: 07/13/25 2253     RBC, UA 3-5 /HPF      WBC, UA 11-20 /HPF      Bacteria, UA Trace /HPF      Squamous Epithelial Cells, UA 0-2 /HPF      Hyaline Casts, UA None Seen /LPF      Methodology Automated Microscopy    Urine Culture - Urine, Urine, Clean Catch [[id number]] Collected: 07/13/25 2228    Specimen: Urine, Clean Catch Updated: 07/14/25 0231    POC Glucose Once [[id number]]  (Abnormal) Collected: 07/13/25 2340    Specimen: Blood Updated: 07/13/25 2342     Glucose 66 mg/dL     POC Glucose Once [[id number]]  (Normal) Collected: 07/14/25 0031    Specimen: Blood Updated: 07/14/25 0034     Glucose 74 mg/dL             Imaging Results (Last 24 Hours)       Procedure Component Value Units Date/Time    FL C Arm During Surgery [[id number]] Resulted: 07/14/25 0027     Updated: 07/14/25 0027    Narrative:      This procedure was auto-finalized with no dictation required.    CT Abdomen Pelvis With Contrast [[id number]] Collected: 07/13/25 2206     Updated: 07/13/25 2218    Narrative:      CTA CHEST, CT ABDOMEN AND PELVIS     HISTORY: Pulmonary Embolism; R07.9-Chest pain, unspecified; R79.89-Other  specified abnormal findings of blood chemistry; E87.6-Hypokalemia;  R19.7-Diarrhea, unspecified;  D72.829-Elevated white blood cell count,  unspecified; A41.9-Sepsis, unspecified organism     COMPARISON: None     TECHNIQUE: CT angiography was performed of the chest with axial images  as well as coronal and sagittal reformatted MIP images provided  following the administration of IV contrast.  3-D surface rendered  reformats were obtained of the pulmonary arteries and aorta.  CT of the  abdomen and pelvis obtained following administration of IV contrast.  Coronal and sagittal reconstructions were obtained.   Radiation dose  reduction techniques were utilized, including automated exposure  control, and exposure modulation based on body size.     FINDINGS:     Chest CTA:  There is mild bibasilar atelectasis, but there is no  evidence of acute pulmonary infiltrate, pleural effusion, pneumothorax  or suspicious nodule.  The thoracic aorta is normal in caliber, and  there is no evidence of dissection.  There are coronary atherosclerotic  vascular calcifications.  There is no suspicious mediastinal adenopathy  or other mass, though there is a small to moderate hiatal hernia.  Bolus  timing is adequate, and there is no evidence of pulmonary embolism.     Abdomen: The liver and gallbladder are normal.  The spleen and pancreas  appear normal.  Both adrenal glands are normal. There is no evidence of  bowel obstruction. The aorta is normal in caliber.       : There is mild atrophic change in the right kidney and there is a  right renal 4 mm nonobstructing calculus, but there is no right  hydronephrosis. No left renal calculi are seen, but there is mild to  moderate left hydronephrosis, and the left ureter is dilated down into  the pelvis, to the level of an approximately 4 mm stone, 8 to 10 cm  above the ureterovesical junction. No bladder calculi are seen.     Pelvis:  The appendix is clearly identified, and is normal.  There is  diverticulosis, but there is no CT evidence of diverticulitis.  There is  no pelvic  adenopathy or other soft tissue mass.  There is no CT evidence  of hernia or bowel obstruction.     There is no acute bony abnormality.       Impression:         1.  Pulmonary arteries are well-opacified, and there is no evidence of  pulmonary embolism.     2.  There is minimal bibasilar dependent atelectasis, but the lung  parenchyma is otherwise normal. No acute pulmonary parenchymal  abnormality is seen.     3.   Mild to moderate left hydronephrosis secondary to a 4 mm stone in  the distal left ureter, 8 to 10 cm above the ureterovesical junction.                 This report was finalized on 7/13/2025 10:15 PM by Dr. Wojciech Roth M.D on Workstation: [id number]       CT Angiogram Chest Pulmonary Embolism [[id number]] Collected: 07/13/25 2206     Updated: 07/13/25 2218    Narrative:      CTA CHEST, CT ABDOMEN AND PELVIS     HISTORY: Pulmonary Embolism; R07.9-Chest pain, unspecified; R79.89-Other  specified abnormal findings of blood chemistry; E87.6-Hypokalemia;  R19.7-Diarrhea, unspecified; D72.829-Elevated white blood cell count,  unspecified; A41.9-Sepsis, unspecified organism     COMPARISON: None     TECHNIQUE: CT angiography was performed of the chest with axial images  as well as coronal and sagittal reformatted MIP images provided  following the administration of IV contrast.  3-D surface rendered  reformats were obtained of the pulmonary arteries and aorta.  CT of the  abdomen and pelvis obtained following administration of IV contrast.  Coronal and sagittal reconstructions were obtained.   Radiation dose  reduction techniques were utilized, including automated exposure  control, and exposure modulation based on body size.     FINDINGS:     Chest CTA:  There is mild bibasilar atelectasis, but there is no  evidence of acute pulmonary infiltrate, pleural effusion, pneumothorax  or suspicious nodule.  The thoracic aorta is normal in caliber, and  there is no evidence of dissection.  There are coronary  atherosclerotic  vascular calcifications.  There is no suspicious mediastinal adenopathy  or other mass, though there is a small to moderate hiatal hernia.  Bolus  timing is adequate, and there is no evidence of pulmonary embolism.     Abdomen: The liver and gallbladder are normal.  The spleen and pancreas  appear normal.  Both adrenal glands are normal. There is no evidence of  bowel obstruction. The aorta is normal in caliber.       : There is mild atrophic change in the right kidney and there is a  right renal 4 mm nonobstructing calculus, but there is no right  hydronephrosis. No left renal calculi are seen, but there is mild to  moderate left hydronephrosis, and the left ureter is dilated down into  the pelvis, to the level of an approximately 4 mm stone, 8 to 10 cm  above the ureterovesical junction. No bladder calculi are seen.     Pelvis:  The appendix is clearly identified, and is normal.  There is  diverticulosis, but there is no CT evidence of diverticulitis.  There is  no pelvic adenopathy or other soft tissue mass.  There is no CT evidence  of hernia or bowel obstruction.     There is no acute bony abnormality.       Impression:         1.  Pulmonary arteries are well-opacified, and there is no evidence of  pulmonary embolism.     2.  There is minimal bibasilar dependent atelectasis, but the lung  parenchyma is otherwise normal. No acute pulmonary parenchymal  abnormality is seen.     3.   Mild to moderate left hydronephrosis secondary to a 4 mm stone in  the distal left ureter, 8 to 10 cm above the ureterovesical junction.                 This report was finalized on 7/13/2025 10:15 PM by Dr. Wojciech Roth M.D on Workstation: [id number]       XR Chest 1 View [[id number]] Collected: 07/13/25 1831     Updated: 07/13/25 1838    Narrative:      XR CHEST 1 VW-     HISTORY: Chest pain. Reported recent heart attack.     COMPARISON: Chest radiograph 6/23/2025     FINDINGS: A single view of the chest was  obtained.     Support Devices:  None.  Cardiac Silhouette/Mediastinum/Wendie:  The cardiac, mediastinal, and  hilar contours are within normal limits.  Lungs/Pleural Spaces:  The lungs and pleural spaces are clear.  Chest Wall/Diaphragm/Upper Abdomen: There is a hiatal hernia. The  visualized osseous structures are similar.        CONCLUSION(S):       1.  No focal consolidation or effusion.  2.  Hiatal hernia.     This report was finalized on 7/13/2025 6:35 PM by Dr. La Blount M.D  on Workstation: [id number]               Results for orders placed during the hospital encounter of 06/25/25    Adult Transthoracic Echo Complete W/ Cont if Necessary Per Protocol 06/25/2025  4:24 PM    Interpretation Summary    Left ventricular systolic function is normal. Calculated left ventricular EF = 62.7% Left ventricular ejection fraction appears to be 61 - 65%.    Left ventricular diastolic function was normal.      Telemetry Scan   Final Result      ECG 12 Lead ED Triage Standing Order; Chest Pain   Final Result   HEART OGXQ=125  bpm   RR Cyfnvtag=238  ms   MI Clggcjgz=609  ms   P Horizontal Axis=25  deg   P Front Axis=44  deg   QRSD Interval=88  ms   QT Ialcodoz=617  ms   SWwE=501  ms   QRS Axis=38  deg   T Wave Axis=38  deg   - ABNORMAL ECG -   Sinus tachycardia   Inferior  infarct, old   When compared with ECG of 23-Jun-2025 09:20:24,   Significant repolarization change   Significant axis, voltage or hypertrophy change   Electronically Signed By: Efrem Beltre ([facility]) 2025-07-13 18:19:59   Date and Time of Study:2025-07-13 17:51:54      ECG 12 Lead Chest Pain    (Results Pending)        Assessment/Plan     Active Hospital Problems    Diagnosis  POA    **Left ureteral stone [N20.1]  Unknown    BALBINA (acute kidney injury) [N17.9]  Unknown    Sepsis [A41.9]  Unknown    Hypokalemia [E87.6]  Unknown    Acute UTI [N39.0]  Yes    Coronary artery disease involving native coronary artery of native heart without angina pectoris  [I25.10]  Yes    Anxiety associated with depression [F41.8]  Yes    Benign essential hypertension [I10]  Yes    Hypothyroidism [E03.9]  Yes    Hyperlipidemia [E78.5]  Yes     2/1/2015      Irritable bowel syndrome [K58.9]  Yes       Left ureter stone  Left hydronephrosis  Acute UTI  Sepsis  -Admit to a telemetry unit for monitoring  -She met sepsis criteria with fever, tachycardia, and leukocytosis  -She also had lactic acidosis, and an elevated procal in the ED. Lactic acidosis has resolved  -She is s/p emergent cystoscopy and ureteral stent insertion with urology  -Continue Zosyn to cover for UTI  -Blood and urine cultures are pending  -Urology is following  -IV fluids  -Morphine as needed for pain    Chest Pain  -She reports her chest pain has resolved   -Her troponin is elevated but trending down  -There are no ischemic changes on EKG  -She is s/p heart cath with Dr. Guzman on 6/23/25 that demonstrated 70% ostial stenosis of a small caliber diagonal not amendable to PCI. Medical therapy was recommended  -Continue to trend troponin. If her troponin trends up or if her chest pain resumes, will consult cardiology    Elevated D Dimer  -CTA chest was negative for an acute pulmonary embolism  -Check BLE venous dopplers to rule out DVT    BALBINA  Hypokalemia  -Her baseline creatinine is less than 1  -IVF  -Avoid nephrotoxins  -Replace potassium per protocol  -Repeat labs in AM    Hypertension  -Blood pressures have been stable. Continue amlodipine  -Hold lisinopril due to elevated creatinine  -Monitor    Hyperlipidemia  Coronary artery disease  -Continue aspirin and statin    Hypothyroidism  -Continue levothyroxine    IBS  -Continue Colestipol, she can take her home dose    Anxiety  Depression  -Continue Cymbalta  -Continue home dose of Xanax as needed for anxiety    -I discussed the patients findings and my recommendations with patient.    VTE Prophylaxis - SCDs.  Code Status - Full code.       Olga Shore,  HEATH  [facility]  07/14/25  05:05 EDT      Electronically signed by Cely Clarke MD at 07/14/25 1443          Emergency Department Notes            Tashi Najera MD at 07/13/25 1818        Procedure Orders    1. Critical Care [[id number]] ordered by Tashi Najera MD                  EMERGENCY DEPARTMENT ENCOUNTER    Room Number:  BRET/BRET  Date of encounter:  7/13/2025  PCP: Jerman Virgen MD  Historian: Patient and significant other  Relevant information and history provided by sources other than the patient will be included below and in the ED Course.  Review of pertinent past medical records may also be included in record below and ED Course.    HPI:  Chief Complaint: Chest pain, diarrhea, feeling weak  A complete HPI/ROS/PMH/PSH/SH/FH are unobtainable due to: Not applicable.   Context: Wilma Longo is a 65 y.o. female who presents to the ED c/o symptoms of the chest pain started yesterday around 2 in the afternoon.  Feels like a tightness right in the center of her chest.  Nothing makes the pain worse.  Is not worse with exertion is not worse with breathing.  This pain does not radiate to her back like her pain that she had a couple weeks ago.  This is in her chest.  She has no weakness or numbness to her extremities.  She states that when she does take a Xanax it helps a little bit.  She did take a Xanax a few hours prior to arrival here and it did not help so she came here because of persistent.  She does have shortness of breath but does have anxiety.  She describes it as she feels that she cannot get enough air.  The other thing that she has is she has a tremendous amount of diarrhea.  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  She is on colestipol for this.  For the past 24 hours the diarrhea has significantly increased.  She is having over 20 episodes of diarrhea a day.  A little bit of blood-tinged diarrhea from her fissure.  No  black diarrhea.  When she does have the diarrhea she has mild abdominal pain.  She has had chills but no definitive fever.  These chills were episodic she had them yesterday and then again today.  She feels weak she has no energy.  She has had bouts of nausea but no vomiting.  She is not eating or drinking as much.  She sometimes feels that eating and drinking will make the diarrhea worse.  Denies any new swelling to lower extremities denies any new pain to lower extremities.  Denies any headache        Previous Episodes: The chest tightness is similar to what she has had before.  The diarrhea is similar to what she has had before but is significantly increased in volume.  Current Symptoms: See above    MEDICAL HISTORY REVIEWED  I reviewed the note from Dr. Durand from 6/23/2025 she has a history of hypertension and hyperlipidemia.  She presented to the [facility] emergency department with chest pain through to her back she had 2 troponins that were elevated with a delta of 8.  EKG was unremarkable patient had a D-dimer and a BNP that was normal at that time CT angiogram showed no acute abnormality and no PE  She did have a heart cath she had 70% mid LAD stenosis 70% ostial stenosis no significant disease of the left main ramus intermedius or left circumflex or right coronary artery normal left ventricular systolic function.  Medical manage was recommended.  She did also have an echo at that time her ejection fraction was 62.7 which was normal diastolic function was normal as well.      PAST MEDICAL HISTORY  Active Ambulatory Problems     Diagnosis Date Noted    Colitis 06/12/2009    Depression     Benign essential hypertension 02/01/2015    Hypothyroidism 02/01/2015    Impaired fasting glucose 02/01/2015    Insomnia 02/03/2015    Irritable bowel syndrome 11/30/2009    Hyperlipidemia 04/13/2011    Mood disorder 04/13/2011    Vitamin D deficiency 07/19/2012    Fibromyalgia 12/21/2017    Dysphagia 08/06/2019     Adhesive capsulitis of left shoulder 08/24/2020    Anemia 01/14/2021    Iron deficiency anemia 03/02/2021    Malabsorption of iron 03/02/2021    Prolapse of female pelvic organs 04/05/2021    Pelvic prolapse 04/06/2021    S/P laparoscopy 04/06/2021    Osman's esophagus without dysplasia 11/30/2022    Monoclonal gammopathy 10/30/2023    Influenza A 02/08/2024    CORONA (dyspnea on exertion) 06/23/2025    Coronary artery disease involving native coronary artery of native heart without angina pectoris 07/02/2025     Resolved Ambulatory Problems     Diagnosis Date Noted    No Resolved Ambulatory Problems     Past Medical History:   Diagnosis Date    Anxiety     Osman esophagus ?    Cancer     Cardiomyopathy     Fibromyalgia, primary     Gestational diabetes mellitus 06/1994    History of gestational diabetes     Inflammatory bowel disease 06/12/2009    Panic attacks     Primary bile acid malabsorption     Rectocele     Skin cancer of arm     Vaginal prolapse          PAST SURGICAL HISTORY  Past Surgical History:   Procedure Laterality Date    ANTERIOR AND POSTERIOR VAGINAL REPAIR N/A 04/06/2021    Procedure: POSTERIOR VAGINAL REPAIR, CYSTOSCOPY;  Surgeon: Cora Gustafson MD;  Location: [facility] OR;  Service: Gynecology;  Laterality: N/A;    BLADDER REPAIR  09/2015    X2    CARDIAC CATHETERIZATION N/A 6/23/2025    Procedure: Left Heart Cath;  Surgeon: Dedra Guzman MD;  Location: [facility] CATH INVASIVE LOCATION;  Service: Cardiovascular;  Laterality: N/A;    CARDIAC CATHETERIZATION N/A 6/23/2025    Procedure: Coronary angiography;  Surgeon: Dedra Guzman MD;  Location: [facility] CATH INVASIVE LOCATION;  Service: Cardiovascular;  Laterality: N/A;    CARDIAC CATHETERIZATION N/A 6/23/2025    Procedure: Left ventriculography;  Surgeon: Dedra Guzman MD;  Location: [facility] CATH INVASIVE LOCATION;  Service: Cardiovascular;  Laterality: N/A;    COLONOSCOPY  2010    wnl    COLONOSCOPY N/A 09/09/2016    Procedure:  COLONOSCOPY into cecum and terminal ileum with biopsies;  Surgeon: Orlin Mann MD;  Location: [facility] ENDOSCOPY;  Service:     COLONOSCOPY N/A 09/06/2019    Procedure: COLONOSCOPY TO CECUM AND INTO TERMINAL ILEUM;  Surgeon: Orlin Mann MD;  Location: [facility] ENDOSCOPY;  Service: Gastroenterology    COLONOSCOPY W/ POLYPECTOMY N/A 02/20/2023    Procedure: COLONOSCOPY INTO CECUM AND TI WITH BIOPSIES;  Surgeon: Basilio Martin MD;  Location: [facility] ENDOSCOPY;  Service: Gastroenterology;  Laterality: N/A;  PRE: COLITIS  POST: NORMAL COLON    CYSTOSCOPY W/ URETERAL STENT PLACEMENT      ENDOSCOPY N/A 09/06/2019    Procedure: ESOPHAGOGASTRODUODENOSCOPY WITH COLD BIOPSIES AND 48F HERNÁNDEZ DILATION;  Surgeon: Orlin Mann MD;  Location: [facility] ENDOSCOPY;  Service: Gastroenterology    ENDOSCOPY N/A 02/20/2023    Procedure: ESOPHAGOGASTRODUODENOSCOPY WITH BIOPSIES;  Surgeon: Basilio Martin MD;  Location: [facility] ENDOSCOPY;  Service: Gastroenterology;  Laterality: N/A;  PRE: MENDEZ'S  POST: HIATAL HERNIA, GASTRITIS    HYSTERECTOMY  12/2013    MOUTH SURGERY      multiple teeth extraction    SACROCOLPOPEXY N/A 04/06/2021    Procedure: LARPAROSCOPY COLPOPEXY, BILATERAL SALPINGECTOMY;  Surgeon: Cora Gustafson MD;  Location: [facility] OR;  Service: Gynecology;  Laterality: N/A;    SKIN CANCER EXCISION      UPPER GASTROINTESTINAL ENDOSCOPY  09/06/2019         FAMILY HISTORY  Family History   Problem Relation Age of Onset    Depression Mother     Heart disease Mother     Stroke Mother     Heart attack Mother     Heart failure Mother     Hyperlipidemia Mother     Hypertension Mother     Irritable bowel syndrome Mother     Arthritis Father     Heart disease Father     Skin cancer Father     Heart attack Father     Heart failure Father     Heart disease Maternal Grandmother     Stroke Maternal Grandfather     Stroke Paternal Grandfather     Hypertension Sister     Hypothyroidism Sister     Hypertension  Brother     Hypothyroidism Daughter     Thyroid disease Sister     Colon cancer Neg Hx     Colon polyps Neg Hx     Liver disease Neg Hx     Rectal cancer Neg Hx     Stomach cancer Neg Hx     Liver cancer Neg Hx     Malig Hyperthermia Neg Hx          SOCIAL HISTORY  Social History     Socioeconomic History    Marital status:      Spouse name: Ruben   Tobacco Use    Smoking status: Never    Smokeless tobacco: Never   Vaping Use    Vaping status: Never Used   Substance and Sexual Activity    Alcohol use: Not Currently     Comment: less than once per  month    Drug use: No    Sexual activity: Yes     Partners: Male     Birth control/protection: Post-menopausal, Hysterectomy         ALLERGIES  Patient has no known allergies.        REVIEW OF SYSTEMS  Review of Systems     All systems reviewed and negative except for those discussed in HPI.       PHYSICAL EXAM    I have reviewed the triage vital signs and nursing notes.    ED Triage Vitals   Temp Heart Rate Resp BP SpO2   07/13/25 1750 07/13/25 1750 07/13/25 1750 07/13/25 1754 07/13/25 1750   99.1 °F (37.3 °C) (!) 137 20 150/73 97 %      Temp src Heart Rate Source Patient Position BP Location FiO2 (%)   -- -- 07/13/25 1754 07/13/25 1754 --     Sitting Right arm        GENERAL: This is an anxious elderly female.  She does look a little weak.  She does have some mild hyperventilation.  No acute distress.Vital signs on my initial evaluation her heart rate is in the low 100s on my exam it is a sinus tachycardia her O2 sat is 100% on room air.  She is afebrile her blood pressure is unremarkable.  HENT: nares patent  Head/neck/ face are symmetric without gross deformity, signs of trauma, or swelling  EYES: no scleral icterus, no conjunctival pallor.  NECK: Supple, no meningismus  CV: regular rhythm, tachycardia with intact distal pulses.  No murmur or rub  RESPIRATORY: normal effort and no respiratory distress.  Clear to auscultation bilaterally  ABDOMEN: soft and  nontender.  Obese.  Very mild discomfort in lower abdomen with palpation.  No guarding or rebound.  Normal bowel sounds  MUSCULOSKELETAL: no deformity.  Intact distal pulses to upper lower extremities that are equal strong and symmetric.  NEURO: alert and appropriate, moves all extremities, follows commands.  No focal motor or sensory changes.  SKIN: warm, dry    Vital signs and nursing notes reviewed.        LAB RESULTS  Recent Results (from the past 24 hours)   ECG 12 Lead ED Triage Standing Order; Chest Pain    Collection Time: 07/13/25  5:51 PM   Result Value Ref Range    QT Interval 355 ms    QTC Interval 490 ms   Comprehensive Metabolic Panel    Collection Time: 07/13/25  5:53 PM    Specimen: Blood   Result Value Ref Range    Glucose 138 (H) 65 - 99 mg/dL    BUN 19.0 8.0 - 23.0 mg/dL    Creatinine 1.26 (H) 0.57 - 1.00 mg/dL    Sodium 136 136 - 145 mmol/L    Potassium 2.8 (L) 3.5 - 5.2 mmol/L    Chloride 97 (L) 98 - 107 mmol/L    CO2 24.1 22.0 - 29.0 mmol/L    Calcium 9.1 8.6 - 10.5 mg/dL    Total Protein 7.7 6.0 - 8.5 g/dL    Albumin 3.7 3.5 - 5.2 g/dL    ALT (SGPT) 17 1 - 33 U/L    AST (SGOT) 20 1 - 32 U/L    Alkaline Phosphatase 78 39 - 117 U/L    Total Bilirubin 0.5 0.0 - 1.2 mg/dL    Globulin 4.0 gm/dL    A/G Ratio 0.9 g/dL    BUN/Creatinine Ratio 15.1 7.0 - 25.0    Anion Gap 14.9 5.0 - 15.0 mmol/L    eGFR 47.5 (L) >60.0 mL/min/1.73   High Sensitivity Troponin T    Collection Time: 07/13/25  5:53 PM    Specimen: Blood   Result Value Ref Range    HS Troponin T 79 (C) <14 ng/L   Green Top (Gel)    Collection Time: 07/13/25  5:53 PM   Result Value Ref Range    Extra Tube Hold for add-ons.    Lavender Top    Collection Time: 07/13/25  5:53 PM   Result Value Ref Range    Extra Tube hold for add-on    Gold Top - SST    Collection Time: 07/13/25  5:53 PM   Result Value Ref Range    Extra Tube Hold for add-ons.    Light Blue Top    Collection Time: 07/13/25  5:53 PM   Result Value Ref Range    Extra Tube Hold for  add-ons.    CBC Auto Differential    Collection Time: 07/13/25  5:53 PM    Specimen: Blood   Result Value Ref Range    WBC 16.21 (H) 3.40 - 10.80 10*3/mm3    RBC 4.16 3.77 - 5.28 10*6/mm3    Hemoglobin 12.2 12.0 - 15.9 g/dL    Hematocrit 36.8 34.0 - 46.6 %    MCV 88.5 79.0 - 97.0 fL    MCH 29.3 26.6 - 33.0 pg    MCHC 33.2 31.5 - 35.7 g/dL    RDW 12.6 12.3 - 15.4 %    RDW-SD 40.5 37.0 - 54.0 fl    MPV 9.7 6.0 - 12.0 fL    Platelets 283 140 - 450 10*3/mm3    Neutrophil % 76.6 (H) 42.7 - 76.0 %    Lymphocyte % 14.8 (L) 19.6 - 45.3 %    Monocyte % 7.7 5.0 - 12.0 %    Eosinophil % 0.4 0.3 - 6.2 %    Basophil % 0.2 0.0 - 1.5 %    Immature Grans % 0.3 0.0 - 0.5 %    Neutrophils, Absolute 12.40 (H) 1.70 - 7.00 10*3/mm3    Lymphocytes, Absolute 2.40 0.70 - 3.10 10*3/mm3    Monocytes, Absolute 1.25 (H) 0.10 - 0.90 10*3/mm3    Eosinophils, Absolute 0.07 0.00 - 0.40 10*3/mm3    Basophils, Absolute 0.04 0.00 - 0.20 10*3/mm3    Immature Grans, Absolute 0.05 0.00 - 0.05 10*3/mm3    nRBC 0.0 0.0 - 0.2 /100 WBC   D-dimer, Quantitative    Collection Time: 07/13/25  5:53 PM    Specimen: Blood   Result Value Ref Range    D-Dimer, Quantitative 1.28 (H) 0.00 - 0.65 MCGFEU/mL   Procalcitonin    Collection Time: 07/13/25  5:53 PM    Specimen: Blood   Result Value Ref Range    Procalcitonin 20.70 (H) 0.00 - 0.25 ng/mL   Magnesium    Collection Time: 07/13/25  5:53 PM    Specimen: Blood   Result Value Ref Range    Magnesium 1.8 1.6 - 2.4 mg/dL   Green Top (Gel)    Collection Time: 07/13/25  7:05 PM   Result Value Ref Range    Extra Tube Hold for add-ons.    High Sensitivity Troponin T 1Hr    Collection Time: 07/13/25  7:05 PM    Specimen: Arm, Right; Blood   Result Value Ref Range    HS Troponin T 64 (C) <14 ng/L    Troponin T Numeric Delta -15 ng/L    Troponin T % Delta -19 Abnormal if >/= 20%   Lactic Acid, Plasma    Collection Time: 07/13/25  7:05 PM    Specimen: Arm, Right; Blood   Result Value Ref Range    Lactate 2.2 (C) 0.5 - 2.0  mmol/L   STAT Lactic Acid, Reflex    Collection Time: 07/13/25 10:10 PM    Specimen: Blood   Result Value Ref Range    Lactate 0.5 0.5 - 2.0 mmol/L   Urinalysis With Microscopic If Indicated (No Culture) - Urine, Clean Catch    Collection Time: 07/13/25 10:28 PM    Specimen: Urine, Clean Catch   Result Value Ref Range    Color, UA Yellow Yellow, Straw    Appearance, UA Clear Clear    pH, UA 6.5 5.0 - 8.0    Specific Gravity, UA 1.028 1.005 - 1.030    Glucose, UA Negative Negative    Ketones, UA Trace (A) Negative    Bilirubin, UA Negative Negative    Blood, UA Trace (A) Negative    Protein, UA Trace (A) Negative    Leuk Esterase, UA Small (1+) (A) Negative    Nitrite, UA Negative Negative    Urobilinogen, UA 0.2 E.U./dL 0.2 - 1.0 E.U./dL   Urinalysis, Microscopic Only - Urine, Clean Catch    Collection Time: 07/13/25 10:28 PM    Specimen: Urine, Clean Catch   Result Value Ref Range    RBC, UA 3-5 (A) None Seen, 0-2 /HPF    WBC, UA 11-20 (A) None Seen, 0-2 /HPF    Bacteria, UA Trace (A) None Seen /HPF    Squamous Epithelial Cells, UA 0-2 None Seen, 0-2 /HPF    Hyaline Casts, UA None Seen None Seen /LPF    Methodology Automated Microscopy        Ordered the above labs and independently reviewed the results.        RADIOLOGY  CT Abdomen Pelvis With Contrast  CT Abdomen Pelvis With Contrast, CT Angiogram Chest Pulmonary Embolism  Result Date: 7/13/2025  CTA CHEST, CT ABDOMEN AND PELVIS  HISTORY: Pulmonary Embolism; R07.9-Chest pain, unspecified; R79.89-Other specified abnormal findings of blood chemistry; E87.6-Hypokalemia; R19.7-Diarrhea, unspecified; D72.829-Elevated white blood cell count, unspecified; A41.9-Sepsis, unspecified organism  COMPARISON: None  TECHNIQUE: CT angiography was performed of the chest with axial images as well as coronal and sagittal reformatted MIP images provided following the administration of IV contrast.  3-D surface rendered reformats were obtained of the pulmonary arteries and aorta.  CT  of the abdomen and pelvis obtained following administration of IV contrast. Coronal and sagittal reconstructions were obtained.   Radiation dose reduction techniques were utilized, including automated exposure control, and exposure modulation based on body size.  FINDINGS:  Chest CTA:  There is mild bibasilar atelectasis, but there is no evidence of acute pulmonary infiltrate, pleural effusion, pneumothorax or suspicious nodule.  The thoracic aorta is normal in caliber, and there is no evidence of dissection.  There are coronary atherosclerotic vascular calcifications.  There is no suspicious mediastinal adenopathy or other mass, though there is a small to moderate hiatal hernia.  Bolus timing is adequate, and there is no evidence of pulmonary embolism.  Abdomen: The liver and gallbladder are normal.  The spleen and pancreas appear normal.  Both adrenal glands are normal. There is no evidence of bowel obstruction. The aorta is normal in caliber.   : There is mild atrophic change in the right kidney and there is a right renal 4 mm nonobstructing calculus, but there is no right hydronephrosis. No left renal calculi are seen, but there is mild to moderate left hydronephrosis, and the left ureter is dilated down into the pelvis, to the level of an approximately 4 mm stone, 8 to 10 cm above the ureterovesical junction. No bladder calculi are seen.  Pelvis:  The appendix is clearly identified, and is normal.  There is diverticulosis, but there is no CT evidence of diverticulitis.  There is no pelvic adenopathy or other soft tissue mass.  There is no CT evidence of hernia or bowel obstruction.  There is no acute bony abnormality.       1.  Pulmonary arteries are well-opacified, and there is no evidence of pulmonary embolism.  2.  There is minimal bibasilar dependent atelectasis, but the lung parenchyma is otherwise normal. No acute pulmonary parenchymal abnormality is seen.  3.   Mild to moderate left hydronephrosis  secondary to a 4 mm stone in the distal left ureter, 8 to 10 cm above the ureterovesical junction.      This report was finalized on 7/13/2025 10:15 PM by Dr. Wojciech Roth M.D on Workstation: [id number]      XR Chest 1 View  Result Date: 7/13/2025  XR CHEST 1 VW-  HISTORY: Chest pain. Reported recent heart attack.  COMPARISON: Chest radiograph 6/23/2025  FINDINGS: A single view of the chest was obtained.  Support Devices:  None. Cardiac Silhouette/Mediastinum/Wendie:  The cardiac, mediastinal, and hilar contours are within normal limits. Lungs/Pleural Spaces:  The lungs and pleural spaces are clear. Chest Wall/Diaphragm/Upper Abdomen: There is a hiatal hernia. The visualized osseous structures are similar.   CONCLUSION(S):   1.  No focal consolidation or effusion. 2.  Hiatal hernia.  This report was finalized on 7/13/2025 6:35 PM by Dr. La Blount M.D on Workstation: [id number]        I ordered the above noted radiological studies. Reviewed by me and discussed with radiologist.  See dictation for official radiology interpretation.      PROCEDURES    Critical Care    Performed by: Tashi Najera MD  Authorized by: Milton De Paz MD    Critical care provider statement:     Critical care time (minutes): 45.    Critical care time was exclusive of:  Separately billable procedures and treating other patients    Critical care was necessary to treat or prevent imminent or life-threatening deterioration of the following conditions:  Sepsis    Critical care was time spent personally by me on the following activities:  Blood draw for specimens, development of treatment plan with patient or surrogate, discussions with consultants, evaluation of patient's response to treatment, examination of patient, obtaining history from patient or surrogate, review of old charts, re-evaluation of patient's condition, pulse oximetry, ordering and review of radiographic studies, ordering and review of laboratory studies  and ordering and performing treatments and interventions    I assumed direction of critical care for this patient from another provider in my specialty: no      Care discussed with: admitting provider          MEDICATIONS GIVEN IN ER    Medications   sodium chloride 0.9 % flush 10 mL (has no administration in time range)   sodium chloride 0.9 % flush 10 mL (has no administration in time range)   Potassium Replacement - Follow Nurse / BPA Driven Protocol (has no administration in time range)   potassium chloride 10 mEq in 100 mL IVPB (10 mEq Intravenous New Bag 7/13/25 2233)   vancomycin IVPB 1500 mg in 0.9% NaCl (Premix) 500 mL (1,500 mg Intravenous New Bag 7/13/25 2232)   aspirin tablet 325 mg (325 mg Oral Given 7/13/25 1951)   potassium chloride (KLOR-CON M20) CR tablet 40 mEq (40 mEq Oral Given 7/13/25 2158)   piperacillin-tazobactam (ZOSYN) 3.375 g IVPB in 100 mL NS MBP (CD) (0 g Intravenous Stopped 7/13/25 2232)   sodium chloride 0.9% - IBW for BMI > 30 bolus 1,700 mL (0 mL Intravenous Stopped 7/13/25 2232)   iopamidol (ISOVUE-370) 76 % injection 100 mL (100 mL Intravenous Given 7/13/25 2053)         All labs have been independently reviewed by me.  All radiology studies have been reviewed by me and I discussed with radiologist dictating the report when indicated below.  All EKG's independently viewed and interpreted by me.  Discussion below represents my analysis of pertinent findings related to patient's condition, differential diagnosis, treatment plan and final disposition.        PROGRESS, DATA ANALYSIS, CONSULTS, AND MEDICAL DECISION MAKING    This is a patient that presents with chest pain.  Recent chest pain evaluation showed an unremarkable cardiac cath as well as an echo but her troponins were mildly elevated.  Her EKG shows no acute change here.  Organ to check cardiac enzymes.  Clinically she does look dehydrated.  She has had tremendous amount of diarrhea and small amount of p.o. intake.  Will get  a give her some IV fluids.  She does not want anything for pain or anxiety at this time.  Informed the patient and the spouse of the test that we will order.  All questions answered at this time.    DDx includes acute coronary syndrome, pulmonary embolism, thoracic aortic dissection, pneumonia, pneumothorax, musculoskeletal pain, GERD or esophageal spasm, PUD, esophagitis, anxiety, myocarditis/pericarditis, esophageal rupture, pancreatitis.       ED Course as of 07/13/25 2319   Sun Jul 13, 2025   1821 WBC(!): 16.21  2 weeks ago when she was seen in the hospital [facility] was 14,000. [MM]   1835 HS Troponin T(!!): 79  When you look 2 weeks ago when she came in and had elevation of her troponin it was 72 and the repeat was 64.  It is elevated here 79. [MM]   1836 WBC(!): 16.21 [MM]   1837 Potassium(!): 2.8 [MM]   1847 My own independent interpretation of the EKG that was on at 5:51 PM reveals a rate of 115 it is a sinus tachycardia narrow complex Q waves in the inferior leads normal axis I do not appreciate any obvious acute injury pattern QT looks normal when I compare this to the previous EKG that was done on6/23/2025 I do not see any significant change. [MM]   1946 Lactate(!!): 2.2 [MM]   1946 Procalcitonin(!): 20.70 [MM]   1948 Chest x-ray shows no acute abnormality.  Please see complete dictated report from radiologist [MM]   2036 D-Dimer, Quant(!): 1.28 [MM]   2036 Procalcitonin(!): 20.70 [MM]   2036 HS Troponin T(!!): 64 [MM]   2222 I reviewed the CT scan of the chest abdomen pelvis report.  CT angiogram of the chest shows no pulmonary embolism there is minimal bibasilar dependent atelectasis but no obvious focal pneumonia or consolidation there is mild to moderate left hydronephrosis secondary to 4 mm stone in the distal left ureter that is 8 to 10 cm above the ureterovesicular junction.  No other evidence of infection seen on the CT scan of the abdomen pelvis. [MM]   2242 I talked with the urologist Dr. Zaidi  and informed him about the results of the test my clinical concerns.  He is going to take the patient to surgery today.  Would like the patient admitted to medicine. [MM]   2243 On 6/25/2025 patient had a urine culture that showed E. coli and it was pansensitive. [MM]   2254 I have a very low clinical suspicion that this is cardiac in etiology her EKG shows no acute changes.  Patient troponins have remained stable and similar to what they were 2 weeks ago.  She had a cardiac cath that essentially was unremarkable had 1 vessel with 70% stenosis. [MM]   2258 BP: 137/78 [MM]   2258 Heart Rate: 98 [MM]   2258 Resp: 20 [MM]   2258 SpO2: 100 % [MM]   2300 Leukocytes, UA(!): Small (1+) [MM]   2300 WBC, UA(!): 11-20 [MM]   2300 Bacteria, UA(!): Trace [MM]   2317 I did discuss the case with Mary who is the midlevel provider on for A.  It Dr. De Paz as the attending.  Informed her of the patient's presenting symptoms as well as my conversation with the urologist and the patient plan to go to surgery.  She agrees to admit after the patient is done with surgery. [MM]      ED Course User Index  [MM] Tashi Najera MD       AS OF 23:19 EDT VITALS:    BP - 137/78  HR - 98  TEMP - 99.1 °F (37.3 °C)  02 SATS - 100%    SOCIAL DETERMINANTS OF HEALTH THAT IMPACT OR LIMIT CARE (For example..Homelessness,safe discharge, inability to obtain care, follow up, or prescriptions):      DIAGNOSIS  Final diagnoses:   Chest pain, unspecified type   Elevated troponin   Hypokalemia   Diarrhea, unspecified type   Leukocytosis, unspecified type   Sepsis, due to unspecified organism, unspecified whether acute organ dysfunction present   Acute UTI   Nephrolithiasis         DISPOSITION  Patient is getting go to the operating room.          DICTATED UTILIZING DRAGON DICTATION    Note Disclaimer: At [facility], we believe that sharing information builds trust and better relationships. You are receiving this note because you recently  visited [facility]. It is possible you will see health information before a provider has talked with you about it. This kind of information can be easy to misunderstand. To help you fully understand what it means for your health, we urge you to discuss this note with your provider.       Tashi Najera MD  07/13/25 2319      Electronically signed by Tashi Najera MD at 07/13/25 2319       Bertha Johnson RN at 07/13/25 1748          Pt reports chest pain that started last night, felt freezing cold, also diarrhea, nausea, states MI on 6/23    Electronically signed by Bertha Johnson RN at 07/13/25 1749       Vital Signs (last 2 days)       Date/Time Temp Temp src Pulse Resp BP Patient Position SpO2    07/14/25 1500 -- -- 76 -- -- -- 96    07/14/25 1323 97.3 (36.3) Oral 78 18 112/64 Lying 98    07/14/25 1033 -- -- 77 -- -- -- 100    07/14/25 0741 97.7 (36.5) Oral 79 18 117/61 Lying 96    07/14/25 0351 97.7 (36.5) Oral 74 -- 122/62 Lying --    07/14/25 0132 99.1 (37.3) Oral 112 21 133/66 Sitting 99    07/14/25 0100 -- -- 109 -- -- -- 99    07/14/25 0045 -- -- 109 -- 125/62 Lying 91    07/14/25 0035 -- -- 112 23 131/66 Lying 91    07/14/25 0030 -- -- 117 21 130/66 Lying 92    07/14/25 0026 102.9 (39.4) Oral 120 31 124/61 Lying 90    07/13/25 2320 101.8 (38.8) Oral 102 15 132/82 Lying 94    07/13/25 2230 -- -- 98 20 137/78 Lying 100    07/13/25 2200 -- -- 120 -- 115/96 -- 98    07/13/25 2120 -- -- 108 20 137/75 Lying --    07/13/25 2118 -- -- -- -- -- -- 97    07/13/25 2100 -- -- -- -- -- -- 97    07/13/25 1830 -- -- 115 20 144/59 -- 98    07/13/25 1754 -- -- -- -- 150/73 Sitting --    07/13/25 1750 99.1 (37.3) -- 137 20 -- -- 97          Oxygen Therapy (last 2 days)       Date/Time SpO2 Device (Oxygen Therapy) Flow (L/min) (Oxygen Therapy) Oxygen Concentration (%) ETCO2 (mmHg)    07/14/25 1500 96 -- -- -- --    07/14/25 1432 -- room air -- -- --    07/14/25 1323 98 room air -- -- --    07/14/25 1033 100 -- --  -- --    07/14/25 0852 -- nasal cannula 2 -- --    07/14/25 0741 96 nasal cannula -- -- --    07/14/25 0138 -- nasal cannula 2 -- --    07/14/25 0132 99 nasal cannula 2 -- --    07/14/25 0100 99 -- -- -- --    07/14/25 0045 91 nasal cannula 2 -- --    07/14/25 0035 91 room air -- -- --    07/14/25 0030 92 room air -- -- --    07/14/25 0026 90 room air -- -- --    07/13/25 2320 94 room air -- -- --    07/13/25 2230 100 room air -- -- --    07/13/25 2200 98 -- -- -- --    07/13/25 2118 97 room air -- -- --    07/13/25 2100 97 -- -- -- --    07/13/25 1830 98 room air -- -- --    07/13/25 1750 97 room air -- -- --          Intake & Output (last 2 days)         07/12 0701 07/13 0700 07/13 0701 07/14 0700 07/14 0701  07/15 0700    IV Piggyback  1900     Total Intake(mL/kg)  1900 (27.3)     Net  +1900            Urine Unmeasured Occurrence  2 x 2 x    Stool Unmeasured Occurrence  1 x 3 x          Lines, Drains & Airways       Active LDAs       Name Placement date Placement time Site Days    Peripheral IV 07/13/25 1940 20 G Anterior;Distal;Right;Upper Arm 07/13/25  1940  Arm  less than 1    Peripheral IV 07/13/25 2339 20 G Posterior;Right Wrist 07/13/25  2339  Wrist  less than 1    Ureteral Drain/Stent Left ureter 7 Fr. 07/14/25  0004  Left ureter  less than 1                  Current Facility-Administered Medications   Medication Dose Route Frequency Provider Last Rate Last Admin    acetaminophen (TYLENOL) tablet 650 mg  650 mg Oral Q6H PRN Olga Shore APRN        ALPRAZolam (XANAX) tablet 0.5 mg  0.5 mg Oral Daily PRN Olga Shore APRN        aluminum-magnesium hydroxide-simethicone (MAALOX MAX) 400-400-40 MG/5ML suspension 15 mL  15 mL Oral Q6H PRN Maged Pedroza MD        amLODIPine (NORVASC) tablet 5 mg  5 mg Oral Nightly Olga Shore APRN   5 mg at 07/14/25 0351    aspirin chewable tablet 81 mg  81 mg Oral Daily Olga Shore APRN   81 mg at 07/14/25 0853    sennosides-docusate  (PERICOLACE) 8.6-50 MG per tablet 2 tablet  2 tablet Oral BID PRN Maged Pedroza MD        And    polyethylene glycol (MIRALAX) packet 17 g  17 g Oral Daily PRN Maged Pedroza MD        And    bisacodyl (DULCOLAX) EC tablet 5 mg  5 mg Oral Daily PRN Maged Pedroza MD        And    bisacodyl (DULCOLAX) suppository 10 mg  10 mg Rectal Daily PRN Maged Pedroza MD        DULoxetine (CYMBALTA) DR capsule 60 mg  60 mg Oral Nightly Olga Shore APRN   60 mg at 07/14/25 0351    levothyroxine (SYNTHROID, LEVOTHROID) tablet 75 mcg  75 mcg Oral Q AM Olga Shore APRN   75 mcg at 07/14/25 0742    morphine injection 2 mg  2 mg Intravenous Q2H PRN Maged Pedroza MD   2 mg at 07/14/25 1432    nitroglycerin (NITROSTAT) SL tablet 0.4 mg  0.4 mg Sublingual Q5 Min PRN Maged Pedroza MD        ondansetron ODT (ZOFRAN-ODT) disintegrating tablet 4 mg  4 mg Oral Q6H PRN Maged Pedroza MD        Or    ondansetron (ZOFRAN) injection 4 mg  4 mg Intravenous Q6H PRN Maged Pedroza MD        piperacillin-tazobactam (ZOSYN) 3.375 g IVPB in 100 mL NS MBP (CD)  3.375 g Intravenous Q8H Olga Shore APRN   3.375 g at 07/14/25 1416    Potassium Replacement - Follow Nurse / BPA Driven Protocol   Not Applicable PRN Maged Pedroza MD        rosuvastatin (CRESTOR) tablet 20 mg  20 mg Oral Daily Olga Shore APRN   20 mg at 07/14/25 0853    sodium chloride 0.9 % flush 10 mL  10 mL Intravenous PRN Maegd Pedroza MD        sodium chloride 0.9 % flush 10 mL  10 mL Intravenous PRN Maged Pedroza MD        sodium chloride 0.9 % flush 10 mL  10 mL Intravenous PRN Maged Pedroza MD         Lab Results (all)       Procedure Component Value Units Date/Time    Basic Metabolic Panel [[id number]]  (Abnormal) Collected: 07/14/25 0846    Specimen: Blood Updated: 07/14/25 0939     Glucose 208 mg/dL      BUN 15.0 mg/dL      Creatinine 0.97 mg/dL      Sodium 141 mmol/L      Potassium 3.8 mmol/L       Comment: Slight hemolysis detected by analyzer. Result may be falsely elevated.        Chloride 108 mmol/L      CO2 23.4 mmol/L      Calcium 8.6 mg/dL      BUN/Creatinine Ratio 15.5     Anion Gap 9.6 mmol/L      eGFR 65.0 mL/min/1.73     Narrative:      GFR Categories in Chronic Kidney Disease (CKD)              GFR Category          GFR (mL/min/1.73)    Interpretation  G1                    90 or greater        Normal or high (1)  G2                    60-89                Mild decrease (1)  G3a                   45-59                Mild to moderate decrease  G3b                   30-44                Moderate to severe decrease  G4                    15-29                Severe decrease  G5                    14 or less           Kidney failure    (1)In the absence of evidence of kidney disease, neither GFR category G1 or G2 fulfill the criteria for CKD.    eGFR calculation 2021 CKD-EPI creatinine equation, which does not include race as a factor    High Sensitivity Troponin T [[id number]]  (Abnormal) Collected: 07/14/25 0846    Specimen: Blood Updated: 07/14/25 0922     HS Troponin T 48 ng/L     Narrative:      High Sensitive Troponin T Reference Range:  <14.0 ng/L- Negative Female for AMI  <22.0 ng/L- Negative Male for AMI  >=14 - Abnormal Female indicating possible myocardial injury.  >=22 - Abnormal Male indicating possible myocardial injury.   Clinicians would have to utilize clinical acumen, EKG, Troponin, and serial changes to determine if it is an Acute Myocardial Infarction or myocardial injury due to an underlying chronic condition.         CBC (No Diff) [[id number]]  (Abnormal) Collected: 07/14/25 0846    Specimen: Blood Updated: 07/14/25 0901     WBC 13.19 10*3/mm3      RBC 3.85 10*6/mm3      Hemoglobin 11.2 g/dL      Hematocrit 34.5 %      MCV 89.6 fL      MCH 29.1 pg      MCHC 32.5 g/dL      RDW 12.7 %      RDW-SD 41.2 fl      MPV 9.8 fL      Platelets 242 10*3/mm3     Urine Culture - Urine, Urine,  Clean Catch [[id number]] Collected: 07/13/25 2228    Specimen: Urine, Clean Catch Updated: 07/14/25 0231    [facility] [[id number]] Collected: 07/13/25 1905    Specimen: Blood Updated: 07/14/25 0204    Narrative:      The following orders were created for panel order [facility].  Procedure                               Abnormality         Status                     ---------                               -----------         ------                     Green Top (Gel)[[id number]]                                  Final result               Lavender Top[[id number]]                                                                Gold Top - SST[[id number]]                                                              Light Blue Top[[id number]]                                                                Please view results for these tests on the individual orders.    POC Glucose Once [[id number]]  (Normal) Collected: 07/14/25 0031    Specimen: Blood Updated: 07/14/25 0034     Glucose 74 mg/dL     POC Glucose Once [[id number]]  (Abnormal) Collected: 07/13/25 2340    Specimen: Blood Updated: 07/13/25 2342     Glucose 66 mg/dL     Urinalysis With Microscopic If Indicated (No Culture) - Urine, Clean Catch [[id number]]  (Abnormal) Collected: 07/13/25 2228    Specimen: Urine, Clean Catch Updated: 07/13/25 2253     Color, UA Yellow     Appearance, UA Clear     pH, UA 6.5     Specific Gravity, UA 1.028     Glucose, UA Negative     Ketones, UA Trace     Bilirubin, UA Negative     Blood, UA Trace     Protein, UA Trace     Leuk Esterase, UA Small (1+)     Nitrite, UA Negative     Urobilinogen, UA 0.2 E.U./dL    Urinalysis, Microscopic Only - Urine, Clean Catch [[id number]]  (Abnormal) Collected: 07/13/25 2228    Specimen: Urine, Clean Catch Updated: 07/13/25 2253     RBC, UA 3-5 /HPF      WBC, UA 11-20 /HPF      Bacteria, UA Trace /HPF      Squamous Epithelial Cells, UA 0-2 /HPF      Hyaline Casts, UA None Seen /LPF      Methodology  "Automated Microscopy    STAT Lactic Acid, Reflex [[id number]]  (Normal) Collected: 07/13/25 2210    Specimen: Blood Updated: 07/13/25 2250     Lactate 0.5 mmol/L     D-dimer, Quantitative [[id number]]  (Abnormal) Collected: 07/13/25 1753    Specimen: Blood Updated: 07/13/25 2029     D-Dimer, Quantitative 1.28 MCGFEU/mL     Narrative:      According to the assay 's published package insert, a normal (<0.50 MCGFEU/mL) D-dimer result in conjunction with a non-high clinical probability assessment, excludes deep vein thrombosis (DVT) and pulmonary embolism (PE) with high sensitivity.    D-dimer values increase with age and this can make VTE exclusion of an older population difficult. To address this, the [employer], based on best available evidence and recent guidelines, recommends that clinicians use age-adjusted D-dimer thresholds in patients greater than 50 years of age with: a) a low probability of PE who do not meet all Pulmonary Embolism Rule Out Criteria, or b) in those with intermediate probability of PE.   The formula for an age-adjusted D-dimer cut-off is \"age/100\".  For example, a 60 year old patient would have an age-adjusted cut-off of 0.60 MCGFEU/mL and an 80 year old 0.80 MCGFEU/mL.    High Sensitivity Troponin T 1Hr [[id number]]  (Abnormal) Collected: 07/13/25 1905    Specimen: Blood from Arm, Right Updated: 07/13/25 1939     HS Troponin T 64 ng/L      Troponin T Numeric Delta -15 ng/L      Troponin T % Delta -19    Narrative:      High Sensitive Troponin T Reference Range:  <14.0 ng/L- Negative Female for AMI  <22.0 ng/L- Negative Male for AMI  >=14 - Abnormal Female indicating possible myocardial injury.  >=22 - Abnormal Male indicating possible myocardial injury.   Clinicians would have to utilize clinical acumen, EKG, Troponin, and serial changes to determine if it is an Acute Myocardial Infarction or myocardial injury due to an underlying chronic condition.         Lactic " "Acid, Plasma [[id number]]  (Abnormal) Collected: 07/13/25 1905    Specimen: Blood from Arm, Right Updated: 07/13/25 1938     Lactate 2.2 mmol/L     Green Top (Gel) [[id number]] Collected: 07/13/25 1905    Specimen: Blood from Arm, Right Updated: 07/13/25 1915     Extra Tube Hold for add-ons.     Comment: Auto resulted.       Blood Culture - Blood, Arm, Left [[id number]] Collected: 07/13/25 1906    Specimen: Blood from Arm, Left Updated: 07/13/25 1912    Blood Culture - Blood, Arm, Right [[id number]] Collected: 07/13/25 1905    Specimen: Blood from Arm, Right Updated: 07/13/25 1911    Procalcitonin [[id number]]  (Abnormal) Collected: 07/13/25 1753    Specimen: Blood Updated: 07/13/25 1905     Procalcitonin 20.70 ng/mL     Narrative:      As a Marker for Sepsis (Non-Neonates):    1. <0.5 ng/mL represents a low risk of severe sepsis and/or septic shock.  2. >2 ng/mL represents a high risk of severe sepsis and/or septic shock.    As a Marker for Lower Respiratory Tract Infections that require antibiotic therapy:    PCT on Admission    Antibiotic Therapy       6-12 Hrs later    >0.5                Strongly Recommended  >0.25 - <0.5        Recommended   0.1 - 0.25          Discouraged              Remeasure/reassess PCT  <0.1                Strongly Discouraged     Remeasure/reassess PCT    As 28 day mortality risk marker: \"Change in Procalcitonin Result\" (>80% or <=80%) if Day 0 (or Day 1) and Day 4 values are available. Refer to http://www.[employer]-pct-calculator.com    Change in PCT <=80%  A decrease of PCT levels below or equal to 80% defines a positive change in PCT test result representing a higher risk for 28-day all-cause mortality of patients diagnosed with severe sepsis for septic shock.    Change in PCT >80%  A decrease of PCT levels of more than 80% defines a negative change in PCT result representing a lower risk for 28-day all-cause mortality of patients diagnosed with severe sepsis or septic shock.       Magnesium " [[id number]]  (Normal) Collected: 07/13/25 1753    Specimen: Blood Updated: 07/13/25 1849     Magnesium 1.8 mg/dL     High Sensitivity Troponin T [[id number]]  (Abnormal) Collected: 07/13/25 1753    Specimen: Blood Updated: 07/13/25 1832     HS Troponin T 79 ng/L     Narrative:      High Sensitive Troponin T Reference Range:  <14.0 ng/L- Negative Female for AMI  <22.0 ng/L- Negative Male for AMI  >=14 - Abnormal Female indicating possible myocardial injury.  >=22 - Abnormal Male indicating possible myocardial injury.   Clinicians would have to utilize clinical acumen, EKG, Troponin, and serial changes to determine if it is an Acute Myocardial Infarction or myocardial injury due to an underlying chronic condition.         Comprehensive Metabolic Panel [[id number]]  (Abnormal) Collected: 07/13/25 1753    Specimen: Blood Updated: 07/13/25 1825     Glucose 138 mg/dL      BUN 19.0 mg/dL      Creatinine 1.26 mg/dL      Sodium 136 mmol/L      Potassium 2.8 mmol/L      Chloride 97 mmol/L      CO2 24.1 mmol/L      Calcium 9.1 mg/dL      Total Protein 7.7 g/dL      Albumin 3.7 g/dL      ALT (SGPT) 17 U/L      AST (SGOT) 20 U/L      Alkaline Phosphatase 78 U/L      Total Bilirubin 0.5 mg/dL      Globulin 4.0 gm/dL      A/G Ratio 0.9 g/dL      BUN/Creatinine Ratio 15.1     Anion Gap 14.9 mmol/L      eGFR 47.5 mL/min/1.73     Narrative:      GFR Categories in Chronic Kidney Disease (CKD)              GFR Category          GFR (mL/min/1.73)    Interpretation  G1                    90 or greater        Normal or high (1)  G2                    60-89                Mild decrease (1)  G3a                   45-59                Mild to moderate decrease  G3b                   30-44                Moderate to severe decrease  G4                    15-29                Severe decrease  G5                    14 or less           Kidney failure    (1)In the absence of evidence of kidney disease, neither GFR category G1 or G2 fulfill the  criteria for CKD.    eGFR calculation 2021 CKD-EPI creatinine equation, which does not include race as a factor    CBC & Differential [[id number]]  (Abnormal) Collected: 07/13/25 1753    Specimen: Blood Updated: 07/13/25 1803    Narrative:      The following orders were created for panel order CBC & Differential.  Procedure                               Abnormality         Status                     ---------                               -----------         ------                     CBC Auto Differential[[id number]]        Abnormal            Final result                 Please view results for these tests on the individual orders.    CBC Auto Differential [[id number]]  (Abnormal) Collected: 07/13/25 1753    Specimen: Blood Updated: 07/13/25 1803     WBC 16.21 10*3/mm3      RBC 4.16 10*6/mm3      Hemoglobin 12.2 g/dL      Hematocrit 36.8 %      MCV 88.5 fL      MCH 29.3 pg      MCHC 33.2 g/dL      RDW 12.6 %      RDW-SD 40.5 fl      MPV 9.7 fL      Platelets 283 10*3/mm3      Neutrophil % 76.6 %      Lymphocyte % 14.8 %      Monocyte % 7.7 %      Eosinophil % 0.4 %      Basophil % 0.2 %      Immature Grans % 0.3 %      Neutrophils, Absolute 12.40 10*3/mm3      Lymphocytes, Absolute 2.40 10*3/mm3      Monocytes, Absolute 1.25 10*3/mm3      Eosinophils, Absolute 0.07 10*3/mm3      Basophils, Absolute 0.04 10*3/mm3      Immature Grans, Absolute 0.05 10*3/mm3      nRBC 0.0 /100 WBC     [place] Draw [[id number]] Collected: 07/13/25 1753    Specimen: Blood Updated: 07/13/25 1800    Narrative:      The following orders were created for panel order [place] Draw.  Procedure                               Abnormality         Status                     ---------                               -----------         ------                     Green Top (Gel)[[id number]]                                  Final result               Lavender Top[[id number]]                                     Final result               Gold Top -  SST[[id number]]                                   Final result               Light Blue Top[[id number]]                                   Final result                 Please view results for these tests on the individual orders.    Green Top (Gel) [[id number]] Collected: 07/13/25 1753    Specimen: Blood Updated: 07/13/25 1800     Extra Tube Hold for add-ons.     Comment: Auto resulted.       Lavender Top [[id number]] Collected: 07/13/25 1753    Specimen: Blood Updated: 07/13/25 1800     Extra Tube hold for add-on     Comment: Auto resulted       Gold Top - SST [[id number]] Collected: 07/13/25 1753    Specimen: Blood Updated: 07/13/25 1800     Extra Tube Hold for add-ons.     Comment: Auto resulted.       Light Blue Top [[id number]] Collected: 07/13/25 1753    Specimen: Blood Updated: 07/13/25 1800     Extra Tube Hold for add-ons.     Comment: Auto resulted             Imaging Results (All)       Procedure Component Value Units Date/Time    FL C Arm During Surgery [[id number]] Resulted: 07/14/25 0027     Updated: 07/14/25 0027    Narrative:      This procedure was auto-finalized with no dictation required.    CT Abdomen Pelvis With Contrast [[id number]] Collected: 07/13/25 2206     Updated: 07/13/25 2218    Narrative:      CTA CHEST, CT ABDOMEN AND PELVIS     HISTORY: Pulmonary Embolism; R07.9-Chest pain, unspecified; R79.89-Other  specified abnormal findings of blood chemistry; E87.6-Hypokalemia;  R19.7-Diarrhea, unspecified; D72.829-Elevated white blood cell count,  unspecified; A41.9-Sepsis, unspecified organism     COMPARISON: None     TECHNIQUE: CT angiography was performed of the chest with axial images  as well as coronal and sagittal reformatted MIP images provided  following the administration of IV contrast.  3-D surface rendered  reformats were obtained of the pulmonary arteries and aorta.  CT of the  abdomen and pelvis obtained following administration of IV contrast.  Coronal and sagittal reconstructions were  obtained.   Radiation dose  reduction techniques were utilized, including automated exposure  control, and exposure modulation based on body size.     FINDINGS:     Chest CTA:  There is mild bibasilar atelectasis, but there is no  evidence of acute pulmonary infiltrate, pleural effusion, pneumothorax  or suspicious nodule.  The thoracic aorta is normal in caliber, and  there is no evidence of dissection.  There are coronary atherosclerotic  vascular calcifications.  There is no suspicious mediastinal adenopathy  or other mass, though there is a small to moderate hiatal hernia.  Bolus  timing is adequate, and there is no evidence of pulmonary embolism.     Abdomen: The liver and gallbladder are normal.  The spleen and pancreas  appear normal.  Both adrenal glands are normal. There is no evidence of  bowel obstruction. The aorta is normal in caliber.       : There is mild atrophic change in the right kidney and there is a  right renal 4 mm nonobstructing calculus, but there is no right  hydronephrosis. No left renal calculi are seen, but there is mild to  moderate left hydronephrosis, and the left ureter is dilated down into  the pelvis, to the level of an approximately 4 mm stone, 8 to 10 cm  above the ureterovesical junction. No bladder calculi are seen.     Pelvis:  The appendix is clearly identified, and is normal.  There is  diverticulosis, but there is no CT evidence of diverticulitis.  There is  no pelvic adenopathy or other soft tissue mass.  There is no CT evidence  of hernia or bowel obstruction.     There is no acute bony abnormality.       Impression:         1.  Pulmonary arteries are well-opacified, and there is no evidence of  pulmonary embolism.     2.  There is minimal bibasilar dependent atelectasis, but the lung  parenchyma is otherwise normal. No acute pulmonary parenchymal  abnormality is seen.     3.   Mild to moderate left hydronephrosis secondary to a 4 mm stone in  the distal left ureter, 8  to 10 cm above the ureterovesical junction.                 This report was finalized on 7/13/2025 10:15 PM by Dr. Wojciech Roth M.D on Workstation: [id number]       CT Angiogram Chest Pulmonary Embolism [[id number]] Collected: 07/13/25 2206     Updated: 07/13/25 2218    Narrative:      CTA CHEST, CT ABDOMEN AND PELVIS     HISTORY: Pulmonary Embolism; R07.9-Chest pain, unspecified; R79.89-Other  specified abnormal findings of blood chemistry; E87.6-Hypokalemia;  R19.7-Diarrhea, unspecified; D72.829-Elevated white blood cell count,  unspecified; A41.9-Sepsis, unspecified organism     COMPARISON: None     TECHNIQUE: CT angiography was performed of the chest with axial images  as well as coronal and sagittal reformatted MIP images provided  following the administration of IV contrast.  3-D surface rendered  reformats were obtained of the pulmonary arteries and aorta.  CT of the  abdomen and pelvis obtained following administration of IV contrast.  Coronal and sagittal reconstructions were obtained.   Radiation dose  reduction techniques were utilized, including automated exposure  control, and exposure modulation based on body size.     FINDINGS:     Chest CTA:  There is mild bibasilar atelectasis, but there is no  evidence of acute pulmonary infiltrate, pleural effusion, pneumothorax  or suspicious nodule.  The thoracic aorta is normal in caliber, and  there is no evidence of dissection.  There are coronary atherosclerotic  vascular calcifications.  There is no suspicious mediastinal adenopathy  or other mass, though there is a small to moderate hiatal hernia.  Bolus  timing is adequate, and there is no evidence of pulmonary embolism.     Abdomen: The liver and gallbladder are normal.  The spleen and pancreas  appear normal.  Both adrenal glands are normal. There is no evidence of  bowel obstruction. The aorta is normal in caliber.       : There is mild atrophic change in the right kidney and there is a  right  renal 4 mm nonobstructing calculus, but there is no right  hydronephrosis. No left renal calculi are seen, but there is mild to  moderate left hydronephrosis, and the left ureter is dilated down into  the pelvis, to the level of an approximately 4 mm stone, 8 to 10 cm  above the ureterovesical junction. No bladder calculi are seen.     Pelvis:  The appendix is clearly identified, and is normal.  There is  diverticulosis, but there is no CT evidence of diverticulitis.  There is  no pelvic adenopathy or other soft tissue mass.  There is no CT evidence  of hernia or bowel obstruction.     There is no acute bony abnormality.       Impression:         1.  Pulmonary arteries are well-opacified, and there is no evidence of  pulmonary embolism.     2.  There is minimal bibasilar dependent atelectasis, but the lung  parenchyma is otherwise normal. No acute pulmonary parenchymal  abnormality is seen.     3.   Mild to moderate left hydronephrosis secondary to a 4 mm stone in  the distal left ureter, 8 to 10 cm above the ureterovesical junction.                 This report was finalized on 7/13/2025 10:15 PM by Dr. Wojciech Roth M.D on Workstation: [id number]       XR Chest 1 View [[id number]] Collected: 07/13/25 1831     Updated: 07/13/25 1838    Narrative:      XR CHEST 1 VW-     HISTORY: Chest pain. Reported recent heart attack.     COMPARISON: Chest radiograph 6/23/2025     FINDINGS: A single view of the chest was obtained.     Support Devices:  None.  Cardiac Silhouette/Mediastinum/Wendie:  The cardiac, mediastinal, and  hilar contours are within normal limits.  Lungs/Pleural Spaces:  The lungs and pleural spaces are clear.  Chest Wall/Diaphragm/Upper Abdomen: There is a hiatal hernia. The  visualized osseous structures are similar.        CONCLUSION(S):       1.  No focal consolidation or effusion.  2.  Hiatal hernia.     This report was finalized on 7/13/2025 6:35 PM by Dr. La Blount M.D  on Workstation:  [id number]             ECG/EMG Results (all)       Procedure Component Value Units Date/Time    ECG 12 Lead ED Triage Standing Order; Chest Pain [[id number]] Collected: 25 1751     Updated: 25 1820     QT Interval 355 ms      QTC Interval 490 ms     Narrative:      HEART PELB=735  bpm  RR Tdxarffu=839  ms  OH Acvljbrc=413  ms  P Horizontal Axis=25  deg  P Front Axis=44  deg  QRSD Interval=88  ms  QT Mqkvwyck=558  ms  RWwE=921  ms  QRS Axis=38  deg  T Wave Axis=38  deg  - ABNORMAL ECG -  Sinus tachycardia  Inferior  infarct, old  When compared with ECG of 2025 09:20:24,  Significant repolarization change  Significant axis, voltage or hypertrophy change  Electronically Signed By: Efrem Beltre ([facility]) 2025 18:19:59  Date and Time of Study:2025 17:51:54    Telemetry Scan [[id number]] Resulted: 25 0138     Updated: 25 0206    Telemetry Scan [[id number]] Resulted: 25 0530     Updated: 25 0637             Operative/Procedure Notes (all)        Maged Pedroza MD at 25 0003  Version 1 of 1         Operative Report    [facility] MAIN OR    Patient: Wilma Longo  Age:      65 y.o.  :     1960  Sex:      female    Medical Record:  [id number]    Date of Operation/Procedure:  2025    Pre-operative Diagnosis: Left urolithiasis    Post-operative Diagnosis: Left urolithiasis    Surgeon: Maged Pedroza MD    Name of Operation/Procedure:  Procedure(s) and Anesthesia Type:     * CYSTOSCOPY URETERAL CATHETER/STENT INSERTION - General    Findings/Complications: Delayed nephrogram with contrast extending to the distal ureter with what appears to be a filling defect overlying the sacrum    Of note there was some delayed contrast in the right kidney as well but appeared to be associated with an extrarenal pelvis.  There were no stones or obstruction on that side on the CT    Description of procedure:     After appropriate discussion of risks and benefits and  informed consent obtained patient was transported to the operating room and positioned in supine position on the operating table.  General anesthesia was induced.  Timeout was completed.  Preoperative antibiotics were administered.  Patient was repositioned into dorsolithotomy and prepped and draped in sterile fashion.  Cystoscope inserted per urethra and advanced into the bladder.  Bladder was free of lesions.  The left ureteral orifice was cannulated the sensor guidewire advanced up to the renal pelvis under fluoroscopic guidance.  A 7 [place] by 24 cm double-J ureteral stent was inserted over the wire and up to the renal pelvis under fluoroscopic guidance.  The wire was pulled leaving a good proximal and distal curl.  There was no purulence expressed from the left side.  This concluded the procedure.  The patient was extubated and transported to PACU in stable condition.    Estimated Blood Loss: None    Specimens: * No orders in the log *    Fluids/Drains: 7 [place] by 24 cm double-J ureteral stent, left side, no string    Maged Pedroza MD  7/14/2025  00:12 EDT       Electronically signed by Maged Pedroza MD at 07/14/25 0014       Physician Progress Notes (all)    No notes of this type exist for this encounter.          Consult Notes (all)        Trini Wade, APRDEJAN at 07/14/25 1521        Consult Orders    1. Inpatient Gastroenterology Consult [[id number]] ordered by Cely Clarke MD at 07/14/25 1444                 [facility]  Initial Inpatient Consult Note    Referring Provider: A     Reason for Consultation: Chronic diarrhea    Subjective     History of present illness:      Thank you for allowing us to participate in the care of this patient.    65 y.o. female who is known to our service with a history of irritable bowel syndrome diarrhea predominant, lymphocytic colitis and GERD who is currently admitted for urosepsis secondary to obstructing left ureteral stone status  post stent placement in the OR whom we have been asked to see for chronic diarrhea.    This patient has struggled with chronic diarrhea for several years.  She is felt the best on colestipol for which she takes 2 pills twice daily.  With current urinary issues she had worsening of baseline IBS with increased diarrhea and even nausea and vomiting.  She feels much better today.  Denies rectal bleeding or rectal pain.  Appetite has been good.  Her weight has been stable.    2/20/2023 colonoscopy showed normal macroscopically with biopsies consistent with lymphocytic colitis. No FH CRC. Recall 10 years.     2/20/2023 EGD showed variable Z-line, 3 cm hiatal hernia, patchy mild gastritis.  Pathology with chronic inactive gastritis, negative H. Pylori; GEJ: Columnar mucosa with chronic inflammation, negative intestinal metaplasia.    Past Medical History:  Past Medical History:   Diagnosis Date    Anemia 5/2022    Anxiety     Osman esophagus ?    Benign essential hypertension 02/01/2015    Cancer     Skin cancer    Cardiomyopathy     Colitis 06/12/2009    Depression     Fibromyalgia     Fibromyalgia, primary     Gestational diabetes mellitus 06/1994    History of gestational diabetes     Hyperlipidemia 04/13/2011    Hypothyroidism 02/01/2015    Inflammatory bowel disease 06/12/2009    Insomnia 02/03/2015    Irritable bowel syndrome 11/30/2009    Panic attacks     Primary bile acid malabsorption     fecal incontinence    Rectocele     Skin cancer of arm     Vaginal prolapse     Vitamin D deficiency 07/19/2012     Past Surgical History:  Past Surgical History:   Procedure Laterality Date    ANTERIOR AND POSTERIOR VAGINAL REPAIR N/A 04/06/2021    Procedure: POSTERIOR VAGINAL REPAIR, CYSTOSCOPY;  Surgeon: Cora Gustafson MD;  Location: [facility];  Service: Gynecology;  Laterality: N/A;    BLADDER REPAIR  09/2015    X2    CARDIAC CATHETERIZATION N/A 6/23/2025    Procedure: Left Heart Cath;  Surgeon: Dedra Guzman MD;   Location: [facility] CATH INVASIVE LOCATION;  Service: Cardiovascular;  Laterality: N/A;    CARDIAC CATHETERIZATION N/A 6/23/2025    Procedure: Coronary angiography;  Surgeon: Dedra Guzman MD;  Location: [facility] CATH INVASIVE LOCATION;  Service: Cardiovascular;  Laterality: N/A;    CARDIAC CATHETERIZATION N/A 6/23/2025    Procedure: Left ventriculography;  Surgeon: Dedra Guzman MD;  Location: [facility] CATH INVASIVE LOCATION;  Service: Cardiovascular;  Laterality: N/A;    COLONOSCOPY  2010    wnl    COLONOSCOPY N/A 09/09/2016    Procedure: COLONOSCOPY into cecum and terminal ileum with biopsies;  Surgeon: Orlin Mann MD;  Location: [facility] ENDOSCOPY;  Service:     COLONOSCOPY N/A 09/06/2019    Procedure: COLONOSCOPY TO CECUM AND INTO TERMINAL ILEUM;  Surgeon: Orlin Mann MD;  Location: [facility] ENDOSCOPY;  Service: Gastroenterology    COLONOSCOPY W/ POLYPECTOMY N/A 02/20/2023    Procedure: COLONOSCOPY INTO CECUM AND TI WITH BIOPSIES;  Surgeon: Basilio Martin MD;  Location: [facility] ENDOSCOPY;  Service: Gastroenterology;  Laterality: N/A;  PRE: COLITIS  POST: NORMAL COLON    CYSTOSCOPY W/ URETERAL STENT PLACEMENT      CYSTOSCOPY W/ URETERAL STENT PLACEMENT Left 7/13/2025    Procedure: CYSTOSCOPY URETERAL CATHETER/STENT INSERTION;  Surgeon: Maged Pedroza MD;  Location: [facility] OR;  Service: Urology;  Laterality: Left;    ENDOSCOPY N/A 09/06/2019    Procedure: ESOPHAGOGASTRODUODENOSCOPY WITH COLD BIOPSIES AND 48F HERNÁNDEZ DILATION;  Surgeon: Orlin Mann MD;  Location: [facility] ENDOSCOPY;  Service: Gastroenterology    ENDOSCOPY N/A 02/20/2023    Procedure: ESOPHAGOGASTRODUODENOSCOPY WITH BIOPSIES;  Surgeon: Basilio Martin MD;  Location: [facility] ENDOSCOPY;  Service: Gastroenterology;  Laterality: N/A;  PRE: MENDEZ'S  POST: HIATAL HERNIA, GASTRITIS    HYSTERECTOMY  12/2013    MOUTH SURGERY      multiple teeth extraction    SACROCOLPOPEXY N/A 04/06/2021    Procedure: LARPAROSCOPY  COLPOPEXY, BILATERAL SALPINGECTOMY;  Surgeon: Cora Gustafson MD;  Location: [facility] OR;  Service: Gynecology;  Laterality: N/A;    SKIN CANCER EXCISION      UPPER GASTROINTESTINAL ENDOSCOPY  09/06/2019      Social History:   Social History     Tobacco Use    Smoking status: Never    Smokeless tobacco: Never   Substance Use Topics    Alcohol use: Not Currently     Comment: less than once per  month      Family History:  Family History   Problem Relation Age of Onset    Depression Mother     Heart disease Mother     Stroke Mother     Heart attack Mother     Heart failure Mother     Hyperlipidemia Mother     Hypertension Mother     Irritable bowel syndrome Mother     Arthritis Father     Heart disease Father     Skin cancer Father     Heart attack Father     Heart failure Father     Heart disease Maternal Grandmother     Stroke Maternal Grandfather     Stroke Paternal Grandfather     Hypertension Sister     Hypothyroidism Sister     Hypertension Brother     Hypothyroidism Daughter     Thyroid disease Sister     Colon cancer Neg Hx     Colon polyps Neg Hx     Liver disease Neg Hx     Rectal cancer Neg Hx     Stomach cancer Neg Hx     Liver cancer Neg Hx     Malig Hyperthermia Neg Hx        Home Meds:  Medications Prior to Admission   Medication Sig Dispense Refill Last Dose/Taking    ALPRAZolam (Xanax) 0.5 MG tablet Take 1 tablet by mouth Daily As Needed for Anxiety. D/c previous order 30 tablet 2 Taking As Needed    amLODIPine (NORVASC) 5 MG tablet TAKE 1 TABLET BY MOUTH DAILY (Patient taking differently: Take 1 tablet by mouth Every Night.) 14 tablet 0 Taking Differently    aspirin 81 MG chewable tablet Chew 1 tablet Daily.   Taking    colestipol (COLESTID) 1 g tablet Take 2 tablets by mouth 2 (Two) Times a Day. 360 tablet 3 Taking    Cranberry 125 MG tablet Take 1 tablet by mouth Daily.   Taking    DULoxetine (CYMBALTA) 60 MG capsule TAKE 1 CAPSULE BY MOUTH DAILY (Patient taking differently: Take 1 capsule by  mouth Every Night.) 14 capsule 0 Taking Differently    levothyroxine (SYNTHROID, LEVOTHROID) 75 MCG tablet TAKE 1 TABLET BY MOUTH DAILY 14 tablet 0 Taking    lisinopril (PRINIVIL,ZESTRIL) 20 MG tablet TAKE 1 TABLET BY MOUTH DAILY (Patient taking differently: Take 1 tablet by mouth Every Night.) 14 tablet 0 Taking Differently    naproxen (NAPROSYN) 500 MG tablet Take 1 tablet by mouth Daily As Needed for Moderate Pain. 180 tablet 1 Taking As Needed    rosuvastatin (CRESTOR) 20 MG tablet Take 1 tablet by mouth Daily. 90 tablet 0 Taking    vitamin C (ASCORBIC ACID) 250 MG tablet Take 2 tablets by mouth Every Night.   Taking    valACYclovir (VALTREX) 500 MG tablet Take 1 tablet by mouth As Needed.        Current Meds:   amLODIPine, 5 mg, Oral, Nightly  aspirin, 81 mg, Oral, Daily  DULoxetine, 60 mg, Oral, Nightly  levothyroxine, 75 mcg, Oral, Q AM  piperacillin-tazobactam, 3.375 g, Intravenous, Q8H  rosuvastatin, 20 mg, Oral, Daily      Allergies:  No Known Allergies    Objective:  Objective     Vital Signs  Temp:  [97.3 °F (36.3 °C)-102.9 °F (39.4 °C)] 97.3 °F (36.3 °C)  Heart Rate:  [] 78  Resp:  [15-31] 18  BP: (112-150)/(59-96) 112/64  Physical Exam:   Constitutional:    Alert, cooperative, in no acute distress    Abdomen:     Soft, nondistended, nontender; normal bowel sounds    Results Review:   I reviewed the patient's new clinical results.    Results from last 7 days   Lab Units 07/14/25  0846 07/13/25  1753   WBC 10*3/mm3 13.19* 16.21*   HEMOGLOBIN g/dL 11.2* 12.2   HEMATOCRIT % 34.5 36.8   PLATELETS 10*3/mm3 242 283     Results from last 7 days   Lab Units 07/14/25  0846 07/13/25  1753   SODIUM mmol/L 141 136   POTASSIUM mmol/L 3.8 2.8*   CHLORIDE mmol/L 108* 97*   CO2 mmol/L 23.4 24.1   BUN mg/dL 15.0 19.0   CREATININE mg/dL 0.97 1.26*   CALCIUM mg/dL 8.6 9.1   BILIRUBIN mg/dL  --  0.5   ALK PHOS U/L  --  78   ALT (SGPT) U/L  --  17   AST (SGOT) U/L  --  20   GLUCOSE mg/dL 208* 138*         No results  "found for: \"LIPASE\"    Radiology:  FL C Arm During Surgery   Final Result      CT Abdomen Pelvis With Contrast   Final Result       1.  Pulmonary arteries are well-opacified, and there is no evidence of   pulmonary embolism.       2.  There is minimal bibasilar dependent atelectasis, but the lung   parenchyma is otherwise normal. No acute pulmonary parenchymal   abnormality is seen.       3.   Mild to moderate left hydronephrosis secondary to a 4 mm stone in   the distal left ureter, 8 to 10 cm above the ureterovesical junction.                       This report was finalized on 7/13/2025 10:15 PM by Dr. Wojciech Roth M.D on Workstation: [id number]          CT Angiogram Chest Pulmonary Embolism   Final Result       1.  Pulmonary arteries are well-opacified, and there is no evidence of   pulmonary embolism.       2.  There is minimal bibasilar dependent atelectasis, but the lung   parenchyma is otherwise normal. No acute pulmonary parenchymal   abnormality is seen.       3.   Mild to moderate left hydronephrosis secondary to a 4 mm stone in   the distal left ureter, 8 to 10 cm above the ureterovesical junction.                       This report was finalized on 7/13/2025 10:15 PM by Dr. Wojciech Roth M.D on Workstation: [id number]          XR Chest 1 View   Final Result          Assessment & Plan   Active Hospital Problems    Diagnosis     **Left ureteral stone     BALBINA (acute kidney injury)     Sepsis     Hypokalemia     Acute UTI     Coronary artery disease involving native coronary artery of native heart without angina pectoris     Anxiety associated with depression     Benign essential hypertension     Hypothyroidism     Hyperlipidemia     Irritable bowel syndrome        Assessment:  Urosepsis secondary to left ureteral stone  History of chronic diarrhea, IBS-D and lymphocytic colitis likely exacerbated by #1    Plan:  Low residue diet as tolerated  Okay to resume home dosage of colestipol 2 tablets " p.o. twice daily, patient can use home supply  Check fecal calprotectin  Consider budesonide pending patient's hospital course   CBC, CMP, CRP, sed rate and TSH in the morning    I discussed the patients findings and my recommendations with patient, family, and Dr. Cardenas.    Shannon dictation used throughout this note.            HEATH Caruso  [facility]  [address] [place] [address]  [phone] office  [phone] fax  [facility]Pro[facility] Hospital[facility]          Electronically signed by Trini Wade APRN at 25 1612       Maged Pedroza MD at 25 [phone]        Consult Orders    1. Urology (on-call MD unless specified) [[id number]] ordered by Tashi Najera MD at 25 2227                      FIRST UROLOGY CONSULT      Patient Identification:  NAME:  Wilma Longo  Age:  65 y.o.   Sex:  female   :  1960   MRN:  [id number]     Chief complaint: Left flank pain    History of present illness:      65-year-old female admitted with left flank pain, nausea, vomiting, fevers    In hospital:  -AVSS, good UOP  -WBC -16  -Creat -1.26  -UA -3-5 RBC, 11-20 WBC, trace bacteria    Lactic acid 0.5 < 2.2    -CT -independently interpreted; left hydronephrosis secondary to a 5 mm distal left ureteral stone    Asked to see    Past medical history:  Past Medical History:   Diagnosis Date    Anemia 2022    Anxiety     Osman esophagus ?    Benign essential hypertension 2015    Cancer     Skin cancer    Cardiomyopathy     Colitis 2009    Depression     Fibromyalgia     Fibromyalgia, primary     Gestational diabetes mellitus 1994    History of gestational diabetes     Hyperlipidemia 2011    Hypothyroidism 2015    Inflammatory bowel disease 2009    Insomnia 2015    Irritable bowel syndrome 2009    Panic attacks     Primary bile acid malabsorption     fecal incontinence    Rectocele     Skin cancer of arm     Vaginal prolapse      Vitamin D deficiency 07/19/2012       Past surgical history:  Past Surgical History:   Procedure Laterality Date    ANTERIOR AND POSTERIOR VAGINAL REPAIR N/A 04/06/2021    Procedure: POSTERIOR VAGINAL REPAIR, CYSTOSCOPY;  Surgeon: Cora Gustafson MD;  Location: [facility] MAIN OR;  Service: Gynecology;  Laterality: N/A;    BLADDER REPAIR  09/2015    X2    CARDIAC CATHETERIZATION N/A 6/23/2025    Procedure: Left Heart Cath;  Surgeon: Dedra Guzman MD;  Location: [facility] CATH INVASIVE LOCATION;  Service: Cardiovascular;  Laterality: N/A;    CARDIAC CATHETERIZATION N/A 6/23/2025    Procedure: Coronary angiography;  Surgeon: Dedra Guzman MD;  Location: [facility] CATH INVASIVE LOCATION;  Service: Cardiovascular;  Laterality: N/A;    CARDIAC CATHETERIZATION N/A 6/23/2025    Procedure: Left ventriculography;  Surgeon: Dedra Guzman MD;  Location: [facility] CATH INVASIVE LOCATION;  Service: Cardiovascular;  Laterality: N/A;    COLONOSCOPY  2010    wnl    COLONOSCOPY N/A 09/09/2016    Procedure: COLONOSCOPY into cecum and terminal ileum with biopsies;  Surgeon: Orlin Mann MD;  Location: [facility] ENDOSCOPY;  Service:     COLONOSCOPY N/A 09/06/2019    Procedure: COLONOSCOPY TO CECUM AND INTO TERMINAL ILEUM;  Surgeon: Orlin Mann MD;  Location: [facility] ENDOSCOPY;  Service: Gastroenterology    COLONOSCOPY W/ POLYPECTOMY N/A 02/20/2023    Procedure: COLONOSCOPY INTO CECUM AND TI WITH BIOPSIES;  Surgeon: Basilio Martin MD;  Location: [facility] ENDOSCOPY;  Service: Gastroenterology;  Laterality: N/A;  PRE: COLITIS  POST: NORMAL COLON    CYSTOSCOPY W/ URETERAL STENT PLACEMENT      ENDOSCOPY N/A 09/06/2019    Procedure: ESOPHAGOGASTRODUODENOSCOPY WITH COLD BIOPSIES AND 48F EHRNÁNDEZ DILATION;  Surgeon: Orlin Mann MD;  Location: [facility] ENDOSCOPY;  Service: Gastroenterology    ENDOSCOPY N/A 02/20/2023    Procedure: ESOPHAGOGASTRODUODENOSCOPY WITH BIOPSIES;  Surgeon: Basilio Martin MD;  Location: [facility]  ENDOSCOPY;  Service: Gastroenterology;  Laterality: N/A;  PRE: MENDEZ'S  POST: HIATAL HERNIA, GASTRITIS    HYSTERECTOMY  12/2013    MOUTH SURGERY      multiple teeth extraction    SACROCOLPOPEXY N/A 04/06/2021    Procedure: LARPAROSCOPY COLPOPEXY, BILATERAL SALPINGECTOMY;  Surgeon: Cora Gustafson MD;  Location: [facility] OR;  Service: Gynecology;  Laterality: N/A;    SKIN CANCER EXCISION      UPPER GASTROINTESTINAL ENDOSCOPY  09/06/2019       Allergies:  Patient has no known allergies.    Home medications:  Medications Prior to Admission   Medication Sig Dispense Refill Last Dose/Taking    ALPRAZolam (Xanax) 0.5 MG tablet Take 1 tablet by mouth Daily As Needed for Anxiety. D/c previous order 30 tablet 2     amLODIPine (NORVASC) 5 MG tablet TAKE 1 TABLET BY MOUTH DAILY (Patient taking differently: Take 1 tablet by mouth Every Night.) 14 tablet 0     aspirin 81 MG chewable tablet Chew 1 tablet Daily.       colestipol (COLESTID) 1 g tablet Take 2 tablets by mouth 2 (Two) Times a Day. 360 tablet 3     Cranberry 125 MG tablet Take 1 tablet by mouth Daily.       DULoxetine (CYMBALTA) 60 MG capsule TAKE 1 CAPSULE BY MOUTH DAILY (Patient taking differently: Take 1 capsule by mouth Every Night.) 14 capsule 0     levothyroxine (SYNTHROID, LEVOTHROID) 75 MCG tablet TAKE 1 TABLET BY MOUTH DAILY 14 tablet 0     lisinopril (PRINIVIL,ZESTRIL) 20 MG tablet TAKE 1 TABLET BY MOUTH DAILY (Patient taking differently: Take 1 tablet by mouth Every Night.) 14 tablet 0     naproxen (NAPROSYN) 500 MG tablet Take 1 tablet by mouth Daily As Needed for Moderate Pain. 180 tablet 1     rosuvastatin (CRESTOR) 20 MG tablet Take 1 tablet by mouth Daily. 90 tablet 0     valACYclovir (VALTREX) 500 MG tablet Take 1 tablet by mouth As Needed.       vitamin C (ASCORBIC ACID) 250 MG tablet Take 2 tablets by mouth Every Night.           Hospital medications:  vancomycin, 20 mg/kg, Intravenous, Once           [Transfer Hold] aluminum-magnesium  hydroxide-simethicone    [Transfer Hold] senna-docusate sodium **AND** [Transfer Hold] polyethylene glycol **AND** [Transfer Hold] bisacodyl **AND** [Transfer Hold] bisacodyl    [Transfer Hold] Morphine    [Transfer Hold] nitroglycerin    [Transfer Hold] ondansetron ODT **OR** [Transfer Hold] ondansetron    Potassium Replacement - Follow Nurse / BPA Driven Protocol    [Transfer Hold] sodium chloride    [Transfer Hold] sodium chloride    [Transfer Hold] sodium chloride    Family history:  Family History   Problem Relation Age of Onset    Depression Mother     Heart disease Mother     Stroke Mother     Heart attack Mother     Heart failure Mother     Hyperlipidemia Mother     Hypertension Mother     Irritable bowel syndrome Mother     Arthritis Father     Heart disease Father     Skin cancer Father     Heart attack Father     Heart failure Father     Heart disease Maternal Grandmother     Stroke Maternal Grandfather     Stroke Paternal Grandfather     Hypertension Sister     Hypothyroidism Sister     Hypertension Brother     Hypothyroidism Daughter     Thyroid disease Sister     Colon cancer Neg Hx     Colon polyps Neg Hx     Liver disease Neg Hx     Rectal cancer Neg Hx     Stomach cancer Neg Hx     Liver cancer Neg Hx     Malig Hyperthermia Neg Hx        Social history:  Social History     Tobacco Use    Smoking status: Never    Smokeless tobacco: Never   Vaping Use    Vaping status: Never Used   Substance Use Topics    Alcohol use: Not Currently     Comment: less than once per  month    Drug use: No       Review of systems:      Positive for:  nothing  Negative for:  chest pain, cough, sob, o/w neg    Objective:  TMax 24 hours:   Temp (24hrs), Av.1 °F (37.3 °C), Min:99.1 °F (37.3 °C), Max:99.1 °F (37.3 °C)      Vitals Ranges:   Temp:  [99.1 °F (37.3 °C)] 99.1 °F (37.3 °C)  Heart Rate:  [] 98  Resp:  [20] 20  BP: (115-150)/(59-96) 137/78    Intake/Output Last 3 shifts:  No intake/output data recorded.      Physical Exam:    General Appearance:    Alert, cooperative, NAD   Back:     No CVA tenderness   Lungs:     Respirations unlabored, no wheezing    Heart:    RRR, intact peripheral pulses   Abdomen:     Soft, NDNT, no masses, no guarding   :    Pelvic not performed, bladder nondistended and nontender   Neuro/Psych:   Orientation intact, mood/affect pleasant       Results review:   I reviewed the patient's new clinical results.    Data review:  Lab Results (last 24 hours)       Procedure Component Value Units Date/Time    Urinalysis With Microscopic If Indicated (No Culture) - Urine, Clean Catch [[id number]]  (Abnormal) Collected: 07/13/25 2228    Specimen: Urine, Clean Catch Updated: 07/13/25 2253     Color, UA Yellow     Appearance, UA Clear     pH, UA 6.5     Specific Gravity, UA 1.028     Glucose, UA Negative     Ketones, UA Trace     Bilirubin, UA Negative     Blood, UA Trace     Protein, UA Trace     Leuk Esterase, UA Small (1+)     Nitrite, UA Negative     Urobilinogen, UA 0.2 E.U./dL    Urinalysis, Microscopic Only - Urine, Clean Catch [[id number]]  (Abnormal) Collected: 07/13/25 2228    Specimen: Urine, Clean Catch Updated: 07/13/25 2253     RBC, UA 3-5 /HPF      WBC, UA 11-20 /HPF      Bacteria, UA Trace /HPF      Squamous Epithelial Cells, UA 0-2 /HPF      Hyaline Casts, UA None Seen /LPF      Methodology Automated Microscopy    STAT Lactic Acid, Reflex [[id number]]  (Normal) Collected: 07/13/25 2210    Specimen: Blood Updated: 07/13/25 2250     Lactate 0.5 mmol/L     D-dimer, Quantitative [[id number]]  (Abnormal) Collected: 07/13/25 1753    Specimen: Blood Updated: 07/13/25 2029     D-Dimer, Quantitative 1.28 MCGFEU/mL     Narrative:      According to the assay 's published package insert, a normal (<0.50 MCGFEU/mL) D-dimer result in conjunction with a non-high clinical probability assessment, excludes deep vein thrombosis (DVT) and pulmonary embolism (PE) with high sensitivity.    D-dimer  "values increase with age and this can make VTE exclusion of an older population difficult. To address this, the [employer], based on best available evidence and recent guidelines, recommends that clinicians use age-adjusted D-dimer thresholds in patients greater than 50 years of age with: a) a low probability of PE who do not meet all Pulmonary Embolism Rule Out Criteria, or b) in those with intermediate probability of PE.   The formula for an age-adjusted D-dimer cut-off is \"age/100\".  For example, a 60 year old patient would have an age-adjusted cut-off of 0.60 MCGFEU/mL and an 80 year old 0.80 MCGFEU/mL.    High Sensitivity Troponin T 1Hr [[id number]]  (Abnormal) Collected: 07/13/25 1905    Specimen: Blood from Arm, Right Updated: 07/13/25 1939     HS Troponin T 64 ng/L      Troponin T Numeric Delta -15 ng/L      Troponin T % Delta -19    Narrative:      High Sensitive Troponin T Reference Range:  <14.0 ng/L- Negative Female for AMI  <22.0 ng/L- Negative Male for AMI  >=14 - Abnormal Female indicating possible myocardial injury.  >=22 - Abnormal Male indicating possible myocardial injury.   Clinicians would have to utilize clinical acumen, EKG, Troponin, and serial changes to determine if it is an Acute Myocardial Infarction or myocardial injury due to an underlying chronic condition.         Lactic Acid, Plasma [[id number]]  (Abnormal) Collected: 07/13/25 1905    Specimen: Blood from Arm, Right Updated: 07/13/25 1938     Lactate 2.2 mmol/L     [place] Draw [[id number]] Collected: 07/13/25 1905    Specimen: Blood Updated: 07/13/25 1915    Narrative:      The following orders were created for panel order [place] Draw.  Procedure                               Abnormality         Status                     ---------                               -----------         ------                     Green Top (Gel)[[id number]]                                  Final result               Lavender " "Top[[id number]]                                                                Gold Top - SST[[id number]]                                                              Light Blue Top[[id number]]                                                                Please view results for these tests on the individual orders.    Green Top (Gel) [[id number]] Collected: 07/13/25 1905    Specimen: Blood from Arm, Right Updated: 07/13/25 1915     Extra Tube Hold for add-ons.     Comment: Auto resulted.       Blood Culture - Blood, Arm, Left [[id number]] Collected: 07/13/25 1906    Specimen: Blood from Arm, Left Updated: 07/13/25 1912    Blood Culture - Blood, Arm, Right [[id number]] Collected: 07/13/25 1905    Specimen: Blood from Arm, Right Updated: 07/13/25 1911    Procalcitonin [[id number]]  (Abnormal) Collected: 07/13/25 1753    Specimen: Blood Updated: 07/13/25 1905     Procalcitonin 20.70 ng/mL     Narrative:      As a Marker for Sepsis (Non-Neonates):    1. <0.5 ng/mL represents a low risk of severe sepsis and/or septic shock.  2. >2 ng/mL represents a high risk of severe sepsis and/or septic shock.    As a Marker for Lower Respiratory Tract Infections that require antibiotic therapy:    PCT on Admission    Antibiotic Therapy       6-12 Hrs later    >0.5                Strongly Recommended  >0.25 - <0.5        Recommended   0.1 - 0.25          Discouraged              Remeasure/reassess PCT  <0.1                Strongly Discouraged     Remeasure/reassess PCT    As 28 day mortality risk marker: \"Change in Procalcitonin Result\" (>80% or <=80%) if Day 0 (or Day 1) and Day 4 values are available. Refer to http://www.Quottes-pct-calculator.com    Change in PCT <=80%  A decrease of PCT levels below or equal to 80% defines a positive change in PCT test result representing a higher risk for 28-day all-cause mortality of patients diagnosed with severe sepsis for septic shock.    Change in PCT >80%  A decrease of PCT levels of more than " 80% defines a negative change in PCT result representing a lower risk for 28-day all-cause mortality of patients diagnosed with severe sepsis or septic shock.       Magnesium [[id number]]  (Normal) Collected: 07/13/25 1753    Specimen: Blood Updated: 07/13/25 1849     Magnesium 1.8 mg/dL     High Sensitivity Troponin T [[id number]]  (Abnormal) Collected: 07/13/25 1753    Specimen: Blood Updated: 07/13/25 1832     HS Troponin T 79 ng/L     Narrative:      High Sensitive Troponin T Reference Range:  <14.0 ng/L- Negative Female for AMI  <22.0 ng/L- Negative Male for AMI  >=14 - Abnormal Female indicating possible myocardial injury.  >=22 - Abnormal Male indicating possible myocardial injury.   Clinicians would have to utilize clinical acumen, EKG, Troponin, and serial changes to determine if it is an Acute Myocardial Infarction or myocardial injury due to an underlying chronic condition.         Comprehensive Metabolic Panel [[id number]]  (Abnormal) Collected: 07/13/25 1753    Specimen: Blood Updated: 07/13/25 1825     Glucose 138 mg/dL      BUN 19.0 mg/dL      Creatinine 1.26 mg/dL      Sodium 136 mmol/L      Potassium 2.8 mmol/L      Chloride 97 mmol/L      CO2 24.1 mmol/L      Calcium 9.1 mg/dL      Total Protein 7.7 g/dL      Albumin 3.7 g/dL      ALT (SGPT) 17 U/L      AST (SGOT) 20 U/L      Alkaline Phosphatase 78 U/L      Total Bilirubin 0.5 mg/dL      Globulin 4.0 gm/dL      A/G Ratio 0.9 g/dL      BUN/Creatinine Ratio 15.1     Anion Gap 14.9 mmol/L      eGFR 47.5 mL/min/1.73     Narrative:      GFR Categories in Chronic Kidney Disease (CKD)              GFR Category          GFR (mL/min/1.73)    Interpretation  G1                    90 or greater        Normal or high (1)  G2                    60-89                Mild decrease (1)  G3a                   45-59                Mild to moderate decrease  G3b                   30-44                Moderate to severe decrease  G4                    15-29                 Severe decrease  G5                    14 or less           Kidney failure    (1)In the absence of evidence of kidney disease, neither GFR category G1 or G2 fulfill the criteria for CKD.    eGFR calculation 2021 CKD-EPI creatinine equation, which does not include race as a factor    CBC & Differential [[id number]]  (Abnormal) Collected: 07/13/25 1753    Specimen: Blood Updated: 07/13/25 1803    Narrative:      The following orders were created for panel order CBC & Differential.  Procedure                               Abnormality         Status                     ---------                               -----------         ------                     CBC Auto Differential[[id number]]        Abnormal            Final result                 Please view results for these tests on the individual orders.    CBC Auto Differential [[id number]]  (Abnormal) Collected: 07/13/25 1753    Specimen: Blood Updated: 07/13/25 1803     WBC 16.21 10*3/mm3      RBC 4.16 10*6/mm3      Hemoglobin 12.2 g/dL      Hematocrit 36.8 %      MCV 88.5 fL      MCH 29.3 pg      MCHC 33.2 g/dL      RDW 12.6 %      RDW-SD 40.5 fl      MPV 9.7 fL      Platelets 283 10*3/mm3      Neutrophil % 76.6 %      Lymphocyte % 14.8 %      Monocyte % 7.7 %      Eosinophil % 0.4 %      Basophil % 0.2 %      Immature Grans % 0.3 %      Neutrophils, Absolute 12.40 10*3/mm3      Lymphocytes, Absolute 2.40 10*3/mm3      Monocytes, Absolute 1.25 10*3/mm3      Eosinophils, Absolute 0.07 10*3/mm3      Basophils, Absolute 0.04 10*3/mm3      Immature Grans, Absolute 0.05 10*3/mm3      nRBC 0.0 /100 WBC     [place] Draw [[id number]] Collected: 07/13/25 1753    Specimen: Blood Updated: 07/13/25 1800    Narrative:      The following orders were created for panel order [place] Draw.  Procedure                               Abnormality         Status                     ---------                               -----------         ------                     [facility]  (Gel)[[id number]]                                  Final result               Lavender Top[[id number]]                                     Final result               Gold Top - SST[[id number]]                                   Final result               Light Blue Top[[id number]]                                   Final result                 Please view results for these tests on the individual orders.    Green Top (Gel) [[id number]] Collected: 07/13/25 1753    Specimen: Blood Updated: 07/13/25 1800     Extra Tube Hold for add-ons.     Comment: Auto resulted.       Lavender Top [[id number]] Collected: 07/13/25 1753    Specimen: Blood Updated: 07/13/25 1800     Extra Tube hold for add-on     Comment: Auto resulted       Gold Top - SST [[id number]] Collected: 07/13/25 1753    Specimen: Blood Updated: 07/13/25 1800     Extra Tube Hold for add-ons.     Comment: Auto resulted.       Light Blue Top [[id number]] Collected: 07/13/25 1753    Specimen: Blood Updated: 07/13/25 1800     Extra Tube Hold for add-ons.     Comment: Auto resulted                Imaging:  Imaging Results (Last 24 Hours)       Procedure Component Value Units Date/Time    CT Abdomen Pelvis With Contrast [[id number]] Collected: 07/13/25 2206     Updated: 07/13/25 2218    Narrative:      CTA CHEST, CT ABDOMEN AND PELVIS     HISTORY: Pulmonary Embolism; R07.9-Chest pain, unspecified; R79.89-Other  specified abnormal findings of blood chemistry; E87.6-Hypokalemia;  R19.7-Diarrhea, unspecified; D72.829-Elevated white blood cell count,  unspecified; A41.9-Sepsis, unspecified organism     COMPARISON: None     TECHNIQUE: CT angiography was performed of the chest with axial images  as well as coronal and sagittal reformatted MIP images provided  following the administration of IV contrast.  3-D surface rendered  reformats were obtained of the pulmonary arteries and aorta.  CT of the  abdomen and pelvis obtained following administration of IV contrast.  Coronal and  sagittal reconstructions were obtained.   Radiation dose  reduction techniques were utilized, including automated exposure  control, and exposure modulation based on body size.     FINDINGS:     Chest CTA:  There is mild bibasilar atelectasis, but there is no  evidence of acute pulmonary infiltrate, pleural effusion, pneumothorax  or suspicious nodule.  The thoracic aorta is normal in caliber, and  there is no evidence of dissection.  There are coronary atherosclerotic  vascular calcifications.  There is no suspicious mediastinal adenopathy  or other mass, though there is a small to moderate hiatal hernia.  Bolus  timing is adequate, and there is no evidence of pulmonary embolism.     Abdomen: The liver and gallbladder are normal.  The spleen and pancreas  appear normal.  Both adrenal glands are normal. There is no evidence of  bowel obstruction. The aorta is normal in caliber.       : There is mild atrophic change in the right kidney and there is a  right renal 4 mm nonobstructing calculus, but there is no right  hydronephrosis. No left renal calculi are seen, but there is mild to  moderate left hydronephrosis, and the left ureter is dilated down into  the pelvis, to the level of an approximately 4 mm stone, 8 to 10 cm  above the ureterovesical junction. No bladder calculi are seen.     Pelvis:  The appendix is clearly identified, and is normal.  There is  diverticulosis, but there is no CT evidence of diverticulitis.  There is  no pelvic adenopathy or other soft tissue mass.  There is no CT evidence  of hernia or bowel obstruction.     There is no acute bony abnormality.       Impression:         1.  Pulmonary arteries are well-opacified, and there is no evidence of  pulmonary embolism.     2.  There is minimal bibasilar dependent atelectasis, but the lung  parenchyma is otherwise normal. No acute pulmonary parenchymal  abnormality is seen.     3.   Mild to moderate left hydronephrosis secondary to a 4 mm stone  in  the distal left ureter, 8 to 10 cm above the ureterovesical junction.                 This report was finalized on 7/13/2025 10:15 PM by Dr. Wojciech Roth M.D on Workstation: [id number]       CT Angiogram Chest Pulmonary Embolism [[id number]] Collected: 07/13/25 2206     Updated: 07/13/25 2218    Narrative:      CTA CHEST, CT ABDOMEN AND PELVIS     HISTORY: Pulmonary Embolism; R07.9-Chest pain, unspecified; R79.89-Other  specified abnormal findings of blood chemistry; E87.6-Hypokalemia;  R19.7-Diarrhea, unspecified; D72.829-Elevated white blood cell count,  unspecified; A41.9-Sepsis, unspecified organism     COMPARISON: None     TECHNIQUE: CT angiography was performed of the chest with axial images  as well as coronal and sagittal reformatted MIP images provided  following the administration of IV contrast.  3-D surface rendered  reformats were obtained of the pulmonary arteries and aorta.  CT of the  abdomen and pelvis obtained following administration of IV contrast.  Coronal and sagittal reconstructions were obtained.   Radiation dose  reduction techniques were utilized, including automated exposure  control, and exposure modulation based on body size.     FINDINGS:     Chest CTA:  There is mild bibasilar atelectasis, but there is no  evidence of acute pulmonary infiltrate, pleural effusion, pneumothorax  or suspicious nodule.  The thoracic aorta is normal in caliber, and  there is no evidence of dissection.  There are coronary atherosclerotic  vascular calcifications.  There is no suspicious mediastinal adenopathy  or other mass, though there is a small to moderate hiatal hernia.  Bolus  timing is adequate, and there is no evidence of pulmonary embolism.     Abdomen: The liver and gallbladder are normal.  The spleen and pancreas  appear normal.  Both adrenal glands are normal. There is no evidence of  bowel obstruction. The aorta is normal in caliber.       : There is mild atrophic change in the right  kidney and there is a  right renal 4 mm nonobstructing calculus, but there is no right  hydronephrosis. No left renal calculi are seen, but there is mild to  moderate left hydronephrosis, and the left ureter is dilated down into  the pelvis, to the level of an approximately 4 mm stone, 8 to 10 cm  above the ureterovesical junction. No bladder calculi are seen.     Pelvis:  The appendix is clearly identified, and is normal.  There is  diverticulosis, but there is no CT evidence of diverticulitis.  There is  no pelvic adenopathy or other soft tissue mass.  There is no CT evidence  of hernia or bowel obstruction.     There is no acute bony abnormality.       Impression:         1.  Pulmonary arteries are well-opacified, and there is no evidence of  pulmonary embolism.     2.  There is minimal bibasilar dependent atelectasis, but the lung  parenchyma is otherwise normal. No acute pulmonary parenchymal  abnormality is seen.     3.   Mild to moderate left hydronephrosis secondary to a 4 mm stone in  the distal left ureter, 8 to 10 cm above the ureterovesical junction.                 This report was finalized on 7/13/2025 10:15 PM by Dr. Wojciech Roth M.D on Workstation: [id number]       XR Chest 1 View [[id number]] Collected: 07/13/25 1831     Updated: 07/13/25 1838    Narrative:      XR CHEST 1 VW-     HISTORY: Chest pain. Reported recent heart attack.     COMPARISON: Chest radiograph 6/23/2025     FINDINGS: A single view of the chest was obtained.     Support Devices:  None.  Cardiac Silhouette/Mediastinum/Wendie:  The cardiac, mediastinal, and  hilar contours are within normal limits.  Lungs/Pleural Spaces:  The lungs and pleural spaces are clear.  Chest Wall/Diaphragm/Upper Abdomen: There is a hiatal hernia. The  visualized osseous structures are similar.        CONCLUSION(S):       1.  No focal consolidation or effusion.  2.  Hiatal hernia.     This report was finalized on 7/13/2025 6:35 PM by Dr. La Blount  M.D  on Workstation: [id number]                Assessment:     Left urolithiasis  Left hydronephrosis  Acute renal failure  Urinary sepsis from obstructing left ureteral stone    Plan:     N.p.o.  Emergent left stent  Vancomycin and Zosyn in the ED  Admitted to medicine  RBL explained to patient and consent obtained  State for outpatient ureteroscopy and laser lithotripsy for definitive stone management    Maged Pedroza MD  07/13/25  23:41 EDT         Electronically signed by Maged Pedroza MD at 07/13/25 2098

## 2025-07-14 NOTE — ANESTHESIA PREPROCEDURE EVALUATION
Anesthesia Evaluation     Patient summary reviewed and Nursing notes reviewed                Airway   Mallampati: III  TM distance: >3 FB  Neck ROM: full  Dental    (+) upper dentures and lower dentures    Pulmonary - normal exam   (+) ,shortness of breath  Cardiovascular     ECG reviewed  Rhythm: regular  Rate: normal    (+) hypertension, CAD, CORONA, hyperlipidemia    ROS comment: Cath 6/2025  CONCLUSION:  1.  Coronary artery disease:  *10% mid LAD stenosis  *70% ostial stenosis of a small caliber diagonal branch (not amenable to PCI)  *No significant disease of left main, ramus intermedius, left circumflex and right coronary artery  2.  LVEDP 12 to 16 mmHg  3.  Normal left ventricular systolic function wall motion     RECOMMENDATIONS:  Medical management.  Proceed with echocardiogram as an outpatient as planned.  Will discharge home later today and further follow-up and recommendations per Dr. Durand.        ·  Left ventricular systolic function is normal. Calculated left ventricular EF = 62.7% Left ventricular ejection fraction appears to be 61 - 65%.  ·  Left ventricular diastolic function was normal.           Neuro/Psych  (+) psychiatric history Anxiety and Depression  GI/Hepatic/Renal/Endo    (+) obesity, renal disease- stones and ARF, diabetes mellitus, thyroid problem hypothyroidism    Musculoskeletal     (+) myalgias  Abdominal    Substance History      OB/GYN          Other   arthritis,     ROS/Med Hx Other: Sepsis  Elevated trop                Anesthesia Plan    ASA 3 - emergent     general     intravenous induction     Anesthetic plan, risks, benefits, and alternatives have been provided, discussed and informed consent has been obtained with: patient.      CODE STATUS:

## 2025-07-14 NOTE — OP NOTE
Operative Report     [facility]    Patient: Wilma Longo  Age:      65 y.o.  :     1960  Sex:      female    Medical Record:  [id number]    Date of Operation/Procedure:  2025    Pre-operative Diagnosis: Left urolithiasis    Post-operative Diagnosis: Left urolithiasis    Surgeon: Maged Pedroza MD    Name of Operation/Procedure:  Procedure(s) and Anesthesia Type:     * CYSTOSCOPY URETERAL CATHETER/STENT INSERTION - General    Findings/Complications: Delayed nephrogram with contrast extending to the distal ureter with what appears to be a filling defect overlying the sacrum    Of note there was some delayed contrast in the right kidney as well but appeared to be associated with an extrarenal pelvis.  There were no stones or obstruction on that side on the CT    Description of procedure:     After appropriate discussion of risks and benefits and informed consent obtained patient was transported to the operating room and positioned in supine position on the operating table.  General anesthesia was induced.  Timeout was completed.  Preoperative antibiotics were administered.  Patient was repositioned into dorsolithotomy and prepped and draped in sterile fashion.  Cystoscope inserted per urethra and advanced into the bladder.  Bladder was free of lesions.  The left ureteral orifice was cannulated the sensor guidewire advanced up to the renal pelvis under fluoroscopic guidance.  A 7 Danish by 24 cm double-J ureteral stent was inserted over the wire and up to the renal pelvis under fluoroscopic guidance.  The wire was pulled leaving a good proximal and distal curl.  There was no purulence expressed from the left side.  This concluded the procedure.  The patient was extubated and transported to PACU in stable condition.    Estimated Blood Loss: None    Specimens: * No orders in the log *    Fluids/Drains: 7 Danish by 24 cm double-J ureteral stent, left side, no string    Maged Pedroza  MD  7/14/2025  00:12 EDT

## 2025-07-14 NOTE — ANESTHESIA PROCEDURE NOTES
Airway  Reason: elective    Date/Time: 7/13/2025 11:58 PM  Airway not difficult    General Information and Staff    Patient location during procedure: OR  CRNA/CAA: Mariel Armstrong CRNA    Indications and Patient Condition  Indications for airway management: airway protection    Preoxygenated: yes  MILS not maintained throughout    Mask difficulty assessment: 0 - not attempted    Final Airway Details    Final airway type: endotracheal airway      Successful airway: ETT  Cuffed: yes   Successful intubation technique: direct laryngoscopy  Endotracheal tube insertion site: oral  Blade: Ye  Blade size: 3  ETT size (mm): 7.0  Cormack-Lehane Classification: grade I - full view of glottis  Placement verified by: chest auscultation and capnometry   Cuff volume (mL): 7  Measured from: lips  ETT/EBT  to lips (cm): 21  Number of attempts at approach: 1  Assessment: lips, teeth, and gum same as pre-op and atraumatic intubation    Additional Comments  Pt preoxygenated prior to induction, easy mask airway, atraumatic intubation,+ ETCO2, + bs bilat,  ETT secured and connected to ventilator.

## 2025-07-14 NOTE — PLAN OF CARE
Goal Outcome Evaluation:              Outcome Evaluation: VSS, pt got IV zosyn today and went down for a scan this morning of her legs to check for blood clots, no thrombosis was found. no complaints at this time, call light within reach.

## 2025-07-14 NOTE — H&P
Patient Name:  Wilma Longo  YOB: 1960  MRN:  [id number]  Admit Date:  7/13/2025  Patient Care Team:  Jerman Virgen MD as PCP - General  Jerman Virgen MD as PCP - Family Medicine  Orlin Mann MD as Consulting Physician (Gastroenterology)  Valery Irizarry MD as Consulting Physician (Obstetrics and Gynecology)  Kammerling, James M, MD as Consulting Physician (Cardiac Electrophysiology)  Estephanie Sanabria MD as Consulting Physician (Orthopedic Surgery)  Jerman Virgen MD as Referring Physician (Family Medicine)  Jerman Dee II, MD as Consulting Physician (Hematology and Oncology)  Crystal Grissom APRN as Nurse Practitioner (Oncology)      Subjective   History Present Illness     Chief Complaint   Patient presents with    Chest Pain       Ms. Longo is a 65 y.o. non-smoker with a history of hypertension, hyperlipidemia, coronary artery disease, irritable bowel syndrome, hypothyroidism, anxiety, and depression that presents to [facility] complaining of chest pain. She reports chest pain that began about 3 days ago. She describes the pain as mid-sternal, intermittent, moderate, and sharp in nature. She reports she had a MI in June, but this pain felt different from what she experienced then as there was no radiation into her neck. She also reports fatigue and generalized weakness. She states she told a family member about the chest pain yesterday, and she was encouraged to come to the ED. Cardiac work up in the ED showed troponin 79 and repeat 64. An EKG showed sinus tachycardia. She was also febrile in the ED. Her other lab work showed potassium 2.8, chloride 97, creatinine 1.26, GFR 47.5, procalcitonin 20.70, D Dimer 1.28, WBC 16.21, lactate 2.2, and repeat lactate 0.5. A urinalysis was positive for trace bacteria, WBCs, RBCs, trace protein, 1+ leukocytes, trace blood, and trace ketones. A CT angiogram of the chest showed no evidence of pulmonary  embolism, well opacified pulmonary arteries, and minimal basilar dependent atelectasis. A CT of the abdomen/pelvis showed mild to moderate left hydronephrosis secondary to a 4 mm stone in the distal left ureter, 8 to 10 cm above the ureterovesical junction. The ED physician spoke with Dr. Pedroza of urology, and the patient was taken emergently to the OR for left ureter stent placement.        History of Present Illness  Review of Systems   Constitutional:  Positive for fatigue. Negative for chills and fever.   HENT:  Negative for congestion and sore throat.    Eyes:  Negative for photophobia and visual disturbance.   Respiratory:  Negative for cough, shortness of breath (mild) and wheezing.    Cardiovascular:  Positive for chest pain. Negative for palpitations and leg swelling.   Gastrointestinal:  Negative for abdominal pain, nausea and vomiting.   Genitourinary:  Negative for decreased urine volume, dysuria, flank pain, frequency and hematuria.   Musculoskeletal:  Positive for myalgias. Negative for arthralgias.   Skin:  Negative for color change and pallor.   Neurological:  Positive for weakness (generalized). Negative for dizziness, light-headedness, numbness and headaches.        Personal History     Past Medical History:   Diagnosis Date    Anemia 5/2022    Anxiety     Osman esophagus ?    Benign essential hypertension 02/01/2015    Cancer     Skin cancer    Cardiomyopathy     Colitis 06/12/2009    Depression     Fibromyalgia     Fibromyalgia, primary     Gestational diabetes mellitus 06/1994    History of gestational diabetes     Hyperlipidemia 04/13/2011    Hypothyroidism 02/01/2015    Inflammatory bowel disease 06/12/2009    Insomnia 02/03/2015    Irritable bowel syndrome 11/30/2009    Panic attacks     Primary bile acid malabsorption     fecal incontinence    Rectocele     Skin cancer of arm     Vaginal prolapse     Vitamin D deficiency 07/19/2012     Past Surgical History:   Procedure Laterality Date     ANTERIOR AND POSTERIOR VAGINAL REPAIR N/A 04/06/2021    Procedure: POSTERIOR VAGINAL REPAIR, CYSTOSCOPY;  Surgeon: Cora Gustafson MD;  Location: [facility] MAIN OR;  Service: Gynecology;  Laterality: N/A;    BLADDER REPAIR  09/2015    X2    CARDIAC CATHETERIZATION N/A 6/23/2025    Procedure: Left Heart Cath;  Surgeon: Dedra Guzman MD;  Location: [facility] CATH INVASIVE LOCATION;  Service: Cardiovascular;  Laterality: N/A;    CARDIAC CATHETERIZATION N/A 6/23/2025    Procedure: Coronary angiography;  Surgeon: Dedra Guzman MD;  Location: [facility] CATH INVASIVE LOCATION;  Service: Cardiovascular;  Laterality: N/A;    CARDIAC CATHETERIZATION N/A 6/23/2025    Procedure: Left ventriculography;  Surgeon: Dedra Guzman MD;  Location: [facility] CATH INVASIVE LOCATION;  Service: Cardiovascular;  Laterality: N/A;    COLONOSCOPY  2010    wnl    COLONOSCOPY N/A 09/09/2016    Procedure: COLONOSCOPY into cecum and terminal ileum with biopsies;  Surgeon: Orlin Mann MD;  Location: [facility] ENDOSCOPY;  Service:     COLONOSCOPY N/A 09/06/2019    Procedure: COLONOSCOPY TO CECUM AND INTO TERMINAL ILEUM;  Surgeon: Orlin Mann MD;  Location: [facility] ENDOSCOPY;  Service: Gastroenterology    COLONOSCOPY W/ POLYPECTOMY N/A 02/20/2023    Procedure: COLONOSCOPY INTO CECUM AND TI WITH BIOPSIES;  Surgeon: Basilio Martin MD;  Location: [facility] ENDOSCOPY;  Service: Gastroenterology;  Laterality: N/A;  PRE: COLITIS  POST: NORMAL COLON    CYSTOSCOPY W/ URETERAL STENT PLACEMENT      ENDOSCOPY N/A 09/06/2019    Procedure: ESOPHAGOGASTRODUODENOSCOPY WITH COLD BIOPSIES AND 48F HERNÁNDEZ DILATION;  Surgeon: Orlin Mann MD;  Location: [facility] ENDOSCOPY;  Service: Gastroenterology    ENDOSCOPY N/A 02/20/2023    Procedure: ESOPHAGOGASTRODUODENOSCOPY WITH BIOPSIES;  Surgeon: Basilio Martin MD;  Location: [facility] ENDOSCOPY;  Service: Gastroenterology;  Laterality: N/A;  PRE: MENDEZ'S  POST: HIATAL HERNIA, GASTRITIS     HYSTERECTOMY  12/2013    MOUTH SURGERY      multiple teeth extraction    SACROCOLPOPEXY N/A 04/06/2021    Procedure: LARPAROSCOPY COLPOPEXY, BILATERAL SALPINGECTOMY;  Surgeon: Cora Gustafson MD;  Location: [facility] MAIN OR;  Service: Gynecology;  Laterality: N/A;    SKIN CANCER EXCISION      UPPER GASTROINTESTINAL ENDOSCOPY  09/06/2019     Family History   Problem Relation Age of Onset    Depression Mother     Heart disease Mother     Stroke Mother     Heart attack Mother     Heart failure Mother     Hyperlipidemia Mother     Hypertension Mother     Irritable bowel syndrome Mother     Arthritis Father     Heart disease Father     Skin cancer Father     Heart attack Father     Heart failure Father     Heart disease Maternal Grandmother     Stroke Maternal Grandfather     Stroke Paternal Grandfather     Hypertension Sister     Hypothyroidism Sister     Hypertension Brother     Hypothyroidism Daughter     Thyroid disease Sister     Colon cancer Neg Hx     Colon polyps Neg Hx     Liver disease Neg Hx     Rectal cancer Neg Hx     Stomach cancer Neg Hx     Liver cancer Neg Hx     Malig Hyperthermia Neg Hx      Social History     Tobacco Use    Smoking status: Never    Smokeless tobacco: Never   Vaping Use    Vaping status: Never Used   Substance Use Topics    Alcohol use: Not Currently     Comment: less than once per  month    Drug use: No     Medications Prior to Admission   Medication Sig Dispense Refill Last Dose/Taking    ALPRAZolam (Xanax) 0.5 MG tablet Take 1 tablet by mouth Daily As Needed for Anxiety. D/c previous order 30 tablet 2 Taking As Needed    amLODIPine (NORVASC) 5 MG tablet TAKE 1 TABLET BY MOUTH DAILY (Patient taking differently: Take 1 tablet by mouth Every Night.) 14 tablet 0 Taking Differently    aspirin 81 MG chewable tablet Chew 1 tablet Daily.   Taking    colestipol (COLESTID) 1 g tablet Take 2 tablets by mouth 2 (Two) Times a Day. 360 tablet 3 Taking    Cranberry 125 MG tablet Take 1 tablet by  mouth Daily.   Taking    DULoxetine (CYMBALTA) 60 MG capsule TAKE 1 CAPSULE BY MOUTH DAILY (Patient taking differently: Take 1 capsule by mouth Every Night.) 14 capsule 0 Taking Differently    levothyroxine (SYNTHROID, LEVOTHROID) 75 MCG tablet TAKE 1 TABLET BY MOUTH DAILY 14 tablet 0 Taking    lisinopril (PRINIVIL,ZESTRIL) 20 MG tablet TAKE 1 TABLET BY MOUTH DAILY (Patient taking differently: Take 1 tablet by mouth Every Night.) 14 tablet 0 Taking Differently    naproxen (NAPROSYN) 500 MG tablet Take 1 tablet by mouth Daily As Needed for Moderate Pain. 180 tablet 1 Taking As Needed    rosuvastatin (CRESTOR) 20 MG tablet Take 1 tablet by mouth Daily. 90 tablet 0 Taking    vitamin C (ASCORBIC ACID) 250 MG tablet Take 2 tablets by mouth Every Night.   Taking    valACYclovir (VALTREX) 500 MG tablet Take 1 tablet by mouth As Needed.        Allergies:  No Known Allergies    Objective    Objective     Vital Signs  Temp:  [97.7 °F (36.5 °C)-102.9 °F (39.4 °C)] 97.7 °F (36.5 °C)  Heart Rate:  [] 74  Resp:  [15-31] 21  BP: (115-150)/(59-96) 122/62  SpO2:  [90 %-100 %] 99 %  on  Flow (L/min) (Oxygen Therapy):  [2] 2;   Device (Oxygen Therapy): nasal cannula  Body mass index is 26.37 kg/m².    Physical Exam  Vitals and nursing note reviewed.   Constitutional:       Appearance: Normal appearance.   HENT:      Head: Normocephalic and atraumatic.      Nose: Nose normal.      Mouth/Throat:      Mouth: Mucous membranes are moist.      Pharynx: Oropharynx is clear.   Eyes:      Extraocular Movements: Extraocular movements intact.      Conjunctiva/sclera: Conjunctivae normal.   Cardiovascular:      Rate and Rhythm: Normal rate and regular rhythm.      Pulses: Normal pulses.      Heart sounds: Normal heart sounds.   Pulmonary:      Effort: Pulmonary effort is normal.      Breath sounds: Normal breath sounds.   Abdominal:      General: Bowel sounds are normal. There is no distension.      Palpations: Abdomen is soft.       Tenderness: There is abdominal tenderness (suprapubic).   Musculoskeletal:         General: No swelling. Normal range of motion.      Cervical back: Normal range of motion and neck supple.   Skin:     General: Skin is warm.   Neurological:      General: No focal deficit present.      Mental Status: She is alert and oriented to person, place, and time.   Psychiatric:         Mood and Affect: Mood normal.         Behavior: Behavior normal.         Results Review:  I reviewed the patient's new clinical results.  I reviewed the patient's new imaging results and agree with the interpretation.  I reviewed the patient's other test results and agree with the interpretation  I personally viewed and interpreted the patient's EKG/Telemetry data  Discussed with ED provider.    Lab Results (last 24 hours)       Procedure Component Value Units Date/Time    CBC & Differential [[id number]]  (Abnormal) Collected: 07/13/25 1753    Specimen: Blood Updated: 07/13/25 1803    Narrative:      The following orders were created for panel order CBC & Differential.  Procedure                               Abnormality         Status                     ---------                               -----------         ------                     CBC Auto Differential[[id number]]        Abnormal            Final result                 Please view results for these tests on the individual orders.    Comprehensive Metabolic Panel [[id number]]  (Abnormal) Collected: 07/13/25 1753    Specimen: Blood Updated: 07/13/25 1825     Glucose 138 mg/dL      BUN 19.0 mg/dL      Creatinine 1.26 mg/dL      Sodium 136 mmol/L      Potassium 2.8 mmol/L      Chloride 97 mmol/L      CO2 24.1 mmol/L      Calcium 9.1 mg/dL      Total Protein 7.7 g/dL      Albumin 3.7 g/dL      ALT (SGPT) 17 U/L      AST (SGOT) 20 U/L      Alkaline Phosphatase 78 U/L      Total Bilirubin 0.5 mg/dL      Globulin 4.0 gm/dL      A/G Ratio 0.9 g/dL      BUN/Creatinine Ratio 15.1     Anion Gap 14.9  mmol/L      eGFR 47.5 mL/min/1.73     Narrative:      GFR Categories in Chronic Kidney Disease (CKD)              GFR Category          GFR (mL/min/1.73)    Interpretation  G1                    90 or greater        Normal or high (1)  G2                    60-89                Mild decrease (1)  G3a                   45-59                Mild to moderate decrease  G3b                   30-44                Moderate to severe decrease  G4                    15-29                Severe decrease  G5                    14 or less           Kidney failure    (1)In the absence of evidence of kidney disease, neither GFR category G1 or G2 fulfill the criteria for CKD.    eGFR calculation 2021 CKD-EPI creatinine equation, which does not include race as a factor    High Sensitivity Troponin T [[id number]]  (Abnormal) Collected: 07/13/25 1753    Specimen: Blood Updated: 07/13/25 1832     HS Troponin T 79 ng/L     Narrative:      High Sensitive Troponin T Reference Range:  <14.0 ng/L- Negative Female for AMI  <22.0 ng/L- Negative Male for AMI  >=14 - Abnormal Female indicating possible myocardial injury.  >=22 - Abnormal Male indicating possible myocardial injury.   Clinicians would have to utilize clinical acumen, EKG, Troponin, and serial changes to determine if it is an Acute Myocardial Infarction or myocardial injury due to an underlying chronic condition.         CBC Auto Differential [[id number]]  (Abnormal) Collected: 07/13/25 1753    Specimen: Blood Updated: 07/13/25 1803     WBC 16.21 10*3/mm3      RBC 4.16 10*6/mm3      Hemoglobin 12.2 g/dL      Hematocrit 36.8 %      MCV 88.5 fL      MCH 29.3 pg      MCHC 33.2 g/dL      RDW 12.6 %      RDW-SD 40.5 fl      MPV 9.7 fL      Platelets 283 10*3/mm3      Neutrophil % 76.6 %      Lymphocyte % 14.8 %      Monocyte % 7.7 %      Eosinophil % 0.4 %      Basophil % 0.2 %      Immature Grans % 0.3 %      Neutrophils, Absolute 12.40 10*3/mm3      Lymphocytes, Absolute 2.40 10*3/mm3  "     Monocytes, Absolute 1.25 10*3/mm3      Eosinophils, Absolute 0.07 10*3/mm3      Basophils, Absolute 0.04 10*3/mm3      Immature Grans, Absolute 0.05 10*3/mm3      nRBC 0.0 /100 WBC     D-dimer, Quantitative [[id number]]  (Abnormal) Collected: 07/13/25 1753    Specimen: Blood Updated: 07/13/25 2029     D-Dimer, Quantitative 1.28 MCGFEU/mL     Narrative:      According to the assay 's published package insert, a normal (<0.50 MCGFEU/mL) D-dimer result in conjunction with a non-high clinical probability assessment, excludes deep vein thrombosis (DVT) and pulmonary embolism (PE) with high sensitivity.    D-dimer values increase with age and this can make VTE exclusion of an older population difficult. To address this, the [employer] College [employer], based on best available evidence and recent guidelines, recommends that clinicians use age-adjusted D-dimer thresholds in patients greater than 50 years of age with: a) a low probability of PE who do not meet all Pulmonary Embolism Rule Out Criteria, or b) in those with intermediate probability of PE.   The formula for an age-adjusted D-dimer cut-off is \"age/100\".  For example, a 60 year old patient would have an age-adjusted cut-off of 0.60 MCGFEU/mL and an 80 year old 0.80 MCGFEU/mL.    Procalcitonin [[id number]]  (Abnormal) Collected: 07/13/25 1753    Specimen: Blood Updated: 07/13/25 1905     Procalcitonin 20.70 ng/mL     Narrative:      As a Marker for Sepsis (Non-Neonates):    1. <0.5 ng/mL represents a low risk of severe sepsis and/or septic shock.  2. >2 ng/mL represents a high risk of severe sepsis and/or septic shock.    As a Marker for Lower Respiratory Tract Infections that require antibiotic therapy:    PCT on Admission    Antibiotic Therapy       6-12 Hrs later    >0.5                Strongly Recommended  >0.25 - <0.5        Recommended   0.1 - 0.25          Discouraged              Remeasure/reassess PCT  <0.1                Strongly " "Discouraged     Remeasure/reassess PCT    As 28 day mortality risk marker: \"Change in Procalcitonin Result\" (>80% or <=80%) if Day 0 (or Day 1) and Day 4 values are available. Refer to http://www.[facility]-pct-calculator.com    Change in PCT <=80%  A decrease of PCT levels below or equal to 80% defines a positive change in PCT test result representing a higher risk for 28-day all-cause mortality of patients diagnosed with severe sepsis for septic shock.    Change in PCT >80%  A decrease of PCT levels of more than 80% defines a negative change in PCT result representing a lower risk for 28-day all-cause mortality of patients diagnosed with severe sepsis or septic shock.       Magnesium [[id number]]  (Normal) Collected: 07/13/25 1753    Specimen: Blood Updated: 07/13/25 1849     Magnesium 1.8 mg/dL     High Sensitivity Troponin T 1Hr [[id number]]  (Abnormal) Collected: 07/13/25 1905    Specimen: Blood from Arm, Right Updated: 07/13/25 1939     HS Troponin T 64 ng/L      Troponin T Numeric Delta -15 ng/L      Troponin T % Delta -19    Narrative:      High Sensitive Troponin T Reference Range:  <14.0 ng/L- Negative Female for AMI  <22.0 ng/L- Negative Male for AMI  >=14 - Abnormal Female indicating possible myocardial injury.  >=22 - Abnormal Male indicating possible myocardial injury.   Clinicians would have to utilize clinical acumen, EKG, Troponin, and serial changes to determine if it is an Acute Myocardial Infarction or myocardial injury due to an underlying chronic condition.         Blood Culture - Blood, Arm, Right [[id number]] Collected: 07/13/25 1905    Specimen: Blood from Arm, Right Updated: 07/13/25 1911    Lactic Acid, Plasma [[id number]]  (Abnormal) Collected: 07/13/25 1905    Specimen: Blood from Arm, Right Updated: 07/13/25 1938     Lactate 2.2 mmol/L     Blood Culture - Blood, Arm, Left [[id number]] Collected: 07/13/25 1906    Specimen: Blood from Arm, Left Updated: 07/13/25 1912    STAT Lactic Acid, Reflex " [[id number]]  (Normal) Collected: 07/13/25 2210    Specimen: Blood Updated: 07/13/25 2250     Lactate 0.5 mmol/L     Urinalysis With Microscopic If Indicated (No Culture) - Urine, Clean Catch [[id number]]  (Abnormal) Collected: 07/13/25 2228    Specimen: Urine, Clean Catch Updated: 07/13/25 2253     Color, UA Yellow     Appearance, UA Clear     pH, UA 6.5     Specific Gravity, UA 1.028     Glucose, UA Negative     Ketones, UA Trace     Bilirubin, UA Negative     Blood, UA Trace     Protein, UA Trace     Leuk Esterase, UA Small (1+)     Nitrite, UA Negative     Urobilinogen, UA 0.2 E.U./dL    Urinalysis, Microscopic Only - Urine, Clean Catch [[id number]]  (Abnormal) Collected: 07/13/25 2228    Specimen: Urine, Clean Catch Updated: 07/13/25 2253     RBC, UA 3-5 /HPF      WBC, UA 11-20 /HPF      Bacteria, UA Trace /HPF      Squamous Epithelial Cells, UA 0-2 /HPF      Hyaline Casts, UA None Seen /LPF      Methodology Automated Microscopy    Urine Culture - Urine, Urine, Clean Catch [[id number]] Collected: 07/13/25 2228    Specimen: Urine, Clean Catch Updated: 07/14/25 0231    POC Glucose Once [[id number]]  (Abnormal) Collected: 07/13/25 2340    Specimen: Blood Updated: 07/13/25 2342     Glucose 66 mg/dL     POC Glucose Once [[id number]]  (Normal) Collected: 07/14/25 0031    Specimen: Blood Updated: 07/14/25 0034     Glucose 74 mg/dL             Imaging Results (Last 24 Hours)       Procedure Component Value Units Date/Time    FL C Arm During Surgery [[id number]] Resulted: 07/14/25 0027     Updated: 07/14/25 0027    Narrative:      This procedure was auto-finalized with no dictation required.    CT Abdomen Pelvis With Contrast [[id number]] Collected: 07/13/25 2206     Updated: 07/13/25 2218    Narrative:      CTA CHEST, CT ABDOMEN AND PELVIS     HISTORY: Pulmonary Embolism; R07.9-Chest pain, unspecified; R79.89-Other  specified abnormal findings of blood chemistry; E87.6-Hypokalemia;  R19.7-Diarrhea, unspecified;  D72.829-Elevated white blood cell count,  unspecified; A41.9-Sepsis, unspecified organism     COMPARISON: None     TECHNIQUE: CT angiography was performed of the chest with axial images  as well as coronal and sagittal reformatted MIP images provided  following the administration of IV contrast.  3-D surface rendered  reformats were obtained of the pulmonary arteries and aorta.  CT of the  abdomen and pelvis obtained following administration of IV contrast.  Coronal and sagittal reconstructions were obtained.   Radiation dose  reduction techniques were utilized, including automated exposure  control, and exposure modulation based on body size.     FINDINGS:     Chest CTA:  There is mild bibasilar atelectasis, but there is no  evidence of acute pulmonary infiltrate, pleural effusion, pneumothorax  or suspicious nodule.  The thoracic aorta is normal in caliber, and  there is no evidence of dissection.  There are coronary atherosclerotic  vascular calcifications.  There is no suspicious mediastinal adenopathy  or other mass, though there is a small to moderate hiatal hernia.  Bolus  timing is adequate, and there is no evidence of pulmonary embolism.     Abdomen: The liver and gallbladder are normal.  The spleen and pancreas  appear normal.  Both adrenal glands are normal. There is no evidence of  bowel obstruction. The aorta is normal in caliber.       : There is mild atrophic change in the right kidney and there is a  right renal 4 mm nonobstructing calculus, but there is no right  hydronephrosis. No left renal calculi are seen, but there is mild to  moderate left hydronephrosis, and the left ureter is dilated down into  the pelvis, to the level of an approximately 4 mm stone, 8 to 10 cm  above the ureterovesical junction. No bladder calculi are seen.     Pelvis:  The appendix is clearly identified, and is normal.  There is  diverticulosis, but there is no CT evidence of diverticulitis.  There is  no pelvic  adenopathy or other soft tissue mass.  There is no CT evidence  of hernia or bowel obstruction.     There is no acute bony abnormality.       Impression:         1.  Pulmonary arteries are well-opacified, and there is no evidence of  pulmonary embolism.     2.  There is minimal bibasilar dependent atelectasis, but the lung  parenchyma is otherwise normal. No acute pulmonary parenchymal  abnormality is seen.     3.   Mild to moderate left hydronephrosis secondary to a 4 mm stone in  the distal left ureter, 8 to 10 cm above the ureterovesical junction.                 This report was finalized on 7/13/2025 10:15 PM by Dr. Wojciech Roth M.D on Workstation: [id number]       CT Angiogram Chest Pulmonary Embolism [[id number]] Collected: 07/13/25 2206     Updated: 07/13/25 2218    Narrative:      CTA CHEST, CT ABDOMEN AND PELVIS     HISTORY: Pulmonary Embolism; R07.9-Chest pain, unspecified; R79.89-Other  specified abnormal findings of blood chemistry; E87.6-Hypokalemia;  R19.7-Diarrhea, unspecified; D72.829-Elevated white blood cell count,  unspecified; A41.9-Sepsis, unspecified organism     COMPARISON: None     TECHNIQUE: CT angiography was performed of the chest with axial images  as well as coronal and sagittal reformatted MIP images provided  following the administration of IV contrast.  3-D surface rendered  reformats were obtained of the pulmonary arteries and aorta.  CT of the  abdomen and pelvis obtained following administration of IV contrast.  Coronal and sagittal reconstructions were obtained.   Radiation dose  reduction techniques were utilized, including automated exposure  control, and exposure modulation based on body size.     FINDINGS:     Chest CTA:  There is mild bibasilar atelectasis, but there is no  evidence of acute pulmonary infiltrate, pleural effusion, pneumothorax  or suspicious nodule.  The thoracic aorta is normal in caliber, and  there is no evidence of dissection.  There are coronary  atherosclerotic  vascular calcifications.  There is no suspicious mediastinal adenopathy  or other mass, though there is a small to moderate hiatal hernia.  Bolus  timing is adequate, and there is no evidence of pulmonary embolism.     Abdomen: The liver and gallbladder are normal.  The spleen and pancreas  appear normal.  Both adrenal glands are normal. There is no evidence of  bowel obstruction. The aorta is normal in caliber.       : There is mild atrophic change in the right kidney and there is a  right renal 4 mm nonobstructing calculus, but there is no right  hydronephrosis. No left renal calculi are seen, but there is mild to  moderate left hydronephrosis, and the left ureter is dilated down into  the pelvis, to the level of an approximately 4 mm stone, 8 to 10 cm  above the ureterovesical junction. No bladder calculi are seen.     Pelvis:  The appendix is clearly identified, and is normal.  There is  diverticulosis, but there is no CT evidence of diverticulitis.  There is  no pelvic adenopathy or other soft tissue mass.  There is no CT evidence  of hernia or bowel obstruction.     There is no acute bony abnormality.       Impression:         1.  Pulmonary arteries are well-opacified, and there is no evidence of  pulmonary embolism.     2.  There is minimal bibasilar dependent atelectasis, but the lung  parenchyma is otherwise normal. No acute pulmonary parenchymal  abnormality is seen.     3.   Mild to moderate left hydronephrosis secondary to a 4 mm stone in  the distal left ureter, 8 to 10 cm above the ureterovesical junction.                 This report was finalized on 7/13/2025 10:15 PM by Dr. Wojciech Roth M.D on Workstation: [id number]       XR Chest 1 View [[id number]] Collected: 07/13/25 1831     Updated: 07/13/25 1838    Narrative:      XR CHEST 1 VW-     HISTORY: Chest pain. Reported recent heart attack.     COMPARISON: Chest radiograph 6/23/2025     FINDINGS: A single view of the chest was  obtained.     Support Devices:  None.  Cardiac Silhouette/Mediastinum/Wendie:  The cardiac, mediastinal, and  hilar contours are within normal limits.  Lungs/Pleural Spaces:  The lungs and pleural spaces are clear.  Chest Wall/Diaphragm/Upper Abdomen: There is a hiatal hernia. The  visualized osseous structures are similar.        CONCLUSION(S):       1.  No focal consolidation or effusion.  2.  Hiatal hernia.     This report was finalized on 7/13/2025 6:35 PM by Dr. La Blount M.D  on Workstation: [id number]               Results for orders placed during the hospital encounter of 06/25/25    Adult Transthoracic Echo Complete W/ Cont if Necessary Per Protocol 06/25/2025  4:24 PM    Interpretation Summary    Left ventricular systolic function is normal. Calculated left ventricular EF = 62.7% Left ventricular ejection fraction appears to be 61 - 65%.    Left ventricular diastolic function was normal.      Telemetry Scan   Final Result      ECG 12 Lead ED Triage Standing Order; Chest Pain   Final Result   HEART YQGW=157  bpm   RR Gktdwsuv=280  ms   ND Nsejjxiw=681  ms   P Horizontal Axis=25  deg   P Front Axis=44  deg   QRSD Interval=88  ms   QT Sziduuse=689  ms   XXpO=681  ms   QRS Axis=38  deg   T Wave Axis=38  deg   - ABNORMAL ECG -   Sinus tachycardia   Inferior  infarct, old   When compared with ECG of 23-Jun-2025 09:20:24,   Significant repolarization change   Significant axis, voltage or hypertrophy change   Electronically Signed By: Efrem Beltre ([facility]) 2025-07-13 18:19:59   Date and Time of Study:2025-07-13 17:51:54      ECG 12 Lead Chest Pain    (Results Pending)        Assessment/Plan     Active Hospital Problems    Diagnosis  POA    **Left ureteral stone [N20.1]  Unknown    BALBINA (acute kidney injury) [N17.9]  Unknown    Sepsis [A41.9]  Unknown    Hypokalemia [E87.6]  Unknown    Acute UTI [N39.0]  Yes    Coronary artery disease involving native coronary artery of native heart without angina pectoris  [I25.10]  Yes    Anxiety associated with depression [F41.8]  Yes    Benign essential hypertension [I10]  Yes    Hypothyroidism [E03.9]  Yes    Hyperlipidemia [E78.5]  Yes     2/1/2015      Irritable bowel syndrome [K58.9]  Yes       Left ureter stone  Left hydronephrosis  Acute UTI  Sepsis  -Admit to a telemetry unit for monitoring  -She met sepsis criteria with fever, tachycardia, and leukocytosis  -She also had lactic acidosis, and an elevated procal in the ED. Lactic acidosis has resolved  -She is s/p emergent cystoscopy and ureteral stent insertion with urology  -Continue Zosyn to cover for UTI  -Blood and urine cultures are pending  -Urology is following  -IV fluids  -Morphine as needed for pain    Chest Pain  -She reports her chest pain has resolved   -Her troponin is elevated but trending down  -There are no ischemic changes on EKG  -She is s/p heart cath with Dr. Guzman on 6/23/25 that demonstrated 70% ostial stenosis of a small caliber diagonal not amendable to PCI. Medical therapy was recommended  -Continue to trend troponin. If her troponin trends up or if her chest pain resumes, will consult cardiology    Elevated D Dimer  -CTA chest was negative for an acute pulmonary embolism  -Check BLE venous dopplers to rule out DVT    BALBINA  Hypokalemia  -Her baseline creatinine is less than 1  -IVF  -Avoid nephrotoxins  -Replace potassium per protocol  -Repeat labs in AM    Hypertension  -Blood pressures have been stable. Continue amlodipine  -Hold lisinopril due to elevated creatinine  -Monitor    Hyperlipidemia  Coronary artery disease  -Continue aspirin and statin    Hypothyroidism  -Continue levothyroxine    IBS  -Continue Colestipol, she can take her home dose    Anxiety  Depression  -Continue Cymbalta  -Continue home dose of Xanax as needed for anxiety    -I discussed the patients findings and my recommendations with patient.    VTE Prophylaxis - SCDs.  Code Status - Full code.       Olga Shore,  [facility]  07/14/25  05:05 EDT

## 2025-07-15 LAB
ALBUMIN SERPL-MCNC: 3.2 G/DL (ref 3.5–5.2)
ALBUMIN/GLOB SERPL: 0.9 G/DL
ALP SERPL-CCNC: 75 U/L (ref 39–117)
ALT SERPL W P-5'-P-CCNC: 11 U/L (ref 1–33)
ANION GAP SERPL CALCULATED.3IONS-SCNC: 10 MMOL/L (ref 5–15)
AST SERPL-CCNC: 14 U/L (ref 1–32)
BACTERIA SPEC AEROBE CULT: NO GROWTH
BASOPHILS # BLD AUTO: 0.03 10*3/MM3 (ref 0–0.2)
BASOPHILS NFR BLD AUTO: 0.2 % (ref 0–1.5)
BILIRUB SERPL-MCNC: 0.2 MG/DL (ref 0–1.2)
BUN SERPL-MCNC: 20 MG/DL (ref 8–23)
BUN/CREAT SERPL: 19 (ref 7–25)
CALCIUM SPEC-SCNC: 8.7 MG/DL (ref 8.6–10.5)
CHLORIDE SERPL-SCNC: 109 MMOL/L (ref 98–107)
CO2 SERPL-SCNC: 21 MMOL/L (ref 22–29)
CREAT SERPL-MCNC: 1.05 MG/DL (ref 0.57–1)
CRP SERPL-MCNC: 14.86 MG/DL (ref 0–0.5)
DEPRECATED RDW RBC AUTO: 42.3 FL (ref 37–54)
EGFRCR SERPLBLD CKD-EPI 2021: 59.1 ML/MIN/1.73
EOSINOPHIL # BLD AUTO: 0.01 10*3/MM3 (ref 0–0.4)
EOSINOPHIL NFR BLD AUTO: 0.1 % (ref 0.3–6.2)
ERYTHROCYTE [DISTWIDTH] IN BLOOD BY AUTOMATED COUNT: 12.8 % (ref 12.3–15.4)
ERYTHROCYTE [SEDIMENTATION RATE] IN BLOOD: 70 MM/HR (ref 0–30)
GLOBULIN UR ELPH-MCNC: 3.7 GM/DL
GLUCOSE SERPL-MCNC: 131 MG/DL (ref 65–99)
HCT VFR BLD AUTO: 32.6 % (ref 34–46.6)
HGB BLD-MCNC: 10.5 G/DL (ref 12–15.9)
IMM GRANULOCYTES # BLD AUTO: 0.12 10*3/MM3 (ref 0–0.05)
IMM GRANULOCYTES NFR BLD AUTO: 0.8 % (ref 0–0.5)
LYMPHOCYTES # BLD AUTO: 1.97 10*3/MM3 (ref 0.7–3.1)
LYMPHOCYTES NFR BLD AUTO: 12.6 % (ref 19.6–45.3)
MCH RBC QN AUTO: 29.2 PG (ref 26.6–33)
MCHC RBC AUTO-ENTMCNC: 32.2 G/DL (ref 31.5–35.7)
MCV RBC AUTO: 90.6 FL (ref 79–97)
MONOCYTES # BLD AUTO: 1.48 10*3/MM3 (ref 0.1–0.9)
MONOCYTES NFR BLD AUTO: 9.4 % (ref 5–12)
NEUTROPHILS NFR BLD AUTO: 12.08 10*3/MM3 (ref 1.7–7)
NEUTROPHILS NFR BLD AUTO: 76.9 % (ref 42.7–76)
NRBC BLD AUTO-RTO: 0 /100 WBC (ref 0–0.2)
PLATELET # BLD AUTO: 260 10*3/MM3 (ref 140–450)
PMV BLD AUTO: 10.1 FL (ref 6–12)
POTASSIUM SERPL-SCNC: 3.2 MMOL/L (ref 3.5–5.2)
POTASSIUM SERPL-SCNC: 3.4 MMOL/L (ref 3.5–5.2)
PROT SERPL-MCNC: 6.9 G/DL (ref 6–8.5)
RBC # BLD AUTO: 3.6 10*6/MM3 (ref 3.77–5.28)
SODIUM SERPL-SCNC: 140 MMOL/L (ref 136–145)
T4 FREE SERPL-MCNC: 1.6 NG/DL (ref 0.92–1.68)
TSH SERPL DL<=0.05 MIU/L-ACNC: 0.22 UIU/ML (ref 0.27–4.2)
WBC NRBC COR # BLD AUTO: 15.69 10*3/MM3 (ref 3.4–10.8)

## 2025-07-15 PROCEDURE — 86140 C-REACTIVE PROTEIN: CPT | Performed by: NURSE PRACTITIONER

## 2025-07-15 PROCEDURE — 84439 ASSAY OF FREE THYROXINE: CPT | Performed by: STUDENT IN AN ORGANIZED HEALTH CARE EDUCATION/TRAINING PROGRAM

## 2025-07-15 PROCEDURE — 80050 GENERAL HEALTH PANEL: CPT | Performed by: NURSE PRACTITIONER

## 2025-07-15 PROCEDURE — 99232 SBSQ HOSP IP/OBS MODERATE 35: CPT | Performed by: INTERNAL MEDICINE

## 2025-07-15 PROCEDURE — 94762 N-INVAS EAR/PLS OXIMTRY CONT: CPT

## 2025-07-15 PROCEDURE — 94799 UNLISTED PULMONARY SVC/PX: CPT

## 2025-07-15 PROCEDURE — 25010000002 MORPHINE PER 10 MG: Performed by: STUDENT IN AN ORGANIZED HEALTH CARE EDUCATION/TRAINING PROGRAM

## 2025-07-15 PROCEDURE — 85652 RBC SED RATE AUTOMATED: CPT | Performed by: NURSE PRACTITIONER

## 2025-07-15 PROCEDURE — 84132 ASSAY OF SERUM POTASSIUM: CPT | Performed by: STUDENT IN AN ORGANIZED HEALTH CARE EDUCATION/TRAINING PROGRAM

## 2025-07-15 PROCEDURE — 25010000002 PIPERACILLIN SOD-TAZOBACTAM PER 1 G: Performed by: NURSE PRACTITIONER

## 2025-07-15 RX ORDER — POTASSIUM CHLORIDE 1500 MG/1
40 TABLET, EXTENDED RELEASE ORAL EVERY 4 HOURS
Status: COMPLETED | OUTPATIENT
Start: 2025-07-15 | End: 2025-07-15

## 2025-07-15 RX ORDER — OXYBUTYNIN CHLORIDE 10 MG/1
10 TABLET, EXTENDED RELEASE ORAL DAILY
Status: DISCONTINUED | OUTPATIENT
Start: 2025-07-15 | End: 2025-07-17 | Stop reason: HOSPADM

## 2025-07-15 RX ORDER — OXYCODONE AND ACETAMINOPHEN 5; 325 MG/1; MG/1
1 TABLET ORAL EVERY 6 HOURS PRN
Status: DISCONTINUED | OUTPATIENT
Start: 2025-07-15 | End: 2025-07-17 | Stop reason: HOSPADM

## 2025-07-15 RX ADMIN — ASPIRIN 81 MG CHEWABLE TABLET 81 MG: 81 TABLET CHEWABLE at 09:02

## 2025-07-15 RX ADMIN — PIPERACILLIN AND TAZOBACTAM 3.38 G: 3; .375 INJECTION, POWDER, FOR SOLUTION INTRAVENOUS at 13:51

## 2025-07-15 RX ADMIN — OXYCODONE AND ACETAMINOPHEN 1 TABLET: 5; 325 TABLET ORAL at 23:46

## 2025-07-15 RX ADMIN — POTASSIUM CHLORIDE 40 MEQ: 1500 TABLET, EXTENDED RELEASE ORAL at 09:02

## 2025-07-15 RX ADMIN — OXYBUTYNIN CHLORIDE 10 MG: 10 TABLET, EXTENDED RELEASE ORAL at 12:05

## 2025-07-15 RX ADMIN — MORPHINE SULFATE 2 MG: 2 INJECTION, SOLUTION INTRAMUSCULAR; INTRAVENOUS at 06:40

## 2025-07-15 RX ADMIN — PIPERACILLIN AND TAZOBACTAM 3.38 G: 3; .375 INJECTION, POWDER, FOR SOLUTION INTRAVENOUS at 06:42

## 2025-07-15 RX ADMIN — ROSUVASTATIN CALCIUM 20 MG: 20 TABLET, FILM COATED ORAL at 09:02

## 2025-07-15 RX ADMIN — PIPERACILLIN AND TAZOBACTAM 3.38 G: 3; .375 INJECTION, POWDER, FOR SOLUTION INTRAVENOUS at 22:43

## 2025-07-15 RX ADMIN — POTASSIUM CHLORIDE 40 MEQ: 1500 TABLET, EXTENDED RELEASE ORAL at 22:43

## 2025-07-15 RX ADMIN — POTASSIUM CHLORIDE 40 MEQ: 1500 TABLET, EXTENDED RELEASE ORAL at 12:05

## 2025-07-15 RX ADMIN — MORPHINE SULFATE 2 MG: 2 INJECTION, SOLUTION INTRAMUSCULAR; INTRAVENOUS at 09:02

## 2025-07-15 RX ADMIN — AMLODIPINE BESYLATE 5 MG: 5 TABLET ORAL at 22:43

## 2025-07-15 RX ADMIN — POTASSIUM CHLORIDE 40 MEQ: 1500 TABLET, EXTENDED RELEASE ORAL at 18:06

## 2025-07-15 RX ADMIN — DULOXETINE 60 MG: 60 CAPSULE, DELAYED RELEASE ORAL at 22:43

## 2025-07-15 RX ADMIN — LEVOTHYROXINE SODIUM 75 MCG: 0.07 TABLET ORAL at 07:58

## 2025-07-15 RX ADMIN — OXYCODONE AND ACETAMINOPHEN 1 TABLET: 5; 325 TABLET ORAL at 18:06

## 2025-07-15 NOTE — PAYOR COMM NOTE
"Wilma Dow \"Arline\" (65 y.o. Female)      SEE FOR INPATIENT: ID# [id number]    THIS WAS FAXED TO LOCAL [facility] YESTERDAY RECEIVED NEW FAX NUMBER TODAY     UR DEPT: [phone],  [phone]     [facility]: NPI [id number] [facility]# [id number]     CELY RODGERS MD:  NPI- [id number]  PD-[phone]  FAX- [phone]     HARSHAL THOMPSON RN,CCP     N39.0, N20.1, N17.9, A41.9,  E87.6        Date of Birth   1960    Social Security Number       Address   [address] [place] [address]    Home Phone   [phone]    MRN   [id number]       [facility]    Marital Status                               Admission Date   7/13/2025    Admission Type   Emergency    Admitting Provider   Cely Rodgers MD    Attending Provider   Cely Rodgers MD    Department, Room/Bed   [address], N444/1       Discharge Date       Discharge Disposition       Discharge Destination                                 Attending Provider: Cely Rodgers MD    Allergies: No Known Allergies    Isolation: None   Infection: None   Code Status: CPR    Ht: 162.6 cm (64\")   Wt: 69.7 kg (153 lb 9.6 oz)    Admission Cmt: None   Principal Problem: Left ureteral stone [N20.1]                   Active Insurance as of 7/13/2025       Primary Coverage       Payor Plan Insurance Group Employer/Plan Group    [employer] BLUE CROSS AN[employer]M BLUE CROSS BLUE SHIELD PPO [id number]       Payor Plan Address Payor Plan Phone Number Payor Plan Fax Number Effective Dates    [address] [phone]  1/1/2025 - None Entered    [place] [address]         Subscriber Name Subscriber Birth Date Member ID       WILMA DOW 1960 [id number]               Secondary Coverage       Payor Plan Insurance Group Employer/Plan Group    [facility] [id number]       Payor Plan Address Payor Plan Phone Number Payor Plan Fax Number Effective Dates    [address]   1/1/2016 - " None Entered    [facility] [address]         Subscriber Name Subscriber Birth Date Member ID       BUTCH DOW [birth year]1957 [id number]                     Emergency Contacts        (Rel.) Home Phone Work Phone Mobile Phone    Ruben Dow (Spouse) [phone] -- --    MAHNAZ ABDI (Daughter) [phone] -- --                 History & Physical        Olga Shore APRN at 07/14/25 0213       Attestation signed by Cely Clarke MD at 07/14/25 1445      I have reviewed the history and plan as obtained by HEATH Altamirano. I have personally examined the patient. I have reviewed available clinical results, imaging results, EKG/Telemetry results, and prior records and performed greater than 50% of the work for this patient. My exam confirms their physical findings and I agree with the plan as listed above, with the addition of the following:    Pleasant lady with history of CAD, hypertension, hyperlipidemia, bile acid malabsorption/chronic diarrhea, hypothyroidism who presented with chest pain, fatigue, weakness, she presented to the ER for further evaluation and was found to be meeting sepsis criteria, a UA was obtained which revealed UTI and a CT of the abdomen pelvis showed moderate left hydronephrosis with AN obstructing ureteral stone on the left.  She was taken by urology emergently to the OR for left ureteral stent placement.  This morning she reports resolution of her chest pain but continues to have some dysuria/urethral pain.    Constitutional:       General: Patient is not in acute distress.     Appearance: Normal appearance. Not toxic-appearing.   HENT:      Head: Normocephalic and atraumatic.   Cardiovascular:      Rate and Rhythm: Normal rate and regular rhythm.   Pulmonary:      Effort: Pulmonary effort is normal. No respiratory distress.      Breath sounds: Normal breath sounds. No wheezing or rhonchi.   Abdominal:      General: Bowel sounds are normal. There is no distension.       Palpations: Abdomen is soft.      Tenderness: There is no abdominal tenderness. There is no guarding or rebound.   Musculoskeletal:         General: No swelling.      Right lower leg: No edema.      Left lower leg: No edema.   Skin:     General: Skin is warm and dry.   Neurological:      General: No focal deficit present.      Mental Status: Alert and oriented to person, place, and time.   Psychiatric:         Mood and Affect: Mood normal.         Behavior: Behavior normal.     Left ureteral stone with hydronephrosis  UTI with sepsis criteria (fever, WBC)  Lactic acidosis- resolved  - Urology consulted and took the patient for cystoscopy with ureteral stent placement, she is continuing on Zosyn while urine cultures are pending, I discussed with the patient today regarding her fever, elevated procalcitonin, white count and abnormal UA it would not surprise me if her blood cultures become positive in the next 24 to 48 hours  -Patient is agreeable to staying in the hospital to monitor cultures  -She is having some urethral pain still which will hopefully improve with treatment of UTI    CAD - recent cath last month with 70% ostial stenosis- not amenable to PCI, med mgmt recommended  Atypical chest pain  Elevated troponin  Recent admission for MI  HTN  HLD  - Chest pain resolved after stent placement, her troponin is lower than baseline  - she is continuing on aspirin and statin continue amlodipine    Bile acid malabsorption/chronic diarrhea  - cont home colestipol  - Patient reports chronic diarrhea with fecal leakage, had improved with colestipol when this was initiated last year however over the last month symptoms have continued to worsen, she is having diarrhea/fecal incontinence related to this daily; she follows with Crystal Smith in clinic, this is having significant impact on her life; we will ask GI to see her while she is here to see if they have any additional recs     Elevated d-dimer  - CTA chest neg  as was b/l LE dopplers    Hypothyroidism  - levothyroxine                            Patient Name:  Wilma Longo  YOB: 1960  MRN:  [id number]  Admit Date:  7/13/2025  Patient Care Team:  Jerman Virgen MD as PCP - General  Jerman Virgen MD as PCP - Family Medicine  Orlin Mann MD as Consulting Physician (Gastroenterology)  Valery Irizarry MD as Consulting Physician (Obstetrics and Gynecology)  Kammerling, James M, MD as Consulting Physician (Cardiac Electrophysiology)  Estephanie Sanabria MD as Consulting Physician (Orthopedic Surgery)  Jerman Virgen MD as Referring Physician (Family Medicine)  Jerman Dee II, MD as Consulting Physician (Hematology and Oncology)  Crystal Grissom APRN as Nurse Practitioner (Oncology)      Subjective  History Present Illness     Chief Complaint   Patient presents with    Chest Pain       Ms. Longo is a 65 y.o. non-smoker with a history of hypertension, hyperlipidemia, coronary artery disease, irritable bowel syndrome, hypothyroidism, anxiety, and depression that presents to [facility] complaining of chest pain. She reports chest pain that began about 3 days ago. She describes the pain as mid-sternal, intermittent, moderate, and sharp in nature. She reports she had a MI in June, but this pain felt different from what she experienced then as there was no radiation into her neck. She also reports fatigue and generalized weakness. She states she told a family member about the chest pain yesterday, and she was encouraged to come to the ED. Cardiac work up in the ED showed troponin 79 and repeat 64. An EKG showed sinus tachycardia. She was also febrile in the ED. Her other lab work showed potassium 2.8, chloride 97, creatinine 1.26, GFR 47.5, procalcitonin 20.70, D Dimer 1.28, WBC 16.21, lactate 2.2, and repeat lactate 0.5. A urinalysis was positive for trace bacteria, WBCs, RBCs, trace protein, 1+ leukocytes, trace blood, and  trace ketones. A CT angiogram of the chest showed no evidence of pulmonary embolism, well opacified pulmonary arteries, and minimal basilar dependent atelectasis. A CT of the abdomen/pelvis showed mild to moderate left hydronephrosis secondary to a 4 mm stone in the distal left ureter, 8 to 10 cm above the ureterovesical junction. The ED physician spoke with Dr. Pedroza of urology, and the patient was taken emergently to the OR for left ureter stent placement.        History of Present Illness  Review of Systems   Constitutional:  Positive for fatigue. Negative for chills and fever.   HENT:  Negative for congestion and sore throat.    Eyes:  Negative for photophobia and visual disturbance.   Respiratory:  Negative for cough, shortness of breath (mild) and wheezing.    Cardiovascular:  Positive for chest pain. Negative for palpitations and leg swelling.   Gastrointestinal:  Negative for abdominal pain, nausea and vomiting.   Genitourinary:  Negative for decreased urine volume, dysuria, flank pain, frequency and hematuria.   Musculoskeletal:  Positive for myalgias. Negative for arthralgias.   Skin:  Negative for color change and pallor.   Neurological:  Positive for weakness (generalized). Negative for dizziness, light-headedness, numbness and headaches.        Personal History     Past Medical History:   Diagnosis Date    Anemia 5/2022    Anxiety     Osman esophagus ?    Benign essential hypertension 02/01/2015    Cancer     Skin cancer    Cardiomyopathy     Colitis 06/12/2009    Depression     Fibromyalgia     Fibromyalgia, primary     Gestational diabetes mellitus 06/1994    History of gestational diabetes     Hyperlipidemia 04/13/2011    Hypothyroidism 02/01/2015    Inflammatory bowel disease 06/12/2009    Insomnia 02/03/2015    Irritable bowel syndrome 11/30/2009    Panic attacks     Primary bile acid malabsorption     fecal incontinence    Rectocele     Skin cancer of arm     Vaginal prolapse     Vitamin D  deficiency 07/19/2012     Past Surgical History:   Procedure Laterality Date    ANTERIOR AND POSTERIOR VAGINAL REPAIR N/A 04/06/2021    Procedure: POSTERIOR VAGINAL REPAIR, CYSTOSCOPY;  Surgeon: Cora Gustafson MD;  Location: [facility] MAIN OR;  Service: Gynecology;  Laterality: N/A;    BLADDER REPAIR  09/2015    X2    CARDIAC CATHETERIZATION N/A 6/23/2025    Procedure: Left Heart Cath;  Surgeon: Dedra Guzman MD;  [facility];  Service: Cardiovascular;  Laterality: N/A;    CARDIAC CATHETERIZATION N/A 6/23/2025    Procedure: Coronary angiography;  Surgeon: Dedra Guzman MD;  [facility];  Service: Cardiovascular;  Laterality: N/A;    CARDIAC CATHETERIZATION N/A 6/23/2025    Procedure: Left ventriculography;  Surgeon: Dedra Guzman MD;  Location:  [facility] CATH INVASIVE LOCATION;  Service: Cardiovascular;  Laterality: N/A;    COLONOSCOPY  2010    wnl    COLONOSCOPY N/A 09/09/2016    Procedure: COLONOSCOPY into cecum and terminal ileum with biopsies;  Surgeon: Orlin Mann MD;  Location: [facility] ENDOSCOPY;  Service:     COLONOSCOPY N/A 09/06/2019    Procedure: COLONOSCOPY TO CECUM AND INTO TERMINAL ILEUM;  Surgeon: Orlin Mann MD;  Location: [facility] ENDOSCOPY;  Service: Gastroenterology    COLONOSCOPY W/ POLYPECTOMY N/A 02/20/2023    Procedure: COLONOSCOPY INTO CECUM AND TI WITH BIOPSIES;  Surgeon: Basilio Martin MD;  Location: [facility] ENDOSCOPY;  Service: Gastroenterology;  Laterality: N/A;  PRE: COLITIS  POST: NORMAL COLON    CYSTOSCOPY W/ URETERAL STENT PLACEMENT      ENDOSCOPY N/A 09/06/2019    Procedure: ESOPHAGOGASTRODUODENOSCOPY WITH COLD BIOPSIES AND 48F HERNÁNDEZ DILATION;  Surgeon: Orlin Mann MD;  Location: [facility] ENDOSCOPY;  Service: Gastroenterology    ENDOSCOPY N/A 02/20/2023    Procedure: ESOPHAGOGASTRODUODENOSCOPY WITH BIOPSIES;  Surgeon: Basilio Martin MD;  Location: [facility] ENDOSCOPY;  Service: Gastroenterology;   Laterality: N/A;  PRE: MENDEZ'S  POST: HIATAL HERNIA, GASTRITIS    HYSTERECTOMY  12/2013    MOUTH SURGERY      multiple teeth extraction    SACROCOLPOPEXY N/A 04/06/2021    Procedure: LARPAROSCOPY COLPOPEXY, BILATERAL SALPINGECTOMY;  Surgeon: Cora Gustafson MD;  Location: [facility] MAIN OR;  Service: Gynecology;  Laterality: N/A;    SKIN CANCER EXCISION      UPPER GASTROINTESTINAL ENDOSCOPY  09/06/2019     Family History   Problem Relation Age of Onset    Depression Mother     Heart disease Mother     Stroke Mother     Heart attack Mother     Heart failure Mother     Hyperlipidemia Mother     Hypertension Mother     Irritable bowel syndrome Mother     Arthritis Father     Heart disease Father     Skin cancer Father     Heart attack Father     Heart failure Father     Heart disease Maternal Grandmother     Stroke Maternal Grandfather     Stroke Paternal Grandfather     Hypertension Sister     Hypothyroidism Sister     Hypertension Brother     Hypothyroidism Daughter     Thyroid disease Sister     Colon cancer Neg Hx     Colon polyps Neg Hx     Liver disease Neg Hx     Rectal cancer Neg Hx     Stomach cancer Neg Hx     Liver cancer Neg Hx     Malig Hyperthermia Neg Hx      Social History     Tobacco Use    Smoking status: Never    Smokeless tobacco: Never   Vaping Use    Vaping status: Never Used   Substance Use Topics    Alcohol use: Not Currently     Comment: less than once per  month    Drug use: No     Medications Prior to Admission   Medication Sig Dispense Refill Last Dose/Taking    ALPRAZolam (Xanax) 0.5 MG tablet Take 1 tablet by mouth Daily As Needed for Anxiety. D/c previous order 30 tablet 2 Taking As Needed    amLODIPine (NORVASC) 5 MG tablet TAKE 1 TABLET BY MOUTH DAILY (Patient taking differently: Take 1 tablet by mouth Every Night.) 14 tablet 0 Taking Differently    aspirin 81 MG chewable tablet Chew 1 tablet Daily.   Taking    colestipol (COLESTID) 1 g tablet Take 2 tablets by mouth 2 (Two) Times a Day.  360 tablet 3 Taking    Cranberry 125 MG tablet Take 1 tablet by mouth Daily.   Taking    DULoxetine (CYMBALTA) 60 MG capsule TAKE 1 CAPSULE BY MOUTH DAILY (Patient taking differently: Take 1 capsule by mouth Every Night.) 14 capsule 0 Taking Differently    levothyroxine (SYNTHROID, LEVOTHROID) 75 MCG tablet TAKE 1 TABLET BY MOUTH DAILY 14 tablet 0 Taking    lisinopril (PRINIVIL,ZESTRIL) 20 MG tablet TAKE 1 TABLET BY MOUTH DAILY (Patient taking differently: Take 1 tablet by mouth Every Night.) 14 tablet 0 Taking Differently    naproxen (NAPROSYN) 500 MG tablet Take 1 tablet by mouth Daily As Needed for Moderate Pain. 180 tablet 1 Taking As Needed    rosuvastatin (CRESTOR) 20 MG tablet Take 1 tablet by mouth Daily. 90 tablet 0 Taking    vitamin C (ASCORBIC ACID) 250 MG tablet Take 2 tablets by mouth Every Night.   Taking    valACYclovir (VALTREX) 500 MG tablet Take 1 tablet by mouth As Needed.        Allergies:  No Known Allergies    Objective   Objective     Vital Signs  Temp:  [97.7 °F (36.5 °C)-102.9 °F (39.4 °C)] 97.7 °F (36.5 °C)  Heart Rate:  [] 74  Resp:  [15-31] 21  BP: (115-150)/(59-96) 122/62  SpO2:  [90 %-100 %] 99 %  on  Flow (L/min) (Oxygen Therapy):  [2] 2;   Device (Oxygen Therapy): nasal cannula  Body mass index is 26.37 kg/m².    Physical Exam  Vitals and nursing note reviewed.   Constitutional:       Appearance: Normal appearance.   HENT:      Head: Normocephalic and atraumatic.      Nose: Nose normal.      Mouth/Throat:      Mouth: Mucous membranes are moist.      Pharynx: Oropharynx is clear.   Eyes:      Extraocular Movements: Extraocular movements intact.      Conjunctiva/sclera: Conjunctivae normal.   Cardiovascular:      Rate and Rhythm: Normal rate and regular rhythm.      Pulses: Normal pulses.      Heart sounds: Normal heart sounds.   Pulmonary:      Effort: Pulmonary effort is normal.      Breath sounds: Normal breath sounds.   Abdominal:      General: Bowel sounds are normal. There  is no distension.      Palpations: Abdomen is soft.      Tenderness: There is abdominal tenderness (suprapubic).   Musculoskeletal:         General: No swelling. Normal range of motion.      Cervical back: Normal range of motion and neck supple.   Skin:     General: Skin is warm.   Neurological:      General: No focal deficit present.      Mental Status: She is alert and oriented to person, place, and time.   Psychiatric:         Mood and Affect: Mood normal.         Behavior: Behavior normal.         Results Review:  I reviewed the patient's new clinical results.  I reviewed the patient's new imaging results and agree with the interpretation.  I reviewed the patient's other test results and agree with the interpretation  I personally viewed and interpreted the patient's EKG/Telemetry data  Discussed with ED provider.    Lab Results (last 24 hours)       Procedure Component Value Units Date/Time    CBC & Differential [[id number]]  (Abnormal) Collected: 07/13/25 1753    Specimen: Blood Updated: 07/13/25 1803    Narrative:      The following orders were created for panel order CBC & Differential.  Procedure                               Abnormality         Status                     ---------                               -----------         ------                     CBC Auto Differential[[id number]]        Abnormal            Final result                 Please view results for these tests on the individual orders.    Comprehensive Metabolic Panel [[id number]]  (Abnormal) Collected: 07/13/25 1753    Specimen: Blood Updated: 07/13/25 1825     Glucose 138 mg/dL      BUN 19.0 mg/dL      Creatinine 1.26 mg/dL      Sodium 136 mmol/L      Potassium 2.8 mmol/L      Chloride 97 mmol/L      CO2 24.1 mmol/L      Calcium 9.1 mg/dL      Total Protein 7.7 g/dL      Albumin 3.7 g/dL      ALT (SGPT) 17 U/L      AST (SGOT) 20 U/L      Alkaline Phosphatase 78 U/L      Total Bilirubin 0.5 mg/dL      Globulin 4.0 gm/dL      A/G Ratio 0.9  g/dL      BUN/Creatinine Ratio 15.1     Anion Gap 14.9 mmol/L      eGFR 47.5 mL/min/1.73     Narrative:      GFR Categories in Chronic Kidney Disease (CKD)              GFR Category          GFR (mL/min/1.73)    Interpretation  G1                    90 or greater        Normal or high (1)  G2                    60-89                Mild decrease (1)  G3a                   45-59                Mild to moderate decrease  G3b                   30-44                Moderate to severe decrease  G4                    15-29                Severe decrease  G5                    14 or less           Kidney failure    (1)In the absence of evidence of kidney disease, neither GFR category G1 or G2 fulfill the criteria for CKD.    eGFR calculation 2021 CKD-EPI creatinine equation, which does not include race as a factor    High Sensitivity Troponin T [[id number]]  (Abnormal) Collected: 07/13/25 1753    Specimen: Blood Updated: 07/13/25 1832     HS Troponin T 79 ng/L     Narrative:      High Sensitive Troponin T Reference Range:  <14.0 ng/L- Negative Female for AMI  <22.0 ng/L- Negative Male for AMI  >=14 - Abnormal Female indicating possible myocardial injury.  >=22 - Abnormal Male indicating possible myocardial injury.   Clinicians would have to utilize clinical acumen, EKG, Troponin, and serial changes to determine if it is an Acute Myocardial Infarction or myocardial injury due to an underlying chronic condition.         CBC Auto Differential [[id number]]  (Abnormal) Collected: 07/13/25 1753    Specimen: Blood Updated: 07/13/25 1803     WBC 16.21 10*3/mm3      RBC 4.16 10*6/mm3      Hemoglobin 12.2 g/dL      Hematocrit 36.8 %      MCV 88.5 fL      MCH 29.3 pg      MCHC 33.2 g/dL      RDW 12.6 %      RDW-SD 40.5 fl      MPV 9.7 fL      Platelets 283 10*3/mm3      Neutrophil % 76.6 %      Lymphocyte % 14.8 %      Monocyte % 7.7 %      Eosinophil % 0.4 %      Basophil % 0.2 %      Immature Grans % 0.3 %      Neutrophils, Absolute  "12.40 10*3/mm3      Lymphocytes, Absolute 2.40 10*3/mm3      Monocytes, Absolute 1.25 10*3/mm3      Eosinophils, Absolute 0.07 10*3/mm3      Basophils, Absolute 0.04 10*3/mm3      Immature Grans, Absolute 0.05 10*3/mm3      nRBC 0.0 /100 WBC     D-dimer, Quantitative [[id number]]  (Abnormal) Collected: 07/13/25 1753    Specimen: Blood Updated: 07/13/25 2029     D-Dimer, Quantitative 1.28 MCGFEU/mL     Narrative:      According to the assay 's published package insert, a normal (<0.50 MCGFEU/mL) D-dimer result in conjunction with a non-high clinical probability assessment, excludes deep vein thrombosis (DVT) and pulmonary embolism (PE) with high sensitivity.    D-dimer values increase with age and this can make VTE exclusion of an older population difficult. To address this, the American College of Phys[employer], based on best available evidence and recent guidelines, recommends that clinicians use age-adjusted D-dimer thresholds in patients greater than 50 years of age with: a) a low probability of PE who do not meet all Pulmonary Embolism Rule Out Criteria, or b) in those with intermediate probability of PE.   The formula for an age-adjusted D-dimer cut-off is \"age/100\".  For example, a 60 year old patient would have an age-adjusted cut-off of 0.60 MCGFEU/mL and an 80 year old 0.80 MCGFEU/mL.    Procalcitonin [[id number]]  (Abnormal) Collected: 07/13/25 1753    Specimen: Blood Updated: 07/13/25 1905     Procalcitonin 20.70 ng/mL     Narrative:      As a Marker for Sepsis (Non-Neonates):    1. <0.5 ng/mL represents a low risk of severe sepsis and/or septic shock.  2. >2 ng/mL represents a high risk of severe sepsis and/or septic shock.    As a Marker for Lower Respiratory Tract Infections that require antibiotic therapy:    PCT on Admission    Antibiotic Therapy       6-12 Hrs later    >0.5                Strongly Recommended  >0.25 - <0.5        Recommended   0.1 - 0.25          Discouraged              " "Remeasure/reassess PCT  <0.1                Strongly Discouraged     Remeasure/reassess PCT    As 28 day mortality risk marker: \"Change in Procalcitonin Result\" (>80% or <=80%) if Day 0 (or Day 1) and Day 4 values are available. Refer to http://www.[facility]-pct-calculator.com    Change in PCT <=80%  A decrease of PCT levels below or equal to 80% defines a positive change in PCT test result representing a higher risk for 28-day all-cause mortality of patients diagnosed with severe sepsis for septic shock.    Change in PCT >80%  A decrease of PCT levels of more than 80% defines a negative change in PCT result representing a lower risk for 28-day all-cause mortality of patients diagnosed with severe sepsis or septic shock.       Magnesium [[id number]]  (Normal) Collected: 07/13/25 1753    Specimen: Blood Updated: 07/13/25 1849     Magnesium 1.8 mg/dL     High Sensitivity Troponin T 1Hr [[id number]]  (Abnormal) Collected: 07/13/25 1905    Specimen: Blood from Arm, Right Updated: 07/13/25 1939     HS Troponin T 64 ng/L      Troponin T Numeric Delta -15 ng/L      Troponin T % Delta -19    Narrative:      High Sensitive Troponin T Reference Range:  <14.0 ng/L- Negative Female for AMI  <22.0 ng/L- Negative Male for AMI  >=14 - Abnormal Female indicating possible myocardial injury.  >=22 - Abnormal Male indicating possible myocardial injury.   Clinicians would have to utilize clinical acumen, EKG, Troponin, and serial changes to determine if it is an Acute Myocardial Infarction or myocardial injury due to an underlying chronic condition.         Blood Culture - Blood, Arm, Right [[id number]] Collected: 07/13/25 1905    Specimen: Blood from Arm, Right Updated: 07/13/25 1911    Lactic Acid, Plasma [[id number]]  (Abnormal) Collected: 07/13/25 1905    Specimen: Blood from Arm, Right Updated: 07/13/25 1938     Lactate 2.2 mmol/L     Blood Culture - Blood, Arm, Left [[id number]] Collected: 07/13/25 1906    Specimen: Blood from Arm, Left " Updated: 07/13/25 1912    STAT Lactic Acid, Reflex [[id number]]  (Normal) Collected: 07/13/25 2210    Specimen: Blood Updated: 07/13/25 2250     Lactate 0.5 mmol/L     Urinalysis With Microscopic If Indicated (No Culture) - Urine, Clean Catch [[id number]]  (Abnormal) Collected: 07/13/25 2228    Specimen: Urine, Clean Catch Updated: 07/13/25 2253     Color, UA Yellow     Appearance, UA Clear     pH, UA 6.5     Specific Gravity, UA 1.028     Glucose, UA Negative     Ketones, UA Trace     Bilirubin, UA Negative     Blood, UA Trace     Protein, UA Trace     Leuk Esterase, UA Small (1+)     Nitrite, UA Negative     Urobilinogen, UA 0.2 E.U./dL    Urinalysis, Microscopic Only - Urine, Clean Catch [[id number]]  (Abnormal) Collected: 07/13/25 2228    Specimen: Urine, Clean Catch Updated: 07/13/25 2253     RBC, UA 3-5 /HPF      WBC, UA 11-20 /HPF      Bacteria, UA Trace /HPF      Squamous Epithelial Cells, UA 0-2 /HPF      Hyaline Casts, UA None Seen /LPF      Methodology Automated Microscopy    Urine Culture - Urine, Urine, Clean Catch [[id number]] Collected: 07/13/25 2228    Specimen: Urine, Clean Catch Updated: 07/14/25 0231    POC Glucose Once [[id number]]  (Abnormal) Collected: 07/13/25 2340    Specimen: Blood Updated: 07/13/25 2342     Glucose 66 mg/dL     POC Glucose Once [[id number]]  (Normal) Collected: 07/14/25 0031    Specimen: Blood Updated: 07/14/25 0034     Glucose 74 mg/dL             Imaging Results (Last 24 Hours)       Procedure Component Value Units Date/Time    FL C Arm During Surgery [[id number]] Resulted: 07/14/25 0027     Updated: 07/14/25 0027    Narrative:      This procedure was auto-finalized with no dictation required.    CT Abdomen Pelvis With Contrast [[id number]] Collected: 07/13/25 2206     Updated: 07/13/25 2218    Narrative:      CTA CHEST, CT ABDOMEN AND PELVIS     HISTORY: Pulmonary Embolism; R07.9-Chest pain, unspecified; R79.89-Other  specified abnormal findings of blood chemistry;  E87.6-Hypokalemia;  R19.7-Diarrhea, unspecified; D72.829-Elevated white blood cell count,  unspecified; A41.9-Sepsis, unspecified organism     COMPARISON: None     TECHNIQUE: CT angiography was performed of the chest with axial images  as well as coronal and sagittal reformatted MIP images provided  following the administration of IV contrast.  3-D surface rendered  reformats were obtained of the pulmonary arteries and aorta.  CT of the  abdomen and pelvis obtained following administration of IV contrast.  Coronal and sagittal reconstructions were obtained.   Radiation dose  reduction techniques were utilized, including automated exposure  control, and exposure modulation based on body size.     FINDINGS:     Chest CTA:  There is mild bibasilar atelectasis, but there is no  evidence of acute pulmonary infiltrate, pleural effusion, pneumothorax  or suspicious nodule.  The thoracic aorta is normal in caliber, and  there is no evidence of dissection.  There are coronary atherosclerotic  vascular calcifications.  There is no suspicious mediastinal adenopathy  or other mass, though there is a small to moderate hiatal hernia.  Bolus  timing is adequate, and there is no evidence of pulmonary embolism.     Abdomen: The liver and gallbladder are normal.  The spleen and pancreas  appear normal.  Both adrenal glands are normal. There is no evidence of  bowel obstruction. The aorta is normal in caliber.       : There is mild atrophic change in the right kidney and there is a  right renal 4 mm nonobstructing calculus, but there is no right  hydronephrosis. No left renal calculi are seen, but there is mild to  moderate left hydronephrosis, and the left ureter is dilated down into  the pelvis, to the level of an approximately 4 mm stone, 8 to 10 cm  above the ureterovesical junction. No bladder calculi are seen.     Pelvis:  The appendix is clearly identified, and is normal.  There is  diverticulosis, but there is no CT evidence  of diverticulitis.  There is  no pelvic adenopathy or other soft tissue mass.  There is no CT evidence  of hernia or bowel obstruction.     There is no acute bony abnormality.       Impression:         1.  Pulmonary arteries are well-opacified, and there is no evidence of  pulmonary embolism.     2.  There is minimal bibasilar dependent atelectasis, but the lung  parenchyma is otherwise normal. No acute pulmonary parenchymal  abnormality is seen.     3.   Mild to moderate left hydronephrosis secondary to a 4 mm stone in  the distal left ureter, 8 to 10 cm above the ureterovesical junction.                 This report was finalized on 7/13/2025 10:15 PM by Dr. Wojciech Roth M.D on Workstation: [id number]       CT Angiogram Chest Pulmonary Embolism [[id number]] Collected: 07/13/25 2206     Updated: 07/13/25 2218    Narrative:      CTA CHEST, CT ABDOMEN AND PELVIS     HISTORY: Pulmonary Embolism; R07.9-Chest pain, unspecified; R79.89-Other  specified abnormal findings of blood chemistry; E87.6-Hypokalemia;  R19.7-Diarrhea, unspecified; D72.829-Elevated white blood cell count,  unspecified; A41.9-Sepsis, unspecified organism     COMPARISON: None     TECHNIQUE: CT angiography was performed of the chest with axial images  as well as coronal and sagittal reformatted MIP images provided  following the administration of IV contrast.  3-D surface rendered  reformats were obtained of the pulmonary arteries and aorta.  CT of the  abdomen and pelvis obtained following administration of IV contrast.  Coronal and sagittal reconstructions were obtained.   Radiation dose  reduction techniques were utilized, including automated exposure  control, and exposure modulation based on body size.     FINDINGS:     Chest CTA:  There is mild bibasilar atelectasis, but there is no  evidence of acute pulmonary infiltrate, pleural effusion, pneumothorax  or suspicious nodule.  The thoracic aorta is normal in caliber, and  there is no  evidence of dissection.  There are coronary atherosclerotic  vascular calcifications.  There is no suspicious mediastinal adenopathy  or other mass, though there is a small to moderate hiatal hernia.  Bolus  timing is adequate, and there is no evidence of pulmonary embolism.     Abdomen: The liver and gallbladder are normal.  The spleen and pancreas  appear normal.  Both adrenal glands are normal. There is no evidence of  bowel obstruction. The aorta is normal in caliber.       : There is mild atrophic change in the right kidney and there is a  right renal 4 mm nonobstructing calculus, but there is no right  hydronephrosis. No left renal calculi are seen, but there is mild to  moderate left hydronephrosis, and the left ureter is dilated down into  the pelvis, to the level of an approximately 4 mm stone, 8 to 10 cm  above the ureterovesical junction. No bladder calculi are seen.     Pelvis:  The appendix is clearly identified, and is normal.  There is  diverticulosis, but there is no CT evidence of diverticulitis.  There is  no pelvic adenopathy or other soft tissue mass.  There is no CT evidence  of hernia or bowel obstruction.     There is no acute bony abnormality.       Impression:         1.  Pulmonary arteries are well-opacified, and there is no evidence of  pulmonary embolism.     2.  There is minimal bibasilar dependent atelectasis, but the lung  parenchyma is otherwise normal. No acute pulmonary parenchymal  abnormality is seen.     3.   Mild to moderate left hydronephrosis secondary to a 4 mm stone in  the distal left ureter, 8 to 10 cm above the ureterovesical junction.                 This report was finalized on 7/13/2025 10:15 PM by Dr. Wojciech Roth M.D on Workstation: [id number]       XR Chest 1 View [[id number]] Collected: 07/13/25 1831     Updated: 07/13/25 1838    Narrative:      XR CHEST 1 VW-     HISTORY: Chest pain. Reported recent heart attack.     COMPARISON: Chest radiograph  6/23/2025     FINDINGS: A single view of the chest was obtained.     Support Devices:  None.  Cardiac Silhouette/Mediastinum/Wendie:  The cardiac, mediastinal, and  hilar contours are within normal limits.  Lungs/Pleural Spaces:  The lungs and pleural spaces are clear.  Chest Wall/Diaphragm/Upper Abdomen: There is a hiatal hernia. The  visualized osseous structures are similar.        CONCLUSION(S):       1.  No focal consolidation or effusion.  2.  Hiatal hernia.     This report was finalized on 7/13/2025 6:35 PM by Dr. La Blount M.D  on Workstation: [id number]               Results for orders placed during the hospital encounter of 06/25/25    Adult Transthoracic Echo Complete W/ Cont if Necessary Per Protocol 06/25/2025  4:24 PM    Interpretation Summary    Left ventricular systolic function is normal. Calculated left ventricular EF = 62.7% Left ventricular ejection fraction appears to be 61 - 65%.    Left ventricular diastolic function was normal.      Telemetry Scan   Final Result      ECG 12 Lead ED Triage Standing Order; Chest Pain   Final Result   HEART BZHO=853  bpm   RR Irwwimab=699  ms   VT Ftsphwex=458  ms   P Horizontal Axis=25  deg   P Front Axis=44  deg   QRSD Interval=88  ms   QT Nrmsbtyp=477  ms   RPyU=317  ms   QRS Axis=38  deg   T Wave Axis=38  deg   - ABNORMAL ECG -   Sinus tachycardia   Inferior  infarct, old   When compared with ECG of 23-Jun-2025 09:20:24,   Significant repolarization change   Significant axis, voltage or hypertrophy change   Electronically Signed By: Efrem Beltre ([facility]) 2025-07-13 18:19:59   Date and Time of Study:2025-07-13 17:51:54      ECG 12 Lead Chest Pain    (Results Pending)        Assessment/Plan     Active Hospital Problems    Diagnosis  POA    **Left ureteral stone [N20.1]  Unknown    BALBINA (acute kidney injury) [N17.9]  Unknown    Sepsis [A41.9]  Unknown    Hypokalemia [E87.6]  Unknown    Acute UTI [N39.0]  Yes    Coronary artery disease involving native  coronary artery of native heart without angina pectoris [I25.10]  Yes    Anxiety associated with depression [F41.8]  Yes    Benign essential hypertension [I10]  Yes    Hypothyroidism [E03.9]  Yes    Hyperlipidemia [E78.5]  Yes     2/1/2015      Irritable bowel syndrome [K58.9]  Yes       Left ureter stone  Left hydronephrosis  Acute UTI  Sepsis  -Admit to a telemetry unit for monitoring  -She met sepsis criteria with fever, tachycardia, and leukocytosis  -She also had lactic acidosis, and an elevated procal in the ED. Lactic acidosis has resolved  -She is s/p emergent cystoscopy and ureteral stent insertion with urology  -Continue Zosyn to cover for UTI  -Blood and urine cultures are pending  -Urology is following  -IV fluids  -Morphine as needed for pain    Chest Pain  -She reports her chest pain has resolved   -Her troponin is elevated but trending down  -There are no ischemic changes on EKG  -She is s/p heart cath with Dr. Guzman on 6/23/25 that demonstrated 70% ostial stenosis of a small caliber diagonal not amendable to PCI. Medical therapy was recommended  -Continue to trend troponin. If her troponin trends up or if her chest pain resumes, will consult cardiology    Elevated D Dimer  -CTA chest was negative for an acute pulmonary embolism  -Check BLE venous dopplers to rule out DVT    BALBINA  Hypokalemia  -Her baseline creatinine is less than 1  -IVF  -Avoid nephrotoxins  -Replace potassium per protocol  -Repeat labs in AM    Hypertension  -Blood pressures have been stable. Continue amlodipine  -Hold lisinopril due to elevated creatinine  -Monitor    Hyperlipidemia  Coronary artery disease  -Continue aspirin and statin    Hypothyroidism  -Continue levothyroxine    IBS  -Continue Colestipol, she can take her home dose    Anxiety  Depression  -Continue Cymbalta  -Continue home dose of Xanax as needed for anxiety    -I discussed the patients findings and my recommendations with patient.    VTE Prophylaxis -  SCDs.  Code Status - Full code.       Olga Shore APRDEJAN  [facility]  07/14/25  05:05 EDT      Electronically signed by Cely Clarke MD at 07/14/25 1443          Emergency Department Notes            Tashi Najera MD at 07/13/25 1818        Procedure Orders    1. Critical Care [[id number]] ordered by Tashi Najera MD                  EMERGENCY DEPARTMENT ENCOUNTER    Room Number:  BRET/BRET  Date of encounter:  7/13/2025  PCP: Jerman Virgen MD  Historian: Patient and significant other  Relevant information and history provided by sources other than the patient will be included below and in the ED Course.  Review of pertinent past medical records may also be included in record below and ED Course.    HPI:  Chief Complaint: Chest pain, diarrhea, feeling weak  A complete HPI/ROS/PMH/PSH/SH/FH are unobtainable due to: Not applicable.   Context: Wilma Longo is a 65 y.o. female who presents to the ED c/o symptoms of the chest pain started yesterday around 2 in the afternoon.  Feels like a tightness right in the center of her chest.  Nothing makes the pain worse.  Is not worse with exertion is not worse with breathing.  This pain does not radiate to her back like her pain that she had a couple weeks ago.  This is in her chest.  She has no weakness or numbness to her extremities.  She states that when she does take a Xanax it helps a little bit.  She did take a Xanax a few hours prior to arrival here and it did not help so she came here because of persistent.  She does have shortness of breath but does have anxiety.  She describes it as she feels that she cannot get enough air.  The other thing that she has is she has a tremendous amount of diarrhea.  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  She is on colestipol for this.  For the past 24 hours the diarrhea has significantly increased.  She is having over 20 episodes of diarrhea a day.  A  little bit of blood-tinged diarrhea from her fissure.  No black diarrhea.  When she does have the diarrhea she has mild abdominal pain.  She has had chills but no definitive fever.  These chills were episodic she had them yesterday and then again today.  She feels weak she has no energy.  She has had bouts of nausea but no vomiting.  She is not eating or drinking as much.  She sometimes feels that eating and drinking will make the diarrhea worse.  Denies any new swelling to lower extremities denies any new pain to lower extremities.  Denies any headache        Previous Episodes: The chest tightness is similar to what she has had before.  The diarrhea is similar to what she has had before but is significantly increased in volume.  Current Symptoms: See above    MEDICAL HISTORY REVIEWED  I reviewed the note from Dr. Durand from 6/23/2025 she has a history of hypertension and hyperlipidemia.  She presented to the [facility] emergency department with chest pain through to her back she had 2 troponins that were elevated with a delta of 8.  EKG was unremarkable patient had a D-dimer and a BNP that was normal at that time CT angiogram showed no acute abnormality and no PE  She did have a heart cath she had 70% mid LAD stenosis 70% ostial stenosis no significant disease of the left main ramus intermedius or left circumflex or right coronary artery normal left ventricular systolic function.  Medical manage was recommended.  She did also have an echo at that time her ejection fraction was 62.7 which was normal diastolic function was normal as well.      PAST MEDICAL HISTORY  Active Ambulatory Problems     Diagnosis Date Noted    Colitis 06/12/2009    Depression     Benign essential hypertension 02/01/2015    Hypothyroidism 02/01/2015    Impaired fasting glucose 02/01/2015    Insomnia 02/03/2015    Irritable bowel syndrome 11/30/2009    Hyperlipidemia 04/13/2011    Mood disorder 04/13/2011    Vitamin D deficiency 07/19/2012     Fibromyalgia 12/21/2017    Dysphagia 08/06/2019    Adhesive capsulitis of left shoulder 08/24/2020    Anemia 01/14/2021    Iron deficiency anemia 03/02/2021    Malabsorption of iron 03/02/2021    Prolapse of female pelvic organs 04/05/2021    Pelvic prolapse 04/06/2021    S/P laparoscopy 04/06/2021    Osman's esophagus without dysplasia 11/30/2022    Monoclonal gammopathy 10/30/2023    Influenza A 02/08/2024    CORONA (dyspnea on exertion) 06/23/2025    Coronary artery disease involving native coronary artery of native heart without angina pectoris 07/02/2025     Resolved Ambulatory Problems     Diagnosis Date Noted    No Resolved Ambulatory Problems     Past Medical History:   Diagnosis Date    Anxiety     Osman esophagus ?    Cancer     Cardiomyopathy     Fibromyalgia, primary     Gestational diabetes mellitus 06/1994    History of gestational diabetes     Inflammatory bowel disease 06/12/2009    Panic attacks     Primary bile acid malabsorption     Rectocele     Skin cancer of arm     Vaginal prolapse          PAST SURGICAL HISTORY  Past Surgical History:   Procedure Laterality Date    ANTERIOR AND POSTERIOR VAGINAL REPAIR N/A 04/06/2021    Procedure: POSTERIOR VAGINAL REPAIR, CYSTOSCOPY;  Surgeon: Cora Gustafson MD;  Location: [facility] MAIN OR;  Service: Gynecology;  Laterality: N/A;    BLADDER REPAIR  09/2015    X2    CARDIAC CATHETERIZATION N/A 6/23/2025    Procedure: Left Heart Cath;  Surgeon: Dedra Guzman MD;  Location: [facility] CATH INVASIVE LOCATION;  Service: Cardiovascular;  Laterality: N/A;    CARDIAC CATHETERIZATION N/A 6/23/2025    Procedure: Coronary angiography;  Surgeon: Dedra Guzman MD;  Location: [facility] CATH INVASIVE LOCATION;  Service: Cardiovascular;  Laterality: N/A;    CARDIAC CATHETERIZATION N/A 6/23/2025    Procedure: Left ventriculography;  Surgeon: Dedra Guzman MD;  Location: [facility] CATH INVASIVE LOCATION;  Service: Cardiovascular;  Laterality: N/A;    COLONOSCOPY  2010    wnl     COLONOSCOPY N/A 09/09/2016    Procedure: COLONOSCOPY into cecum and terminal ileum with biopsies;  Surgeon: Orlin Mann MD;  Location:  [facility];  Service:     COLONOSCOPY N/A 09/06/2019    Procedure: COLONOSCOPY TO CECUM AND INTO TERMINAL ILEUM;  Surgeon: Orlin Mann MD;  Location: [facility] ENDOSCOPY;  Service: Gastroenterology    COLONOSCOPY W/ POLYPECTOMY N/A 02/20/2023    Procedure: COLONOSCOPY INTO CECUM AND TI WITH BIOPSIES;  Surgeon: Basilio Martin MD;  Location: [facility] ENDOSCOPY;  Service: Gastroenterology;  Laterality: N/A;  PRE: COLITIS  POST: NORMAL COLON    CYSTOSCOPY W/ URETERAL STENT PLACEMENT      ENDOSCOPY N/A 09/06/2019    Procedure: ESOPHAGOGASTRODUODENOSCOPY WITH COLD BIOPSIES AND 48F HERNÁNDEZ DILATION;  Surgeon: Orlin Mann MD;  Location: [facility] ENDOSCOPY;  Service: Gastroenterology    ENDOSCOPY N/A 02/20/2023    Procedure: ESOPHAGOGASTRODUODENOSCOPY WITH BIOPSIES;  Surgeon: Basilio Martin MD;  Location: [facility] ENDOSCOPY;  Service: Gastroenterology;  Laterality: N/A;  PRE: MENDEZ'S  POST: HIATAL HERNIA, GASTRITIS    HYSTERECTOMY  12/2013    MOUTH SURGERY      multiple teeth extraction    SACROCOLPOPEXY N/A 04/06/2021    Procedure: LARPAROSCOPY COLPOPEXY, BILATERAL SALPINGECTOMY;  Surgeon: Cora Gustafson MD;  Location: [facility] OR;  Service: Gynecology;  Laterality: N/A;    SKIN CANCER EXCISION      UPPER GASTROINTESTINAL ENDOSCOPY  09/06/2019         FAMILY HISTORY  Family History   Problem Relation Age of Onset    Depression Mother     Heart disease Mother     Stroke Mother     Heart attack Mother     Heart failure Mother     Hyperlipidemia Mother     Hypertension Mother     Irritable bowel syndrome Mother     Arthritis Father     Heart disease Father     Skin cancer Father     Heart attack Father     Heart failure Father     Heart disease Maternal Grandmother     Stroke Maternal Grandfather     Stroke Paternal Grandfather     Hypertension Sister      Hypothyroidism Sister     Hypertension Brother     Hypothyroidism Daughter     Thyroid disease Sister     Colon cancer Neg Hx     Colon polyps Neg Hx     Liver disease Neg Hx     Rectal cancer Neg Hx     Stomach cancer Neg Hx     Liver cancer Neg Hx     Malig Hyperthermia Neg Hx          SOCIAL HISTORY  Social History     Socioeconomic History    Marital status:      Spouse name: Ruben   Tobacco Use    Smoking status: Never    Smokeless tobacco: Never   Vaping Use    Vaping status: Never Used   Substance and Sexual Activity    Alcohol use: Not Currently     Comment: less than once per  month    Drug use: No    Sexual activity: Yes     Partners: Male     Birth control/protection: Post-menopausal, Hysterectomy         ALLERGIES  Patient has no known allergies.        REVIEW OF SYSTEMS  Review of Systems     All systems reviewed and negative except for those discussed in HPI.       PHYSICAL EXAM    I have reviewed the triage vital signs and nursing notes.    ED Triage Vitals   Temp Heart Rate Resp BP SpO2   07/13/25 1750 07/13/25 1750 07/13/25 1750 07/13/25 1754 07/13/25 1750   99.1 °F (37.3 °C) (!) 137 20 150/73 97 %      Temp src Heart Rate Source Patient Position BP Location FiO2 (%)   -- -- 07/13/25 1754 07/13/25 1754 --     Sitting Right arm        GENERAL: This is an anxious elderly female.  She does look a little weak.  She does have some mild hyperventilation.  No acute distress.Vital signs on my initial evaluation her heart rate is in the low 100s on my exam it is a sinus tachycardia her O2 sat is 100% on room air.  She is afebrile her blood pressure is unremarkable.  HENT: nares patent  Head/neck/ face are symmetric without gross deformity, signs of trauma, or swelling  EYES: no scleral icterus, no conjunctival pallor.  NECK: Supple, no meningismus  CV: regular rhythm, tachycardia with intact distal pulses.  No murmur or rub  RESPIRATORY: normal effort and no respiratory distress.  Clear to auscultation  bilaterally  ABDOMEN: soft and nontender.  Obese.  Very mild discomfort in lower abdomen with palpation.  No guarding or rebound.  Normal bowel sounds  MUSCULOSKELETAL: no deformity.  Intact distal pulses to upper lower extremities that are equal strong and symmetric.  NEURO: alert and appropriate, moves all extremities, follows commands.  No focal motor or sensory changes.  SKIN: warm, dry    Vital signs and nursing notes reviewed.        LAB RESULTS  Recent Results (from the past 24 hours)   ECG 12 Lead ED Triage Standing Order; Chest Pain    Collection Time: 07/13/25  5:51 PM   Result Value Ref Range    QT Interval 355 ms    QTC Interval 490 ms   Comprehensive Metabolic Panel    Collection Time: 07/13/25  5:53 PM    Specimen: Blood   Result Value Ref Range    Glucose 138 (H) 65 - 99 mg/dL    BUN 19.0 8.0 - 23.0 mg/dL    Creatinine 1.26 (H) 0.57 - 1.00 mg/dL    Sodium 136 136 - 145 mmol/L    Potassium 2.8 (L) 3.5 - 5.2 mmol/L    Chloride 97 (L) 98 - 107 mmol/L    CO2 24.1 22.0 - 29.0 mmol/L    Calcium 9.1 8.6 - 10.5 mg/dL    Total Protein 7.7 6.0 - 8.5 g/dL    Albumin 3.7 3.5 - 5.2 g/dL    ALT (SGPT) 17 1 - 33 U/L    AST (SGOT) 20 1 - 32 U/L    Alkaline Phosphatase 78 39 - 117 U/L    Total Bilirubin 0.5 0.0 - 1.2 mg/dL    Globulin 4.0 gm/dL    A/G Ratio 0.9 g/dL    BUN/Creatinine Ratio 15.1 7.0 - 25.0    Anion Gap 14.9 5.0 - 15.0 mmol/L    eGFR 47.5 (L) >60.0 mL/min/1.73   High Sensitivity Troponin T    Collection Time: 07/13/25  5:53 PM    Specimen: Blood   Result Value Ref Range    HS Troponin T 79 (C) <14 ng/L   Green Top (Gel)    Collection Time: 07/13/25  5:53 PM   Result Value Ref Range    Extra Tube Hold for add-ons.    Lavender Top    Collection Time: 07/13/25  5:53 PM   Result Value Ref Range    Extra Tube hold for add-on    Gold Top - SST    Collection Time: 07/13/25  5:53 PM   Result Value Ref Range    Extra Tube Hold for add-ons.    Light Blue Top    Collection Time: 07/13/25  5:53 PM   Result Value  Ref Range    Extra Tube Hold for add-ons.    CBC Auto Differential    Collection Time: 07/13/25  5:53 PM    Specimen: Blood   Result Value Ref Range    WBC 16.21 (H) 3.40 - 10.80 10*3/mm3    RBC 4.16 3.77 - 5.28 10*6/mm3    Hemoglobin 12.2 12.0 - 15.9 g/dL    Hematocrit 36.8 34.0 - 46.6 %    MCV 88.5 79.0 - 97.0 fL    MCH 29.3 26.6 - 33.0 pg    MCHC 33.2 31.5 - 35.7 g/dL    RDW 12.6 12.3 - 15.4 %    RDW-SD 40.5 37.0 - 54.0 fl    MPV 9.7 6.0 - 12.0 fL    Platelets 283 140 - 450 10*3/mm3    Neutrophil % 76.6 (H) 42.7 - 76.0 %    Lymphocyte % 14.8 (L) 19.6 - 45.3 %    Monocyte % 7.7 5.0 - 12.0 %    Eosinophil % 0.4 0.3 - 6.2 %    Basophil % 0.2 0.0 - 1.5 %    Immature Grans % 0.3 0.0 - 0.5 %    Neutrophils, Absolute 12.40 (H) 1.70 - 7.00 10*3/mm3    Lymphocytes, Absolute 2.40 0.70 - 3.10 10*3/mm3    Monocytes, Absolute 1.25 (H) 0.10 - 0.90 10*3/mm3    Eosinophils, Absolute 0.07 0.00 - 0.40 10*3/mm3    Basophils, Absolute 0.04 0.00 - 0.20 10*3/mm3    Immature Grans, Absolute 0.05 0.00 - 0.05 10*3/mm3    nRBC 0.0 0.0 - 0.2 /100 WBC   D-dimer, Quantitative    Collection Time: 07/13/25  5:53 PM    Specimen: Blood   Result Value Ref Range    D-Dimer, Quantitative 1.28 (H) 0.00 - 0.65 MCGFEU/mL   Procalcitonin    Collection Time: 07/13/25  5:53 PM    Specimen: Blood   Result Value Ref Range    Procalcitonin 20.70 (H) 0.00 - 0.25 ng/mL   Magnesium    Collection Time: 07/13/25  5:53 PM    Specimen: Blood   Result Value Ref Range    Magnesium 1.8 1.6 - 2.4 mg/dL   Green Top (Gel)    Collection Time: 07/13/25  7:05 PM   Result Value Ref Range    Extra Tube Hold for add-ons.    High Sensitivity Troponin T 1Hr    Collection Time: 07/13/25  7:05 PM    Specimen: Arm, Right; Blood   Result Value Ref Range    HS Troponin T 64 (C) <14 ng/L    Troponin T Numeric Delta -15 ng/L    Troponin T % Delta -19 Abnormal if >/= 20%   Lactic Acid, Plasma    Collection Time: 07/13/25  7:05 PM    Specimen: Arm, Right; Blood   Result Value Ref Range     Lactate 2.2 (C) 0.5 - 2.0 mmol/L   STAT Lactic Acid, Reflex    Collection Time: 07/13/25 10:10 PM    Specimen: Blood   Result Value Ref Range    Lactate 0.5 0.5 - 2.0 mmol/L   Urinalysis With Microscopic If Indicated (No Culture) - Urine, Clean Catch    Collection Time: 07/13/25 10:28 PM    Specimen: Urine, Clean Catch   Result Value Ref Range    Color, UA Yellow Yellow, Straw    Appearance, UA Clear Clear    pH, UA 6.5 5.0 - 8.0    Specific Gravity, UA 1.028 1.005 - 1.030    Glucose, UA Negative Negative    Ketones, UA Trace (A) Negative    Bilirubin, UA Negative Negative    Blood, UA Trace (A) Negative    Protein, UA Trace (A) Negative    Leuk Esterase, UA Small (1+) (A) Negative    Nitrite, UA Negative Negative    Urobilinogen, UA 0.2 E.U./dL 0.2 - 1.0 E.U./dL   Urinalysis, Microscopic Only - Urine, Clean Catch    Collection Time: 07/13/25 10:28 PM    Specimen: Urine, Clean Catch   Result Value Ref Range    RBC, UA 3-5 (A) None Seen, 0-2 /HPF    WBC, UA 11-20 (A) None Seen, 0-2 /HPF    Bacteria, UA Trace (A) None Seen /HPF    Squamous Epithelial Cells, UA 0-2 None Seen, 0-2 /HPF    Hyaline Casts, UA None Seen None Seen /LPF    Methodology Automated Microscopy        Ordered the above labs and independently reviewed the results.        RADIOLOGY  CT Abdomen Pelvis With Contrast  CT Abdomen Pelvis With Contrast, CT Angiogram Chest Pulmonary Embolism  Result Date: 7/13/2025  CTA CHEST, CT ABDOMEN AND PELVIS  HISTORY: Pulmonary Embolism; R07.9-Chest pain, unspecified; R79.89-Other specified abnormal findings of blood chemistry; E87.6-Hypokalemia; R19.7-Diarrhea, unspecified; D72.829-Elevated white blood cell count, unspecified; A41.9-Sepsis, unspecified organism  COMPARISON: None  TECHNIQUE: CT angiography was performed of the chest with axial images as well as coronal and sagittal reformatted MIP images provided following the administration of IV contrast.  3-D surface rendered reformats were obtained of the  pulmonary arteries and aorta.  CT of the abdomen and pelvis obtained following administration of IV contrast. Coronal and sagittal reconstructions were obtained.   Radiation dose reduction techniques were utilized, including automated exposure control, and exposure modulation based on body size.  FINDINGS:  Chest CTA:  There is mild bibasilar atelectasis, but there is no evidence of acute pulmonary infiltrate, pleural effusion, pneumothorax or suspicious nodule.  The thoracic aorta is normal in caliber, and there is no evidence of dissection.  There are coronary atherosclerotic vascular calcifications.  There is no suspicious mediastinal adenopathy or other mass, though there is a small to moderate hiatal hernia.  Bolus timing is adequate, and there is no evidence of pulmonary embolism.  Abdomen: The liver and gallbladder are normal.  The spleen and pancreas appear normal.  Both adrenal glands are normal. There is no evidence of bowel obstruction. The aorta is normal in caliber.   : There is mild atrophic change in the right kidney and there is a right renal 4 mm nonobstructing calculus, but there is no right hydronephrosis. No left renal calculi are seen, but there is mild to moderate left hydronephrosis, and the left ureter is dilated down into the pelvis, to the level of an approximately 4 mm stone, 8 to 10 cm above the ureterovesical junction. No bladder calculi are seen.  Pelvis:  The appendix is clearly identified, and is normal.  There is diverticulosis, but there is no CT evidence of diverticulitis.  There is no pelvic adenopathy or other soft tissue mass.  There is no CT evidence of hernia or bowel obstruction.  There is no acute bony abnormality.       1.  Pulmonary arteries are well-opacified, and there is no evidence of pulmonary embolism.  2.  There is minimal bibasilar dependent atelectasis, but the lung parenchyma is otherwise normal. No acute pulmonary parenchymal abnormality is seen.  3.   Mild to  moderate left hydronephrosis secondary to a 4 mm stone in the distal left ureter, 8 to 10 cm above the ureterovesical junction.      This report was finalized on 7/13/2025 10:15 PM by Dr. Wojciech Roth M.D on Workstation: [id number]      XR Chest 1 View  Result Date: 7/13/2025  XR CHEST 1 VW-  HISTORY: Chest pain. Reported recent heart attack.  COMPARISON: Chest radiograph 6/23/2025  FINDINGS: A single view of the chest was obtained.  Support Devices:  None. Cardiac Silhouette/Mediastinum/Wendie:  The cardiac, mediastinal, and hilar contours are within normal limits. Lungs/Pleural Spaces:  The lungs and pleural spaces are clear. Chest Wall/Diaphragm/Upper Abdomen: There is a hiatal hernia. The visualized osseous structures are similar.   CONCLUSION(S):   1.  No focal consolidation or effusion. 2.  Hiatal hernia.  This report was finalized on 7/13/2025 6:35 PM by Dr. La Blount M.D on Workstation: [id number]        I ordered the above noted radiological studies. Reviewed by me and discussed with radiologist.  See dictation for official radiology interpretation.      PROCEDURES    Critical Care    Performed by: Tashi Najera MD  Authorized by: Milton DeP az MD    Critical care provider statement:     Critical care time (minutes): 45.    Critical care time was exclusive of:  Separately billable procedures and treating other patients    Critical care was necessary to treat or prevent imminent or life-threatening deterioration of the following conditions:  Sepsis    Critical care was time spent personally by me on the following activities:  Blood draw for specimens, development of treatment plan with patient or surrogate, discussions with consultants, evaluation of patient's response to treatment, examination of patient, obtaining history from patient or surrogate, review of old charts, re-evaluation of patient's condition, pulse oximetry, ordering and review of radiographic studies, ordering and  review of laboratory studies and ordering and performing treatments and interventions    I assumed direction of critical care for this patient from another provider in my specialty: no      Care discussed with: admitting provider          MEDICATIONS GIVEN IN ER    Medications   sodium chloride 0.9 % flush 10 mL (has no administration in time range)   sodium chloride 0.9 % flush 10 mL (has no administration in time range)   Potassium Replacement - Follow Nurse / BPA Driven Protocol (has no administration in time range)   potassium chloride 10 mEq in 100 mL IVPB (10 mEq Intravenous New Bag 7/13/25 2233)   vancomycin IVPB 1500 mg in 0.9% NaCl (Premix) 500 mL (1,500 mg Intravenous New Bag 7/13/25 2232)   aspirin tablet 325 mg (325 mg Oral Given 7/13/25 1951)   potassium chloride (KLOR-CON M20) CR tablet 40 mEq (40 mEq Oral Given 7/13/25 2158)   piperacillin-tazobactam (ZOSYN) 3.375 g IVPB in 100 mL NS MBP (CD) (0 g Intravenous Stopped 7/13/25 2232)   sodium chloride 0.9% - IBW for BMI > 30 bolus 1,700 mL (0 mL Intravenous Stopped 7/13/25 2232)   iopamidol (ISOVUE-370) 76 % injection 100 mL (100 mL Intravenous Given 7/13/25 2053)         All labs have been independently reviewed by me.  All radiology studies have been reviewed by me and I discussed with radiologist dictating the report when indicated below.  All EKG's independently viewed and interpreted by me.  Discussion below represents my analysis of pertinent findings related to patient's condition, differential diagnosis, treatment plan and final disposition.        PROGRESS, DATA ANALYSIS, CONSULTS, AND MEDICAL DECISION MAKING    This is a patient that presents with chest pain.  Recent chest pain evaluation showed an unremarkable cardiac cath as well as an echo but her troponins were mildly elevated.  Her EKG shows no acute change here.  Organ to check cardiac enzymes.  Clinically she does look dehydrated.  She has had tremendous amount of diarrhea and small  amount of p.o. intake.  Will get a give her some IV fluids.  She does not want anything for pain or anxiety at this time.  Informed the patient and the spouse of the test that we will order.  All questions answered at this time.    DDx includes acute coronary syndrome, pulmonary embolism, thoracic aortic dissection, pneumonia, pneumothorax, musculoskeletal pain, GERD or esophageal spasm, PUD, esophagitis, anxiety, myocarditis/pericarditis, esophageal rupture, pancreatitis.       ED Course as of 07/13/25 2319   Sun Jul 13, 2025   1821 WBC(!): 16.21  2 weeks ago when she was seen in the hospital [facility] was 14,000. [MM]   1835 HS Troponin T(!!): 79  When you look 2 weeks ago when she came in and had elevation of her troponin it was 72 and the repeat was 64.  It is elevated here 79. [MM]   1836 WBC(!): 16.21 [MM]   1837 Potassium(!): 2.8 [MM]   1847 My own independent interpretation of the EKG that was on at 5:51 PM reveals a rate of 115 it is a sinus tachycardia narrow complex Q waves in the inferior leads normal axis I do not appreciate any obvious acute injury pattern QT looks normal when I compare this to the previous EKG that was done on6/23/2025 I do not see any significant change. [MM]   1946 Lactate(!!): 2.2 [MM]   1946 Procalcitonin(!): 20.70 [MM]   1948 Chest x-ray shows no acute abnormality.  Please see complete dictated report from radiologist [MM]   2036 D-Dimer, Quant(!): 1.28 [MM]   2036 Procalcitonin(!): 20.70 [MM]   2036 HS Troponin T(!!): 64 [MM]   2222 I reviewed the CT scan of the chest abdomen pelvis report.  CT angiogram of the chest shows no pulmonary embolism there is minimal bibasilar dependent atelectasis but no obvious focal pneumonia or consolidation there is mild to moderate left hydronephrosis secondary to 4 mm stone in the distal left ureter that is 8 to 10 cm above the ureterovesicular junction.  No other evidence of infection seen on the CT scan of the abdomen pelvis. [MM]   2242 I  talked with the urologist Dr. Zaidi and informed him about the results of the test my clinical concerns.  He is going to take the patient to surgery today.  Would like the patient admitted to medicine. [MM]   2243 On 6/25/2025 patient had a urine culture that showed E. coli and it was pansensitive. [MM]   2254 I have a very low clinical suspicion that this is cardiac in etiology her EKG shows no acute changes.  Patient troponins have remained stable and similar to what they were 2 weeks ago.  She had a cardiac cath that essentially was unremarkable had 1 vessel with 70% stenosis. [MM]   2258 BP: 137/78 [MM]   2258 Heart Rate: 98 [MM]   2258 Resp: 20 [MM]   2258 SpO2: 100 % [MM]   2300 Leukocytes, UA(!): Small (1+) [MM]   2300 WBC, UA(!): 11-20 [MM]   2300 Bacteria, UA(!): Trace [MM]   2317 I did discuss the case with Mary who is the midlevel provider on for A.  It Dr. De Paz as the attending.  Informed her of the patient's presenting symptoms as well as my conversation with the urologist and the patient plan to go to surgery.  She agrees to admit after the patient is done with surgery. [MM]      ED Course User Index  [MM] Tashi Najera MD       AS OF 23:19 EDT VITALS:    BP - 137/78  HR - 98  TEMP - 99.1 °F (37.3 °C)  02 SATS - 100%    SOCIAL DETERMINANTS OF HEALTH THAT IMPACT OR LIMIT CARE (For example..Homelessness,safe discharge, inability to obtain care, follow up, or prescriptions):      DIAGNOSIS  Final diagnoses:   Chest pain, unspecified type   Elevated troponin   Hypokalemia   Diarrhea, unspecified type   Leukocytosis, unspecified type   Sepsis, due to unspecified organism, unspecified whether acute organ dysfunction present   Acute UTI   Nephrolithiasis         DISPOSITION  Patient is getting go to the operating room.          DICTATED UTILIZING DRAGON DICTATION    Note Disclaimer: At [facility], we believe that sharing information builds trust and better relationships. You are receiving  this note because you recently visited [facility]. It is possible you will see health information before a provider has talked with you about it. This kind of information can be easy to misunderstand. To help you fully understand what it means for your health, we urge you to discuss this note with your provider.       Tashi Najera MD  07/13/25 2319      Electronically signed by Tashi Najera MD at 07/13/25 2319       Bertha Johnson, RN at 07/13/25 1748          Pt reports chest pain that started last night, felt freezing cold, also diarrhea, nausea, states MI on 6/23    Electronically signed by Bertha Johnson RN at 07/13/25 1749       Vital Signs (last 3 days)       Date/Time Temp Temp src Pulse Resp BP Patient Position SpO2    07/15/25 1334 98.2 (36.8) Oral 87 18 125/64 Lying 98    07/15/25 0802 98.1 (36.7) Oral 75 18 135/66 Sitting 97    07/14/25 2320 98.2 (36.8) Oral 82 18 130/71 Lying 98    07/14/25 1953 97.9 (36.6) Oral 92 18 119/61 Lying 99    07/14/25 1740 -- -- 85 -- -- -- 95    07/14/25 1500 -- -- 76 -- -- -- 96    07/14/25 1323 97.3 (36.3) Oral 78 18 112/64 Lying 98    07/14/25 1033 -- -- 77 -- -- -- 100    07/14/25 0741 97.7 (36.5) Oral 79 18 117/61 Lying 96    07/14/25 0351 97.7 (36.5) Oral 74 -- 122/62 Lying --    07/14/25 0132 99.1 (37.3) Oral 112 21 133/66 Sitting 99    07/14/25 0100 -- -- 109 -- -- -- 99    07/14/25 0045 -- -- 109 -- 125/62 Lying 91    07/14/25 0035 -- -- 112 23 131/66 Lying 91    07/14/25 0030 -- -- 117 21 130/66 Lying 92    07/14/25 0026 102.9 (39.4) Oral 120 31 124/61 Lying 90    07/13/25 2320 101.8 (38.8) Oral 102 15 132/82 Lying 94    07/13/25 2230 -- -- 98 20 137/78 Lying 100    07/13/25 2200 -- -- 120 -- 115/96 -- 98    07/13/25 2120 -- -- 108 20 137/75 Lying --    07/13/25 2118 -- -- -- -- -- -- 97    07/13/25 2100 -- -- -- -- -- -- 97    07/13/25 1830 -- -- 115 20 144/59 -- 98    07/13/25 1754 -- -- -- -- 150/73 Sitting --    07/13/25 1750 99.1 (37.3) -- 137 20  -- -- 97          Oxygen Therapy (last 3 days)       Date/Time SpO2 Device (Oxygen Therapy) Flow (L/min) (Oxygen Therapy) Oxygen Concentration (%) ETCO2 (mmHg)    07/15/25 1400 -- room air -- -- --    07/15/25 1334 98 room air -- -- --    07/15/25 0902 -- room air -- -- --    07/15/25 0802 97 nasal cannula -- -- --    07/15/25 0002 -- room air -- -- --    07/14/25 2320 98 room air -- -- --    07/14/25 2020 -- room air -- -- --    07/14/25 1953 99 room air -- -- --    07/14/25 1740 95 -- -- -- --    07/14/25 1500 96 -- -- -- --    07/14/25 1432 -- room air -- -- --    07/14/25 1323 98 room air -- -- --    07/14/25 1033 100 -- -- -- --    07/14/25 0852 -- nasal cannula 2 -- --    07/14/25 0741 96 nasal cannula -- -- --    07/14/25 0138 -- nasal cannula 2 -- --    07/14/25 0132 99 nasal cannula 2 -- --    07/14/25 0100 99 -- -- -- --    07/14/25 0045 91 nasal cannula 2 -- --    07/14/25 0035 91 room air -- -- --    07/14/25 0030 92 room air -- -- --    07/14/25 0026 90 room air -- -- --    07/13/25 2320 94 room air -- -- --    07/13/25 2230 100 room air -- -- --    07/13/25 2200 98 -- -- -- --    07/13/25 2118 97 room air -- -- --    07/13/25 2100 97 -- -- -- --    07/13/25 1830 98 room air -- -- --    07/13/25 1750 97 room air -- -- --          Intake & Output (last 3 days)         07/12 0701 07/13 0700 07/13 0701 07/14 0700 07/14 0701  07/15 0700 07/15 0701 07/16 0700    P.O.   240     IV Piggyback  1900      Total Intake(mL/kg)  1900 (27.3) 240 (3.4)     Net  +1900 +240             Urine Unmeasured Occurrence  2 x 4 x 1 x    Stool Unmeasured Occurrence  1 x 4 x            Lines, Drains & Airways       Active LDAs       Name Placement date Placement time Site Days    Peripheral IV 07/13/25 2339 20 G Posterior;Right Wrist 07/13/25  2339  Wrist  1    Ureteral Drain/Stent Left ureter 7 Fr. 07/14/25  0004  Left ureter  1             Current Facility-Administered Medications   Medication Dose Route Frequency Provider  Last Rate Last Admin    acetaminophen (TYLENOL) tablet 650 mg  650 mg Oral Q6H PRN Olga Shore APRN        ALPRAZolam (XANAX) tablet 0.5 mg  0.5 mg Oral Daily PRN lOga Shore APRN        aluminum-magnesium hydroxide-simethicone (MAALOX MAX) 400-400-40 MG/5ML suspension 15 mL  15 mL Oral Q6H PRN Maged Pedroza MD        amLODIPine (NORVASC) tablet 5 mg  5 mg Oral Nightly Olga Shore APRN   5 mg at 07/14/25 2319    aspirin chewable tablet 81 mg  81 mg Oral Daily Olga Shore APRN   81 mg at 07/15/25 0902    sennosides-docusate (PERICOLACE) 8.6-50 MG per tablet 2 tablet  2 tablet Oral BID PRN Maged Pedroza MD        And    polyethylene glycol (MIRALAX) packet 17 g  17 g Oral Daily PRN Maged Pedroza MD        And    bisacodyl (DULCOLAX) EC tablet 5 mg  5 mg Oral Daily PRN Maged Pedroza MD        And    bisacodyl (DULCOLAX) suppository 10 mg  10 mg Rectal Daily PRN Maged Pedroza MD        DULoxetine (CYMBALTA) DR capsule 60 mg  60 mg Oral Nightly Olga Shore APRN   60 mg at 07/14/25 2319    levothyroxine (SYNTHROID, LEVOTHROID) tablet 75 mcg  75 mcg Oral Q AM Olga Shore APRN   75 mcg at 07/15/25 0758    morphine injection 2 mg  2 mg Intravenous Q2H PRN Maged Pedroza MD   2 mg at 07/15/25 0902    nitroglycerin (NITROSTAT) SL tablet 0.4 mg  0.4 mg Sublingual Q5 Min PRN Maged Pedroza MD        ondansetron ODT (ZOFRAN-ODT) disintegrating tablet 4 mg  4 mg Oral Q6H PRN Maged Pedroza MD        Or    ondansetron (ZOFRAN) injection 4 mg  4 mg Intravenous Q6H PRN Maged Pedroza MD        oxybutynin XL (DITROPAN-XL) 24 hr tablet 10 mg  10 mg Oral Daily Maged Pedroza MD   10 mg at 07/15/25 1205    oxyCODONE-acetaminophen (PERCOCET) 5-325 MG per tablet 1 tablet  1 tablet Oral Q6H PRN Cely Clarke MD        piperacillin-tazobactam (ZOSYN) 3.375 g IVPB in 100 mL NS MBP (CD)  3.375 g Intravenous Q8H Olga Shore, APRN    3.375 g at 07/15/25 1351    Potassium Replacement - Follow Nurse / BPA Driven Protocol   Not Applicable PRN Maged Pedroza MD        rosuvastatin (CRESTOR) tablet 20 mg  20 mg Oral Daily Olga Shore APRN   20 mg at 07/15/25 0902    sodium chloride 0.9 % flush 10 mL  10 mL Intravenous PRN Maged Pedroza MD        sodium chloride 0.9 % flush 10 mL  10 mL Intravenous PRN Maged Pedroza MD        sodium chloride 0.9 % flush 10 mL  10 mL Intravenous PRN Maged Pedroza MD         Lab Results (all)       Procedure Component Value Units Date/Time    Urine Culture - Urine, Urine, Clean Catch [[id number]]  (Normal) Collected: 07/13/25 2228    Specimen: Urine, Clean Catch Updated: 07/15/25 1059     Urine Culture No growth    T4, Free [[id number]]  (Normal) Collected: 07/15/25 0532    Specimen: Blood Updated: 07/15/25 0818     Free T4 1.60 ng/dL     TSH [[id number]]  (Abnormal) Collected: 07/15/25 0532    Specimen: Blood Updated: 07/15/25 0650     TSH 0.224 uIU/mL     Comprehensive Metabolic Panel [[id number]]  (Abnormal) Collected: 07/15/25 0532    Specimen: Blood Updated: 07/15/25 0644     Glucose 131 mg/dL      BUN 20.0 mg/dL      Creatinine 1.05 mg/dL      Sodium 140 mmol/L      Potassium 3.4 mmol/L      Chloride 109 mmol/L      CO2 21.0 mmol/L      Calcium 8.7 mg/dL      Total Protein 6.9 g/dL      Albumin 3.2 g/dL      ALT (SGPT) 11 U/L      AST (SGOT) 14 U/L      Alkaline Phosphatase 75 U/L      Total Bilirubin 0.2 mg/dL      Globulin 3.7 gm/dL      A/G Ratio 0.9 g/dL      BUN/Creatinine Ratio 19.0     Anion Gap 10.0 mmol/L      eGFR 59.1 mL/min/1.73     Narrative:      GFR Categories in Chronic Kidney Disease (CKD)              GFR Category          GFR (mL/min/1.73)    Interpretation  G1                    90 or greater        Normal or high (1)  G2                    60-89                Mild decrease (1)  G3a                   45-59                Mild to moderate decrease  G3b                    30-44                Moderate to severe decrease  G4                    15-29                Severe decrease  G5                    14 or less           Kidney failure    (1)In the absence of evidence of kidney disease, neither GFR category G1 or G2 fulfill the criteria for CKD.    eGFR calculation 2021 CKD-EPI creatinine equation, which does not include race as a factor    C-reactive Protein [[id number]]  (Abnormal) Collected: 07/15/25 0532    Specimen: Blood Updated: 07/15/25 0644     C-Reactive Protein 14.86 mg/dL     Sedimentation Rate [[id number]]  (Abnormal) Collected: 07/15/25 0532    Specimen: Blood Updated: 07/15/25 0638     Sed Rate 70 mm/hr     CBC & Differential [[id number]]  (Abnormal) Collected: 07/15/25 0532    Specimen: Blood Updated: 07/15/25 0618    Narrative:      The following orders were created for panel order CBC & Differential.  Procedure                               Abnormality         Status                     ---------                               -----------         ------                     CBC Auto Differential[[id number]]        Abnormal            Final result                 Please view results for these tests on the individual orders.    CBC Auto Differential [[id number]]  (Abnormal) Collected: 07/15/25 0532    Specimen: Blood Updated: 07/15/25 0618     WBC 15.69 10*3/mm3      RBC 3.60 10*6/mm3      Hemoglobin 10.5 g/dL      Hematocrit 32.6 %      MCV 90.6 fL      MCH 29.2 pg      MCHC 32.2 g/dL      RDW 12.8 %      RDW-SD 42.3 fl      MPV 10.1 fL      Platelets 260 10*3/mm3      Neutrophil % 76.9 %      Lymphocyte % 12.6 %      Monocyte % 9.4 %      Eosinophil % 0.1 %      Basophil % 0.2 %      Immature Grans % 0.8 %      Neutrophils, Absolute 12.08 10*3/mm3      Lymphocytes, Absolute 1.97 10*3/mm3      Monocytes, Absolute 1.48 10*3/mm3      Eosinophils, Absolute 0.01 10*3/mm3      Basophils, Absolute 0.03 10*3/mm3      Immature Grans, Absolute 0.12 10*3/mm3      nRBC 0.0 /100 WBC      Calprotectin, Fecal - Stool, [[id number]] Collected: 07/14/25 2233    Specimen: Stool Updated: 07/14/25 2304    Blood Culture - Blood, Arm, Right [[id number]]  (Normal) Collected: 07/13/25 1905    Specimen: Blood from Arm, Right Updated: 07/14/25 1915     Blood Culture No growth at 24 hours    Blood Culture - Blood, Arm, Left [[id number]]  (Normal) Collected: 07/13/25 1906    Specimen: Blood from Arm, Left Updated: 07/14/25 1915     Blood Culture No growth at 24 hours    Narrative:      Less than seven (7) mL's of blood was collected.  Insufficient quantity may yield false negative results.    Basic Metabolic Panel [[id number]]  (Abnormal) Collected: 07/14/25 0846    Specimen: Blood Updated: 07/14/25 0939     Glucose 208 mg/dL      BUN 15.0 mg/dL      Creatinine 0.97 mg/dL      Sodium 141 mmol/L      Potassium 3.8 mmol/L      Comment: Slight hemolysis detected by analyzer. Result may be falsely elevated.        Chloride 108 mmol/L      CO2 23.4 mmol/L      Calcium 8.6 mg/dL      BUN/Creatinine Ratio 15.5     Anion Gap 9.6 mmol/L      eGFR 65.0 mL/min/1.73     Narrative:      GFR Categories in Chronic Kidney Disease (CKD)              GFR Category          GFR (mL/min/1.73)    Interpretation  G1                    90 or greater        Normal or high (1)  G2                    60-89                Mild decrease (1)  G3a                   45-59                Mild to moderate decrease  G3b                   30-44                Moderate to severe decrease  G4                    15-29                Severe decrease  G5                    14 or less           Kidney failure    (1)In the absence of evidence of kidney disease, neither GFR category G1 or G2 fulfill the criteria for CKD.    eGFR calculation 2021 CKD-EPI creatinine equation, which does not include race as a factor    High Sensitivity Troponin T [[id number]]  (Abnormal) Collected: 07/14/25 0846    Specimen: Blood Updated: 07/14/25 0922     HS Troponin T 48 ng/L      Narrative:      High Sensitive Troponin T Reference Range:  <14.0 ng/L- Negative Female for AMI  <22.0 ng/L- Negative Male for AMI  >=14 - Abnormal Female indicating possible myocardial injury.  >=22 - Abnormal Male indicating possible myocardial injury.   Clinicians would have to utilize clinical acumen, EKG, Troponin, and serial changes to determine if it is an Acute Myocardial Infarction or myocardial injury due to an underlying chronic condition.         CBC (No Diff) [[id number]]  (Abnormal) Collected: 07/14/25 0846    Specimen: Blood Updated: 07/14/25 0901     WBC 13.19 10*3/mm3      RBC 3.85 10*6/mm3      Hemoglobin 11.2 g/dL      Hematocrit 34.5 %      MCV 89.6 fL      MCH 29.1 pg      MCHC 32.5 g/dL      RDW 12.7 %      RDW-SD 41.2 fl      MPV 9.8 fL      Platelets 242 10*3/mm3     [place] Draw [[id number]] Collected: 07/13/25 1905    Specimen: Blood Updated: 07/14/25 0204    Narrative:      The following orders were created for panel order [place] Draw.  Procedure                               Abnormality         Status                     ---------                               -----------         ------                     Green Top (Gel)[[id number]]                                  Final result               Lavender Top[[id number]]                                                                Gold Top - SST[[id number]]                                                              Light Blue Top[[id number]]                                                                Please view results for these tests on the individual orders.    POC Glucose Once [[id number]]  (Normal) Collected: 07/14/25 0031    Specimen: Blood Updated: 07/14/25 0034     Glucose 74 mg/dL     POC Glucose Once [[id number]]  (Abnormal) Collected: 07/13/25 2340    Specimen: Blood Updated: 07/13/25 2342     Glucose 66 mg/dL     Urinalysis With Microscopic If Indicated (No Culture) - Urine, Clean Catch [[id number]]  (Abnormal) Collected:  "07/13/25 2228    Specimen: Urine, Clean Catch Updated: 07/13/25 2253     Color, UA Yellow     Appearance, UA Clear     pH, UA 6.5     Specific Gravity, UA 1.028     Glucose, UA Negative     Ketones, UA Trace     Bilirubin, UA Negative     Blood, UA Trace     Protein, UA Trace     Leuk Esterase, UA Small (1+)     Nitrite, UA Negative     Urobilinogen, UA 0.2 E.U./dL    Urinalysis, Microscopic Only - Urine, Clean Catch [[id number]]  (Abnormal) Collected: 07/13/25 2228    Specimen: Urine, Clean Catch Updated: 07/13/25 2253     RBC, UA 3-5 /HPF      WBC, UA 11-20 /HPF      Bacteria, UA Trace /HPF      Squamous Epithelial Cells, UA 0-2 /HPF      Hyaline Casts, UA None Seen /LPF      Methodology Automated Microscopy    STAT Lactic Acid, Reflex [[id number]]  (Normal) Collected: 07/13/25 2210    Specimen: Blood Updated: 07/13/25 2250     Lactate 0.5 mmol/L     D-dimer, Quantitative [[id number]]  (Abnormal) Collected: 07/13/25 1753    Specimen: Blood Updated: 07/13/25 2029     D-Dimer, Quantitative 1.28 MCGFEU/mL     Narrative:      According to the assay 's published package insert, a normal (<0.50 MCGFEU/mL) D-dimer result in conjunction with a non-high clinical probability assessment, excludes deep vein thrombosis (DVT) and pulmonary embolism (PE) with high sensitivity.    D-dimer values increase with age and this can make VTE exclusion of an older population difficult. To address this, the [employer], based on best available evidence and recent guidelines, recommends that clinicians use age-adjusted D-dimer thresholds in patients greater than 50 years of age with: a) a low probability of PE who do not meet all Pulmonary Embolism Rule Out Criteria, or b) in those with intermediate probability of PE.   The formula for an age-adjusted D-dimer cut-off is \"age/100\".  For example, a 60 year old patient would have an age-adjusted cut-off of 0.60 MCGFEU/mL and an 80 year old 0.80 MCGFEU/mL.    High " "Sensitivity Troponin T 1Hr [[id number]]  (Abnormal) Collected: 07/13/25 1905    Specimen: Blood from Arm, Right Updated: 07/13/25 1939     HS Troponin T 64 ng/L      Troponin T Numeric Delta -15 ng/L      Troponin T % Delta -19    Narrative:      High Sensitive Troponin T Reference Range:  <14.0 ng/L- Negative Female for AMI  <22.0 ng/L- Negative Male for AMI  >=14 - Abnormal Female indicating possible myocardial injury.  >=22 - Abnormal Male indicating possible myocardial injury.   Clinicians would have to utilize clinical acumen, EKG, Troponin, and serial changes to determine if it is an Acute Myocardial Infarction or myocardial injury due to an underlying chronic condition.         Lactic Acid, Plasma [[id number]]  (Abnormal) Collected: 07/13/25 1905    Specimen: Blood from Arm, Right Updated: 07/13/25 1938     Lactate 2.2 mmol/L     Green Top (Gel) [[id number]] Collected: 07/13/25 1905    Specimen: Blood from Arm, Right Updated: 07/13/25 1915     Extra Tube Hold for add-ons.     Comment: Auto resulted.       Procalcitonin [[id number]]  (Abnormal) Collected: 07/13/25 1753    Specimen: Blood Updated: 07/13/25 1905     Procalcitonin 20.70 ng/mL     Narrative:      As a Marker for Sepsis (Non-Neonates):    1. <0.5 ng/mL represents a low risk of severe sepsis and/or septic shock.  2. >2 ng/mL represents a high risk of severe sepsis and/or septic shock.    As a Marker for Lower Respiratory Tract Infections that require antibiotic therapy:    PCT on Admission    Antibiotic Therapy       6-12 Hrs later    >0.5                Strongly Recommended  >0.25 - <0.5        Recommended   0.1 - 0.25          Discouraged              Remeasure/reassess PCT  <0.1                Strongly Discouraged     Remeasure/reassess PCT    As 28 day mortality risk marker: \"Change in Procalcitonin Result\" (>80% or <=80%) if Day 0 (or Day 1) and Day 4 values are available. Refer to http://www.iMICROQs-pct-calculator.com    Change in PCT <=80%  A " decrease of PCT levels below or equal to 80% defines a positive change in PCT test result representing a higher risk for 28-day all-cause mortality of patients diagnosed with severe sepsis for septic shock.    Change in PCT >80%  A decrease of PCT levels of more than 80% defines a negative change in PCT result representing a lower risk for 28-day all-cause mortality of patients diagnosed with severe sepsis or septic shock.       Magnesium [[id number]]  (Normal) Collected: 07/13/25 1753    Specimen: Blood Updated: 07/13/25 1849     Magnesium 1.8 mg/dL     High Sensitivity Troponin T [[id number]]  (Abnormal) Collected: 07/13/25 1753    Specimen: Blood Updated: 07/13/25 1832     HS Troponin T 79 ng/L     Narrative:      High Sensitive Troponin T Reference Range:  <14.0 ng/L- Negative Female for AMI  <22.0 ng/L- Negative Male for AMI  >=14 - Abnormal Female indicating possible myocardial injury.  >=22 - Abnormal Male indicating possible myocardial injury.   Clinicians would have to utilize clinical acumen, EKG, Troponin, and serial changes to determine if it is an Acute Myocardial Infarction or myocardial injury due to an underlying chronic condition.         Comprehensive Metabolic Panel [[id number]]  (Abnormal) Collected: 07/13/25 1753    Specimen: Blood Updated: 07/13/25 1825     Glucose 138 mg/dL      BUN 19.0 mg/dL      Creatinine 1.26 mg/dL      Sodium 136 mmol/L      Potassium 2.8 mmol/L      Chloride 97 mmol/L      CO2 24.1 mmol/L      Calcium 9.1 mg/dL      Total Protein 7.7 g/dL      Albumin 3.7 g/dL      ALT (SGPT) 17 U/L      AST (SGOT) 20 U/L      Alkaline Phosphatase 78 U/L      Total Bilirubin 0.5 mg/dL      Globulin 4.0 gm/dL      A/G Ratio 0.9 g/dL      BUN/Creatinine Ratio 15.1     Anion Gap 14.9 mmol/L      eGFR 47.5 mL/min/1.73     Narrative:      GFR Categories in Chronic Kidney Disease (CKD)              GFR Category          GFR (mL/min/1.73)    Interpretation  G1                    90 or greater         Normal or high (1)  G2                    60-89                Mild decrease (1)  G3a                   45-59                Mild to moderate decrease  G3b                   30-44                Moderate to severe decrease  G4                    15-29                Severe decrease  G5                    14 or less           Kidney failure    (1)In the absence of evidence of kidney disease, neither GFR category G1 or G2 fulfill the criteria for CKD.    eGFR calculation 2021 CKD-EPI creatinine equation, which does not include race as a factor    CBC & Differential [[id number]]  (Abnormal) Collected: 07/13/25 1753    Specimen: Blood Updated: 07/13/25 1803    Narrative:      The following orders were created for panel order CBC & Differential.  Procedure                               Abnormality         Status                     ---------                               -----------         ------                     CBC Auto Differential[[id number]]        Abnormal            Final result                 Please view results for these tests on the individual orders.    CBC Auto Differential [[id number]]  (Abnormal) Collected: 07/13/25 1753    Specimen: Blood Updated: 07/13/25 1803     WBC 16.21 10*3/mm3      RBC 4.16 10*6/mm3      Hemoglobin 12.2 g/dL      Hematocrit 36.8 %      MCV 88.5 fL      MCH 29.3 pg      MCHC 33.2 g/dL      RDW 12.6 %      RDW-SD 40.5 fl      MPV 9.7 fL      Platelets 283 10*3/mm3      Neutrophil % 76.6 %      Lymphocyte % 14.8 %      Monocyte % 7.7 %      Eosinophil % 0.4 %      Basophil % 0.2 %      Immature Grans % 0.3 %      Neutrophils, Absolute 12.40 10*3/mm3      Lymphocytes, Absolute 2.40 10*3/mm3      Monocytes, Absolute 1.25 10*3/mm3      Eosinophils, Absolute 0.07 10*3/mm3      Basophils, Absolute 0.04 10*3/mm3      Immature Grans, Absolute 0.05 10*3/mm3      nRBC 0.0 /100 WBC     [place] Draw [[id number]] Collected: 07/13/25 1753    Specimen: Blood Updated: 07/13/25 1800    Narrative:       The following orders were created for panel order [place] Draw.  Procedure                               Abnormality         Status                     ---------                               -----------         ------                     Green Top (Gel)[[id number]]                                  Final result               Lavender Top[[id number]]                                     Final result               Gold Top - SST[[id number]]                                   Final result               Light Blue Top[[id number]]                                   Final result                 Please view results for these tests on the individual orders.    Green Top (Gel) [[id number]] Collected: 07/13/25 1753    Specimen: Blood Updated: 07/13/25 1800     Extra Tube Hold for add-ons.     Comment: Auto resulted.       Lavender Top [[id number]] Collected: 07/13/25 1753    Specimen: Blood Updated: 07/13/25 1800     Extra Tube hold for add-on     Comment: Auto resulted       Gold Top - SST [[id number]] Collected: 07/13/25 1753    Specimen: Blood Updated: 07/13/25 1800     Extra Tube Hold for add-ons.     Comment: Auto resulted.       Light Blue Top [[id number]] Collected: 07/13/25 1753    Specimen: Blood Updated: 07/13/25 1800     Extra Tube Hold for add-ons.     Comment: Auto resulted             Imaging Results (All)       Procedure Component Value Units Date/Time    FL C Arm During Surgery [[id number]] Resulted: 07/14/25 0027     Updated: 07/14/25 0027    Narrative:      This procedure was auto-finalized with no dictation required.    CT Abdomen Pelvis With Contrast [[id number]] Collected: 07/13/25 2206     Updated: 07/13/25 2218    Narrative:      CTA CHEST, CT ABDOMEN AND PELVIS     HISTORY: Pulmonary Embolism; R07.9-Chest pain, unspecified; R79.89-Other  specified abnormal findings of blood chemistry; E87.6-Hypokalemia;  R19.7-Diarrhea, unspecified; D72.829-Elevated white blood cell count,  unspecified; A41.9-Sepsis,  unspecified organism     COMPARISON: None     TECHNIQUE: CT angiography was performed of the chest with axial images  as well as coronal and sagittal reformatted MIP images provided  following the administration of IV contrast.  3-D surface rendered  reformats were obtained of the pulmonary arteries and aorta.  CT of the  abdomen and pelvis obtained following administration of IV contrast.  Coronal and sagittal reconstructions were obtained.   Radiation dose  reduction techniques were utilized, including automated exposure  control, and exposure modulation based on body size.     FINDINGS:     Chest CTA:  There is mild bibasilar atelectasis, but there is no  evidence of acute pulmonary infiltrate, pleural effusion, pneumothorax  or suspicious nodule.  The thoracic aorta is normal in caliber, and  there is no evidence of dissection.  There are coronary atherosclerotic  vascular calcifications.  There is no suspicious mediastinal adenopathy  or other mass, though there is a small to moderate hiatal hernia.  Bolus  timing is adequate, and there is no evidence of pulmonary embolism.     Abdomen: The liver and gallbladder are normal.  The spleen and pancreas  appear normal.  Both adrenal glands are normal. There is no evidence of  bowel obstruction. The aorta is normal in caliber.       : There is mild atrophic change in the right kidney and there is a  right renal 4 mm nonobstructing calculus, but there is no right  hydronephrosis. No left renal calculi are seen, but there is mild to  moderate left hydronephrosis, and the left ureter is dilated down into  the pelvis, to the level of an approximately 4 mm stone, 8 to 10 cm  above the ureterovesical junction. No bladder calculi are seen.     Pelvis:  The appendix is clearly identified, and is normal.  There is  diverticulosis, but there is no CT evidence of diverticulitis.  There is  no pelvic adenopathy or other soft tissue mass.  There is no CT evidence  of hernia or  bowel obstruction.     There is no acute bony abnormality.       Impression:         1.  Pulmonary arteries are well-opacified, and there is no evidence of  pulmonary embolism.     2.  There is minimal bibasilar dependent atelectasis, but the lung  parenchyma is otherwise normal. No acute pulmonary parenchymal  abnormality is seen.     3.   Mild to moderate left hydronephrosis secondary to a 4 mm stone in  the distal left ureter, 8 to 10 cm above the ureterovesical junction.                 This report was finalized on 7/13/2025 10:15 PM by Dr. Wojciech Roth M.D on Workstation: [id number]       CT Angiogram Chest Pulmonary Embolism [[id number]] Collected: 07/13/25 2206     Updated: 07/13/25 2218    Narrative:      CTA CHEST, CT ABDOMEN AND PELVIS     HISTORY: Pulmonary Embolism; R07.9-Chest pain, unspecified; R79.89-Other  specified abnormal findings of blood chemistry; E87.6-Hypokalemia;  R19.7-Diarrhea, unspecified; D72.829-Elevated white blood cell count,  unspecified; A41.9-Sepsis, unspecified organism     COMPARISON: None     TECHNIQUE: CT angiography was performed of the chest with axial images  as well as coronal and sagittal reformatted MIP images provided  following the administration of IV contrast.  3-D surface rendered  reformats were obtained of the pulmonary arteries and aorta.  CT of the  abdomen and pelvis obtained following administration of IV contrast.  Coronal and sagittal reconstructions were obtained.   Radiation dose  reduction techniques were utilized, including automated exposure  control, and exposure modulation based on body size.     FINDINGS:     Chest CTA:  There is mild bibasilar atelectasis, but there is no  evidence of acute pulmonary infiltrate, pleural effusion, pneumothorax  or suspicious nodule.  The thoracic aorta is normal in caliber, and  there is no evidence of dissection.  There are coronary atherosclerotic  vascular calcifications.  There is no suspicious mediastinal  adenopathy  or other mass, though there is a small to moderate hiatal hernia.  Bolus  timing is adequate, and there is no evidence of pulmonary embolism.     Abdomen: The liver and gallbladder are normal.  The spleen and pancreas  appear normal.  Both adrenal glands are normal. There is no evidence of  bowel obstruction. The aorta is normal in caliber.       : There is mild atrophic change in the right kidney and there is a  right renal 4 mm nonobstructing calculus, but there is no right  hydronephrosis. No left renal calculi are seen, but there is mild to  moderate left hydronephrosis, and the left ureter is dilated down into  the pelvis, to the level of an approximately 4 mm stone, 8 to 10 cm  above the ureterovesical junction. No bladder calculi are seen.     Pelvis:  The appendix is clearly identified, and is normal.  There is  diverticulosis, but there is no CT evidence of diverticulitis.  There is  no pelvic adenopathy or other soft tissue mass.  There is no CT evidence  of hernia or bowel obstruction.     There is no acute bony abnormality.       Impression:         1.  Pulmonary arteries are well-opacified, and there is no evidence of  pulmonary embolism.     2.  There is minimal bibasilar dependent atelectasis, but the lung  parenchyma is otherwise normal. No acute pulmonary parenchymal  abnormality is seen.     3.   Mild to moderate left hydronephrosis secondary to a 4 mm stone in  the distal left ureter, 8 to 10 cm above the ureterovesical junction.                 This report was finalized on 7/13/2025 10:15 PM by Dr. Wojciech Roth M.D on Workstation: [id number]       XR Chest 1 View [[id number]] Collected: 07/13/25 1831     Updated: 07/13/25 1838    Narrative:      XR CHEST 1 VW-     HISTORY: Chest pain. Reported recent heart attack.     COMPARISON: Chest radiograph 6/23/2025     FINDINGS: A single view of the chest was obtained.     Support Devices:  None.  Cardiac Silhouette/Mediastinum/Wendie:   The cardiac, mediastinal, and  hilar contours are within normal limits.  Lungs/Pleural Spaces:  The lungs and pleural spaces are clear.  Chest Wall/Diaphragm/Upper Abdomen: There is a hiatal hernia. The  visualized osseous structures are similar.        CONCLUSION(S):       1.  No focal consolidation or effusion.  2.  Hiatal hernia.     This report was finalized on 7/13/2025 6:35 PM by Dr. La Blount M.D  on Workstation: [id number]             ECG/EMG Results (all)       Procedure Component Value Units Date/Time    ECG 12 Lead ED Triage Standing Order; Chest Pain [[id number]] Collected: 07/13/25 1751     Updated: 07/13/25 1820     QT Interval 355 ms      QTC Interval 490 ms     Narrative:      HEART REKS=047  bpm  RR Bycazewh=990  ms  HI Cwqrlxtk=873  ms  P Horizontal Axis=25  deg  P Front Axis=44  deg  QRSD Interval=88  ms  QT Ikqujjya=635  ms  TEuK=560  ms  QRS Axis=38  deg  T Wave Axis=38  deg  - ABNORMAL ECG -  Sinus tachycardia  Inferior  infarct, old  When compared with ECG of 23-Jun-2025 09:20:24,  Significant repolarization change  Significant axis, voltage or hypertrophy change  Electronically Signed By: Efrem Beltre ([facility]) 2025-07-13 18:19:59  Date and Time of Study:2025-07-13 17:51:54    Telemetry Scan [[id number]] Resulted: 07/14/25 0138     Updated: 07/14/25 0206    Telemetry Scan [[id number]] Resulted: 07/14/25 0530     Updated: 07/14/25 0637    Telemetry Scan [[id number]] Resulted: 07/14/25 1100     Updated: 07/14/25 1836    Telemetry Scan [[id number]] Resulted: 07/14/25 1700     Updated: 07/14/25 1836    Telemetry Scan [[id number]] Resulted: 07/15/25 0003     Updated: 07/15/25 0708    Telemetry Scan [[id number]] Resulted: 07/15/25 0602     Updated: 07/15/25 0708    Telemetry Scan [[id number]] Resulted: 07/15/25 1104     Updated: 07/15/25 1507             Operative/Procedure Notes (all)        Maged Pedroza MD at 07/14/25 0003  Version 1 of 1         Operative Report     [facility]  OR    Patient: Wilma Longo  Age:      65 y.o.  :     1960  Sex:      female    Medical Record:  [id number]    Date of Operation/Procedure:  2025    Pre-operative Diagnosis: Left urolithiasis    Post-operative Diagnosis: Left urolithiasis    Surgeon: Maged Pedroza MD    Name of Operation/Procedure:  Procedure(s) and Anesthesia Type:     * CYSTOSCOPY URETERAL CATHETER/STENT INSERTION - General    Findings/Complications: Delayed nephrogram with contrast extending to the distal ureter with what appears to be a filling defect overlying the sacrum    Of note there was some delayed contrast in the right kidney as well but appeared to be associated with an extrarenal pelvis.  There were no stones or obstruction on that side on the CT    Description of procedure:     After appropriate discussion of risks and benefits and informed consent obtained patient was transported to the operating room and positioned in supine position on the operating table.  General anesthesia was induced.  Timeout was completed.  Preoperative antibiotics were administered.  Patient was repositioned into dorsolithotomy and prepped and draped in sterile fashion.  Cystoscope inserted per urethra and advanced into the bladder.  Bladder was free of lesions.  The left ureteral orifice was cannulated the sensor guidewire advanced up to the renal pelvis under fluoroscopic guidance.  A 7 Telugu by 24 cm double-J ureteral stent was inserted over the wire and up to the renal pelvis under fluoroscopic guidance.  The wire was pulled leaving a good proximal and distal curl.  There was no purulence expressed from the left side.  This concluded the procedure.  The patient was extubated and transported to PACU in stable condition.    Estimated Blood Loss: None    Specimens: * No orders in the log *    Fluids/Drains: 7 Telugu by 24 cm double-J ureteral stent, left side, no string    Maged Pedroza MD  2025  00:12 EDT       Electronically  signed by Maged Pedroza MD at 25 0014          Physician Progress Notes (all)        Cely Clarke MD at 07/15/25 5443              Name: Wilma Longo ADMIT: 2025   : 1960  PCP: Jerman Virgen MD    MRN: [id number] LOS: 2 days   AGE/SEX: 65 y.o. female  ROOM: [facility]/     Subjective   Subjective   Patient resting in bed, continues to have some pain likely related to stent and urology has added oxybutynin.  Diarrhea is improving, we reviewed that her blood cultures have remained negative which she is pleased with however her white blood cell count is up slightly which we will need to keep a close eye on.  Patient is agreeable and understanding of the plan.      Objective   Objective   Vital Signs  Temp:  [97.9 °F (36.6 °C)-98.2 °F (36.8 °C)] 98.2 °F (36.8 °C)  Heart Rate:  [75-92] 87  Resp:  [18] 18  BP: (119-135)/(61-71) 125/64  SpO2:  [95 %-99 %] 98 %  on   ;   Device (Oxygen Therapy): room air  Body mass index is 26.37 kg/m².  Physical Exam  Constitutional:       General: She is not in acute distress.     Appearance: Normal appearance. She is not ill-appearing.   Cardiovascular:      Rate and Rhythm: Normal rate and regular rhythm.   Pulmonary:      Effort: Pulmonary effort is normal. No respiratory distress.      Breath sounds: Normal breath sounds. No wheezing.   Abdominal:      General: Abdomen is flat. There is no distension.      Palpations: Abdomen is soft.      Tenderness: There is abdominal tenderness (mild).   Musculoskeletal:         General: No swelling or tenderness.      Right lower leg: No edema.      Left lower leg: No edema.   Skin:     General: Skin is warm and dry.   Neurological:      General: No focal deficit present.      Mental Status: She is alert and oriented to person, place, and time. Mental status is at baseline.         Results Review     I reviewed the patient's new clinical results.  Results from last 7 days   Lab Units 07/15/25  0532 25  0846  07/13/25  1753   WBC 10*3/mm3 15.69* 13.19* 16.21*   HEMOGLOBIN g/dL 10.5* 11.2* 12.2   PLATELETS 10*3/mm3 260 242 283     Results from last 7 days   Lab Units 07/15/25  0532 07/14/25  0846 07/13/25  1753   SODIUM mmol/L 140 141 136   POTASSIUM mmol/L 3.4* 3.8 2.8*   CHLORIDE mmol/L 109* 108* 97*   CO2 mmol/L 21.0* 23.4 24.1   BUN mg/dL 20.0 15.0 19.0   CREATININE mg/dL 1.05* 0.97 1.26*   GLUCOSE mg/dL 131* 208* 138*   Estimated Creatinine Clearance: 51.2 mL/min (A) (by C-G formula based on SCr of 1.05 mg/dL (H)).  Results from last 7 days   Lab Units 07/15/25  0532 07/13/25  1753   ALBUMIN g/dL 3.2* 3.7   BILIRUBIN mg/dL 0.2 0.5   ALK PHOS U/L 75 78   AST (SGOT) U/L 14 20   ALT (SGPT) U/L 11 17     Results from last 7 days   Lab Units 07/15/25  0532 07/14/25  0846 07/13/25  1753   CALCIUM mg/dL 8.7 8.6 9.1   ALBUMIN g/dL 3.2*  --  3.7   MAGNESIUM mg/dL  --   --  1.8     Results from last 7 days   Lab Units 07/13/25  2210 07/13/25  1905 07/13/25  1753   PROCALCITONIN ng/mL  --   --  20.70*   LACTATE mmol/L 0.5 2.2*  --      SARS-CoV-2, RASHARD   Date Value Ref Range Status   11/23/2020 Not Detected Not Detected Final     Comment:     This nucleic acid amplification test was developed and its performance  characteristics determined by [facility]. Nucleic acid  amplification tests include PCR and TMA. This test has not been FDA  cleared or approved. This test has been authorized by FDA under an  Emergency Use Authorization (EUA). This test is only authorized for  the duration of time the declaration that circumstances exist  justifying the authorization of the emergency use of in vitro  diagnostic tests for detection of SARS-CoV-2 virus and/or diagnosis  of COVID-19 infection under section 564(b)(1) of the Act, 21 U.S.C.  360bbb-3(b) (1), unless the authorization is terminated or revoked  sooner.  When diagnostic testing is negative, the possibility of a false  negative result should be considered in the context  of a patient's  recent exposures and the presence of clinical signs and symptoms  consistent with COVID-19. An individual without symptoms of COVID-19  and who is not shedding SARS-CoV-2 virus would expect to have a  negative (not detected) result in this assay.   11/23/2020 Not Detected Not Detected Final     Comment:     This nucleic acid amplification test was developed and its performance  characteristics determined by [facility]. Nucleic acid  amplification tests include PCR and TMA. This test has not been FDA  cleared or approved. This test has been authorized by FDA under an  Emergency Use Authorization (EUA). This test is only authorized for  the duration of time the declaration that circumstances exist  justifying the authorization of the emergency use of in vitro  diagnostic tests for detection of SARS-CoV-2 virus and/or diagnosis  of COVID-19 infection under section 564(b)(1) of the Act, 21 U.S.C.  360bbb-3(b) (1), unless the authorization is terminated or revoked  sooner.  When diagnostic testing is negative, the possibility of a false  negative result should be considered in the context of a patient's  recent exposures and the presence of clinical signs and symptoms  consistent with COVID-19. An individual without symptoms of COVID-19  and who is not shedding SARS-CoV-2 virus would expect to have a  negative (not detected) result in this assay.       SARS-CoV-2 PCR   Date Value Ref Range Status   04/03/2021 Not Detected Not Detected Final     Comment:     Nucleic acid specific to SARS-CoV-2 (COVID-19) virus was not detected in  this sample by the TaqPath (TM) COVID-19 Combo Kit.          SARS-CoV-2 (COVID-19) nucleic acid testing performed using [facility] Aptima (R) SARS-CoV-2 Assay or [facility] TaqPath (TM)  COVID-19 Combo Kit as indicated above under Emergency Use Authorization (EUA) from the FDA. Aptima (R) and TaqPath (TM) test performance  characteristics verified by Dr. Z  [facility] in accordance with the FDAs Guidance Document (Policy for Diagnostic Tests for Coronavirus Disease-2019  during the Public Health Emergency) issued on March 16, 2020. The laboratory is regulated under CLIA as qualified to perform high-complexity testing  Unless otherwise noted, all testing was performed at [facility], CLIA No. [id number], [place] State Licensee No. [id number]     Glucose   Date/Time Value Ref Range Status   07/14/2025 0031 74 70 - 130 mg/dL Final   07/13/2025 2340 66 (L) 70 - 130 mg/dL Final       Duplex Venous Lower Extremity - Bilateral CAR    Normal bilateral lower extremity venous duplex scan.  FL C Arm During Surgery  This procedure was auto-finalized with no dictation required.    Scheduled Medications  amLODIPine, 5 mg, Oral, Nightly  aspirin, 81 mg, Oral, Daily  DULoxetine, 60 mg, Oral, Nightly  levothyroxine, 75 mcg, Oral, Q AM  oxybutynin XL, 10 mg, Oral, Daily  piperacillin-tazobactam, 3.375 g, Intravenous, Q8H  rosuvastatin, 20 mg, Oral, Daily    Infusions   Diet  Diet: Gastrointestinal; Fiber-Restricted; Fluid Consistency: Thin (IDDSI 0)        I have personally reviewed:  [x]  Laboratory   []  Microbiology   []  Radiology   [x]  EKG/Telemetry   []  Cardiology/Vascular   []  Pathology   []  Records    Assessment/Plan     Active Hospital Problems    Diagnosis  POA    **Left ureteral stone [N20.1]  Unknown    BALBINA (acute kidney injury) [N17.9]  Unknown    Sepsis [A41.9]  Unknown    Hypokalemia [E87.6]  Unknown    Acute UTI [N39.0]  Yes    Coronary artery disease involving native coronary artery of native heart without angina pectoris [I25.10]  Yes    Anxiety associated with depression [F41.8]  Yes    Benign essential hypertension [I10]  Yes    Hypothyroidism [E03.9]  Yes    Hyperlipidemia [E78.5]  Yes    Irritable bowel syndrome [K58.9]  Yes      Resolved Hospital Problems   No resolved problems to display.       65 y.o. female admitted with Left ureteral stone.    Left ureteral stone  with hydronephrosis  UTI with sepsis criteria (fever, WBC)  Lactic acidosis- resolved  - Urology consulted and took the patient for cystoscopy with ureteral stent placement, she is continuing on Zosyn while urine cultures are pending, I discussed with the patient today regarding her fever, elevated procalcitonin, white count and abnormal UA it would not surprise me if her blood cultures become positive in the next 24 to 48 hours  -Patient is agreeable to staying in the hospital to monitor cultures  -She is having some urethral pain still which will hopefully improve with treatment of UTI  - urology has added oxybutynin for her spasms related to stent placement- WBC 13->15; if WBC continues to climb may need repeat ct scan to evaluate, blood cx NGTD, urine cx with no growth (she did have a + UTI last month with pansensitive e coli)     CAD - recent cath last month with 70% ostial stenosis- not amenable to PCI, med mgmt recommended  Atypical chest pain  Elevated troponin  Recent admission for MI  HTN  HLD  - Chest pain resolved after stent placement, her troponin is lower than baseline  - she is continuing on aspirin and statin continue amlodipine     Bile acid malabsorption/chronic diarrhea  - cont home colestipol  - Patient reports chronic diarrhea with fecal leakage, had improved with colestipol when this was initiated last year however over the last month symptoms have continued to worsen, she is having diarrhea/fecal incontinence related to this daily; she follows with Crystal Smith in clinic, this is having significant impact on her life; we will ask GI to see her while she is here to see if they have any additional recs   - pt s improving on her cholestipol; fecal calprotectin pending per GI     Elevated d-dimer  - CTA chest neg as was b/l LE dopplers     Hypothyroidism  - levothyroxine    SCDs for DVT prophylaxis.  Full code.  Discussed with patient, nursing staff, and CCP.  Anticipated discharge home, 1-2  days        Cely Clarke MD  [facility]  07/15/25  15:53 EDT    I wore protective equipment throughout this patient encounter including gloves.  Hand hygiene was performed before donning protective equipment and after removal when leaving the room.    Expected Discharge Date and Time       Expected Discharge Date Expected Discharge Time    Jul 16, 2025              Copied text in this note has been reviewed and is accurate as of 07/15/25.       Electronically signed by Cely Clarke MD at 07/15/25 5182       Amina Cardenas MD at 07/15/25 143          [facility]  Inpatient Progress Note    Reason for Follow Up: Chronic diarrhea    Subjective     Interval History:   4 charted bowel movements yesterday    She reports that her symptoms are better today.  She is essentially back to her baseline.  She reports that she always has loose bowel movements, worse after eating but she is able to control things and she is not having incontinence.  She reports she is eating more here than she does typically at home and doing well.  No abdominal pain or cramping.    Current Facility-Administered Medications:     acetaminophen (TYLENOL) tablet 650 mg, 650 mg, Oral, Q6H PRN, Olga Shore APRN    ALPRAZolam (XANAX) tablet 0.5 mg, 0.5 mg, Oral, Daily PRN, Olga Shore APRN    aluminum-magnesium hydroxide-simethicone (MAALOX MAX) 400-400-40 MG/5ML suspension 15 mL, 15 mL, Oral, Q6H PRN, Maged Pedroza MD    amLODIPine (NORVASC) tablet 5 mg, 5 mg, Oral, Nightly, Olga Shore APRN, 5 mg at 07/14/25 2319    aspirin chewable tablet 81 mg, 81 mg, Oral, Daily, Olga Shore APRN, 81 mg at 07/15/25 0902    sennosides-docusate (PERICOLACE) 8.6-50 MG per tablet 2 tablet, 2 tablet, Oral, BID PRN **AND** polyethylene glycol (MIRALAX) packet 17 g, 17 g, Oral, Daily PRN **AND** bisacodyl (DULCOLAX) EC tablet 5 mg, 5 mg, Oral, Daily PRN **AND**  bisacodyl (DULCOLAX) suppository 10 mg, 10 mg, Rectal, Daily PRN, Maged Pedroza MD    DULoxetine (CYMBALTA) DR capsule 60 mg, 60 mg, Oral, Nightly, Olga Shore APRN, 60 mg at 07/14/25 2319    levothyroxine (SYNTHROID, LEVOTHROID) tablet 75 mcg, 75 mcg, Oral, Q AM, Olga Shore APRN, 75 mcg at 07/15/25 0758    morphine injection 2 mg, 2 mg, Intravenous, Q2H PRN, Maged Pedroza MD, 2 mg at 07/15/25 0902    nitroglycerin (NITROSTAT) SL tablet 0.4 mg, 0.4 mg, Sublingual, Q5 Min PRN, Maged Pedroza MD    ondansetron ODT (ZOFRAN-ODT) disintegrating tablet 4 mg, 4 mg, Oral, Q6H PRN **OR** ondansetron (ZOFRAN) injection 4 mg, 4 mg, Intravenous, Q6H PRN, Maged Pedroza MD    oxybutynin XL (DITROPAN-XL) 24 hr tablet 10 mg, 10 mg, Oral, Daily, Maged Pedroza MD, 10 mg at 07/15/25 1205    oxyCODONE-acetaminophen (PERCOCET) 5-325 MG per tablet 1 tablet, 1 tablet, Oral, Q6H PRN, Cely Clarke MD    piperacillin-tazobactam (ZOSYN) 3.375 g IVPB in 100 mL NS MBP (CD), 3.375 g, Intravenous, Q8H, Olga Shore APRN, 3.375 g at 07/15/25 1351    Potassium Replacement - Follow Nurse / BPA Driven Protocol, , Not Applicable, PRN, Maged Pedroza MD    rosuvastatin (CRESTOR) tablet 20 mg, 20 mg, Oral, Daily, Olga Shore APRN, 20 mg at 07/15/25 0902    sodium chloride 0.9 % flush 10 mL, 10 mL, Intravenous, PRN, Maged Pedroza MD    sodium chloride 0.9 % flush 10 mL, 10 mL, Intravenous, PRN, Maged Pedroza MD    sodium chloride 0.9 % flush 10 mL, 10 mL, Intravenous, PRN, Maged Pedroza MD  Review of Systems:    Positive for loose stools, no incontinence or abdominal pain, no fever or chills    Objective     Vital Signs  Temp:  [97.9 °F (36.6 °C)-98.2 °F (36.8 °C)] 98.2 °F (36.8 °C)  Heart Rate:  [75-92] 87  Resp:  [18] 18  BP: (119-135)/(61-71) 125/64  Body mass index is 26.37 kg/m².    Intake/Output Summary (Last 24 hours) at 7/15/2025 0635  Last data filed at 7/15/2025  "0002  Gross per 24 hour   Intake 240 ml   Output --   Net 240 ml     No intake/output data recorded.     Physical Exam:   General: patient awake, alert and cooperative   Eyes: Normal lids and lashes, no scleral icterus   Neck: supple, normal ROM   Skin: warm and dry, not jaundiced   Pulm:   regular and unlabored   Abdomen: soft, nontender, nondistended    Psychiatric: Normal mood and behavior; memory intact     Results Review:     I reviewed the patient's new clinical results.    Results from last 7 days   Lab Units 07/15/25  0532 07/14/25  0846 07/13/25  1753   WBC 10*3/mm3 15.69* 13.19* 16.21*   HEMOGLOBIN g/dL 10.5* 11.2* 12.2   HEMATOCRIT % 32.6* 34.5 36.8   PLATELETS 10*3/mm3 260 242 283     Results from last 7 days   Lab Units 07/15/25  0532 07/14/25  0846 07/13/25  1753   SODIUM mmol/L 140 141 136   POTASSIUM mmol/L 3.4* 3.8 2.8*   CHLORIDE mmol/L 109* 108* 97*   CO2 mmol/L 21.0* 23.4 24.1   BUN mg/dL 20.0 15.0 19.0   CREATININE mg/dL 1.05* 0.97 1.26*   CALCIUM mg/dL 8.7 8.6 9.1   BILIRUBIN mg/dL 0.2  --  0.5   ALK PHOS U/L 75  --  78   ALT (SGPT) U/L 11  --  17   AST (SGOT) U/L 14  --  20   GLUCOSE mg/dL 131* 208* 138*         No results found for: \"LIPASE\"    Radiology:  FL C Arm During Surgery   Final Result      CT Abdomen Pelvis With Contrast   Final Result       1.  Pulmonary arteries are well-opacified, and there is no evidence of   pulmonary embolism.       2.  There is minimal bibasilar dependent atelectasis, but the lung   parenchyma is otherwise normal. No acute pulmonary parenchymal   abnormality is seen.       3.   Mild to moderate left hydronephrosis secondary to a 4 mm stone in   the distal left ureter, 8 to 10 cm above the ureterovesical junction.                       This report was finalized on 7/13/2025 10:15 PM by Dr. Wojciech Roth M.D on Workstation: [id number]          CT Angiogram Chest Pulmonary Embolism   Final Result       1.  Pulmonary arteries are well-opacified, and there " is no evidence of   pulmonary embolism.       2.  There is minimal bibasilar dependent atelectasis, but the lung   parenchyma is otherwise normal. No acute pulmonary parenchymal   abnormality is seen.       3.   Mild to moderate left hydronephrosis secondary to a 4 mm stone in   the distal left ureter, 8 to 10 cm above the ureterovesical junction.                       This report was finalized on 7/13/2025 10:15 PM by Dr. Wojciech Roth M.D on Workstation: [id number]          XR Chest 1 View   Final Result          Assessment & Plan     Active Hospital Problems    Diagnosis     **Left ureteral stone     BALBINA (acute kidney injury)     Sepsis     Hypokalemia     Acute UTI     Coronary artery disease involving native coronary artery of native heart without angina pectoris     Anxiety associated with depression     Benign essential hypertension     Hypothyroidism     Hyperlipidemia     Irritable bowel syndrome        Assessment:  Chronic diarrhea-IBS-D lymphocytic colitis likely exacerbated by #2  Urosepsis secondary to left ureteral stone      Plan:  Continue colestipol 2 tablets p.o. twice daily-this has worked best for her in the past.  Symptoms have improved and she is essentially at her baseline.  We will follow-up results of fecal calprotectin however even if elevated given that she has returned to baseline, do not anticipate adding any additional medication at this time as long as her symptoms remain stable.  No further inpatient recommendations at this time-we will sign off but are available as needed    I discussed the patients findings and my recommendations with patient and family.            Amina Cardenas M.D.  [facility]  [phone]        Electronically signed by Amina Cardenas MD at 07/15/25 1506          Consult Notes (all)        Trini Wade, APRN at 07/14/25 1521        Consult Orders    1. Inpatient Gastroenterology Consult [[id number]] ordered by Vince  Cely ANTONIO MD at 07/14/25 7203                 [facility]  Initial Inpatient Consult Note    Referring Provider: A     Reason for Consultation: Chronic diarrhea    Subjective     History of present illness:      Thank you for allowing us to participate in the care of this patient.    65 y.o. female who is known to our service with a history of irritable bowel syndrome diarrhea predominant, lymphocytic colitis and GERD who is currently admitted for urosepsis secondary to obstructing left ureteral stone status post stent placement in the OR whom we have been asked to see for chronic diarrhea.    This patient has struggled with chronic diarrhea for several years.  She is felt the best on colestipol for which she takes 2 pills twice daily.  With current urinary issues she had worsening of baseline IBS with increased diarrhea and even nausea and vomiting.  She feels much better today.  Denies rectal bleeding or rectal pain.  Appetite has been good.  Her weight has been stable.    2/20/2023 colonoscopy showed normal macroscopically with biopsies consistent with lymphocytic colitis. No FH CRC. Recall 10 years.     2/20/2023 EGD showed variable Z-line, 3 cm hiatal hernia, patchy mild gastritis.  Pathology with chronic inactive gastritis, negative H. Pylori; GEJ: Columnar mucosa with chronic inflammation, negative intestinal metaplasia.      Current Meds:   amLODIPine, 5 mg, Oral, Nightly  aspirin, 81 mg, Oral, Daily  DULoxetine, 60 mg, Oral, Nightly  levothyroxine, 75 mcg, Oral, Q AM  piperacillin-tazobactam, 3.375 g, Intravenous, Q8H  rosuvastatin, 20 mg, Oral, Daily      Allergies:  No Known Allergies    Objective:  Objective     Vital Signs  Temp:  [97.3 °F (36.3 °C)-102.9 °F (39.4 °C)] 97.3 °F (36.3 °C)  Heart Rate:  [] 78  Resp:  [15-31] 18  BP: (112-150)/(59-96) 112/64  Physical Exam:   Constitutional:    Alert, cooperative, in no acute distress    Abdomen:     Soft, nondistended,  "nontender; normal bowel sounds    Results Review:   I reviewed the patient's new clinical results.    Results from last 7 days   Lab Units 07/14/25  0846 07/13/25  1753   WBC 10*3/mm3 13.19* 16.21*   HEMOGLOBIN g/dL 11.2* 12.2   HEMATOCRIT % 34.5 36.8   PLATELETS 10*3/mm3 242 283     Results from last 7 days   Lab Units 07/14/25  0846 07/13/25  1753   SODIUM mmol/L 141 136   POTASSIUM mmol/L 3.8 2.8*   CHLORIDE mmol/L 108* 97*   CO2 mmol/L 23.4 24.1   BUN mg/dL 15.0 19.0   CREATININE mg/dL 0.97 1.26*   CALCIUM mg/dL 8.6 9.1   BILIRUBIN mg/dL  --  0.5   ALK PHOS U/L  --  78   ALT (SGPT) U/L  --  17   AST (SGOT) U/L  --  20   GLUCOSE mg/dL 208* 138*         No results found for: \"LIPASE\"    Radiology:  FL C Arm During Surgery   Final Result      CT Abdomen Pelvis With Contrast   Final Result       1.  Pulmonary arteries are well-opacified, and there is no evidence of   pulmonary embolism.       2.  There is minimal bibasilar dependent atelectasis, but the lung   parenchyma is otherwise normal. No acute pulmonary parenchymal   abnormality is seen.       3.   Mild to moderate left hydronephrosis secondary to a 4 mm stone in   the distal left ureter, 8 to 10 cm above the ureterovesical junction.                       This report was finalized on 7/13/2025 10:15 PM by Dr. Wojciech Roth M.D on Workstation: [id number]          CT Angiogram Chest Pulmonary Embolism   Final Result       1.  Pulmonary arteries are well-opacified, and there is no evidence of   pulmonary embolism.       2.  There is minimal bibasilar dependent atelectasis, but the lung   parenchyma is otherwise normal. No acute pulmonary parenchymal   abnormality is seen.       3.   Mild to moderate left hydronephrosis secondary to a 4 mm stone in   the distal left ureter, 8 to 10 cm above the ureterovesical junction.                       This report was finalized on 7/13/2025 10:15 PM by Dr. Wojciech Roth M.D on Workstation: [id number]        "   XR Chest 1 View   Final Result          Assessment & Plan   Active Hospital Problems    Diagnosis     **Left ureteral stone     BALBINA (acute kidney injury)     Sepsis     Hypokalemia     Acute UTI     Coronary artery disease involving native coronary artery of native heart without angina pectoris     Anxiety associated with depression     Benign essential hypertension     Hypothyroidism     Hyperlipidemia     Irritable bowel syndrome        Assessment:  Urosepsis secondary to left ureteral stone  History of chronic diarrhea, IBS-D and lymphocytic colitis likely exacerbated by #1    Plan:  Low residue diet as tolerated  Okay to resume home dosage of colestipol 2 tablets p.o. twice daily, patient can use home supply  Check fecal calprotectin  Consider budesonide pending patient's hospital course   CBC, CMP, CRP, sed rate and TSH in the morning    I discussed the patients findings and my recommendations with patient, family, and Dr. Cardenas.    Dragon dictation used throughout this note.            HEATH Caruso  [facility]  [address] [place] [address]  [phone] office  [phone] fax  [facility]          Electronically signed by Trini Wade APRN at 25 1612       Maged Pedroza MD at 25 2341        Consult Orders    1. Urology (on-call MD unless specified) [[id number]] ordered by Tashi Najera MD at 25 2227                      FIRST UROLOGY CONSULT      Patient Identification:  NAME:  Wilma Longo  Age:  65 y.o.   Sex:  female   :  1960   MRN:  [id number]     Chief complaint: Left flank pain    History of present illness:      65-year-old female admitted with left flank pain, nausea, vomiting, fevers    In hospital:  -AVSS, good UOP  -WBC -16  -Creat -1.26  -UA -3-5 RBC, 11-20 WBC, trace bacteria    Lactic acid 0.5 < 2.2    -CT -independently interpreted; left hydronephrosis secondary to a 5 mm distal left ureteral  stone    Asked to see    Allergies:  Patient has no known allergies.    Home medications:  Medications Prior to Admission   Medication Sig Dispense Refill Last Dose/Taking    ALPRAZolam (Xanax) 0.5 MG tablet Take 1 tablet by mouth Daily As Needed for Anxiety. D/c previous order 30 tablet 2     amLODIPine (NORVASC) 5 MG tablet TAKE 1 TABLET BY MOUTH DAILY (Patient taking differently: Take 1 tablet by mouth Every Night.) 14 tablet 0     aspirin 81 MG chewable tablet Chew 1 tablet Daily.       colestipol (COLESTID) 1 g tablet Take 2 tablets by mouth 2 (Two) Times a Day. 360 tablet 3     Cranberry 125 MG tablet Take 1 tablet by mouth Daily.       DULoxetine (CYMBALTA) 60 MG capsule TAKE 1 CAPSULE BY MOUTH DAILY (Patient taking differently: Take 1 capsule by mouth Every Night.) 14 capsule 0     levothyroxine (SYNTHROID, LEVOTHROID) 75 MCG tablet TAKE 1 TABLET BY MOUTH DAILY 14 tablet 0     lisinopril (PRINIVIL,ZESTRIL) 20 MG tablet TAKE 1 TABLET BY MOUTH DAILY (Patient taking differently: Take 1 tablet by mouth Every Night.) 14 tablet 0     naproxen (NAPROSYN) 500 MG tablet Take 1 tablet by mouth Daily As Needed for Moderate Pain. 180 tablet 1     rosuvastatin (CRESTOR) 20 MG tablet Take 1 tablet by mouth Daily. 90 tablet 0     valACYclovir (VALTREX) 500 MG tablet Take 1 tablet by mouth As Needed.       vitamin C (ASCORBIC ACID) 250 MG tablet Take 2 tablets by mouth Every Night.           Hospital medications:  vancomycin, 20 mg/kg, Intravenous, Once           [Transfer Hold] aluminum-magnesium hydroxide-simethicone    [Transfer Hold] senna-docusate sodium **AND** [Transfer Hold] polyethylene glycol **AND** [Transfer Hold] bisacodyl **AND** [Transfer Hold] bisacodyl    [Transfer Hold] Morphine    [Transfer Hold] nitroglycerin    [Transfer Hold] ondansetron ODT **OR** [Transfer Hold] ondansetron    Potassium Replacement - Follow Nurse / BPA Driven Protocol    [Transfer Hold] sodium chloride    [Transfer Hold] sodium  chloride    [Transfer Hold] sodium chloride    Family history:  Family History   Problem Relation Age of Onset    Depression Mother     Heart disease Mother     Stroke Mother     Heart attack Mother     Heart failure Mother     Hyperlipidemia Mother     Hypertension Mother     Irritable bowel syndrome Mother     Arthritis Father     Heart disease Father     Skin cancer Father     Heart attack Father     Heart failure Father     Heart disease Maternal Grandmother     Stroke Maternal Grandfather     Stroke Paternal Grandfather     Hypertension Sister     Hypothyroidism Sister     Hypertension Brother     Hypothyroidism Daughter     Thyroid disease Sister     Colon cancer Neg Hx     Colon polyps Neg Hx     Liver disease Neg Hx     Rectal cancer Neg Hx     Stomach cancer Neg Hx     Liver cancer Neg Hx     Malig Hyperthermia Neg Hx        Social history:  Social History     Tobacco Use    Smoking status: Never    Smokeless tobacco: Never   Vaping Use    Vaping status: Never Used   Substance Use Topics    Alcohol use: Not Currently     Comment: less than once per  month    Drug use: No       Review of systems:      Positive for:  nothing  Negative for:  chest pain, cough, sob, o/w neg    Objective:  TMax 24 hours:   Temp (24hrs), Av.1 °F (37.3 °C), Min:99.1 °F (37.3 °C), Max:99.1 °F (37.3 °C)      Vitals Ranges:   Temp:  [99.1 °F (37.3 °C)] 99.1 °F (37.3 °C)  Heart Rate:  [] 98  Resp:  [20] 20  BP: (115-150)/(59-96) 137/78    Intake/Output Last 3 shifts:  No intake/output data recorded.     Physical Exam:    General Appearance:    Alert, cooperative, NAD   Back:     No CVA tenderness   Lungs:     Respirations unlabored, no wheezing    Heart:    RRR, intact peripheral pulses   Abdomen:     Soft, NDNT, no masses, no guarding   :    Pelvic not performed, bladder nondistended and nontender   Neuro/Psych:   Orientation intact, mood/affect pleasant       Results review:   I reviewed the patient's new clinical  results.    Data review:  Lab Results (last 24 hours)       Procedure Component Value Units Date/Time    Urinalysis With Microscopic If Indicated (No Culture) - Urine, Clean Catch [[id number]]  (Abnormal) Collected: 07/13/25 2228    Specimen: Urine, Clean Catch Updated: 07/13/25 2253     Color, UA Yellow     Appearance, UA Clear     pH, UA 6.5     Specific Gravity, UA 1.028     Glucose, UA Negative     Ketones, UA Trace     Bilirubin, UA Negative     Blood, UA Trace     Protein, UA Trace     Leuk Esterase, UA Small (1+)     Nitrite, UA Negative     Urobilinogen, UA 0.2 E.U./dL    Urinalysis, Microscopic Only - Urine, Clean Catch [[id number]]  (Abnormal) Collected: 07/13/25 2228    Specimen: Urine, Clean Catch Updated: 07/13/25 2253     RBC, UA 3-5 /HPF      WBC, UA 11-20 /HPF      Bacteria, UA Trace /HPF      Squamous Epithelial Cells, UA 0-2 /HPF      Hyaline Casts, UA None Seen /LPF      Methodology Automated Microscopy    STAT Lactic Acid, Reflex [[id number]]  (Normal) Collected: 07/13/25 2210    Specimen: Blood Updated: 07/13/25 2250     Lactate 0.5 mmol/L     D-dimer, Quantitative [[id number]]  (Abnormal) Collected: 07/13/25 1753    Specimen: Blood Updated: 07/13/25 2029     D-Dimer, Quantitative 1.28 MCGFEU/mL     Narrative:      According to the assay 's published package insert, a normal (<0.50 MCGFEU/mL) D-dimer result in conjunction with a non-high clinical probability assessment, excludes deep vein thrombosis (DVT) and pulmonary embolism (PE) with high sensitivity.    D-dimer values increase with age and this can make VTE exclusion of an older population difficult. To address this, the [employer], based on best available evidence and recent guidelines, recommends that clinicians use age-adjusted D-dimer thresholds in patients greater than 50 years of age with: a) a low probability of PE who do not meet all Pulmonary Embolism Rule Out Criteria, or b) in those with intermediate  "probability of PE.   The formula for an age-adjusted D-dimer cut-off is \"age/100\".  For example, a 60 year old patient would have an age-adjusted cut-off of 0.60 MCGFEU/mL and an 80 year old 0.80 MCGFEU/mL.    High Sensitivity Troponin T 1Hr [[id number]]  (Abnormal) Collected: 07/13/25 1905    Specimen: Blood from Arm, Right Updated: 07/13/25 1939     HS Troponin T 64 ng/L      Troponin T Numeric Delta -15 ng/L      Troponin T % Delta -19    Narrative:      High Sensitive Troponin T Reference Range:  <14.0 ng/L- Negative Female for AMI  <22.0 ng/L- Negative Male for AMI  >=14 - Abnormal Female indicating possible myocardial injury.  >=22 - Abnormal Male indicating possible myocardial injury.   Clinicians would have to utilize clinical acumen, EKG, Troponin, and serial changes to determine if it is an Acute Myocardial Infarction or myocardial injury due to an underlying chronic condition.         Lactic Acid, Plasma [[id number]]  (Abnormal) Collected: 07/13/25 1905    Specimen: Blood from Arm, Right Updated: 07/13/25 1938     Lactate 2.2 mmol/L     [place] Draw [[id number]] Collected: 07/13/25 1905    Specimen: Blood Updated: 07/13/25 1915    Narrative:      The following orders were created for panel order [place] Draw.  Procedure                               Abnormality         Status                     ---------                               -----------         ------                     Green Top (Gel)[[id number]]                                  Final result               Lavender Top[[id number]]                                                                Gold Top - SST[[id number]]                                                              Light Blue Top[[id number]]                                                                Please view results for these tests on the individual orders.    Green Top (Gel) [[id number]] Collected: 07/13/25 1905    Specimen: Blood from Arm, Right Updated: 07/13/25 1915     Extra " "Tube Hold for add-ons.     Comment: Auto resulted.       Blood Culture - Blood, Arm, Left [[id number]] Collected: 07/13/25 1906    Specimen: Blood from Arm, Left Updated: 07/13/25 1912    Blood Culture - Blood, Arm, Right [[id number]] Collected: 07/13/25 1905    Specimen: Blood from Arm, Right Updated: 07/13/25 1911    Procalcitonin [[id number]]  (Abnormal) Collected: 07/13/25 1753    Specimen: Blood Updated: 07/13/25 1905     Procalcitonin 20.70 ng/mL     Narrative:      As a Marker for Sepsis (Non-Neonates):    1. <0.5 ng/mL represents a low risk of severe sepsis and/or septic shock.  2. >2 ng/mL represents a high risk of severe sepsis and/or septic shock.    As a Marker for Lower Respiratory Tract Infections that require antibiotic therapy:    PCT on Admission    Antibiotic Therapy       6-12 Hrs later    >0.5                Strongly Recommended  >0.25 - <0.5        Recommended   0.1 - 0.25          Discouraged              Remeasure/reassess PCT  <0.1                Strongly Discouraged     Remeasure/reassess PCT    As 28 day mortality risk marker: \"Change in Procalcitonin Result\" (>80% or <=80%) if Day 0 (or Day 1) and Day 4 values are available. Refer to http://www.[facility]-pct-calculator.com    Change in PCT <=80%  A decrease of PCT levels below or equal to 80% defines a positive change in PCT test result representing a higher risk for 28-day all-cause mortality of patients diagnosed with severe sepsis for septic shock.    Change in PCT >80%  A decrease of PCT levels of more than 80% defines a negative change in PCT result representing a lower risk for 28-day all-cause mortality of patients diagnosed with severe sepsis or septic shock.       Magnesium [[id number]]  (Normal) Collected: 07/13/25 1753    Specimen: Blood Updated: 07/13/25 1849     Magnesium 1.8 mg/dL     High Sensitivity Troponin T [[id number]]  (Abnormal) Collected: 07/13/25 1753    Specimen: Blood Updated: 07/13/25 1832     HS Troponin T 79 ng/L     " Narrative:      High Sensitive Troponin T Reference Range:  <14.0 ng/L- Negative Female for AMI  <22.0 ng/L- Negative Male for AMI  >=14 - Abnormal Female indicating possible myocardial injury.  >=22 - Abnormal Male indicating possible myocardial injury.   Clinicians would have to utilize clinical acumen, EKG, Troponin, and serial changes to determine if it is an Acute Myocardial Infarction or myocardial injury due to an underlying chronic condition.         Comprehensive Metabolic Panel [[id number]]  (Abnormal) Collected: 07/13/25 1753    Specimen: Blood Updated: 07/13/25 1825     Glucose 138 mg/dL      BUN 19.0 mg/dL      Creatinine 1.26 mg/dL      Sodium 136 mmol/L      Potassium 2.8 mmol/L      Chloride 97 mmol/L      CO2 24.1 mmol/L      Calcium 9.1 mg/dL      Total Protein 7.7 g/dL      Albumin 3.7 g/dL      ALT (SGPT) 17 U/L      AST (SGOT) 20 U/L      Alkaline Phosphatase 78 U/L      Total Bilirubin 0.5 mg/dL      Globulin 4.0 gm/dL      A/G Ratio 0.9 g/dL      BUN/Creatinine Ratio 15.1     Anion Gap 14.9 mmol/L      eGFR 47.5 mL/min/1.73     Narrative:      GFR Categories in Chronic Kidney Disease (CKD)              GFR Category          GFR (mL/min/1.73)    Interpretation  G1                    90 or greater        Normal or high (1)  G2                    60-89                Mild decrease (1)  G3a                   45-59                Mild to moderate decrease  G3b                   30-44                Moderate to severe decrease  G4                    15-29                Severe decrease  G5                    14 or less           Kidney failure    (1)In the absence of evidence of kidney disease, neither GFR category G1 or G2 fulfill the criteria for CKD.    eGFR calculation 2021 CKD-EPI creatinine equation, which does not include race as a factor    CBC & Differential [[id number]]  (Abnormal) Collected: 07/13/25 1753    Specimen: Blood Updated: 07/13/25 1803    Narrative:      The following orders were  created for panel order CBC & Differential.  Procedure                               Abnormality         Status                     ---------                               -----------         ------                     CBC Auto Differential[[id number]]        Abnormal            Final result                 Please view results for these tests on the individual orders.    CBC Auto Differential [[id number]]  (Abnormal) Collected: 07/13/25 1753    Specimen: Blood Updated: 07/13/25 1803     WBC 16.21 10*3/mm3      RBC 4.16 10*6/mm3      Hemoglobin 12.2 g/dL      Hematocrit 36.8 %      MCV 88.5 fL      MCH 29.3 pg      MCHC 33.2 g/dL      RDW 12.6 %      RDW-SD 40.5 fl      MPV 9.7 fL      Platelets 283 10*3/mm3      Neutrophil % 76.6 %      Lymphocyte % 14.8 %      Monocyte % 7.7 %      Eosinophil % 0.4 %      Basophil % 0.2 %      Immature Grans % 0.3 %      Neutrophils, Absolute 12.40 10*3/mm3      Lymphocytes, Absolute 2.40 10*3/mm3      Monocytes, Absolute 1.25 10*3/mm3      Eosinophils, Absolute 0.07 10*3/mm3      Basophils, Absolute 0.04 10*3/mm3      Immature Grans, Absolute 0.05 10*3/mm3      nRBC 0.0 /100 WBC     [place] Draw [[id number]] Collected: 07/13/25 1753    Specimen: Blood Updated: 07/13/25 1800    Narrative:      The following orders were created for panel order [place] Draw.  Procedure                               Abnormality         Status                     ---------                               -----------         ------                     Green Top (Gel)[[id number]]                                  Final result               Lavender Top[[id number]]                                     Final result               Gold Top - [facility][[id number]]                                   Final result               Light Blue Top[[id number]]                                   Final result                 Please view results for these tests on the individual orders.    Green Top (Gel) [[id number]] Collected: 07/13/25  1753    Specimen: Blood Updated: 07/13/25 1800     Extra Tube Hold for add-ons.     Comment: Auto resulted.       Lavender Top [[id number]] Collected: 07/13/25 1753    Specimen: Blood Updated: 07/13/25 1800     Extra Tube hold for add-on     Comment: Auto resulted       Gold Top - SST [[id number]] Collected: 07/13/25 1753    Specimen: Blood Updated: 07/13/25 1800     Extra Tube Hold for add-ons.     Comment: Auto resulted.       Light Blue Top [[id number]] Collected: 07/13/25 1753    Specimen: Blood Updated: 07/13/25 1800     Extra Tube Hold for add-ons.     Comment: Auto resulted                Imaging:  Imaging Results (Last 24 Hours)       Procedure Component Value Units Date/Time    CT Abdomen Pelvis With Contrast [[id number]] Collected: 07/13/25 2206     Updated: 07/13/25 2218    Narrative:      CTA CHEST, CT ABDOMEN AND PELVIS     HISTORY: Pulmonary Embolism; R07.9-Chest pain, unspecified; R79.89-Other  specified abnormal findings of blood chemistry; E87.6-Hypokalemia;  R19.7-Diarrhea, unspecified; D72.829-Elevated white blood cell count,  unspecified; A41.9-Sepsis, unspecified organism     COMPARISON: None     TECHNIQUE: CT angiography was performed of the chest with axial images  as well as coronal and sagittal reformatted MIP images provided  following the administration of IV contrast.  3-D surface rendered  reformats were obtained of the pulmonary arteries and aorta.  CT of the  abdomen and pelvis obtained following administration of IV contrast.  Coronal and sagittal reconstructions were obtained.   Radiation dose  reduction techniques were utilized, including automated exposure  control, and exposure modulation based on body size.     FINDINGS:     Chest CTA:  There is mild bibasilar atelectasis, but there is no  evidence of acute pulmonary infiltrate, pleural effusion, pneumothorax  or suspicious nodule.  The thoracic aorta is normal in caliber, and  there is no evidence of dissection.  There are coronary  atherosclerotic  vascular calcifications.  There is no suspicious mediastinal adenopathy  or other mass, though there is a small to moderate hiatal hernia.  Bolus  timing is adequate, and there is no evidence of pulmonary embolism.     Abdomen: The liver and gallbladder are normal.  The spleen and pancreas  appear normal.  Both adrenal glands are normal. There is no evidence of  bowel obstruction. The aorta is normal in caliber.       : There is mild atrophic change in the right kidney and there is a  right renal 4 mm nonobstructing calculus, but there is no right  hydronephrosis. No left renal calculi are seen, but there is mild to  moderate left hydronephrosis, and the left ureter is dilated down into  the pelvis, to the level of an approximately 4 mm stone, 8 to 10 cm  above the ureterovesical junction. No bladder calculi are seen.     Pelvis:  The appendix is clearly identified, and is normal.  There is  diverticulosis, but there is no CT evidence of diverticulitis.  There is  no pelvic adenopathy or other soft tissue mass.  There is no CT evidence  of hernia or bowel obstruction.     There is no acute bony abnormality.       Impression:         1.  Pulmonary arteries are well-opacified, and there is no evidence of  pulmonary embolism.     2.  There is minimal bibasilar dependent atelectasis, but the lung  parenchyma is otherwise normal. No acute pulmonary parenchymal  abnormality is seen.     3.   Mild to moderate left hydronephrosis secondary to a 4 mm stone in  the distal left ureter, 8 to 10 cm above the ureterovesical junction.                 This report was finalized on 7/13/2025 10:15 PM by Dr. Wojciech Roth M.D on Workstation: [id number]       CT Angiogram Chest Pulmonary Embolism [[id number]] Collected: 07/13/25 2206     Updated: 07/13/25 2218    Narrative:      CTA CHEST, CT ABDOMEN AND PELVIS     HISTORY: Pulmonary Embolism; R07.9-Chest pain, unspecified; R79.89-Other  specified abnormal  findings of blood chemistry; E87.6-Hypokalemia;  R19.7-Diarrhea, unspecified; D72.829-Elevated white blood cell count,  unspecified; A41.9-Sepsis, unspecified organism     COMPARISON: None     TECHNIQUE: CT angiography was performed of the chest with axial images  as well as coronal and sagittal reformatted MIP images provided  following the administration of IV contrast.  3-D surface rendered  reformats were obtained of the pulmonary arteries and aorta.  CT of the  abdomen and pelvis obtained following administration of IV contrast.  Coronal and sagittal reconstructions were obtained.   Radiation dose  reduction techniques were utilized, including automated exposure  control, and exposure modulation based on body size.     FINDINGS:     Chest CTA:  There is mild bibasilar atelectasis, but there is no  evidence of acute pulmonary infiltrate, pleural effusion, pneumothorax  or suspicious nodule.  The thoracic aorta is normal in caliber, and  there is no evidence of dissection.  There are coronary atherosclerotic  vascular calcifications.  There is no suspicious mediastinal adenopathy  or other mass, though there is a small to moderate hiatal hernia.  Bolus  timing is adequate, and there is no evidence of pulmonary embolism.     Abdomen: The liver and gallbladder are normal.  The spleen and pancreas  appear normal.  Both adrenal glands are normal. There is no evidence of  bowel obstruction. The aorta is normal in caliber.       : There is mild atrophic change in the right kidney and there is a  right renal 4 mm nonobstructing calculus, but there is no right  hydronephrosis. No left renal calculi are seen, but there is mild to  moderate left hydronephrosis, and the left ureter is dilated down into  the pelvis, to the level of an approximately 4 mm stone, 8 to 10 cm  above the ureterovesical junction. No bladder calculi are seen.     Pelvis:  The appendix is clearly identified, and is normal.  There  is  diverticulosis, but there is no CT evidence of diverticulitis.  There is  no pelvic adenopathy or other soft tissue mass.  There is no CT evidence  of hernia or bowel obstruction.     There is no acute bony abnormality.       Impression:         1.  Pulmonary arteries are well-opacified, and there is no evidence of  pulmonary embolism.     2.  There is minimal bibasilar dependent atelectasis, but the lung  parenchyma is otherwise normal. No acute pulmonary parenchymal  abnormality is seen.     3.   Mild to moderate left hydronephrosis secondary to a 4 mm stone in  the distal left ureter, 8 to 10 cm above the ureterovesical junction.                 This report was finalized on 7/13/2025 10:15 PM by Dr. Wojciech Roth M.D on Workstation: [id number]       XR Chest 1 View [[id number]] Collected: 07/13/25 1831     Updated: 07/13/25 1838    Narrative:      XR CHEST 1 VW-     HISTORY: Chest pain. Reported recent heart attack.     COMPARISON: Chest radiograph 6/23/2025     FINDINGS: A single view of the chest was obtained.     Support Devices:  None.  Cardiac Silhouette/Mediastinum/Wendie:  The cardiac, mediastinal, and  hilar contours are within normal limits.  Lungs/Pleural Spaces:  The lungs and pleural spaces are clear.  Chest Wall/Diaphragm/Upper Abdomen: There is a hiatal hernia. The  visualized osseous structures are similar.        CONCLUSION(S):       1.  No focal consolidation or effusion.  2.  Hiatal hernia.     This report was finalized on 7/13/2025 6:35 PM by Dr. La Blount M.D  on Workstation: [id number]                Assessment:     Left urolithiasis  Left hydronephrosis  Acute renal failure  Urinary sepsis from obstructing left ureteral stone    Plan:     N.p.o.  Emergent left stent  Vancomycin and Zosyn in the ED  Admitted to medicine  RBL explained to patient and consent obtained  State for outpatient ureteroscopy and laser lithotripsy for definitive stone management    Maged Pedroza,  MD  07/13/25  23:41 EDT         Electronically signed by Maged Pedroza MD at 07/13/25 8839

## 2025-07-15 NOTE — PLAN OF CARE
Goal Outcome Evaluation:              Outcome Evaluation: VSS,  pt c/o pain where stents were placed, called urology and they prescribed oxybutynin once a day the first dose was given today. PRN percocet q6 was ordered to help with pain. Possible d/c tomorrow (7/16). Taking home med of colestipol, GI signed off. Still on IV zosyn. no complaints at this time, call light in reach.

## 2025-07-15 NOTE — PROGRESS NOTES
Name: Wilma Longo ADMIT: 2025   : 1960  PCP: Jerman Virgen MD    MRN: [id number] LOS: 2 days   AGE/SEX: 65 y.o. female  ROOM: [facility]     Subjective   Subjective   Patient resting in bed, continues to have some pain likely related to stent and urology has added oxybutynin.  Diarrhea is improving, we reviewed that her blood cultures have remained negative which she is pleased with however her white blood cell count is up slightly which we will need to keep a close eye on.  Patient is agreeable and understanding of the plan.       Objective   Objective   Vital Signs  Temp:  [97.9 °F (36.6 °C)-98.2 °F (36.8 °C)] 98.2 °F (36.8 °C)  Heart Rate:  [75-92] 87  Resp:  [18] 18  BP: (119-135)/(61-71) 125/64  SpO2:  [95 %-99 %] 98 %  on   ;   Device (Oxygen Therapy): room air  Body mass index is 26.37 kg/m².  Physical Exam  Constitutional:       General: She is not in acute distress.     Appearance: Normal appearance. She is not ill-appearing.   Cardiovascular:      Rate and Rhythm: Normal rate and regular rhythm.   Pulmonary:      Effort: Pulmonary effort is normal. No respiratory distress.      Breath sounds: Normal breath sounds. No wheezing.   Abdominal:      General: Abdomen is flat. There is no distension.      Palpations: Abdomen is soft.      Tenderness: There is abdominal tenderness (mild).   Musculoskeletal:         General: No swelling or tenderness.      Right lower leg: No edema.      Left lower leg: No edema.   Skin:     General: Skin is warm and dry.   Neurological:      General: No focal deficit present.      Mental Status: She is alert and oriented to person, place, and time. Mental status is at baseline.         Results Review     I reviewed the patient's new clinical results.  Results from last 7 days   Lab Units 07/15/25  0532 25  0846 25  1753   WBC 10*3/mm3 15.69* 13.19* 16.21*   HEMOGLOBIN g/dL 10.5* 11.2* 12.2   PLATELETS 10*3/mm3 260 242 283     Results from last 7 days    Lab Units 07/15/25  0532 07/14/25  0846 07/13/25  1753   SODIUM mmol/L 140 141 136   POTASSIUM mmol/L 3.4* 3.8 2.8*   CHLORIDE mmol/L 109* 108* 97*   CO2 mmol/L 21.0* 23.4 24.1   BUN mg/dL 20.0 15.0 19.0   CREATININE mg/dL 1.05* 0.97 1.26*   GLUCOSE mg/dL 131* 208* 138*   Estimated Creatinine Clearance: 51.2 mL/min (A) (by C-G formula based on SCr of 1.05 mg/dL (H)).  Results from last 7 days   Lab Units 07/15/25  0532 07/13/25  1753   ALBUMIN g/dL 3.2* 3.7   BILIRUBIN mg/dL 0.2 0.5   ALK PHOS U/L 75 78   AST (SGOT) U/L 14 20   ALT (SGPT) U/L 11 17     Results from last 7 days   Lab Units 07/15/25  0532 07/14/25  0846 07/13/25  1753   CALCIUM mg/dL 8.7 8.6 9.1   ALBUMIN g/dL 3.2*  --  3.7   MAGNESIUM mg/dL  --   --  1.8     Results from last 7 days   Lab Units 07/13/25  2210 07/13/25  1905 07/13/25  1753   PROCALCITONIN ng/mL  --   --  20.70*   LACTATE mmol/L 0.5 2.2*  --      SARS-CoV-2, RASHARD   Date Value Ref Range Status   11/23/2020 Not Detected Not Detected Final     Comment:     This nucleic acid amplification test was developed and its performance  characteristics determined by [facility]. Nucleic acid  amplification tests include PCR and TMA. This test has not been FDA  cleared or approved. This test has been authorized by FDA under an  Emergency Use Authorization (EUA). This test is only authorized for  the duration of time the declaration that circumstances exist  justifying the authorization of the emergency use of in vitro  diagnostic tests for detection of SARS-CoV-2 virus and/or diagnosis  of COVID-19 infection under section 564(b)(1) of the Act, 21 U.S.C.  360bbb-3(b) (1), unless the authorization is terminated or revoked  sooner.  When diagnostic testing is negative, the possibility of a false  negative result should be considered in the context of a patient's  recent exposures and the presence of clinical signs and symptoms  consistent with COVID-19. An individual without symptoms of  COVID-19  and who is not shedding SARS-CoV-2 virus would expect to have a  negative (not detected) result in this assay.   11/23/2020 Not Detected Not Detected Final     Comment:     This nucleic acid amplification test was developed and its performance  characteristics determined by [facility]. Nucleic acid  amplification tests include PCR and TMA. This test has not been FDA  cleared or approved. This test has been authorized by FDA under an  Emergency Use Authorization (EUA). This test is only authorized for  the duration of time the declaration that circumstances exist  justifying the authorization of the emergency use of in vitro  diagnostic tests for detection of SARS-CoV-2 virus and/or diagnosis  of COVID-19 infection under section 564(b)(1) of the Act, 21 U.S.C.  360bbb-3(b) (1), unless the authorization is terminated or revoked  sooner.  When diagnostic testing is negative, the possibility of a false  negative result should be considered in the context of a patient's  recent exposures and the presence of clinical signs and symptoms  consistent with COVID-19. An individual without symptoms of COVID-19  and who is not shedding SARS-CoV-2 virus would expect to have a  negative (not detected) result in this assay.       SARS-CoV-2 PCR   Date Value Ref Range Status   04/03/2021 Not Detected Not Detected Final     Comment:     Nucleic acid specific to SARS-CoV-2 (COVID-19) virus was not detected in  this sample by the TaqPath (TM) COVID-19 Combo Kit.          SARS-CoV-2 (COVID-19) nucleic acid testing performed using [employer] Aptima (R) SARS-CoV-2 Assay or [employer] TaqPath (TM)  COVID-19 Combo Kit as indicated above under Emergency Use Authorization (EUA) from the FDA. Aptima (R) and TaqPath (TM) test performance  characteristics verified by [facility] in accordance with the FDAs Guidance Document (Policy for Diagnostic Tests for Coronavirus Disease-2019  during the Public Health  Emergency) issued on March 16, 2020. The laboratory is regulated under CLIA as qualified to perform high-complexity testing  Unless otherwise noted, all testing was performed at [facility], CLIA No. [id number], [place] State Licensee No. [id number]     Glucose   Date/Time Value Ref Range Status   07/14/2025 0031 74 70 - 130 mg/dL Final   07/13/2025 2340 66 (L) 70 - 130 mg/dL Final       Duplex Venous Lower Extremity - Bilateral CAR    Normal bilateral lower extremity venous duplex scan.  FL C Arm During Surgery  This procedure was auto-finalized with no dictation required.    Scheduled Medications  amLODIPine, 5 mg, Oral, Nightly  aspirin, 81 mg, Oral, Daily  DULoxetine, 60 mg, Oral, Nightly  levothyroxine, 75 mcg, Oral, Q AM  oxybutynin XL, 10 mg, Oral, Daily  piperacillin-tazobactam, 3.375 g, Intravenous, Q8H  rosuvastatin, 20 mg, Oral, Daily    Infusions   Diet  Diet: Gastrointestinal; Fiber-Restricted; Fluid Consistency: Thin (IDDSI 0)         I have personally reviewed:  [x]  Laboratory   []  Microbiology   []  Radiology   [x]  EKG/Telemetry   []  Cardiology/Vascular   []  Pathology   []  Records    Assessment/Plan     Active Hospital Problems    Diagnosis  POA    **Left ureteral stone [N20.1]  Unknown    BALBINA (acute kidney injury) [N17.9]  Unknown    Sepsis [A41.9]  Unknown    Hypokalemia [E87.6]  Unknown    Acute UTI [N39.0]  Yes    Coronary artery disease involving native coronary artery of native heart without angina pectoris [I25.10]  Yes    Anxiety associated with depression [F41.8]  Yes    Benign essential hypertension [I10]  Yes    Hypothyroidism [E03.9]  Yes    Hyperlipidemia [E78.5]  Yes    Irritable bowel syndrome [K58.9]  Yes      Resolved Hospital Problems   No resolved problems to display.       65 y.o. female admitted with Left ureteral stone.    Left ureteral stone with hydronephrosis  UTI with sepsis criteria (fever, WBC)  Lactic acidosis- resolved  - Urology consulted and took the patient for  cystoscopy with ureteral stent placement, she is continuing on Zosyn while urine cultures are pending, I discussed with the patient today regarding her fever, elevated procalcitonin, white count and abnormal UA it would not surprise me if her blood cultures become positive in the next 24 to 48 hours  -Patient is agreeable to staying in the hospital to monitor cultures  -She is having some urethral pain still which will hopefully improve with treatment of UTI  - urology has added oxybutynin for her spasms related to stent placement- WBC 13->15; if WBC continues to climb may need repeat ct scan to evaluate, blood cx NGTD, urine cx with no growth (she did have a + UTI last month with pansensitive e coli)     CAD - recent cath last month with 70% ostial stenosis- not amenable to PCI, med mgmt recommended  Atypical chest pain  Elevated troponin  Recent admission for MI  HTN  HLD  - Chest pain resolved after stent placement, her troponin is lower than baseline  - she is continuing on aspirin and statin continue amlodipine     Bile acid malabsorption/chronic diarrhea  - cont home colestipol  - Patient reports chronic diarrhea with fecal leakage, had improved with colestipol when this was initiated last year however over the last month symptoms have continued to worsen, she is having diarrhea/fecal incontinence related to this daily; she follows with Crystal Smith in clinic, this is having significant impact on her life; we will ask GI to see her while she is here to see if they have any additional recs   - pt s improving on her cholestipol; fecal calprotectin pending per GI     Elevated d-dimer  - CTA chest neg as was b/l LE dopplers     Hypothyroidism  - levothyroxine    SCDs for DVT prophylaxis.  Full code.  Discussed with patient, nursing staff, and CCP.  Anticipated discharge home, 1-2 days        Cely Clarke MD  [facility]  07/15/25  15:53 EDT    I wore protective equipment throughout  this patient encounter including gloves.  Hand hygiene was performed before donning protective equipment and after removal when leaving the room.    Expected Discharge Date and Time       Expected Discharge Date Expected Discharge Time    Jul 16, 2025              Copied text in this note has been reviewed and is accurate as of 07/15/25.

## 2025-07-15 NOTE — PROGRESS NOTES
[facility]  Inpatient Progress Note    Reason for Follow Up: Chronic diarrhea    Subjective     Interval History:   4 charted bowel movements yesterday    She reports that her symptoms are better today.  She is essentially back to her baseline.  She reports that she always has loose bowel movements, worse after eating but she is able to control things and she is not having incontinence.  She reports she is eating more here than she does typically at home and doing well.  No abdominal pain or cramping.    Current Facility-Administered Medications:     acetaminophen (TYLENOL) tablet 650 mg, 650 mg, Oral, Q6H PRN, Olga Shore APRN    ALPRAZolam (XANAX) tablet 0.5 mg, 0.5 mg, Oral, Daily PRN, Olga Shore APRN    aluminum-magnesium hydroxide-simethicone (MAALOX MAX) 400-400-40 MG/5ML suspension 15 mL, 15 mL, Oral, Q6H PRN, Maged Pedroza MD    amLODIPine (NORVASC) tablet 5 mg, 5 mg, Oral, Nightly, Olga Shore APRN, 5 mg at 07/14/25 2319    aspirin chewable tablet 81 mg, 81 mg, Oral, Daily, Olga Shore APRN, 81 mg at 07/15/25 0902    sennosides-docusate (PERICOLACE) 8.6-50 MG per tablet 2 tablet, 2 tablet, Oral, BID PRN **AND** polyethylene glycol (MIRALAX) packet 17 g, 17 g, Oral, Daily PRN **AND** bisacodyl (DULCOLAX) EC tablet 5 mg, 5 mg, Oral, Daily PRN **AND** bisacodyl (DULCOLAX) suppository 10 mg, 10 mg, Rectal, Daily PRN, Maged Pedroza MD    DULoxetine (CYMBALTA) DR capsule 60 mg, 60 mg, Oral, Nightly, Olga Shore APRN, 60 mg at 07/14/25 2319    levothyroxine (SYNTHROID, LEVOTHROID) tablet 75 mcg, 75 mcg, Oral, Q AM, Olga Shore APRN, 75 mcg at 07/15/25 0758    morphine injection 2 mg, 2 mg, Intravenous, Q2H PRN, Maged Pedroza MD, 2 mg at 07/15/25 0902    nitroglycerin (NITROSTAT) SL tablet 0.4 mg, 0.4 mg, Sublingual, Q5 Min PRN, Maged Pedroza MD    ondansetron ODT (ZOFRAN-ODT) disintegrating tablet 4 mg, 4 mg,  Oral, Q6H PRN **OR** ondansetron (ZOFRAN) injection 4 mg, 4 mg, Intravenous, Q6H PRN, Maged Pedroza MD    oxybutynin XL (DITROPAN-XL) 24 hr tablet 10 mg, 10 mg, Oral, Daily, Maged Pedroza MD, 10 mg at 07/15/25 1205    oxyCODONE-acetaminophen (PERCOCET) 5-325 MG per tablet 1 tablet, 1 tablet, Oral, Q6H PRN, Cely Clarke MD    piperacillin-tazobactam (ZOSYN) 3.375 g IVPB in 100 mL NS MBP (CD), 3.375 g, Intravenous, Q8H, Olga Shore APRN, 3.375 g at 07/15/25 1351    Potassium Replacement - Follow Nurse / BPA Driven Protocol, , Not Applicable, PRN, Maged Pedroza MD    rosuvastatin (CRESTOR) tablet 20 mg, 20 mg, Oral, Daily, Olga Shore APRN, 20 mg at 07/15/25 0902    sodium chloride 0.9 % flush 10 mL, 10 mL, Intravenous, PRNYovany Ross F, MD    sodium chloride 0.9 % flush 10 mL, 10 mL, Intravenous, PRNYovany Ross F, MD    sodium chloride 0.9 % flush 10 mL, 10 mL, Intravenous, PRN, Maged Pedroza MD  Review of Systems:    Positive for loose stools, no incontinence or abdominal pain, no fever or chills    Objective     Vital Signs  Temp:  [97.9 °F (36.6 °C)-98.2 °F (36.8 °C)] 98.2 °F (36.8 °C)  Heart Rate:  [75-92] 87  Resp:  [18] 18  BP: (119-135)/(61-71) 125/64  Body mass index is 26.37 kg/m².    Intake/Output Summary (Last 24 hours) at 7/15/2025 1437  Last data filed at 7/15/2025 0002  Gross per 24 hour   Intake 240 ml   Output --   Net 240 ml     No intake/output data recorded.     Physical Exam:   General: patient awake, alert and cooperative   Eyes: Normal lids and lashes, no scleral icterus   Neck: supple, normal ROM   Skin: warm and dry, not jaundiced   Pulm:   regular and unlabored   Abdomen: soft, nontender, nondistended    Psychiatric: Normal mood and behavior; memory intact     Results Review:     I reviewed the patient's new clinical results.    Results from last 7 days   Lab Units 07/15/25  0532 07/14/25  0846 07/13/25  1753   WBC 10*3/mm3 15.69* 13.19*  "16.21*   HEMOGLOBIN g/dL 10.5* 11.2* 12.2   HEMATOCRIT % 32.6* 34.5 36.8   PLATELETS 10*3/mm3 260 242 283     Results from last 7 days   Lab Units 07/15/25  0532 07/14/25  0846 07/13/25  1753   SODIUM mmol/L 140 141 136   POTASSIUM mmol/L 3.4* 3.8 2.8*   CHLORIDE mmol/L 109* 108* 97*   CO2 mmol/L 21.0* 23.4 24.1   BUN mg/dL 20.0 15.0 19.0   CREATININE mg/dL 1.05* 0.97 1.26*   CALCIUM mg/dL 8.7 8.6 9.1   BILIRUBIN mg/dL 0.2  --  0.5   ALK PHOS U/L 75  --  78   ALT (SGPT) U/L 11  --  17   AST (SGOT) U/L 14  --  20   GLUCOSE mg/dL 131* 208* 138*         No results found for: \"LIPASE\"    Radiology:  FL C Arm During Surgery   Final Result      CT Abdomen Pelvis With Contrast   Final Result       1.  Pulmonary arteries are well-opacified, and there is no evidence of   pulmonary embolism.       2.  There is minimal bibasilar dependent atelectasis, but the lung   parenchyma is otherwise normal. No acute pulmonary parenchymal   abnormality is seen.       3.   Mild to moderate left hydronephrosis secondary to a 4 mm stone in   the distal left ureter, 8 to 10 cm above the ureterovesical junction.                       This report was finalized on 7/13/2025 10:15 PM by Dr. Wojciech Roth M.D on Workstation: [id number]          CT Angiogram Chest Pulmonary Embolism   Final Result       1.  Pulmonary arteries are well-opacified, and there is no evidence of   pulmonary embolism.       2.  There is minimal bibasilar dependent atelectasis, but the lung   parenchyma is otherwise normal. No acute pulmonary parenchymal   abnormality is seen.       3.   Mild to moderate left hydronephrosis secondary to a 4 mm stone in   the distal left ureter, 8 to 10 cm above the ureterovesical junction.                       This report was finalized on 7/13/2025 10:15 PM by Dr. Wojciech Roth M.D on Workstation: [id number]          XR Chest 1 View   Final Result          Assessment & Plan     Active Hospital Problems    Diagnosis     " **Left ureteral stone     BALBINA (acute kidney injury)     Sepsis     Hypokalemia     Acute UTI     Coronary artery disease involving native coronary artery of native heart without angina pectoris     Anxiety associated with depression     Benign essential hypertension     Hypothyroidism     Hyperlipidemia     Irritable bowel syndrome        Assessment:  Chronic diarrhea-IBS-D lymphocytic colitis likely exacerbated by #2  Urosepsis secondary to left ureteral stone      Plan:  Continue colestipol 2 tablets p.o. twice daily-this has worked best for her in the past.  Symptoms have improved and she is essentially at her baseline.  We will follow-up results of fecal calprotectin however even if elevated given that she has returned to baseline, do not anticipate adding any additional medication at this time as long as her symptoms remain stable.  No further inpatient recommendations at this time-we will sign off but are available as needed    I discussed the patients findings and my recommendations with patient and family.            Amina Cardenas M.D.  [facility]  [phone]

## 2025-07-16 ENCOUNTER — APPOINTMENT (OUTPATIENT)
Dept: CARDIOLOGY | Facility: HOSPITAL | Age: 65
DRG: 854 | End: 2025-07-16
Payer: COMMERCIAL

## 2025-07-16 LAB
ANION GAP SERPL CALCULATED.3IONS-SCNC: 7.4 MMOL/L (ref 5–15)
BH CV UPPER VENOUS LEFT SUBCLAVIAN AUGMENT: NORMAL
BH CV UPPER VENOUS LEFT SUBCLAVIAN COMPRESS: NORMAL
BH CV UPPER VENOUS LEFT SUBCLAVIAN PHASIC: NORMAL
BH CV UPPER VENOUS LEFT SUBCLAVIAN SPONT: NORMAL
BH CV UPPER VENOUS RIGHT AXILLARY AUGMENT: NORMAL
BH CV UPPER VENOUS RIGHT AXILLARY COMPRESS: NORMAL
BH CV UPPER VENOUS RIGHT AXILLARY PHASIC: NORMAL
BH CV UPPER VENOUS RIGHT AXILLARY SPONT: NORMAL
BH CV UPPER VENOUS RIGHT BASILIC FOREARM COMPRESS: NORMAL
BH CV UPPER VENOUS RIGHT BASILIC UPPER COMPRESS: NORMAL
BH CV UPPER VENOUS RIGHT BRACHIAL COMPRESS: NORMAL
BH CV UPPER VENOUS RIGHT CEPHALIC FOREARM COLOR: 1
BH CV UPPER VENOUS RIGHT CEPHALIC FOREARM COMPRESS: NORMAL
BH CV UPPER VENOUS RIGHT CEPHALIC UPPER COMPRESS: NORMAL
BH CV UPPER VENOUS RIGHT INTERNAL JUGULAR AUGMENT: NORMAL
BH CV UPPER VENOUS RIGHT INTERNAL JUGULAR COMPRESS: NORMAL
BH CV UPPER VENOUS RIGHT INTERNAL JUGULAR PHASIC: NORMAL
BH CV UPPER VENOUS RIGHT INTERNAL JUGULAR SPONT: NORMAL
BH CV UPPER VENOUS RIGHT RADIAL COMPRESS: NORMAL
BH CV UPPER VENOUS RIGHT SUBCLAVIAN AUGMENT: NORMAL
BH CV UPPER VENOUS RIGHT SUBCLAVIAN COMPRESS: NORMAL
BH CV UPPER VENOUS RIGHT SUBCLAVIAN PHASIC: NORMAL
BH CV UPPER VENOUS RIGHT SUBCLAVIAN SPONT: NORMAL
BH CV UPPER VENOUS RIGHT ULNAR COMPRESS: NORMAL
BUN SERPL-MCNC: 15 MG/DL (ref 8–23)
BUN/CREAT SERPL: 15 (ref 7–25)
CALCIUM SPEC-SCNC: 8.7 MG/DL (ref 8.6–10.5)
CHLORIDE SERPL-SCNC: 112 MMOL/L (ref 98–107)
CO2 SERPL-SCNC: 22.6 MMOL/L (ref 22–29)
CREAT SERPL-MCNC: 1 MG/DL (ref 0.57–1)
DEPRECATED RDW RBC AUTO: 41.3 FL (ref 37–54)
EGFRCR SERPLBLD CKD-EPI 2021: 62.6 ML/MIN/1.73
ERYTHROCYTE [DISTWIDTH] IN BLOOD BY AUTOMATED COUNT: 12.7 % (ref 12.3–15.4)
FERRITIN SERPL-MCNC: 355 NG/ML (ref 13–150)
FOLATE SERPL-MCNC: 6.46 NG/ML (ref 4.78–24.2)
GLUCOSE SERPL-MCNC: 106 MG/DL (ref 65–99)
HAPTOGLOB SERPL-MCNC: 376 MG/DL (ref 30–200)
HCT VFR BLD AUTO: 31.4 % (ref 34–46.6)
HGB BLD-MCNC: 10.1 G/DL (ref 12–15.9)
IRON 24H UR-MRATE: 23 MCG/DL (ref 37–145)
IRON SATN MFR SERPL: 9 % (ref 20–50)
LDH SERPL-CCNC: 143 U/L (ref 135–214)
MCH RBC QN AUTO: 29 PG (ref 26.6–33)
MCHC RBC AUTO-ENTMCNC: 32.2 G/DL (ref 31.5–35.7)
MCV RBC AUTO: 90.2 FL (ref 79–97)
PLATELET # BLD AUTO: 288 10*3/MM3 (ref 140–450)
PMV BLD AUTO: 10.1 FL (ref 6–12)
POTASSIUM SERPL-SCNC: 4.3 MMOL/L (ref 3.5–5.2)
RBC # BLD AUTO: 3.48 10*6/MM3 (ref 3.77–5.28)
RETICS # AUTO: 0.05 10*6/MM3 (ref 0.02–0.13)
RETICS/RBC NFR AUTO: 1.22 % (ref 0.7–1.9)
SODIUM SERPL-SCNC: 142 MMOL/L (ref 136–145)
TIBC SERPL-MCNC: 252 MCG/DL (ref 298–536)
TRANSFERRIN SERPL-MCNC: 169 MG/DL (ref 200–360)
TSH SERPL DL<=0.05 MIU/L-ACNC: 0.67 UIU/ML (ref 0.27–4.2)
VIT B12 BLD-MCNC: 513 PG/ML (ref 211–946)
WBC NRBC COR # BLD AUTO: 11.14 10*3/MM3 (ref 3.4–10.8)

## 2025-07-16 PROCEDURE — 85045 AUTOMATED RETICULOCYTE COUNT: CPT | Performed by: INTERNAL MEDICINE

## 2025-07-16 PROCEDURE — 82728 ASSAY OF FERRITIN: CPT | Performed by: INTERNAL MEDICINE

## 2025-07-16 PROCEDURE — 83010 ASSAY OF HAPTOGLOBIN QUANT: CPT | Performed by: INTERNAL MEDICINE

## 2025-07-16 PROCEDURE — 82746 ASSAY OF FOLIC ACID SERUM: CPT | Performed by: INTERNAL MEDICINE

## 2025-07-16 PROCEDURE — 93971 EXTREMITY STUDY: CPT | Performed by: SURGERY

## 2025-07-16 PROCEDURE — 25010000002 MORPHINE PER 10 MG: Performed by: STUDENT IN AN ORGANIZED HEALTH CARE EDUCATION/TRAINING PROGRAM

## 2025-07-16 PROCEDURE — 83540 ASSAY OF IRON: CPT | Performed by: INTERNAL MEDICINE

## 2025-07-16 PROCEDURE — 82607 VITAMIN B-12: CPT | Performed by: INTERNAL MEDICINE

## 2025-07-16 PROCEDURE — 85027 COMPLETE CBC AUTOMATED: CPT | Performed by: STUDENT IN AN ORGANIZED HEALTH CARE EDUCATION/TRAINING PROGRAM

## 2025-07-16 PROCEDURE — 84443 ASSAY THYROID STIM HORMONE: CPT | Performed by: INTERNAL MEDICINE

## 2025-07-16 PROCEDURE — 93971 EXTREMITY STUDY: CPT

## 2025-07-16 PROCEDURE — 84466 ASSAY OF TRANSFERRIN: CPT | Performed by: INTERNAL MEDICINE

## 2025-07-16 PROCEDURE — 80048 BASIC METABOLIC PNL TOTAL CA: CPT | Performed by: STUDENT IN AN ORGANIZED HEALTH CARE EDUCATION/TRAINING PROGRAM

## 2025-07-16 PROCEDURE — 83615 LACTATE (LD) (LDH) ENZYME: CPT | Performed by: INTERNAL MEDICINE

## 2025-07-16 PROCEDURE — 25010000002 PIPERACILLIN SOD-TAZOBACTAM PER 1 G: Performed by: NURSE PRACTITIONER

## 2025-07-16 RX ADMIN — OXYCODONE AND ACETAMINOPHEN 1 TABLET: 5; 325 TABLET ORAL at 17:51

## 2025-07-16 RX ADMIN — ASPIRIN 81 MG CHEWABLE TABLET 81 MG: 81 TABLET CHEWABLE at 08:24

## 2025-07-16 RX ADMIN — ROSUVASTATIN CALCIUM 20 MG: 20 TABLET, FILM COATED ORAL at 08:24

## 2025-07-16 RX ADMIN — AMLODIPINE BESYLATE 5 MG: 5 TABLET ORAL at 21:53

## 2025-07-16 RX ADMIN — PIPERACILLIN AND TAZOBACTAM 3.38 G: 3; .375 INJECTION, POWDER, FOR SOLUTION INTRAVENOUS at 06:31

## 2025-07-16 RX ADMIN — MORPHINE SULFATE 2 MG: 2 INJECTION, SOLUTION INTRAMUSCULAR; INTRAVENOUS at 17:51

## 2025-07-16 RX ADMIN — OXYBUTYNIN CHLORIDE 10 MG: 10 TABLET, EXTENDED RELEASE ORAL at 08:24

## 2025-07-16 RX ADMIN — PIPERACILLIN AND TAZOBACTAM 3.38 G: 3; .375 INJECTION, POWDER, FOR SOLUTION INTRAVENOUS at 21:53

## 2025-07-16 RX ADMIN — LEVOTHYROXINE SODIUM 75 MCG: 0.07 TABLET ORAL at 06:31

## 2025-07-16 RX ADMIN — DULOXETINE 60 MG: 60 CAPSULE, DELAYED RELEASE ORAL at 21:53

## 2025-07-16 RX ADMIN — OXYCODONE AND ACETAMINOPHEN 1 TABLET: 5; 325 TABLET ORAL at 06:38

## 2025-07-16 RX ADMIN — ALPRAZOLAM 0.5 MG: 0.5 TABLET ORAL at 08:24

## 2025-07-16 RX ADMIN — PIPERACILLIN AND TAZOBACTAM 3.38 G: 3; .375 INJECTION, POWDER, FOR SOLUTION INTRAVENOUS at 12:21

## 2025-07-16 RX ADMIN — MORPHINE SULFATE 2 MG: 2 INJECTION, SOLUTION INTRAMUSCULAR; INTRAVENOUS at 08:24

## 2025-07-16 NOTE — CASE MANAGEMENT/SOCIAL WORK
Continued Stay Note  [facility]     Patient Name: Wilma Longo  MRN: [id number]  Today's Date: 7/16/2025    Admit Date: 7/13/2025    Plan: Home with spouse and overnight O2 via [employer]   Discharge Plan       Row Name 07/16/25 1307       Plan    Plan Home with spouse and overnight O2 via [employer]    Patient/Family in Agreement with Plan yes    Plan Comments Talked to patient and discussed that she qulaifies for overnight oxygen. Patient is agreeable and ok with using [employer]. CCP made the referral. CCP reached out MD to get O2 order. Patient plans home with spouse and O2.                   Discharge Codes    No documentation.                 Expected Discharge Date and Time       Expected Discharge Date Expected Discharge Time    Jul 16, 2025

## 2025-07-16 NOTE — PROGRESS NOTES
Name: Wilma Longo ADMIT: 2025   : 1960  PCP: Jerman Virgen MD    MRN: [id number] LOS: 3 days   AGE/SEX: 65 y.o. female  ROOM: [facility]/     Subjective   Subjective   Patient reports no abdominal pain.  Positive occasional dysuria.  No hematuria.  No fever or chills.  No nausea or vomiting.  The normal bowel movement with improvement of the diarrhea.  No fresh bright blood per rectum or melena.  Complaining of new onset right upper extremity pain and swelling.    Review of Systems  Cardiovascular/respiratory.  No chest pain/no shortness of breath/no palpitations/no cough     Objective   Objective   Vital Signs  Temp:  [97.7 °F (36.5 °C)-98.1 °F (36.7 °C)] 98.1 °F (36.7 °C)  Heart Rate:  [75-85] 85  Resp:  [18] 18  BP: (124-139)/(64-80) 130/64  SpO2:  [94 %-100 %] 100 %  on   ;   Device (Oxygen Therapy): room air    Intake/Output Summary (Last 24 hours) at 2025 1442  Last data filed at 2025 1328  Gross per 24 hour   Intake 120 ml   Output 300 ml   Net -180 ml     Body mass index is 26.37 kg/m².      25  0132   Weight: 69.7 kg (153 lb 9.6 oz)     Physical Exam  General.  Middle-aged female.  She is alert and oriented x 4.  In no apparent pain/distress/diaphoresis.  Normal mood and affect  Eyes.  Pupils equal round and reactive.  Intact extraocular musculature.  No pallor or jaundice  Oral cavity.  Moist mucous membrane  Neck.  Supple.  No JVD.  No lymphadenopathy or thyromegaly  Cardio vascular.  Regular rate and rhythm with no gallops or murmurs  Chest.  Clear to auscultation bilaterally with no added sounds.  Abdomen.  Soft lax.  No tenderness.  No organomegaly.  No guarding or rebound  .  No CVA tenderness  Extremities.  Trace bilateral lower extremity edema.  +1 right upper extremity edema with tenderness on palpation no clubbing or cyanosis  CNS.  No acute focal neurological deficits      Results Review:      Results from last 7 days   Lab Units 25  0231 07/15/25  5305  "07/15/25  0532 07/14/25  0846 07/13/25  1753   SODIUM mmol/L 142  --  140 141 136   POTASSIUM mmol/L 4.3 3.2* 3.4* 3.8 2.8*   CHLORIDE mmol/L 112*  --  109* 108* 97*   CO2 mmol/L 22.6  --  21.0* 23.4 24.1   BUN mg/dL 15.0  --  20.0 15.0 19.0   CREATININE mg/dL 1.00  --  1.05* 0.97 1.26*   GLUCOSE mg/dL 106*  --  131* 208* 138*   CALCIUM mg/dL 8.7  --  8.7 8.6 9.1   AST (SGOT) U/L  --   --  14  --  20   ALT (SGPT) U/L  --   --  11  --  17     Estimated Creatinine Clearance: 53.7 mL/min (by C-G formula based on SCr of 1 mg/dL).      Results from last 7 days   Lab Units 07/14/25  0031 07/13/25  2340   GLUCOSE mg/dL 74 66*     Results from last 7 days   Lab Units 07/14/25  0846 07/13/25  1905 07/13/25  1753   HSTROP T ng/L 48* 64* 79*         Results from last 7 days   Lab Units 07/15/25  0532   TSH uIU/mL 0.224*     Results from last 7 days   Lab Units 07/13/25  1753   MAGNESIUM mg/dL 1.8           Invalid input(s): \"LDLCALC\"  Results from last 7 days   Lab Units 07/16/25  0231 07/15/25  0532 07/14/25  0846 07/13/25  1753   WBC 10*3/mm3 11.14* 15.69* 13.19* 16.21*   HEMOGLOBIN g/dL 10.1* 10.5* 11.2* 12.2   HEMATOCRIT % 31.4* 32.6* 34.5 36.8   PLATELETS 10*3/mm3 288 260 242 283   MCV fL 90.2 90.6 89.6 88.5   MCH pg 29.0 29.2 29.1 29.3   MCHC g/dL 32.2 32.2 32.5 33.2   RDW % 12.7 12.8 12.7 12.6   RDW-SD fl 41.3 42.3 41.2 40.5   MPV fL 10.1 10.1 9.8 9.7   NEUTROPHIL % %  --  76.9*  --  76.6*   LYMPHOCYTE % %  --  12.6*  --  14.8*   MONOCYTES % %  --  9.4  --  7.7   EOSINOPHIL % %  --  0.1*  --  0.4   BASOPHIL % %  --  0.2  --  0.2   IMM GRAN % %  --  0.8*  --  0.3   NEUTROS ABS 10*3/mm3  --  12.08*  --  12.40*   LYMPHS ABS 10*3/mm3  --  1.97  --  2.40   MONOS ABS 10*3/mm3  --  1.48*  --  1.25*   EOS ABS 10*3/mm3  --  0.01  --  0.07   BASOS ABS 10*3/mm3  --  0.03  --  0.04   IMMATURE GRANS (ABS) 10*3/mm3  --  0.12*  --  0.05   NRBC /100 WBC  --  0.0  --  0.0             Results from last 7 days   Lab Units 07/13/25  3703 " 07/13/25  1905 07/13/25  1753   PROCALCITONIN ng/mL  --   --  20.70*   LACTATE mmol/L 0.5 2.2*  --      Results from last 7 days   Lab Units 07/15/25  0532   SED RATE mm/hr 70*   CRP mg/dL 14.86*         Results from last 7 days   Lab Units 07/13/25  2228 07/13/25  1906 07/13/25  1905   BLOODCX   --  No growth at 2 days No growth at 2 days   URINECX  No growth  --   --          Results from last 7 days   Lab Units 07/13/25  2228   NITRITE UA  Negative   WBC UA /HPF 11-20*   BACTERIA UA /HPF Trace*   SQUAM EPITHEL UA /HPF 0-2   URINECX  No growth           Imaging:  Imaging Results (Last 24 Hours)       ** No results found for the last 24 hours. **               I reviewed the patient's new clinical results / labs / tests / procedures      Assessment/Plan     Active Hospital Problems    Diagnosis  POA    **Left ureteral stone [N20.1]  Unknown    BALBINA (acute kidney injury) [N17.9]  Unknown    Sepsis [A41.9]  Unknown    Hypokalemia [E87.6]  Unknown    Acute UTI [N39.0]  Yes    Coronary artery disease involving native coronary artery of native heart without angina pectoris [I25.10]  Yes    Anxiety associated with depression [F41.8]  Yes    Benign essential hypertension [I10]  Yes    Hypothyroidism [E03.9]  Yes    Hyperlipidemia [E78.5]  Yes    Irritable bowel syndrome [K58.9]  Yes      Resolved Hospital Problems   No resolved problems to display.           Left ureteral stone with hydronephrosis associated with sepsis and UTI (fever, leukocytosis, lactic acidosis).  Patient is on IV Zosyn.  Blood cultures and urine cultures are negative.  Patient was seen by urology and underwent cystoscopy and ureteral stent placement.  Will continue IV Zosyn and oxybutynin.  Leukocytosis improving.  Noted CT scan of the abdomen and pelvis.  Right upper extremity pain and swelling.  Probably infiltrated IV.  Will check a right upper extremity venous ultrasound.  Nocturnal hypoxemia/positive D-dimer.  Nocturnal oxygen.  CTA of the chest  revealed no PE with bibasilar atelectasis.  Bilateral lower extremity venous ultrasound negative for DVT.  History of coronary artery disease-hypertension.  Good blood pressure control with no evidence of angina or congestive heart failure.  Had atypical chest pain that has now resolved.  Elevated  Dyslipidemia.  Continue Crestor.  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  Status post GI consult.  Continue bile sequestrant.  Benign GI examination.  Improved.  Hypothyroidism.  TSH is mildly.  An outpatient  Anemia.  Will workup.  Hypokalemia/acute kidney failure.  Per kalemia resolved with substitution.  Acute kidney failure is mostly secondary to prerenal azotemia because of sepsis/UTI/hydronephrosis.  Patient currently is euvolemic.  Resolved acute kidney failure with management of the UTI/IV fluids/stent placement.  VTE prophylaxis compression device.        Discussed my findings and plan of treatment with the patient/nurses at multidisciplinary rounds.  Disposition.  Home tomorrow with nocturnal oxygen.        Frank Avila MD  [facility]  07/16/25  14:42 EDT

## 2025-07-16 NOTE — DISCHARGE PLACEMENT REQUEST
"Wilma Dow \"Arline\" (65 y.o. Female)       Date of Birth   1960    Social Security Number       Address   [address] [address]    Home Phone   [phone]    MRN   [id number]       [facility]    Marital Status                               Admission Date   7/13/2025    Admission Type   Emergency    Admitting Provider   Cely Clarke MD    Attending Provider   Frank Avila MD    Department, Room/Bed   [address], [address]       Discharge Date       Discharge Disposition       Discharge Destination                                 Attending Provider: Frank Avila MD    Allergies: No Known Allergies    Isolation: None   Infection: None   Code Status: CPR    Ht: 162.6 cm (64\")   Wt: 69.7 kg (153 lb 9.6 oz)    Admission Cmt: None   Principal Problem: Left ureteral stone [N20.1]                   Active Insurance as of 7/13/2025       Primary Coverage       Payor Plan Insurance Group Employer/Plan Group    [employer] BLUE CROSS [employer] BLUE CROSS BLUE SHIELD PPO [id number]       Payor Plan Address Payor Plan Phone Number Payor Plan Fax Number Effective Dates    [address] [phone]  1/1/2025 - None Entered    [place] [address]         Subscriber Name Subscriber Birth Date Member ID       WILMA DOW 1960 [id number]               Secondary Coverage       Payor Plan Insurance Group Employer/Plan Group    [facility] [id number]       Payor Plan Address Payor Plan Phone Number Payor Plan Fax Number Effective Dates    [address]   1/1/2016 - None Entered    [facility] [address]         Subscriber Name Subscriber Birth Date Member ID       BUTCH DOW 4/[birth year]/1957 [id number]                     Emergency Contacts        (Rel.) Home Phone Work Phone Mobile Phone    Ruben Dow (Spouse) [phone] -- --    JINMAHNAZ (Daughter) [phone] -- --            "

## 2025-07-16 NOTE — PLAN OF CARE
Goal Outcome Evaluation:              Outcome Evaluation: VSS, O2 SAT STABLE ON RA OVERNIGHT WITH SLEEP STUDY. PAIN PILL EVERY 6 HOURS FOR STENT DISCOMFORT WITH ADEQUATE RELIEF. WBC IMPROVED.

## 2025-07-16 NOTE — PLAN OF CARE
Problem: Adult Inpatient Plan of Care  Goal: Plan of Care Review  Outcome: Progressing  Flowsheets (Taken 7/16/2025 1812)  Outcome Evaluation: Pt AxOx4, medications given as scheduled, IV abx, pain medication given PRN, RA, VSS, will continue plan of care  Plan of Care Reviewed With: patient  Goal: Patient-Specific Goal (Individualized)  Outcome: Progressing  Goal: Absence of Hospital-Acquired Illness or Injury  Outcome: Progressing  Intervention: Identify and Manage Fall Risk  Recent Flowsheet Documentation  Taken 7/16/2025 1515 by Karina Whiting RN  Safety Promotion/Fall Prevention:   safety round/check completed   activity supervised   assistive device/personal items within reach   clutter free environment maintained   fall prevention program maintained   nonskid shoes/slippers when out of bed  Taken 7/16/2025 1400 by Karina Whiting RN  Safety Promotion/Fall Prevention:   safety round/check completed   activity supervised   clutter free environment maintained   assistive device/personal items within reach   fall prevention program maintained   nonskid shoes/slippers when out of bed  Taken 7/16/2025 1015 by Karina Whiting RN  Safety Promotion/Fall Prevention:   safety round/check completed   activity supervised   assistive device/personal items within reach   clutter free environment maintained   fall prevention program maintained   nonskid shoes/slippers when out of bed  Taken 7/16/2025 0824 by Karina Whiting RN  Safety Promotion/Fall Prevention:   safety round/check completed   activity supervised   assistive device/personal items within reach   clutter free environment maintained   fall prevention program maintained   nonskid shoes/slippers when out of bed  Intervention: Prevent Skin Injury  Recent Flowsheet Documentation  Taken 7/16/2025 1515 by Karina Whiting RN  Body Position: weight shifting  Taken 7/16/2025 1400 by Karina Whiting RN  Body Position: sitting up in bed  Taken 7/16/2025 1015 by Henok  DEANNE Collins  Body Position: sitting up in bed  Taken 7/16/2025 0824 by Karina Whiting RN  Body Position: weight shifting  Intervention: Prevent Infection  Recent Flowsheet Documentation  Taken 7/16/2025 1515 by Karina Whiting RN  Infection Prevention: hand hygiene promoted  Taken 7/16/2025 1015 by Karina Whiting RN  Infection Prevention: hand hygiene promoted  Taken 7/16/2025 0824 by Karina Whiting RN  Infection Prevention: hand hygiene promoted  Goal: Optimal Comfort and Wellbeing  Outcome: Progressing  Intervention: Provide Person-Centered Care  Recent Flowsheet Documentation  Taken 7/16/2025 1515 by Karina Whiting RN  Trust Relationship/Rapport:   care explained   choices provided   emotional support provided   empathic listening provided   questions answered   questions encouraged   reassurance provided   thoughts/feelings acknowledged  Taken 7/16/2025 0824 by Karina Whiting RN  Trust Relationship/Rapport:   care explained   choices provided   emotional support provided   empathic listening provided   questions answered   questions encouraged   reassurance provided   thoughts/feelings acknowledged  Goal: Readiness for Transition of Care  Outcome: Progressing     Problem: Sepsis/Septic Shock  Goal: Optimal Coping  Outcome: Progressing  Intervention: Support Patient and Family Response  Recent Flowsheet Documentation  Taken 7/16/2025 1515 by Karina Whiting RN  Family/Support System Care:   self-care encouraged   support provided  Taken 7/16/2025 0824 by Karina Whiting RN  Family/Support System Care:   support provided   self-care encouraged  Goal: Absence of Bleeding  Outcome: Progressing  Goal: Blood Glucose Level Within Target Range  Outcome: Progressing  Goal: Absence of Infection Signs and Symptoms  Outcome: Progressing  Intervention: Initiate Sepsis Management  Recent Flowsheet Documentation  Taken 7/16/2025 1515 by Karina Whiting RN  Infection Prevention: hand hygiene promoted  Taken 7/16/2025 1015  by Karina Whiting, RN  Infection Prevention: hand hygiene promoted  Taken 7/16/2025 0824 by Karina Whiting RN  Infection Prevention: hand hygiene promoted  Intervention: Promote Recovery  Recent Flowsheet Documentation  Taken 7/16/2025 1515 by Karina Whiting, RN  Activity Management: activity encouraged  Taken 7/16/2025 1400 by Karina Whiting, RN  Activity Management: activity encouraged  Taken 7/16/2025 1015 by Karina Whiting RN  Activity Management: activity encouraged  Taken 7/16/2025 0824 by Karina Whiting RN  Activity Management: activity encouraged  Goal: Optimal Nutrition Delivery  Outcome: Progressing   Goal Outcome Evaluation:  Plan of Care Reviewed With: patient           Outcome Evaluation: Pt AxOx4, medications given as scheduled, IV abx, pain medication given PRN, RA, VSS, will continue plan of care

## 2025-07-17 ENCOUNTER — READMISSION MANAGEMENT (OUTPATIENT)
Dept: CALL CENTER | Facility: HOSPITAL | Age: 65
End: 2025-07-17
Payer: COMMERCIAL

## 2025-07-17 VITALS
TEMPERATURE: 98.1 F | BODY MASS INDEX: 26.22 KG/M2 | SYSTOLIC BLOOD PRESSURE: 134 MMHG | OXYGEN SATURATION: 97 % | HEART RATE: 73 BPM | HEIGHT: 64 IN | WEIGHT: 153.6 LBS | RESPIRATION RATE: 18 BRPM | DIASTOLIC BLOOD PRESSURE: 73 MMHG

## 2025-07-17 PROBLEM — E87.6 HYPOKALEMIA: Status: RESOLVED | Noted: 2025-07-14 | Resolved: 2025-07-17

## 2025-07-17 PROBLEM — N17.9 AKI (ACUTE KIDNEY INJURY): Status: RESOLVED | Noted: 2025-07-14 | Resolved: 2025-07-17

## 2025-07-17 PROBLEM — A41.9 SEPSIS: Status: RESOLVED | Noted: 2025-07-14 | Resolved: 2025-07-17

## 2025-07-17 LAB
ANION GAP SERPL CALCULATED.3IONS-SCNC: 11.8 MMOL/L (ref 5–15)
BUN SERPL-MCNC: 11 MG/DL (ref 8–23)
BUN/CREAT SERPL: 11.2 (ref 7–25)
CALCIUM SPEC-SCNC: 8.8 MG/DL (ref 8.6–10.5)
CALPROTECTIN STL-MCNT: 139 UG/G (ref 0–120)
CHLORIDE SERPL-SCNC: 106 MMOL/L (ref 98–107)
CO2 SERPL-SCNC: 24.2 MMOL/L (ref 22–29)
CREAT SERPL-MCNC: 0.98 MG/DL (ref 0.57–1)
DEPRECATED RDW RBC AUTO: 40.5 FL (ref 37–54)
EGFRCR SERPLBLD CKD-EPI 2021: 64.2 ML/MIN/1.73
ERYTHROCYTE [DISTWIDTH] IN BLOOD BY AUTOMATED COUNT: 12.5 % (ref 12.3–15.4)
GLUCOSE SERPL-MCNC: 91 MG/DL (ref 65–99)
HCT VFR BLD AUTO: 36.4 % (ref 34–46.6)
HGB BLD-MCNC: 11.6 G/DL (ref 12–15.9)
MCH RBC QN AUTO: 28.4 PG (ref 26.6–33)
MCHC RBC AUTO-ENTMCNC: 31.9 G/DL (ref 31.5–35.7)
MCV RBC AUTO: 89.2 FL (ref 79–97)
PLATELET # BLD AUTO: 298 10*3/MM3 (ref 140–450)
PMV BLD AUTO: 9.7 FL (ref 6–12)
POTASSIUM SERPL-SCNC: 4.1 MMOL/L (ref 3.5–5.2)
RBC # BLD AUTO: 4.08 10*6/MM3 (ref 3.77–5.28)
SODIUM SERPL-SCNC: 142 MMOL/L (ref 136–145)
WBC NRBC COR # BLD AUTO: 8.62 10*3/MM3 (ref 3.4–10.8)

## 2025-07-17 PROCEDURE — 85027 COMPLETE CBC AUTOMATED: CPT | Performed by: STUDENT IN AN ORGANIZED HEALTH CARE EDUCATION/TRAINING PROGRAM

## 2025-07-17 PROCEDURE — 25010000002 PIPERACILLIN SOD-TAZOBACTAM PER 1 G: Performed by: NURSE PRACTITIONER

## 2025-07-17 PROCEDURE — 80048 BASIC METABOLIC PNL TOTAL CA: CPT | Performed by: STUDENT IN AN ORGANIZED HEALTH CARE EDUCATION/TRAINING PROGRAM

## 2025-07-17 RX ORDER — OXYCODONE AND ACETAMINOPHEN 5; 325 MG/1; MG/1
1 TABLET ORAL EVERY 6 HOURS PRN
Qty: 10 TABLET | Refills: 0 | Status: SHIPPED | OUTPATIENT
Start: 2025-07-17 | End: 2025-07-25

## 2025-07-17 RX ORDER — OXYBUTYNIN CHLORIDE 10 MG/1
10 TABLET, EXTENDED RELEASE ORAL DAILY
Qty: 30 TABLET | Refills: 0 | Status: SHIPPED | OUTPATIENT
Start: 2025-07-18 | End: 2025-08-04

## 2025-07-17 RX ADMIN — ROSUVASTATIN CALCIUM 20 MG: 20 TABLET, FILM COATED ORAL at 08:58

## 2025-07-17 RX ADMIN — OXYCODONE AND ACETAMINOPHEN 1 TABLET: 5; 325 TABLET ORAL at 06:11

## 2025-07-17 RX ADMIN — ASPIRIN 81 MG CHEWABLE TABLET 81 MG: 81 TABLET CHEWABLE at 08:58

## 2025-07-17 RX ADMIN — OXYBUTYNIN CHLORIDE 10 MG: 10 TABLET, EXTENDED RELEASE ORAL at 08:58

## 2025-07-17 RX ADMIN — PIPERACILLIN AND TAZOBACTAM 3.38 G: 3; .375 INJECTION, POWDER, FOR SOLUTION INTRAVENOUS at 06:11

## 2025-07-17 RX ADMIN — LEVOTHYROXINE SODIUM 75 MCG: 0.07 TABLET ORAL at 06:12

## 2025-07-17 NOTE — CASE MANAGEMENT/SOCIAL WORK
Case Management Discharge Note      Final Note: Home with spouse. [employer] to deliver oxygen to the patients home. Family transport.    Provided Post Acute Provider List?: N/A (Plan is home)  Provided Post Acute Provider Quality & Resource List?: N/A (Plan is home)    Selected Continued Care - Admitted Since 7/13/2025       Destination    No services have been selected for the patient.                Durable Medical Equipment    No services have been selected for the patient.                Dialysis/Infusion    No services have been selected for the patient.                Home Medical Care    No services have been selected for the patient.                Therapy    No services have been selected for the patient.                Community Resources    No services have been selected for the patient.                Community & DME    No services have been selected for the patient.                    Transportation Services  Transportation: Private Transportation  Private: Car    Final Discharge Disposition Code: 01 - home or self-care

## 2025-07-17 NOTE — PLAN OF CARE
Goal Outcome Evaluation:  Plan of Care Reviewed With: patient        Progress: improving  Outcome Evaluation: Remains on Zosyn, intermittent c/o pain, morphine and percocet. RA all shift, but will D/C with nocturnal O2. Possible D/C home.

## 2025-07-17 NOTE — DISCHARGE SUMMARY
Patient Name: Wilma Longo  : 1960  MRN: [id number]    Date of Admission: 2025  Date of Discharge:  2025  Primary Care Physician: Jerman Virgen MD      Discharge Diagnoses     Active Hospital Problems    Diagnosis  POA    **Left ureteral stone [N20.1]  Yes    Acute UTI [N39.0]  Yes    Coronary artery disease involving native coronary artery of native heart without angina pectoris [I25.10]  Yes    Anxiety associated with depression [F41.8]  Yes    Benign essential hypertension [I10]  Yes    Hypothyroidism [E03.9]  Yes    Hyperlipidemia [E78.5]  Yes    Irritable bowel syndrome [K58.9]  Yes      Resolved Hospital Problems    Diagnosis Date Resolved POA    BALBINA (acute kidney injury) [N17.9] 2025 Yes    Sepsis [A41.9] 2025 Yes    Hypokalemia [E87.6] 2025 Unknown        Hospital Course     Brief admission history and physical.  Please refer to the H&P for full details.  A pleasant 65 years old female with a past history of hypertension/coronary artery disease/dyslipidemia/hypothyroidism/IBS/mood disorder who presented to the emergency department complaining of atypical chest pain associated with fatigue and weakness and myalgia.  Her physical examination was remarkable for an afebrile patient with stable vital signs/suprapubic abdominal tenderness.  Hospital course.  Initial ER evaluation included a CBC that was normal except a white count of 16.2.  CMP was normal except for glucose of 138, creatinine 1.26, potassium 2.8, chloride 97, GFR 47.5.  Troponin elevated at 79.  D-dimer elevated at 1.28.  Procalcitonin elevated at 20.7.  Lactic acid elevated at 2.2.  UA suggestive of UTI.  CTA scan of the chest/abdomen and pelvis with IV contrast revealing no pulmonary embolism.  Bibasilar dependent atelectasis.  Mild to moderate left hydronephrosis with 4 mm stone in the left distal ureter.  Patient last 2D echo on 2025 revealed a normal ejection fraction and diastolic function.   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  Hospital course will be summarized in a problem-oriented manner as below  1.  Left ureteral stone with left hydronephrosis associated with urosepsis.  Sepsis was indicated by fever, leukocytosis, lactic acidosis.  She was empirically started on IV fluid and IV Zosyn.  Blood cultures and urine cultures were obtained and they were negative.  Urology was consulted and the patient underwent cystoscopy and ureteral stent placement.  Oxybutynin was initiated.  Eventually IV Zosyn was substituted to p.o. Augmentin to complete a total of 7 days of antibiotic.  Leukocytosis has resolved.  CT scan of the abdomen pelvis was noted as mentioned above.  Patient was passing urine freely at the time of discharge  2.  Chest pain in a patient with a history of coronary artery disease and hypertension/positive D-dimer troponin were mildly elevated.  Blood pressure was under good control.  There was no clinical evidence of congestive heart failure.  Chest pain spontaneously resolved.  CTA of the chest negative for PE.  Bilateral lower extremity venous ultrasound was negative patient was chest pain-free with good blood pressure and pulse control at the time of discharge while on aspirin/Norvasc.  Lisinopril was DC'd.  3.  Right upper extremity superficial venous thrombus.  Patient had pain and swelling of the right upper extremity at the site of an IV.  Venous ultrasound revealed superficial venous thrombosis and this has improved by the time of discharge she is to continue aspirin for that.  4.  Dyslipidemia Crestor was maintained.  5.  Chronic diarrhea.  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  Status post GI consult.  Improved on by sequestrant.  The GI examination remained benign  6.  Hypothyroidism.  TSH was normal she was maintained on her current replacement and this is to be followed up as an outpatient.  7.  Iron deficiency anemia.  This is mild and  needs to be followed up as an outpatient.  There is no overt GI bleed  8.  Hypokalemia and acute kidney failure.  Hypokalemia resolved with substitution.  Acute kidney failure was thought to be secondary to prerenal azotemia because of sepsis/UTI/hydronephrosis and the patient was on ACE.  Acute kidney failure resolved with IV fluid and stopping lisinopril.  She was euvolemic with good blood pressure control at the time of discharge.  She is status post UTI treatment.  9.  Nocturnal hypoxemia.  As mentioned above CTA of the chest was negative for PE or infiltrate.  She was supplied with home oxygen.  This needs to be followed up as an outpatient with consideration of sleep study    At the time of discharge patient was asymptomatic and hemodynamically stable.  She is to follow-up with primary MD/urology/cardiology/hematology-oncology/GI as scheduled.  Consultants     Consult Orders (all) (From admission, onward)       Start     Ordered    07/14/25 1444  Inpatient Gastroenterology Consult  Once        Specialty:  Gastroenterology  Provider:  Amina Cardenas MD    07/14/25 1444    07/13/25 2243  [facility] (on-call MD unless specified) Details  Once        Specialty:  Hospitalist  Provider:  (Not yet assigned)    07/13/25 2242    07/13/25 2228  Urology (on-call MD unless specified)  Once        Specialty:  Urology  Provider:  Maged Pedroza MD    07/13/25 2227    07/13/25 2222  [facility] (on-call MD unless specified) Details  Once,   Status:  Canceled        Specialty:  Hospitalist  Provider:  (Not yet assigned)    07/13/25 2221                  Procedures     Imaging Results (All)       Procedure Component Value Units Date/Time    FL C Arm During Surgery [[id number]] Resulted: 07/14/25 0027     Updated: 07/14/25 0027    Narrative:      This procedure was auto-finalized with no dictation required.    CT Abdomen Pelvis With Contrast [[id number]] Collected: 07/13/25 2206     Updated: 07/13/25 2218    Narrative:      CTA CHEST, CT  ABDOMEN AND PELVIS     HISTORY: Pulmonary Embolism; R07.9-Chest pain, unspecified; R79.89-Other  specified abnormal findings of blood chemistry; E87.6-Hypokalemia;  R19.7-Diarrhea, unspecified; D72.829-Elevated white blood cell count,  unspecified; A41.9-Sepsis, unspecified organism     COMPARISON: None     TECHNIQUE: CT angiography was performed of the chest with axial images  as well as coronal and sagittal reformatted MIP images provided  following the administration of IV contrast.  3-D surface rendered  reformats were obtained of the pulmonary arteries and aorta.  CT of the  abdomen and pelvis obtained following administration of IV contrast.  Coronal and sagittal reconstructions were obtained.   Radiation dose  reduction techniques were utilized, including automated exposure  control, and exposure modulation based on body size.     FINDINGS:     Chest CTA:  There is mild bibasilar atelectasis, but there is no  evidence of acute pulmonary infiltrate, pleural effusion, pneumothorax  or suspicious nodule.  The thoracic aorta is normal in caliber, and  there is no evidence of dissection.  There are coronary atherosclerotic  vascular calcifications.  There is no suspicious mediastinal adenopathy  or other mass, though there is a small to moderate hiatal hernia.  Bolus  timing is adequate, and there is no evidence of pulmonary embolism.     Abdomen: The liver and gallbladder are normal.  The spleen and pancreas  appear normal.  Both adrenal glands are normal. There is no evidence of  bowel obstruction. The aorta is normal in caliber.       : There is mild atrophic change in the right kidney and there is a  right renal 4 mm nonobstructing calculus, but there is no right  hydronephrosis. No left renal calculi are seen, but there is mild to  moderate left hydronephrosis, and the left ureter is dilated down into  the pelvis, to the level of an approximately 4 mm stone, 8 to 10 cm  above the ureterovesical junction. No  bladder calculi are seen.     Pelvis:  The appendix is clearly identified, and is normal.  There is  diverticulosis, but there is no CT evidence of diverticulitis.  There is  no pelvic adenopathy or other soft tissue mass.  There is no CT evidence  of hernia or bowel obstruction.     There is no acute bony abnormality.       Impression:         1.  Pulmonary arteries are well-opacified, and there is no evidence of  pulmonary embolism.     2.  There is minimal bibasilar dependent atelectasis, but the lung  parenchyma is otherwise normal. No acute pulmonary parenchymal  abnormality is seen.     3.   Mild to moderate left hydronephrosis secondary to a 4 mm stone in  the distal left ureter, 8 to 10 cm above the ureterovesical junction.                 This report was finalized on 7/13/2025 10:15 PM by Dr. Wojciech Roth M.D on Workstation: [id number]       CT Angiogram Chest Pulmonary Embolism [[id number]] Collected: 07/13/25 2206     Updated: 07/13/25 2218    Narrative:      CTA CHEST, CT ABDOMEN AND PELVIS     HISTORY: Pulmonary Embolism; R07.9-Chest pain, unspecified; R79.89-Other  specified abnormal findings of blood chemistry; E87.6-Hypokalemia;  R19.7-Diarrhea, unspecified; D72.829-Elevated white blood cell count,  unspecified; A41.9-Sepsis, unspecified organism     COMPARISON: None     TECHNIQUE: CT angiography was performed of the chest with axial images  as well as coronal and sagittal reformatted MIP images provided  following the administration of IV contrast.  3-D surface rendered  reformats were obtained of the pulmonary arteries and aorta.  CT of the  abdomen and pelvis obtained following administration of IV contrast.  Coronal and sagittal reconstructions were obtained.   Radiation dose  reduction techniques were utilized, including automated exposure  control, and exposure modulation based on body size.     FINDINGS:     Chest CTA:  There is mild bibasilar atelectasis, but there is no  evidence of  acute pulmonary infiltrate, pleural effusion, pneumothorax  or suspicious nodule.  The thoracic aorta is normal in caliber, and  there is no evidence of dissection.  There are coronary atherosclerotic  vascular calcifications.  There is no suspicious mediastinal adenopathy  or other mass, though there is a small to moderate hiatal hernia.  Bolus  timing is adequate, and there is no evidence of pulmonary embolism.     Abdomen: The liver and gallbladder are normal.  The spleen and pancreas  appear normal.  Both adrenal glands are normal. There is no evidence of  bowel obstruction. The aorta is normal in caliber.       : There is mild atrophic change in the right kidney and there is a  right renal 4 mm nonobstructing calculus, but there is no right  hydronephrosis. No left renal calculi are seen, but there is mild to  moderate left hydronephrosis, and the left ureter is dilated down into  the pelvis, to the level of an approximately 4 mm stone, 8 to 10 cm  above the ureterovesical junction. No bladder calculi are seen.     Pelvis:  The appendix is clearly identified, and is normal.  There is  diverticulosis, but there is no CT evidence of diverticulitis.  There is  no pelvic adenopathy or other soft tissue mass.  There is no CT evidence  of hernia or bowel obstruction.     There is no acute bony abnormality.       Impression:         1.  Pulmonary arteries are well-opacified, and there is no evidence of  pulmonary embolism.     2.  There is minimal bibasilar dependent atelectasis, but the lung  parenchyma is otherwise normal. No acute pulmonary parenchymal  abnormality is seen.     3.   Mild to moderate left hydronephrosis secondary to a 4 mm stone in  the distal left ureter, 8 to 10 cm above the ureterovesical junction.                 This report was finalized on 7/13/2025 10:15 PM by Dr. Wojciech Roth M.D on Workstation: [id number]       XR Chest 1 View [[id number]] Collected: 07/13/25 4138     Updated:  07/13/25 1838    Narrative:      XR CHEST 1 VW-     HISTORY: Chest pain. Reported recent heart attack.     COMPARISON: Chest radiograph 6/23/2025     FINDINGS: A single view of the chest was obtained.     Support Devices:  None.  Cardiac Silhouette/Mediastinum/Wendie:  The cardiac, mediastinal, and  hilar contours are within normal limits.  Lungs/Pleural Spaces:  The lungs and pleural spaces are clear.  Chest Wall/Diaphragm/Upper Abdomen: There is a hiatal hernia. The  visualized osseous structures are similar.        CONCLUSION(S):       1.  No focal consolidation or effusion.  2.  Hiatal hernia.     This report was finalized on 7/13/2025 6:35 PM by Dr. La Blount M.D  on Workstation: [id number]               Pertinent Labs     Results from last 7 days   Lab Units 07/17/25  0417 07/16/25  0231 07/15/25  0532 07/14/25  0846   WBC 10*3/mm3 8.62 11.14* 15.69* 13.19*   HEMOGLOBIN g/dL 11.6* 10.1* 10.5* 11.2*   PLATELETS 10*3/mm3 298 288 260 242     Results from last 7 days   Lab Units 07/17/25  0417 07/16/25  0231 07/15/25  1657 07/15/25  0532 07/14/25  0846   SODIUM mmol/L 142 142  --  140 141   POTASSIUM mmol/L 4.1 4.3 3.2* 3.4* 3.8   CHLORIDE mmol/L 106 112*  --  109* 108*   CO2 mmol/L 24.2 22.6  --  21.0* 23.4   BUN mg/dL 11.0 15.0  --  20.0 15.0   CREATININE mg/dL 0.98 1.00  --  1.05* 0.97   GLUCOSE mg/dL 91 106*  --  131* 208*   Estimated Creatinine Clearance: 54.8 mL/min (by C-G formula based on SCr of 0.98 mg/dL).  Results from last 7 days   Lab Units 07/15/25  0532 07/13/25  1753   ALBUMIN g/dL 3.2* 3.7   BILIRUBIN mg/dL 0.2 0.5   ALK PHOS U/L 75 78   AST (SGOT) U/L 14 20   ALT (SGPT) U/L 11 17     Results from last 7 days   Lab Units 07/17/25  0417 07/16/25  0231 07/15/25  0532 07/14/25  0846 07/13/25  1753   CALCIUM mg/dL 8.8 8.7 8.7 8.6 9.1   ALBUMIN g/dL  --   --  3.2*  --  3.7   MAGNESIUM mg/dL  --   --   --   --  1.8       Results from last 7 days   Lab Units 07/14/25  0846 07/13/25  8036  "07/13/25  1753   HSTROP T ng/L 48* 64* 79*   D DIMER QUANT MCGFEU/mL  --   --  1.28*           Invalid input(s): \"LDLCALC\"  Results from last 7 days   Lab Units 07/13/25  2228 07/13/25  1906 07/13/25  1905   BLOODCX   --  No growth at 3 days No growth at 3 days   URINECX  No growth  --   --      Imaging Results (Last 24 Hours)       ** No results found for the last 24 hours. **            Test Results Pending at Discharge     Pending Labs       Order Current Status    Blood Culture - Blood, Arm, Left Preliminary result    Blood Culture - Blood, Arm, Right Preliminary result              Discharge Exam   Physical Exam  Vitals temperature 98.1 a pulse of 76 respiratory rate of 18 and blood pressure 134/73 and O2 sats of 98% on room air  General.  Middle-aged female.  She is alert and oriented x 4.  In no apparent pain/distress/diaphoresis.  Normal mood and affect  Eyes.  Pupils equal round and reactive.  Intact extraocular musculature.  No pallor or jaundice  Oral cavity.  Moist mucous membrane  Neck.  Supple.  No JVD.  No lymphadenopathy or thyromegaly  Cardio vascular.  Regular rate and rhythm with no gallops or murmurs  Chest.  Clear to auscultation bilaterally with no added sounds.  Abdomen.  Soft lax.  No tenderness.  No organomegaly.  No guarding or rebound  .  No CVA tenderness  Extremities.  Trace bilateral lower extremity edema.  +1 right upper extremity edema with tenderness on palpation no clubbing or cyanosis  CNS.  No acute focal neurological deficits        Discharge Details        Discharge Medications        New Medications        Instructions Start Date   amoxicillin-clavulanate 875-125 MG per tablet  Commonly known as: AUGMENTIN   1 tablet, Oral, 2 Times Daily      naloxone 4 MG/0.1ML nasal spray  Commonly known as: NARCAN   Call 911. Don't prime. [place] in 1 nostril for overdose. Repeat in 2-3 minutes in other nostril if no or minimal breathing/responsiveness.      oxybutynin XL 10 MG 24 hr " tablet  Commonly known as: DITROPAN-XL   10 mg, Oral, Daily   Start Date: July 18, 2025     oxyCODONE-acetaminophen 5-325 MG per tablet  Commonly known as: PERCOCET   1 tablet, Oral, Every 6 Hours PRN             Changes to Medications        Instructions Start Date   amLODIPine 5 MG tablet  Commonly known as: NORVASC  What changed: when to take this   5 mg, Oral, Daily      DULoxetine 60 MG capsule  Commonly known as: CYMBALTA  What changed: when to take this   60 mg, Oral, Daily             Continue These Medications        Instructions Start Date   ALPRAZolam 0.5 MG tablet  Commonly known as: Xanax   0.5 mg, Oral, Daily PRN, D/c previous order      aspirin 81 MG chewable tablet   81 mg, Oral, Daily      colestipol 1 g tablet  Commonly known as: COLESTID   2 g, Oral, 2 Times Daily      levothyroxine 75 MCG tablet  Commonly known as: SYNTHROID, LEVOTHROID   75 mcg, Oral, Daily      rosuvastatin 20 MG tablet  Commonly known as: CRESTOR   20 mg, Oral, Daily      valACYclovir 500 MG tablet  Commonly known as: VALTREX   500 mg, As Needed      vitamin C 250 MG tablet  Commonly known as: ASCORBIC ACID   500 mg, Nightly             Stop These Medications      Cranberry 125 MG tablet     lisinopril 20 MG tablet  Commonly known as: PRINIVIL,ZESTRIL     naproxen 500 MG tablet  Commonly known as: NAPROSYN              No Known Allergies      Discharge Disposition:  Condition: Stable    Diet:   Diet Order   Procedures    Diet: Gastrointestinal; Fiber-Restricted; Fluid Consistency: Thin (IDDSI 0)       Activity:   Activity Instructions       Activity as Tolerated                CODE STATUS:    Code Status and Medical Interventions: CPR (Attempt to Resuscitate); Full Support   Ordered at: 07/13/25 0012     Code Status (Patient has no pulse and is not breathing):    CPR (Attempt to Resuscitate)     Medical Interventions (Patient has pulse or is breathing):    Full Support       Future Appointments   Date Time Provider Department  Center   7/29/2025  8:00 AM LAB CHAIR 2 [facility]   8/4/2025  8:40 AM VITALS ONLY [facility]   8/4/2025  9:00 AM Jerman Dee II, MD [facility]   8/8/2025 12:20 PM Olga Durand MD [id number]     Additional Instructions for the Follow-ups that You Need to Schedule       Call MD With Problems / Concerns   As directed      Instructions: Call MD or return to ER if abdominal pain/nausea or vomiting/dysuria/fever or chills/hematuria/chest pain/shortness of breath    Order Comments: Instructions: Call MD or return to ER if abdominal pain/nausea or vomiting/dysuria/fever or chills/hematuria/chest pain/shortness of breath         Discharge Follow-up with PCP   As directed       Currently Documented PCP:    Jerman Virgen MD    PCP Phone Number:    [phone]     Follow Up Details: 1 week.  Left ureteral stone with hydronephrosis/urosepsis/acute kidney failure and hypokalemia/nocturnal hypoxemia/right upper extremity SVT/dyslipidemia/CAD/hypertension/chronic diarrhea/hypothyroid        Discharge Follow-up with Specialty: Hematology oncology [place] as scheduled.  Iron deficiency anemia   As directed      Specialty: Hematology oncology [place] as scheduled.  Iron deficiency anemia        Discharge Follow-up with Specified Provider: Cardiology.  As scheduled   As directed      To: Cardiology.  As scheduled   Follow Up Details: CAD and hypertension        Discharge Follow-up with Specified Provider: GI.  As scheduled.  Iron deficiency anemia   As directed      To: GI.  As scheduled.  Iron deficiency anemia        Discharge Follow-up with Specified Provider: Urology.  As scheduled.  10 days   As directed      To: Urology.  As scheduled.  10 days   Follow Up Details: Right ureteral stone with hydronephrosis and urosepsis               Follow-up Information       Jerman Virgen MD .    Specialty: Family Medicine  Why: 1 week.  Left ureteral stone with  hydronephrosis/urosepsis/acute kidney failure and hypokalemia/nocturnal hypoxemia/right upper extremity SVT/dyslipidemia/CAD/hypertension/chronic diarrhea/hypothyroid  Contact information:  [address] [place] [id number] [phone]                               Time Spent on Discharge:  Greater than 30 minutes      Frank Avila MD  [facility]  07/17/25  11:44 EDT

## 2025-07-17 NOTE — PAYOR COMM NOTE
"Wilma Dow \"Arline\" (65 y.o. Female)      PATIENT DISCHARGED 07/17/2025:  ref# [id number]     FAX:  [phone]    PH:  [phone]    [facility]:  NPI [id number]  [facility]#  [id number]    HARSHAL THOMPSON RN,CCP     Date of Birth   1960    Social Security Number       Address   [address] [place] [address]    Home Phone   [phone]    MRN   [id number]       [facility]    Marital Status                               Admission Date   7/13/2025    Admission Type   Emergency    Admitting Provider   Cely Clarke MD    Attending Provider       Department, Room/Bed   [address], N444/1       Discharge Date   7/17/2025    Discharge Disposition   Home or Self Care    Discharge Destination                                 Attending Provider: (none)   Allergies: No Known Allergies    Isolation: None   Infection: None   Code Status: Prior    Ht: 162.6 cm (64\")   Wt: 69.7 kg (153 lb 9.6 oz)    Admission Cmt: None   Principal Problem: Left ureteral stone [N20.1]                   Active Insurance as of 7/13/2025       Primary Coverage       Payor Plan Insurance Group Employer/Plan Group    [employer]HE[employer] BLUE CROSS ANTHEM BLUE CROSS BLUE SHIELD PPO [id number]       Payor Plan Address Payor Plan Phone Number Payor Plan Fax Number Effective Dates    [address] [phone]  1/1/2025 - None Entered    [place] [address]         Subscriber Name Subscriber Birth Date Member ID       WILMA DOW 1960 [id number]               Secondary Coverage       Payor Plan Insurance Group Employer/Plan Group    [facility] [id number]       Payor Plan Address Payor Plan Phone Number Payor Plan Fax Number Effective Dates    [address]   1/1/2016 - None Entered    [facility] [id number]         Subscriber Name Subscriber Birth Date Member ID       BUTCH DOW 4/21/1957 [id number]                     Emergency Contacts        (Rel.) " Home Phone Work Phone Mobile Phone    Ruben Longo (Spouse) [phone] -- --    MAHNAZ ABDI (Daughter) [phone] -- --                 Discharge Summary        Frank Avila MD at 25 1144              Patient Name: Wilma Longo  : 1960  MRN: [id number]    Date of Admission: 2025  Date of Discharge:  2025  Primary Care Physician: Jerman Virgen MD      Discharge Diagnoses     Active Hospital Problems    Diagnosis  POA    **Left ureteral stone [N20.1]  Yes    Acute UTI [N39.0]  Yes    Coronary artery disease involving native coronary artery of native heart without angina pectoris [I25.10]  Yes    Anxiety associated with depression [F41.8]  Yes    Benign essential hypertension [I10]  Yes    Hypothyroidism [E03.9]  Yes    Hyperlipidemia [E78.5]  Yes    Irritable bowel syndrome [K58.9]  Yes      Resolved Hospital Problems    Diagnosis Date Resolved POA    BALBINA (acute kidney injury) [N17.9] 2025 Yes    Sepsis [A41.9] 2025 Yes    Hypokalemia [E87.6] 2025 Unknown        Hospital Course     Brief admission history and physical.  Please refer to the H&P for full details.  A pleasant 65 years old female with a past history of hypertension/coronary artery disease/dyslipidemia/hypothyroidism/IBS/mood disorder who presented to the emergency department complaining of atypical chest pain associated with fatigue and weakness and myalgia.  Her physical examination was remarkable for an afebrile patient with stable vital signs/suprapubic abdominal tenderness.  Hospital course.  Initial ER evaluation included a CBC that was normal except a white count of 16.2.  CMP was normal except for glucose of 138, creatinine 1.26, potassium 2.8, chloride 97, GFR 47.5.  Troponin elevated at 79.  D-dimer elevated at 1.28.  Procalcitonin elevated at 20.7.  Lactic acid elevated at 2.2.  UA suggestive of UTI.  CTA scan of the chest/abdomen and pelvis with IV contrast revealing no pulmonary embolism.   Bibasilar dependent atelectasis.  Mild to moderate left hydronephrosis with 4 mm stone in the left distal ureter.  Patient last 2D echo on 6/25/2025 revealed a normal ejection fraction and diastolic function.  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  Hospital course will be summarized in a problem-oriented manner as below  1.  Left ureteral stone with left hydronephrosis associated with urosepsis.  Sepsis was indicated by fever, leukocytosis, lactic acidosis.  She was empirically started on IV fluid and IV Zosyn.  Blood cultures and urine cultures were obtained and they were negative.  Urology was consulted and the patient underwent cystoscopy and ureteral stent placement.  Oxybutynin was initiated.  Eventually IV Zosyn was substituted to p.o. Augmentin to complete a total of 7 days of antibiotic.  Leukocytosis has resolved.  CT scan of the abdomen pelvis was noted as mentioned above.  Patient was passing urine freely at the time of discharge  2.  Chest pain in a patient with a history of coronary artery disease and hypertension/positive D-dimer troponin were mildly elevated.  Blood pressure was under good control.  There was no clinical evidence of congestive heart failure.  Chest pain spontaneously resolved.  CTA of the chest negative for PE.  Bilateral lower extremity venous ultrasound was negative patient was chest pain-free with good blood pressure and pulse control at the time of discharge while on aspirin/Norvasc.  Lisinopril was DC'd.  3.  Right upper extremity superficial venous thrombus.  Patient had pain and swelling of the right upper extremity at the site of an IV.  Venous ultrasound revealed superficial venous thrombosis and this has improved by the time of discharge she is to continue aspirin for that.  4.  Dyslipidemia Crestor was maintained.  5.  Chronic diarrhea.  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  Status post GI consult.  Improved on by sequestrant.  The GI  examination remained benign  6.  Hypothyroidism.  TSH was normal she was maintained on her current replacement and this is to be followed up as an outpatient.  7.  Iron deficiency anemia.  This is mild and needs to be followed up as an outpatient.  There is no overt GI bleed  8.  Hypokalemia and acute kidney failure.  Hypokalemia resolved with substitution.  Acute kidney failure was thought to be secondary to prerenal azotemia because of sepsis/UTI/hydronephrosis and the patient was on ACE.  Acute kidney failure resolved with IV fluid and stopping lisinopril.  She was euvolemic with good blood pressure control at the time of discharge.  She is status post UTI treatment.  9.  Nocturnal hypoxemia.  As mentioned above CTA of the chest was negative for PE or infiltrate.  She was supplied with home oxygen.  This needs to be followed up as an outpatient with consideration of sleep study    At the time of discharge patient was asymptomatic and hemodynamically stable.  She is to follow-up with primary MD/urology/cardiology/hematology-oncology/GI as scheduled.  Consultants     Consult Orders (all) (From admission, onward)       Start     Ordered    07/14/25 1444  Inpatient Gastroenterology Consult  Once        Specialty:  Gastroenterology  Provider:  Amina Cardenas MD    07/14/25 1444    07/13/25 2243  [facility] (on-call MD unless specified) Details  Once        Specialty:  Hospitalist  Provider:  (Not yet assigned)    07/13/25 2242    07/13/25 2228  Urology (on-call MD unless specified)  Once        Specialty:  Urology  Provider:  Maged Pedroza MD    07/13/25 2227    07/13/25 2222  [facility] (on-call MD unless specified) Details  Once,   Status:  Canceled        Specialty:  Hospitalist  Provider:  (Not yet assigned)    07/13/25 2221                  Procedures     Imaging Results (All)       Procedure Component Value Units Date/Time    FL C Arm During Surgery [[id number]] Resulted: 07/14/25 0027     Updated: 07/14/25 0027     Narrative:      This procedure was auto-finalized with no dictation required.    CT Abdomen Pelvis With Contrast [[id number]] Collected: 07/13/25 2206     Updated: 07/13/25 2218    Narrative:      CTA CHEST, CT ABDOMEN AND PELVIS     HISTORY: Pulmonary Embolism; R07.9-Chest pain, unspecified; R79.89-Other  specified abnormal findings of blood chemistry; E87.6-Hypokalemia;  R19.7-Diarrhea, unspecified; D72.829-Elevated white blood cell count,  unspecified; A41.9-Sepsis, unspecified organism     COMPARISON: None     TECHNIQUE: CT angiography was performed of the chest with axial images  as well as coronal and sagittal reformatted MIP images provided  following the administration of IV contrast.  3-D surface rendered  reformats were obtained of the pulmonary arteries and aorta.  CT of the  abdomen and pelvis obtained following administration of IV contrast.  Coronal and sagittal reconstructions were obtained.   Radiation dose  reduction techniques were utilized, including automated exposure  control, and exposure modulation based on body size.     FINDINGS:     Chest CTA:  There is mild bibasilar atelectasis, but there is no  evidence of acute pulmonary infiltrate, pleural effusion, pneumothorax  or suspicious nodule.  The thoracic aorta is normal in caliber, and  there is no evidence of dissection.  There are coronary atherosclerotic  vascular calcifications.  There is no suspicious mediastinal adenopathy  or other mass, though there is a small to moderate hiatal hernia.  Bolus  timing is adequate, and there is no evidence of pulmonary embolism.     Abdomen: The liver and gallbladder are normal.  The spleen and pancreas  appear normal.  Both adrenal glands are normal. There is no evidence of  bowel obstruction. The aorta is normal in caliber.       : There is mild atrophic change in the right kidney and there is a  right renal 4 mm nonobstructing calculus, but there is no right  hydronephrosis. No left renal calculi  are seen, but there is mild to  moderate left hydronephrosis, and the left ureter is dilated down into  the pelvis, to the level of an approximately 4 mm stone, 8 to 10 cm  above the ureterovesical junction. No bladder calculi are seen.     Pelvis:  The appendix is clearly identified, and is normal.  There is  diverticulosis, but there is no CT evidence of diverticulitis.  There is  no pelvic adenopathy or other soft tissue mass.  There is no CT evidence  of hernia or bowel obstruction.     There is no acute bony abnormality.       Impression:         1.  Pulmonary arteries are well-opacified, and there is no evidence of  pulmonary embolism.     2.  There is minimal bibasilar dependent atelectasis, but the lung  parenchyma is otherwise normal. No acute pulmonary parenchymal  abnormality is seen.     3.   Mild to moderate left hydronephrosis secondary to a 4 mm stone in  the distal left ureter, 8 to 10 cm above the ureterovesical junction.                 This report was finalized on 7/13/2025 10:15 PM by Dr. Wojciech Roth M.D on Workstation: [id number]       CT Angiogram Chest Pulmonary Embolism [[id number]] Collected: 07/13/25 2206     Updated: 07/13/25 2218    Narrative:      CTA CHEST, CT ABDOMEN AND PELVIS     HISTORY: Pulmonary Embolism; R07.9-Chest pain, unspecified; R79.89-Other  specified abnormal findings of blood chemistry; E87.6-Hypokalemia;  R19.7-Diarrhea, unspecified; D72.829-Elevated white blood cell count,  unspecified; A41.9-Sepsis, unspecified organism     COMPARISON: None     TECHNIQUE: CT angiography was performed of the chest with axial images  as well as coronal and sagittal reformatted MIP images provided  following the administration of IV contrast.  3-D surface rendered  reformats were obtained of the pulmonary arteries and aorta.  CT of the  abdomen and pelvis obtained following administration of IV contrast.  Coronal and sagittal reconstructions were obtained.   Radiation  dose  reduction techniques were utilized, including automated exposure  control, and exposure modulation based on body size.     FINDINGS:     Chest CTA:  There is mild bibasilar atelectasis, but there is no  evidence of acute pulmonary infiltrate, pleural effusion, pneumothorax  or suspicious nodule.  The thoracic aorta is normal in caliber, and  there is no evidence of dissection.  There are coronary atherosclerotic  vascular calcifications.  There is no suspicious mediastinal adenopathy  or other mass, though there is a small to moderate hiatal hernia.  Bolus  timing is adequate, and there is no evidence of pulmonary embolism.     Abdomen: The liver and gallbladder are normal.  The spleen and pancreas  appear normal.  Both adrenal glands are normal. There is no evidence of  bowel obstruction. The aorta is normal in caliber.       : There is mild atrophic change in the right kidney and there is a  right renal 4 mm nonobstructing calculus, but there is no right  hydronephrosis. No left renal calculi are seen, but there is mild to  moderate left hydronephrosis, and the left ureter is dilated down into  the pelvis, to the level of an approximately 4 mm stone, 8 to 10 cm  above the ureterovesical junction. No bladder calculi are seen.     Pelvis:  The appendix is clearly identified, and is normal.  There is  diverticulosis, but there is no CT evidence of diverticulitis.  There is  no pelvic adenopathy or other soft tissue mass.  There is no CT evidence  of hernia or bowel obstruction.     There is no acute bony abnormality.       Impression:         1.  Pulmonary arteries are well-opacified, and there is no evidence of  pulmonary embolism.     2.  There is minimal bibasilar dependent atelectasis, but the lung  parenchyma is otherwise normal. No acute pulmonary parenchymal  abnormality is seen.     3.   Mild to moderate left hydronephrosis secondary to a 4 mm stone in  the distal left ureter, 8 to 10 cm above the  ureterovesical junction.                 This report was finalized on 7/13/2025 10:15 PM by Dr. Wojciech Roth M.D on Workstation: [id number]       XR Chest 1 View [[id number]] Collected: 07/13/25 1831     Updated: 07/13/25 1838    Narrative:      XR CHEST 1 VW-     HISTORY: Chest pain. Reported recent heart attack.     COMPARISON: Chest radiograph 6/23/2025     FINDINGS: A single view of the chest was obtained.     Support Devices:  None.  Cardiac Silhouette/Mediastinum/Wendie:  The cardiac, mediastinal, and  hilar contours are within normal limits.  Lungs/Pleural Spaces:  The lungs and pleural spaces are clear.  Chest Wall/Diaphragm/Upper Abdomen: There is a hiatal hernia. The  visualized osseous structures are similar.        CONCLUSION(S):       1.  No focal consolidation or effusion.  2.  Hiatal hernia.     This report was finalized on 7/13/2025 6:35 PM by Dr. La Blount M.D  on Workstation: [id number]               Pertinent Labs     Results from last 7 days   Lab Units 07/17/25  0417 07/16/25  0231 07/15/25  0532 07/14/25  0846   WBC 10*3/mm3 8.62 11.14* 15.69* 13.19*   HEMOGLOBIN g/dL 11.6* 10.1* 10.5* 11.2*   PLATELETS 10*3/mm3 298 288 260 242     Results from last 7 days   Lab Units 07/17/25  0417 07/16/25  0231 07/15/25  1657 07/15/25  0532 07/14/25  0846   SODIUM mmol/L 142 142  --  140 141   POTASSIUM mmol/L 4.1 4.3 3.2* 3.4* 3.8   CHLORIDE mmol/L 106 112*  --  109* 108*   CO2 mmol/L 24.2 22.6  --  21.0* 23.4   BUN mg/dL 11.0 15.0  --  20.0 15.0   CREATININE mg/dL 0.98 1.00  --  1.05* 0.97   GLUCOSE mg/dL 91 106*  --  131* 208*   Estimated Creatinine Clearance: 54.8 mL/min (by C-G formula based on SCr of 0.98 mg/dL).  Results from last 7 days   Lab Units 07/15/25  0532 07/13/25  1753   ALBUMIN g/dL 3.2* 3.7   BILIRUBIN mg/dL 0.2 0.5   ALK PHOS U/L 75 78   AST (SGOT) U/L 14 20   ALT (SGPT) U/L 11 17     Results from last 7 days   Lab Units 07/17/25  0417 07/16/25  0231 07/15/25  0532  "07/14/25  0846 07/13/25  1753   CALCIUM mg/dL 8.8 8.7 8.7 8.6 9.1   ALBUMIN g/dL  --   --  3.2*  --  3.7   MAGNESIUM mg/dL  --   --   --   --  1.8       Results from last 7 days   Lab Units 07/14/25  0846 07/13/25  1905 07/13/25  1753   HSTROP T ng/L 48* 64* 79*   D DIMER QUANT MCGFEU/mL  --   --  1.28*           Invalid input(s): \"LDLCALC\"  Results from last 7 days   Lab Units 07/13/25  2228 07/13/25  1906 07/13/25  1905   BLOODCX   --  No growth at 3 days No growth at 3 days   URINECX  No growth  --   --      Imaging Results (Last 24 Hours)       ** No results found for the last 24 hours. **            Test Results Pending at Discharge     Pending Labs       Order Current Status    Blood Culture - Blood, Arm, Left Preliminary result    Blood Culture - Blood, Arm, Right Preliminary result              Discharge Exam   Physical Exam  Vitals temperature 98.1 a pulse of 76 respiratory rate of 18 and blood pressure 134/73 and O2 sats of 98% on room air  General.  Middle-aged female.  She is alert and oriented x 4.  In no apparent pain/distress/diaphoresis.  Normal mood and affect  Eyes.  Pupils equal round and reactive.  Intact extraocular musculature.  No pallor or jaundice  Oral cavity.  Moist mucous membrane  Neck.  Supple.  No JVD.  No lymphadenopathy or thyromegaly  Cardio vascular.  Regular rate and rhythm with no gallops or murmurs  Chest.  Clear to auscultation bilaterally with no added sounds.  Abdomen.  Soft lax.  No tenderness.  No organomegaly.  No guarding or rebound  .  No CVA tenderness  Extremities.  Trace bilateral lower extremity edema.  +1 right upper extremity edema with tenderness on palpation no clubbing or cyanosis  CNS.  No acute focal neurological deficits        Discharge Details        Discharge Medications        New Medications        Instructions Start Date   amoxicillin-clavulanate 875-125 MG per tablet  Commonly known as: AUGMENTIN   1 tablet, Oral, 2 Times Daily      naloxone 4 " MG/0.1ML nasal spray  Commonly known as: NARCAN   Call 911. Don't prime. [place] in 1 nostril for overdose. Repeat in 2-3 minutes in other nostril if no or minimal breathing/responsiveness.      oxybutynin XL 10 MG 24 hr tablet  Commonly known as: DITROPAN-XL   10 mg, Oral, Daily   Start Date: July 18, 2025     oxyCODONE-acetaminophen 5-325 MG per tablet  Commonly known as: PERCOCET   1 tablet, Oral, Every 6 Hours PRN             Changes to Medications        Instructions Start Date   amLODIPine 5 MG tablet  Commonly known as: NORVASC  What changed: when to take this   5 mg, Oral, Daily      DULoxetine 60 MG capsule  Commonly known as: CYMBALTA  What changed: when to take this   60 mg, Oral, Daily             Continue These Medications        Instructions Start Date   ALPRAZolam 0.5 MG tablet  Commonly known as: Xanax   0.5 mg, Oral, Daily PRN, D/c previous order      aspirin 81 MG chewable tablet   81 mg, Oral, Daily      colestipol 1 g tablet  Commonly known as: COLESTID   2 g, Oral, 2 Times Daily      levothyroxine 75 MCG tablet  Commonly known as: SYNTHROID, LEVOTHROID   75 mcg, Oral, Daily      rosuvastatin 20 MG tablet  Commonly known as: CRESTOR   20 mg, Oral, Daily      valACYclovir 500 MG tablet  Commonly known as: VALTREX   500 mg, As Needed      vitamin C 250 MG tablet  Commonly known as: ASCORBIC ACID   500 mg, Nightly             Stop These Medications      Cranberry 125 MG tablet     lisinopril 20 MG tablet  Commonly known as: PRINIVIL,ZESTRIL     naproxen 500 MG tablet  Commonly known as: NAPROSYN              No Known Allergies      Discharge Disposition:  Condition: Stable    Diet:   Diet Order   Procedures    Diet: Gastrointestinal; Fiber-Restricted; Fluid Consistency: Thin (IDDSI 0)       Activity:   Activity Instructions       Activity as Tolerated                CODE STATUS:    Code Status and Medical Interventions: CPR (Attempt to Resuscitate); Full Support   Ordered at: 07/13/25 7276     Code  Status (Patient has no pulse and is not breathing):    CPR (Attempt to Resuscitate)     Medical Interventions (Patient has pulse or is breathing):    Full Support       Future Appointments   Date Time Provider Department Center   7/29/2025  8:00 AM LAB CHAIR 2 [facility]  [facility]   8/4/2025  8:40 AM VITALS ONLY [facility]   8/4/2025  9:00 AM Jerman Dee II, MD [facility]   8/8/2025 12:20 PM Olga Durand MD [id number]     Additional Instructions for the Follow-ups that You Need to Schedule       Call MD With Problems / Concerns   As directed      Instructions: Call MD or return to ER if abdominal pain/nausea or vomiting/dysuria/fever or chills/hematuria/chest pain/shortness of breath    Order Comments: Instructions: Call MD or return to ER if abdominal pain/nausea or vomiting/dysuria/fever or chills/hematuria/chest pain/shortness of breath         Discharge Follow-up with PCP   As directed       Currently Documented PCP:    Jerman Virgen MD    PCP Phone Number:    [phone]     Follow Up Details: 1 week.  Left ureteral stone with hydronephrosis/urosepsis/acute kidney failure and hypokalemia/nocturnal hypoxemia/right upper extremity SVT/dyslipidemia/CAD/hypertension/chronic diarrhea/hypothyroid        Discharge Follow-up with Specialty: Hematology oncology [place] as scheduled.  Iron deficiency anemia   As directed      Specialty: Hematology oncology [place] as scheduled.  Iron deficiency anemia        Discharge Follow-up with Specified Provider: Cardiology.  As scheduled   As directed      To: Cardiology.  As scheduled   Follow Up Details: CAD and hypertension        Discharge Follow-up with Specified Provider: GI.  As scheduled.  Iron deficiency anemia   As directed      To: GI.  As scheduled.  Iron deficiency anemia        Discharge Follow-up with Specified Provider: Urology.  As scheduled.  10 days   As directed      To: Urology.  As scheduled.  10 days    Follow Up Details: Right ureteral stone with hydronephrosis and urosepsis               Follow-up Information       Jerman Virgen MD .    Specialty: Family Medicine  Why: 1 week.  Left ureteral stone with hydronephrosis/urosepsis/acute kidney failure and hypokalemia/nocturnal hypoxemia/right upper extremity SVT/dyslipidemia/CAD/hypertension/chronic diarrhea/hypothyroid  Contact information:  [address] [facility] [address]  [place] [id number]  [phone]                               Time Spent on Discharge:  Greater than 30 minutes      Jd Avila MD  [facility]  07/17/25  11:44 EDT     Electronically signed by Jd Avila MD at 07/17/25 1210       Discharge Order (From admission, onward)       Start     Ordered    07/17/25 1136  Discharge patient  Once        Expected Discharge Date: 07/17/25   Expected Discharge Time: Morning   Discharge Disposition: Home or Self Care   Physician of Record for Attribution - Please select from Treatment Team: JD AVILA [[id number]]   Review needed by CMO to determine Physician of Record: No      Question Answer Comment   Physician of Record for Attribution - Please select from Treatment Team JD AVILA    Review needed by CMO to determine Physician of Record No        07/17/25 1142                    Rani Walker MSW     Case Management     Case Management/Social Work     Signed     Date of Service: 07/17/25 1150  Creation Time: 07/17/25 1150     Signed         Case Management Discharge Note        Final Note: Home with spouse. [employer] to deliver oxygen to the patients home. Family transport.     Provided Post Acute Provider List?: N/A (Plan is home)  Provided Post Acute Provider Quality & Resource List?: N/A (Plan is home)     Selected Continued Care - Admitted Since 7/13/2025         Destination    No services have been selected for the patient.                    Durable Medical Equipment    No services have been  selected for the patient.                    Dialysis/Infusion    No services have been selected for the patient.                    Home Medical Care    No services have been selected for the patient.                    Therapy    No services have been selected for the patient.                    Community Resources    No services have been selected for the patient.                    [facility]    No services have been selected for the patient.                         Transportation Services  Transportation: Private Transportation  Private: Car     Final Discharge Disposition Code: 01 - home or self-care

## 2025-07-17 NOTE — OUTREACH NOTE
Prep Survey      Flowsheet Row Responses   [facility], [place], operated by [facility] patient discharged from? [place]   Is LACE score < 7 ? No   Eligibility [facility]   Date of Admission 07/13/25   Date of Discharge 07/17/25   Discharge Disposition Home or Self Care   Discharge diagnosis Left ureteral stone   Does the patient have one of the following disease processes/diagnoses(primary or secondary)? General Surgery   Does the patient have Home health ordered? No   Is there a DME ordered? Yes   What DME was ordered? Oxygen Therapy--[facility]   Medication alerts for this patient see AVS   Prep survey completed? Yes            Diane FRANCISCO - Registered Nurse

## 2025-07-18 ENCOUNTER — TRANSITIONAL CARE MANAGEMENT TELEPHONE ENCOUNTER (OUTPATIENT)
Dept: CALL CENTER | Facility: HOSPITAL | Age: 65
End: 2025-07-18
Payer: COMMERCIAL

## 2025-07-18 LAB
BACTERIA SPEC AEROBE CULT: NORMAL
BACTERIA SPEC AEROBE CULT: NORMAL

## 2025-07-18 NOTE — OUTREACH NOTE
Call Center TCM Note      Flowsheet Row Responses   [facility] patient discharged from? [place]   Does the patient have one of the following disease processes/diagnoses(primary or secondary)? General Surgery   TCM attempt successful? Yes   Call start time 1127   Call end time 1129   Discharge diagnosis Left ureteral stone   Person spoke with today (if not patient) and relationship Patient   Meds reviewed with patient/caregiver? Yes   Does the patient have all medications related to this admission filled (includes all antibiotics, pain medications, etc.) Yes   Prescription comments START taking:  amoxicillin-clavulanate (AUGMENTIN)  naloxone (NARCAN)  oxybutynin XL (DITROPAN-XL)  Start taking on: July 18, 2025  oxyCODONE-acetaminophen (PERCOCET)  STOP taking:  Cranberry 125 MG tablet  lisinopril 20 MG tablet (PRINIVIL,ZESTRIL)  naproxen 500 MG tablet (NAPROSYN)   Is the patient taking all medications as directed (includes completed medication regime)? Yes   Comments Patient states she will call office later to schedule fu appt- need work schedule and her schedule before calling.   Does the patient have an appointment with their PCP within 7-14 days of discharge? Other   Nursing Interventions Routed TCM call to PCP office   Has home health visited the patient within 72 hours of discharge? N/A   What DME was ordered? Oxygen Therapy--[facility]   Psychosocial issues? No   Did the patient receive a copy of their discharge instructions? Yes   Nursing interventions Reviewed instructions with patient   What is the patient's perception of their health status since discharge? Improving   Nursing interventions Nurse provided patient education   Is the patient/caregiver able to teach back signs and symptoms of incisional infection? Fever   Is the patient/caregiver able to teach back steps to recovery at home? Set small, achievable goals for return to baseline health, Rest and rebuild strength, gradually increase  activity   Is the patient/caregiver able to teach back the hierarchy of who to call/visit for symptoms/problems? PCP, Specialist, Home health nurse, Urgent Care, ED, 911 Yes   TCM call completed? Yes   Wrap up additional comments Patient reports doing well. Stent removal at the end of the month. No concerns or questions noted.   Call end time 1129   Would this patient benefit from a Referral to [facility] Social Work? No   Is the patient interested in additional calls from an ambulatory ? No            Julia ENNIS - Registered Nurse    7/18/2025, 11:31 EDT

## 2025-07-24 ENCOUNTER — TELEPHONE (OUTPATIENT)
Age: 65
End: 2025-07-24
Payer: COMMERCIAL

## 2025-07-29 ENCOUNTER — LAB (OUTPATIENT)
Dept: LAB | Facility: HOSPITAL | Age: 65
End: 2025-07-29
Payer: COMMERCIAL

## 2025-07-29 DIAGNOSIS — D50.9 IRON DEFICIENCY ANEMIA, UNSPECIFIED IRON DEFICIENCY ANEMIA TYPE: ICD-10-CM

## 2025-07-29 DIAGNOSIS — I25.10 CORONARY ARTERY DISEASE INVOLVING NATIVE CORONARY ARTERY OF NATIVE HEART WITHOUT ANGINA PECTORIS: ICD-10-CM

## 2025-07-29 DIAGNOSIS — D47.2 MONOCLONAL GAMMOPATHY: ICD-10-CM

## 2025-07-29 DIAGNOSIS — E78.2 MIXED HYPERLIPIDEMIA: Chronic | ICD-10-CM

## 2025-07-29 LAB
BASOPHILS # BLD AUTO: 0.11 10*3/MM3 (ref 0–0.2)
BASOPHILS NFR BLD AUTO: 1.1 % (ref 0–1.5)
CHOLEST SERPL-MCNC: 161 MG/DL (ref 0–200)
DEPRECATED RDW RBC AUTO: 45 FL (ref 37–54)
EOSINOPHIL # BLD AUTO: 0.24 10*3/MM3 (ref 0–0.4)
EOSINOPHIL NFR BLD AUTO: 2.5 % (ref 0.3–6.2)
ERYTHROCYTE [DISTWIDTH] IN BLOOD BY AUTOMATED COUNT: 13.3 % (ref 12.3–15.4)
FERRITIN SERPL-MCNC: 349 NG/ML (ref 13–150)
HCT VFR BLD AUTO: 40.7 % (ref 34–46.6)
HDLC SERPL-MCNC: 44 MG/DL (ref 40–60)
HGB BLD-MCNC: 12.6 G/DL (ref 12–15.9)
HGB RETIC QN AUTO: 33.4 PG (ref 29.8–36.1)
IMM GRANULOCYTES # BLD AUTO: 0.03 10*3/MM3 (ref 0–0.05)
IMM GRANULOCYTES NFR BLD AUTO: 0.3 % (ref 0–0.5)
IMM RETICS NFR: 11 % (ref 3–15.8)
IRON 24H UR-MRATE: 49 MCG/DL (ref 37–145)
IRON SATN MFR SERPL: 17 % (ref 20–50)
LDLC SERPL CALC-MCNC: 89 MG/DL (ref 0–100)
LDLC/HDLC SERPL: 1.91 {RATIO}
LYMPHOCYTES # BLD AUTO: 3.77 10*3/MM3 (ref 0.7–3.1)
LYMPHOCYTES NFR BLD AUTO: 39.1 % (ref 19.6–45.3)
MCH RBC QN AUTO: 28.6 PG (ref 26.6–33)
MCHC RBC AUTO-ENTMCNC: 31 G/DL (ref 31.5–35.7)
MCV RBC AUTO: 92.5 FL (ref 79–97)
MONOCYTES # BLD AUTO: 0.84 10*3/MM3 (ref 0.1–0.9)
MONOCYTES NFR BLD AUTO: 8.7 % (ref 5–12)
NEUTROPHILS NFR BLD AUTO: 4.66 10*3/MM3 (ref 1.7–7)
NEUTROPHILS NFR BLD AUTO: 48.3 % (ref 42.7–76)
NRBC BLD AUTO-RTO: 0 /100 WBC (ref 0–0.2)
PLATELET # BLD AUTO: 401 10*3/MM3 (ref 140–450)
PMV BLD AUTO: 9.4 FL (ref 6–12)
RBC # BLD AUTO: 4.4 10*6/MM3 (ref 3.77–5.28)
RETICS # AUTO: 0.07 10*6/MM3 (ref 0.02–0.13)
RETICS/RBC NFR AUTO: 1.51 % (ref 0.7–1.9)
TIBC SERPL-MCNC: 295 MCG/DL (ref 298–536)
TRANSFERRIN SERPL-MCNC: 198 MG/DL (ref 200–360)
TRIGL SERPL-MCNC: 164 MG/DL (ref 0–150)
VLDLC SERPL-MCNC: 28 MG/DL (ref 5–40)
WBC NRBC COR # BLD AUTO: 9.65 10*3/MM3 (ref 3.4–10.8)

## 2025-07-29 PROCEDURE — 85046 RETICYTE/HGB CONCENTRATE: CPT

## 2025-07-29 PROCEDURE — 83540 ASSAY OF IRON: CPT

## 2025-07-29 PROCEDURE — 84466 ASSAY OF TRANSFERRIN: CPT

## 2025-07-29 PROCEDURE — 85025 COMPLETE CBC W/AUTO DIFF WBC: CPT

## 2025-07-29 PROCEDURE — 82728 ASSAY OF FERRITIN: CPT

## 2025-07-29 PROCEDURE — 80061 LIPID PANEL: CPT | Performed by: NURSE PRACTITIONER

## 2025-07-30 LAB — LPA SERPL-SCNC: 8.8 NMOL/L

## 2025-08-01 LAB — APO B SERPL-MCNC: 90 MG/DL

## 2025-08-04 ENCOUNTER — OFFICE VISIT (OUTPATIENT)
Dept: ONCOLOGY | Facility: CLINIC | Age: 65
End: 2025-08-04
Payer: COMMERCIAL

## 2025-08-04 ENCOUNTER — OFFICE VISIT (OUTPATIENT)
Dept: FAMILY MEDICINE CLINIC | Facility: CLINIC | Age: 65
End: 2025-08-04
Payer: COMMERCIAL

## 2025-08-04 VITALS
SYSTOLIC BLOOD PRESSURE: 122 MMHG | DIASTOLIC BLOOD PRESSURE: 74 MMHG | TEMPERATURE: 97.7 F | RESPIRATION RATE: 20 BRPM | BODY MASS INDEX: 26.12 KG/M2 | OXYGEN SATURATION: 99 % | HEART RATE: 96 BPM | WEIGHT: 153 LBS | HEIGHT: 64 IN

## 2025-08-04 VITALS
HEIGHT: 64 IN | OXYGEN SATURATION: 96 % | TEMPERATURE: 98.7 F | SYSTOLIC BLOOD PRESSURE: 107 MMHG | DIASTOLIC BLOOD PRESSURE: 75 MMHG | HEART RATE: 90 BPM | BODY MASS INDEX: 25.86 KG/M2 | WEIGHT: 151.5 LBS

## 2025-08-04 DIAGNOSIS — E03.9 ACQUIRED HYPOTHYROIDISM: Chronic | ICD-10-CM

## 2025-08-04 DIAGNOSIS — D47.2 IGG LAMBDA MONOCLONAL GAMMOPATHY: Primary | ICD-10-CM

## 2025-08-04 DIAGNOSIS — I80.9 SUPERFICIAL PHLEBITIS: ICD-10-CM

## 2025-08-04 DIAGNOSIS — E87.6 HYPOKALEMIA: ICD-10-CM

## 2025-08-04 DIAGNOSIS — D50.9 IRON DEFICIENCY ANEMIA, UNSPECIFIED IRON DEFICIENCY ANEMIA TYPE: ICD-10-CM

## 2025-08-04 DIAGNOSIS — Z09 HOSPITAL DISCHARGE FOLLOW-UP: ICD-10-CM

## 2025-08-04 DIAGNOSIS — N20.1 LEFT URETERAL STONE: Primary | ICD-10-CM

## 2025-08-04 DIAGNOSIS — N17.9 AKI (ACUTE KIDNEY INJURY): ICD-10-CM

## 2025-08-04 DIAGNOSIS — Z79.899 MEDICATION MANAGEMENT: ICD-10-CM

## 2025-08-04 DIAGNOSIS — I10 BENIGN ESSENTIAL HYPERTENSION: Chronic | ICD-10-CM

## 2025-08-04 DIAGNOSIS — F39 MOOD DISORDER: Chronic | ICD-10-CM

## 2025-08-04 DIAGNOSIS — G47.34 NOCTURNAL HYPOXEMIA: ICD-10-CM

## 2025-08-04 DIAGNOSIS — M79.7 FIBROMYALGIA: Chronic | ICD-10-CM

## 2025-08-04 PROCEDURE — 99214 OFFICE O/P EST MOD 30 MIN: CPT | Performed by: FAMILY MEDICINE

## 2025-08-04 PROCEDURE — 99214 OFFICE O/P EST MOD 30 MIN: CPT | Performed by: INTERNAL MEDICINE

## 2025-08-04 RX ORDER — AMLODIPINE BESYLATE 5 MG/1
5 TABLET ORAL DAILY
Qty: 90 TABLET | Refills: 1 | Status: SHIPPED | OUTPATIENT
Start: 2025-08-04

## 2025-08-04 RX ORDER — LEVOTHYROXINE SODIUM 75 UG/1
75 TABLET ORAL DAILY
Qty: 90 TABLET | Refills: 1 | Status: SHIPPED | OUTPATIENT
Start: 2025-08-04

## 2025-08-04 RX ORDER — OXYCODONE AND ACETAMINOPHEN 5; 325 MG/1; MG/1
2 TABLET ORAL EVERY 4 HOURS PRN
COMMUNITY
Start: 2025-07-31

## 2025-08-04 RX ORDER — DULOXETIN HYDROCHLORIDE 60 MG/1
60 CAPSULE, DELAYED RELEASE ORAL DAILY
Qty: 90 CAPSULE | Refills: 1 | Status: SHIPPED | OUTPATIENT
Start: 2025-08-04

## 2025-08-04 RX ORDER — ALPRAZOLAM 0.5 MG
0.5 TABLET ORAL DAILY PRN
Qty: 30 TABLET | Refills: 2 | Status: SHIPPED | OUTPATIENT
Start: 2025-08-04

## 2025-08-06 LAB
1OH-MIDAZOLAM UR QL SCN: NOT DETECTED NG/MG CREAT
6MAM UR QL SCN: NEGATIVE NG/ML
7AMINOCLONAZEPAM/CREAT UR: NOT DETECTED NG/MG CREAT
A-OH ALPRAZ/CREAT UR: NOT DETECTED NG/MG CREAT
A-OH-TRIAZOLAM/CREAT UR CFM: NOT DETECTED NG/MG CREAT
ALPRAZ/CREAT UR CFM: NOT DETECTED NG/MG CREAT
AMPHETAMINES UR QL SCN: NEGATIVE NG/ML
BARBITURATES UR QL SCN: NEGATIVE NG/ML
BENZODIAZ SCN METH UR: NEGATIVE
BUPRENORPHINE UR QL SCN: NEGATIVE
BUPRENORPHINE/CREAT UR: NOT DETECTED NG/MG CREAT
CANNABINOIDS UR QL SCN: NEGATIVE NG/ML
CLONAZEPAM/CREAT UR CFM: NOT DETECTED NG/MG CREAT
COCAINE+BZE UR QL SCN: NEGATIVE NG/ML
CODEINE UR CFM-MCNC: NOT DETECTED NG/MG CREAT
CREAT UR-MCNC: 142 MG/DL
DESALKYLFLURAZ/CREAT UR: NOT DETECTED NG/MG CREAT
DHC/CREAT UR: NOT DETECTED NG/MG CREAT
DIAZEPAM/CREAT UR: NOT DETECTED NG/MG CREAT
ETHANOL UR QL SCN: NEGATIVE G/DL
FENTANYL CTO UR SCN-MCNC: NORMAL NG/ML
FENTANYL/CREAT UR: NOT DETECTED NG/MG CREAT
FLUNITRAZEPAM UR QL SCN: NOT DETECTED NG/MG CREAT
HYDROCODONE UR CFM-MCNC: NOT DETECTED NG/MG CREAT
HYDROMORPHONE UR CFM-MCNC: NOT DETECTED NG/MG CREAT
LORAZEPAM/CREAT UR: NOT DETECTED NG/MG CREAT
METHADONE UR QL SCN: NEGATIVE NG/ML
METHADONE+METAB UR QL SCN: NEGATIVE NG/ML
MIDAZOLAM/CREAT UR CFM: NOT DETECTED NG/MG CREAT
MORPHINE UR CFM-MCNC: NOT DETECTED NG/MG CREAT
NORBUPRENORPHINE/CREAT UR: NOT DETECTED NG/MG CREAT
NORCODEINE/CREAT UR CFM: NOT DETECTED NG/MG CREAT
NORDIAZEPAM/CREAT UR: NOT DETECTED NG/MG CREAT
NORFENTANYL/CREAT UR: 4 NG/MG CREAT
NORFLUNITRAZEPAM UR-MCNC: NOT DETECTED NG/MG CREAT
NORHYDROCODONE UR CFM-MCNC: NOT DETECTED NG/MG CREAT
NORMORPHINE UR-MCNC: NOT DETECTED NG/MG CREAT
NOROXYCODONE UR CFM-MCNC: 668 NG/MG CREAT
NOROXYMORPHONE/CREAT UR CFM: NOT DETECTED NG/MG CREAT
OPIATES UR QL CFM: NEGATIVE
OPIATES UR SCN-MCNC: NORMAL NG/ML
OXAZEPAM/CREAT UR: NOT DETECTED NG/MG CREAT
OXYCODONE CTO UR SCN-MCNC: NORMAL NG/ML
OXYCODONE UR CFM-MCNC: 194 NG/MG CREAT
OXYCODONE UR QL CFM: NORMAL
OXYMORPHONE UR CFM-MCNC: 61 NG/MG CREAT
PCP UR QL SCN: NEGATIVE NG/ML
PRESCRIBED MEDICATIONS: NORMAL
TAPENTADOL CTO UR SCN-MCNC: NEGATIVE NG/ML
TEMAZEPAM/CREAT UR: NOT DETECTED NG/MG CREAT
TRAMADOL UR QL SCN: NEGATIVE NG/ML

## 2025-08-08 ENCOUNTER — OFFICE VISIT (OUTPATIENT)
Dept: CARDIOLOGY | Age: 65
End: 2025-08-08
Payer: COMMERCIAL

## 2025-08-08 VITALS
SYSTOLIC BLOOD PRESSURE: 130 MMHG | OXYGEN SATURATION: 98 % | DIASTOLIC BLOOD PRESSURE: 84 MMHG | HEIGHT: 64 IN | BODY MASS INDEX: 26.4 KG/M2 | WEIGHT: 154.6 LBS | HEART RATE: 70 BPM

## 2025-08-08 DIAGNOSIS — I25.10 CORONARY ARTERY DISEASE INVOLVING NATIVE CORONARY ARTERY OF NATIVE HEART WITHOUT ANGINA PECTORIS: Primary | ICD-10-CM

## 2025-08-08 DIAGNOSIS — I10 BENIGN ESSENTIAL HYPERTENSION: ICD-10-CM

## 2025-08-08 DIAGNOSIS — E78.2 MIXED HYPERLIPIDEMIA: ICD-10-CM

## 2025-08-08 PROCEDURE — 99214 OFFICE O/P EST MOD 30 MIN: CPT | Performed by: INTERNAL MEDICINE

## 2025-08-08 PROCEDURE — 93000 ELECTROCARDIOGRAM COMPLETE: CPT | Performed by: INTERNAL MEDICINE

## 2025-08-12 ENCOUNTER — OFFICE VISIT (OUTPATIENT)
Dept: SLEEP MEDICINE | Facility: HOSPITAL | Age: 65
End: 2025-08-12
Payer: COMMERCIAL

## 2025-08-12 VITALS — OXYGEN SATURATION: 99 % | HEART RATE: 94 BPM | BODY MASS INDEX: 26.29 KG/M2 | WEIGHT: 154 LBS | HEIGHT: 64 IN

## 2025-08-12 DIAGNOSIS — G47.30 HYPERSOMNIA WITH SLEEP APNEA: ICD-10-CM

## 2025-08-12 DIAGNOSIS — G47.10 HYPERSOMNIA WITH SLEEP APNEA: ICD-10-CM

## 2025-08-12 DIAGNOSIS — G47.34 NOCTURNAL HYPOXEMIA: Primary | ICD-10-CM

## 2025-08-12 DIAGNOSIS — G47.36 HYPOXEMIA ASSOCIATED WITH SLEEP: ICD-10-CM

## 2025-08-12 PROCEDURE — 99214 OFFICE O/P EST MOD 30 MIN: CPT | Performed by: INTERNAL MEDICINE

## 2025-08-12 PROCEDURE — G0463 HOSPITAL OUTPT CLINIC VISIT: HCPCS

## 2025-08-13 PROBLEM — G47.10 HYPERSOMNIA WITH SLEEP APNEA: Status: ACTIVE | Noted: 2025-08-13

## 2025-08-13 PROBLEM — G47.36 HYPOXEMIA ASSOCIATED WITH SLEEP: Status: ACTIVE | Noted: 2025-08-13

## 2025-08-13 PROBLEM — G47.30 HYPERSOMNIA WITH SLEEP APNEA: Status: ACTIVE | Noted: 2025-08-13

## 2025-08-18 ENCOUNTER — HOSPITAL ENCOUNTER (OUTPATIENT)
Dept: SLEEP MEDICINE | Facility: HOSPITAL | Age: 65
Discharge: HOME | End: 2025-08-18
Admitting: FAMILY MEDICINE
Payer: COMMERCIAL

## 2025-08-18 DIAGNOSIS — G47.8 NON-RESTORATIVE SLEEP: ICD-10-CM

## 2025-08-18 DIAGNOSIS — E66.3 OVERWEIGHT WITH BODY MASS INDEX (BMI) 25.0-29.9: ICD-10-CM

## 2025-08-18 DIAGNOSIS — G47.30 SLEEP APNEA, UNSPECIFIED TYPE: ICD-10-CM

## 2025-08-18 DIAGNOSIS — R06.83 SNORING: ICD-10-CM

## 2025-08-18 DIAGNOSIS — G47.10 HYPERSOMNIA: ICD-10-CM

## 2025-08-18 PROCEDURE — G0399 HOME SLEEP TEST/TYPE 3 PORTA: HCPCS

## 2025-08-21 DIAGNOSIS — G47.33 OSA (OBSTRUCTIVE SLEEP APNEA): Primary | ICD-10-CM

## 2025-08-21 DIAGNOSIS — R06.83 SNORING: ICD-10-CM

## 2025-08-22 ENCOUNTER — TELEPHONE (OUTPATIENT)
Dept: SLEEP MEDICINE | Facility: HOSPITAL | Age: 65
End: 2025-08-22
Payer: COMMERCIAL

## (undated) DEVICE — ST IRR CYSTO W/SPK 77IN LF

## (undated) DEVICE — TR BAND RADIAL ARTERY COMPRESSION DEVICE: Brand: TR BAND

## (undated) DEVICE — SENSR O2 OXIMAX FNGR A/ 18IN NONSTR

## (undated) DEVICE — ADAPT CLN BIOGUARD AIR/H2O DISP

## (undated) DEVICE — SUT VICYL 2/0 CR8 18IN DYED J726D

## (undated) DEVICE — MARKR SKIN W/RULR AND LBL

## (undated) DEVICE — ENDOPATH PNEUMONEEDLE INSUFFLATION NEEDLES WITH LUER LOCK CONNECTORS 120MM: Brand: ENDOPATH

## (undated) DEVICE — APPL CHLORAPREP HI/LITE 26ML ORNG

## (undated) DEVICE — TUBING, SUCTION, 1/4" X 20', STRAIGHT: Brand: MEDLINE INDUSTRIES, INC.

## (undated) DEVICE — COVER,TABLE,HEAVY DUTY,79"X110",STRL: Brand: MEDLINE

## (undated) DEVICE — SUT VIC 0 CT1 36IN J946H

## (undated) DEVICE — 1016 S-DRAPE IRRIG POUCH 10/BOX: Brand: STERI-DRAPE™

## (undated) DEVICE — ANTIBACTERIAL UNDYED BRAIDED (POLYGLACTIN 910), SYNTHETIC ABSORBABLE SUTURE: Brand: COATED VICRYL

## (undated) DEVICE — DGW .035 FC J3MM 260CM TEF: Brand: EMERALD

## (undated) DEVICE — TUBING, SUCTION, 1/4" X 10', STRAIGHT: Brand: MEDLINE

## (undated) DEVICE — ENDOPATH XCEL WITH OPTIVIEW TECHNOLOGY BLADELESS TROCARS WITH STABILITY SLEEVES: Brand: ENDOPATH XCEL OPTIVIEW

## (undated) DEVICE — FRCP BX RADJAW4 NDL 2.8 240CM LG OG BX40

## (undated) DEVICE — BITEBLOCK OMNI BLOC

## (undated) DEVICE — GLV SURG BIOGEL LTX PF 8

## (undated) DEVICE — LOU PACE DEFIB: Brand: MEDLINE INDUSTRIES, INC.

## (undated) DEVICE — ENDOPATH XCEL BLADELESS TROCARS WITH STABILITY SLEEVES: Brand: ENDOPATH XCEL

## (undated) DEVICE — NITINOL WIRE WITH HYDROPHILIC TIP: Brand: SENSOR

## (undated) DEVICE — CATH DIAG IMPULSE FL3.5 5F 100CM

## (undated) DEVICE — SUT VIC 3/0 CTI 36IN J944H

## (undated) DEVICE — KT MANIFLD CARDIAC

## (undated) DEVICE — 3M™ STERI-STRIP™ REINFORCED ADHESIVE SKIN CLOSURES, R1546, 1/4 IN X 4 IN (6 MM X 100 MM), 10 STRIPS/ENVELOPE: Brand: 3M™ STERI-STRIP™

## (undated) DEVICE — SOL NACL 0.9PCT 1000ML

## (undated) DEVICE — JELLY,LUBE,STERILE,FLIP TOP,TUBE,4-OZ: Brand: MEDLINE

## (undated) DEVICE — LOU LAVH: Brand: MEDLINE INDUSTRIES, INC.

## (undated) DEVICE — DECANT BG O JET

## (undated) DEVICE — KT ORCA ORCAPOD DISP STRL

## (undated) DEVICE — INTENDED FOR TISSUE SEPARATION, AND OTHER PROCEDURES THAT REQUIRE A SHARP SURGICAL BLADE TO PUNCTURE OR CUT.: Brand: BARD-PARKER ® CARBON RIB-BACK BLADES

## (undated) DEVICE — TIDISHIELD UROLOGY DRAIN BAGS FROSTY VINYL STERILE FITS SIEMENS UROSKOP ACCESS 20 PER CASE: Brand: TIDISHIELD

## (undated) DEVICE — GLV SURG SIGNATURE ESSENTIAL PF LTX SZ7

## (undated) DEVICE — LN SMPL CO2 SHTRM SD STREAM W/M LUER

## (undated) DEVICE — CATH URETRL FLXITP POLLACK STD 5F 70CM

## (undated) DEVICE — DEV SUT GRSPR CLOSUR 15CM 14G

## (undated) DEVICE — SUT GORE THX36 CV2 36IN 2N07A

## (undated) DEVICE — SUT VIC 5/0 PS2 18IN J495H

## (undated) DEVICE — ADHS SKIN SURG TISS VISC PREMIERPRO EXOFIN HI/VISC FAST/DRY

## (undated) DEVICE — LAPAROSCOPIC SMOKE FILTRATION SYSTEM: Brand: PALL LAPAROSHIELD® PLUS LAPAROSCOPIC SMOKE FILTRATION SYSTEM

## (undated) DEVICE — THE TORRENT IRRIGATION SCOPE CONNECTOR IS USED WITH THE TORRENT IRRIGATION TUBING TO PROVIDE IRRIGATION FLUIDS SUCH AS STERILE WATER DURING GASTROINTESTINAL ENDOSCOPIC PROCEDURES WHEN USED IN CONJUNCTION WITH AN IRRIGATION PUMP (OR ELECTROSURGICAL UNIT).: Brand: TORRENT

## (undated) DEVICE — CATH VENT MIV RADL PIG ST TIP 5F 110CM

## (undated) DEVICE — SUT ETHIB 0/0 CT1 30IN X424H

## (undated) DEVICE — FEMORAL ENTRY ANGIOGRAPHY SHIELD-YELLOW: Brand: RADPAD

## (undated) DEVICE — GLV SURG BIOGEL LTX PF 6 1/2

## (undated) DEVICE — IRRIGATOR BULB ASEPTO 60CC STRL

## (undated) DEVICE — LOU CYSTO: Brand: MEDLINE INDUSTRIES, INC.

## (undated) DEVICE — METER,URINE,400ML,DRAIN BAG,L/F,LL,SLIDE: Brand: MEDLINE

## (undated) DEVICE — HARMONIC ACE +7 LAPAROSCOPIC SHEARS ADVANCED HEMOSTASIS 5MM DIAMETER 36CM SHAFT LENGTH  FOR USE WITH GRAY HAND PIECE ONLY: Brand: HARMONIC ACE

## (undated) DEVICE — CONTAINER,SPECIMEN,OR STERILE,4OZ: Brand: MEDLINE

## (undated) DEVICE — CATH DIAG IMPULSE FR4 5F 100CM

## (undated) DEVICE — 40585 XL ADVANCED TRENDELENBURG POSITIONING KIT: Brand: 40585 XL ADVANCED TRENDELENBURG POSITIONING KIT

## (undated) DEVICE — SYR LUERLOK 20CC BX/50

## (undated) DEVICE — PK CATH CARD 40

## (undated) DEVICE — CATHETER,FOLEY,100%SILICONE,18FR,10ML,LF: Brand: MEDLINE

## (undated) DEVICE — COUNT NDL MAG FM/BLCK 40COUNT

## (undated) DEVICE — GLIDESHEATH SLENDER STAINLESS STEEL KIT: Brand: GLIDESHEATH SLENDER

## (undated) DEVICE — CANNULA,ADULT,SOFT-TOUCH,7'TUBE,UC: Brand: PENDING

## (undated) DEVICE — SOL NACL 0.9PCT 100ML SGL

## (undated) DEVICE — SUT PDS 0 CT 36IN DYED Z358T

## (undated) DEVICE — Device: Brand: DEFENDO AIR/WATER/SUCTION AND BIOPSY VALVE

## (undated) DEVICE — COVER,MAYO STAND,STERILE: Brand: MEDLINE

## (undated) DEVICE — DEV COND GAS LAP INSUFLOW W/LUER CONN

## (undated) DEVICE — VIOLET POLYDIOXANONE POLYMER, SYNTHETIC ABSORBABLE SUTURE CLIPS: Brand: LAPRA-TY

## (undated) DEVICE — MSK PROC CURAPLEX O2 2/ADAPT 7FT